# Patient Record
Sex: MALE | Race: WHITE | NOT HISPANIC OR LATINO | Employment: OTHER | URBAN - METROPOLITAN AREA
[De-identification: names, ages, dates, MRNs, and addresses within clinical notes are randomized per-mention and may not be internally consistent; named-entity substitution may affect disease eponyms.]

---

## 2017-05-16 ENCOUNTER — OFFICE VISIT (OUTPATIENT)
Dept: ORTHOPEDIC SURGERY | Facility: CLINIC | Age: 62
End: 2017-05-16

## 2017-05-16 VITALS — TEMPERATURE: 97.9 F | BODY MASS INDEX: 34.65 KG/M2 | WEIGHT: 270 LBS | HEIGHT: 74 IN

## 2017-05-16 DIAGNOSIS — R52 PAIN: Primary | ICD-10-CM

## 2017-05-16 DIAGNOSIS — M75.101 TEAR OF RIGHT ROTATOR CUFF, UNSPECIFIED TEAR EXTENT: ICD-10-CM

## 2017-05-16 PROCEDURE — 20610 DRAIN/INJ JOINT/BURSA W/O US: CPT | Performed by: ORTHOPAEDIC SURGERY

## 2017-05-16 RX ORDER — DICLOFENAC SODIUM 75 MG/1
TABLET, DELAYED RELEASE ORAL
COMMUNITY
Start: 2017-05-06 | End: 2023-01-06

## 2017-05-16 RX ORDER — METHYLPREDNISOLONE ACETATE 80 MG/ML
80 INJECTION, SUSPENSION INTRA-ARTICULAR; INTRALESIONAL; INTRAMUSCULAR; SOFT TISSUE
Status: COMPLETED | OUTPATIENT
Start: 2017-05-16 | End: 2017-05-16

## 2017-05-16 RX ORDER — BUPIVACAINE HYDROCHLORIDE 2.5 MG/ML
4 INJECTION, SOLUTION INFILTRATION; PERINEURAL
Status: COMPLETED | OUTPATIENT
Start: 2017-05-16 | End: 2017-05-16

## 2017-05-16 RX ADMIN — METHYLPREDNISOLONE ACETATE 80 MG: 80 INJECTION, SUSPENSION INTRA-ARTICULAR; INTRALESIONAL; INTRAMUSCULAR; SOFT TISSUE at 13:03

## 2017-05-16 RX ADMIN — BUPIVACAINE HYDROCHLORIDE 4 ML: 2.5 INJECTION, SOLUTION INFILTRATION; PERINEURAL at 13:03

## 2019-05-06 ENCOUNTER — HOSPITAL ENCOUNTER (OUTPATIENT)
Dept: GENERAL RADIOLOGY | Facility: HOSPITAL | Age: 64
Discharge: HOME | End: 2019-05-06

## 2019-07-31 ENCOUNTER — HOSPITAL ENCOUNTER (OUTPATIENT)
Dept: MRI IMAGING | Facility: HOSPITAL | Age: 64
Discharge: HOME | End: 2019-07-31

## 2020-05-27 ENCOUNTER — OFFICE VISIT CONVERTED (OUTPATIENT)
Dept: NEUROSURGERY | Facility: CLINIC | Age: 65
End: 2020-05-27
Attending: PHYSICIAN ASSISTANT

## 2020-06-18 ENCOUNTER — HOSPITAL ENCOUNTER (OUTPATIENT)
Dept: MRI IMAGING | Facility: HOSPITAL | Age: 65
Discharge: HOME | End: 2020-06-18
Attending: PHYSICIAN ASSISTANT

## 2020-06-23 ENCOUNTER — OFFICE VISIT CONVERTED (OUTPATIENT)
Dept: NEUROSURGERY | Facility: CLINIC | Age: 65
End: 2020-06-23
Attending: PHYSICIAN ASSISTANT
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
## 2021-05-14 NOTE — PROGRESS NOTES
Progress Note      Patient Name: Jimmy Gabehart   Patient ID: [id number]   Sex: Male   YOB: 1955    Referring Provider: Miriam Mckeon DO    Visit Date: June 23, 2020    Provider: Vaishnavi Jamil PA-C   Location: [facility]   Location Address: [address] [place] [address]  [id number]   Location Phone: [phone]          Chief Complaint  · Follow-up      History Of Present Illness  The patient is a 65 year old male who is in the office for followup appointment.      MRI cervical spine showed reversal of the normal cervical lordosis.  He has a disc protrusion at C3/4, C5/6 and C6/7 with moderate central canal stenosis at C3/4  and foraminal stenosis at C5/6 and C6/7.  These were the most notable findings.  This has only slightly progressed from MRI over a year ago. He is still having pain in the right arm down to the thumb and index finger.  His pain in the arm is intermittent along with paresthesias at this point.  He would like to avoid surgery.       Medication List  Apple Cider Vinegar Plus [phone] mg-mcg-mg-mg oral tablet; B12 5,000-100 mcg sublingual lozenge; carvedilol 6.25 mg oral tablet; diclofenac sodium 75 mg oral tablet,delayed release (DR/EC); escitalopram oxalate 5 mg oral tablet; gabapentin 300 mg oral capsule; losartan 100 mg oral tablet; magnesium 250 mg oral tablet; metformin 500 mg oral tablet; oxycodone 5 mg oral capsule; pantoprazole 20 mg oral tablet,delayed release (DR/EC); tamsulosin 0.4 mg oral capsule; Vitamin C 500 mg oral capsule, extended release         Allergy List  Demerol; PENICILLINS       Allergies Reconciled  Social History  Tobacco (Never)         Review of Systems  · Constitutional  o Denies  o : chills, excessive sweating, fatigue, fever, sycope/passing out, weight gain, weight loss  · Eyes  o Denies  o : changes in vision, blurry vision, double vision  · HENT  o Admits  o : sinus pain  o Denies  o : loss of hearing, ringing  "in the ears, ear aches, sore throat, nasal congestion, nose bleeds, seasonal allergies  · Cardiovascular  o Denies  o : blood clots, swollen legs, anemia, easy burising or bleeding, transfusions  · Respiratory  o Admits  o : dry cough  o Denies  o : shortness of breath, productive cough, pneumonia, COPD  · Gastrointestinal  o Admits  o : reflux  o Denies  o : difficulty swallowing  · Genitourinary  o Denies  o : incontinence  · Neurologic  o Denies  o : headache, seizure, stroke, tremor, loss of balance, falls, dizziness/vertigo, difficulty with sleep, numbness/tingling/paresthesia , difficulty with coordination, difficulty with dexterity, weakness  · Musculoskeletal  o Admits  o : neck stiffness/pain, joint pain, pain radiating in arm, pain radiating in leg, low back pain  o Denies  o : swollen lymph nodes, muscle aches, weakness, spasms, sciatica  · Endocrine  o Admits  o : diabetes  o Denies  o : thyroid disorder  · Psychiatric  o Denies  o : anxiety, depression  · All Others Negative      Vitals  Date Time BP Position Site L\R Cuff Size HR RR TEMP (F) WT  HT  BMI kg/m2 BSA m2 O2 Sat        06/23/2020 01:01 PM        97.1 257lbs 8oz 6'  2\" 33.06 2.47           Physical Examination  · Constitutional  o Appearance  o : well-nourished, well developed, alert, in no acute distress  · Respiratory  o Respiratory Effort  o : breathing unlabored  · Cardiovascular  o Peripheral Vascular System  o :   § Extremities  § : no cyanosis, clubbing or edema  · Neurologic  o Mental Status Examination  o :   § Orientation  § : grossly oriented to person, place and time  o Motor Examination  o :   § RUE Strength  § : strength normal  § RUE Motor Function  § : tone normal, muscle bulk normal, no abnormal movements noted  § LUE Strength  § : strength normal  § LUE Motor Function  § : tone normal, muscle bulk normal, no abnormal movements noted  § RLE Strength  § : strength normal  § RLE Motor Function  § : tone normal, no atrophy, no " abnormal movements noted  § LLE Strength  § : strength normal  § LLE Motor Function  § : tone normal, no atrophy, no abnormal movements noted  o Reflexes  o :   § RUE  § : biceps reflex 2, triceps reflex 2, brachioradialis reflex 2  § LUE  § : biceps reflex 2, triceps reflex 2, brachioradialis reflex 2  § RLE  § : 2/4 knee and ankle reflex  § LLE  § : 2/4 knee and ankle reflex  o Sensation  o :   § Light Touch  § : sensation intact to light touch in extremities  o Gait and Station  o :   § Gait Screening  § : gait within normal limits  · Psychiatric  o Mood and Affect  o : mood normal, affect appropriate          Assessment  · Cervicalgia     723.1/M54.2  · Cervical radiculopathy     723.4/M54.12  · Cervical disc disorder     722.91/M50.90  · Paresthesia     782.0/R20.2  · Cervical spondylosis     721.0/M47.812    Problems Reconciled  Plan  · Medications  o Medications have been Reconciled  o Transition of Care or Provider Policy  · Instructions  o Encouraged to follow-up with Primary Care Provider for preventative care.  o The ROS and the PFSH were reviewed at today's visit.  o Call or return to office if symptoms worsen or persist.   o I discussed with him the option of surgery due to disc protrusions at C5/6 and C6/7 but he would like to avoid surgery at this time. He will f/u with Dr. Murrieta if he would like to discuss surgery further.             Electronically Signed by: Vaishnavi Jamil PA-C -Author on June 23, 2020 01:38:38 PM

## 2021-05-15 VITALS
WEIGHT: 256 LBS | BODY MASS INDEX: 32.87 KG/M2 | HEART RATE: 66 BPM | TEMPERATURE: 97.2 F | SYSTOLIC BLOOD PRESSURE: 126 MMHG | DIASTOLIC BLOOD PRESSURE: 68 MMHG

## 2021-05-15 VITALS — TEMPERATURE: 97.1 F | BODY MASS INDEX: 33.05 KG/M2 | WEIGHT: 257.5 LBS | HEIGHT: 74 IN

## 2023-01-06 ENCOUNTER — OFFICE VISIT (OUTPATIENT)
Dept: ORTHOPEDIC SURGERY | Facility: CLINIC | Age: 68
End: 2023-01-06
Payer: MEDICARE

## 2023-01-06 VITALS
WEIGHT: 224 LBS | DIASTOLIC BLOOD PRESSURE: 62 MMHG | BODY MASS INDEX: 28.75 KG/M2 | SYSTOLIC BLOOD PRESSURE: 112 MMHG | HEIGHT: 74 IN

## 2023-01-06 DIAGNOSIS — E11.42 TYPE 2 DIABETES MELLITUS WITH DIABETIC POLYNEUROPATHY, WITHOUT LONG-TERM CURRENT USE OF INSULIN: ICD-10-CM

## 2023-01-06 DIAGNOSIS — M79.672 LEFT FOOT PAIN: Primary | ICD-10-CM

## 2023-01-06 DIAGNOSIS — M14.672 CHARCOT'S JOINT OF LEFT FOOT: ICD-10-CM

## 2023-01-06 PROCEDURE — 99204 OFFICE O/P NEW MOD 45 MIN: CPT | Performed by: ORTHOPAEDIC SURGERY

## 2023-01-06 RX ORDER — SITAGLIPTIN 50 MG/1
TABLET, FILM COATED ORAL
COMMUNITY
Start: 2022-11-01

## 2023-01-06 RX ORDER — ESCITALOPRAM OXALATE 5 MG/1
TABLET ORAL
COMMUNITY

## 2023-01-06 RX ORDER — ASPIRIN 81 MG/1
TABLET ORAL
COMMUNITY
Start: 2022-12-13 | End: 2023-04-18

## 2023-01-06 RX ORDER — GABAPENTIN 600 MG/1
TABLET ORAL
COMMUNITY
Start: 2022-12-07

## 2023-01-06 RX ORDER — ACETAMINOPHEN 500 MG
TABLET ORAL 3 TIMES DAILY
COMMUNITY
Start: 2022-12-13 | End: 2023-01-12

## 2023-01-06 RX ORDER — PANTOPRAZOLE SODIUM 40 MG/1
TABLET, DELAYED RELEASE ORAL
COMMUNITY
Start: 2022-10-27

## 2023-01-06 NOTE — PROGRESS NOTES
NEW PATIENT    Patient: Jimmy Gabehart  : 1955    Primary Care Provider: Miriam Mckeon DO    Requesting Provider: As above    Pain of the Left Ankle      History    Chief Complaint: Complicated left foot and ankle problem    History of Present Illness: This is an extremely pleasant 67-year-old gentleman who is here with his significant other.  He is seen at the request of Dr. Peña for complicated problem with respect to the left foot.  He has had diabetes for 8 to 10 years.  He reports his hemoglobin A1c is somewhere between 6 and 7.  He has seen podiatry in his hometown.  Last spring he had a severe infection of the left foot under the fifth metatarsal head.  He does have over-the-counter orthotics but no diabetic custom orthotics.  He does not do anything in the way of foot care himself.  He had a left ankle fusion secondary to trauma in .  He has had intermittent trouble on uneven ground in the hindfoot as expected.  He has been followed by podiatry for diabetic foot problems in the ankle.  He has x-rays on a disc of the left foot from 7/15/2018.  They show what looks like a solid ankle fusion with some narrowing of the subtalar joint to my interpretation.  The talus and talonavicular joints still have normal contour.  There is another x-ray of the left ankle from 2020, similarly there is no signs of Charcot joint as yet.  He had an MRI done on 2021 and the second 1 on 2021 that are on the disc and I reviewed both.  I also reviewed the report.  To my interpretation is a solid ankle fusion, and there is arthritic change in the subtalar joint as expected, no signs of Charcot joint at that time.  Over the past several months he has had increasing difficulty with pain and swelling in the left hindfoot.  He had to stop taking his anti-inflammatory medication  to have a right total knee done by Dr. Peña on .  He noticed even more pain when the  medication stopped.  Dr. Peña has sent him for evaluation of the left hindfoot pain and swelling.  The patient also notes its been very warm.  He has not had any fever nor chills.  He is still using a walker and recovery from the right knee replacement.  He is known to have diabetic neuropathy.  He takes Neurontin for pain.      The patient does not have a personal or family history of DVT, PE, hypercoagulable state or risk factors for clotting.         Current Outpatient Medications on File Prior to Visit   Medication Sig Dispense Refill   • acetaminophen (TYLENOL) 500 MG tablet 3 (Three) Times a Day.     • aspirin 81 MG EC tablet Aspirin 81 MG Oral Tablet Delayed Release QTY: 84 tablet Days: 42 Refills: 2  Written: 12/13/22 Patient Instructions: twice a day     • carvedilol (COREG) 6.25 MG tablet      • Diclofenac Sodium (VOLTAREN) 1 % gel gel apply a thin film (up to 4 grams or 4 pumps) to affected areas of feet up to four-five times daily (max 16 gm/day). Rub in for 2 minutes.     • escitalopram (LEXAPRO) 5 MG tablet escitalopram 5 mg tablet     • gabapentin (NEURONTIN) 600 MG tablet      • Januvia 50 MG tablet      • losartan (COZAAR) 100 MG tablet      • meloxicam (MOBIC) 15 MG tablet      • metFORMIN (GLUCOPHAGE) 500 MG tablet      • ONE TOUCH ULTRA TEST test strip      • ONETOUCH DELICA LANCETS 33G misc      • pantoprazole (PROTONIX) 40 MG EC tablet      • tamsulosin (FLOMAX) 0.4 MG capsule 24 hr capsule      • [DISCONTINUED] ammonium lactate (LAC-HYDRIN) 12 % lotion      • [DISCONTINUED] clotrimazole-betamethasone (LOTRISONE) 1-0.05 % cream      • [DISCONTINUED] diclofenac (VOLTAREN) 75 MG EC tablet      • [DISCONTINUED] fluticasone (FLONASE) 50 MCG/ACT nasal spray      • [DISCONTINUED] FLUZONE QUADRIVALENT 0.5 ML suspension prefilled syringe injection      • [DISCONTINUED] losartan (COZAAR) 50 MG tablet      • [DISCONTINUED] NAPROXEN  MG EC tablet      • [DISCONTINUED] pantoprazole (PROTONIX)  20 MG EC tablet      • [DISCONTINUED] prednisoLONE acetate (PRED FORTE) 1 % ophthalmic suspension      • [DISCONTINUED] trimethoprim-polymyxin b (POLYTRIM) 72863-0.1 UNIT/ML-% ophthalmic solution      • [DISCONTINUED] VIGAMOX 0.5 % ophthalmic solution        No current facility-administered medications on file prior to visit.      Allergies   Allergen Reactions   • Demerol [Meperidine]    • Penicillins       Past Medical History:   Diagnosis Date   • Diabetes (HCC)    • Hypertension      Past Surgical History:   Procedure Laterality Date   • BACK SURGERY     • CARPAL TUNNEL RELEASE     • COLONOSCOPY     • EYE SURGERY     • FOOT SURGERY Left 1986   • HERNIA REPAIR     • KNEE SURGERY Right 12/14/2022    TKA   • TONSILLECTOMY AND ADENOIDECTOMY     • TRIGGER FINGER RELEASE     • WISDOM TOOTH EXTRACTION       Family History   Problem Relation Age of Onset   • Hypertension Mother    • Heart attack Mother    • Kidney disease Mother    • High cholesterol Mother    • Arthritis Mother    • Heart attack Father       Social History     Socioeconomic History   • Marital status:    Tobacco Use   • Smoking status: Never   • Smokeless tobacco: Never   Substance and Sexual Activity   • Alcohol use: Yes     Comment: occasional   • Drug use: No   • Sexual activity: Defer        Review of Systems   Constitutional: Negative.    HENT: Negative.    Eyes: Negative.    Respiratory: Negative.    Cardiovascular: Negative.    Gastrointestinal: Negative.    Endocrine: Negative.    Genitourinary: Negative.    Musculoskeletal: Positive for arthralgias.   Skin: Negative.    Allergic/Immunologic: Negative.    Neurological: Negative.    Hematological: Negative.    Psychiatric/Behavioral: Negative.        The following portions of the patient's history were reviewed and updated as appropriate: allergies, current medications, past family history, past medical history, past social history, past surgical history and problem list.    Physical  Exam:   /62   Ht 188 cm (74.02\")   Wt 102 kg (224 lb)   BMI 28.75 kg/m²   GENERAL: Body habitus: overweight    Lower extremity edema: Right: trace; Left: trace    Varicose veins:  Right: mild; Left: mild    Gait: using walker     Mental Status:  awake and alert; oriented to person, place, and time    Voice:  clear  SKIN:  Lower extremity: warm and dry    Hair Growth(lower extremity):  Right:diminished; Left:  diminished  NAILS: Toenails: thick  HEENT: Head: Normocephalic, atraumatic,  without obvious abnormality.  eye: normal external eye, no icterus  ears:normal external ears  Pupils are asymmetric, evidently from prior injury, he reports vision is significantly decreased  PULM:  Repiratory effort normal  CV:  Dorsalis Pedis:  Right: 2+; Left:2+    Posterior Tibial: Right:2+; Left:2+    Capillary Refill:  Brisk  MSK:  Hand:intrinsic wasting      Tibia:  Right:  Total knee incision is healing well; Left:  non tender      Ankle:  Right: non tender; Left:  Left ankle is very stiff as expected healed lateral incision, the talonavicular and subtalar region are very swollen, they are warm, there is no erythema, minimal midfoot range of motion      Foot:  Right:  non tender; Left:  No other swelling in the foot, Tilleaux midfoot range of motion, toes have reasonable range of motion      NEURO: Heel Walking:  Right:  unable to test; Left:  unable to test    Toe Walking:  Right:  unable to test; Left:  unable to test     [place]-Radha 5.07 monofilament test: absent    Lower extremity sensation: diminished             Motor Function: All motors fire in the right lower extremity, the left toe motors fire, difficult to evaluate anything else secondary to ankle fusion         Medical Decision Making    Data Review:   ordered and reviewed x-rays today, reviewed prior lab results, reviewed radiology images, reviewed radiology results, reviewed outside records and phone conversation with physician    Assessment and  Plan/ Diagnosis/Treatment options:   1. Type 2 diabetes mellitus with diabetic polyneuropathy, without long-term current use of insulin (HCC)  I reviewed his outside records extensively.  I reviewed the podiatry notes.  I reviewed the studies both on the disc and the reports.  I also reviewed ABIs which were done3/31/2022 which were normal.  These were done at [facility].  I also discussed the patient with Dr. Peña.  He has stage I Charcot destruction of his talus and subtalar joint.  He still has reasonable alignment.  There is no active signs of infection.  He does not have any infection at present under the fifth metatarsal head where he had this previously.  I explained Charcot joints at length to the patient and his significant other.  I also went over diabetic foot care in great detail.  I explained that Charcot joints can take 6 to 12 months to reach stage III.  I explained each different stage.  I explained that the greatest risk in all of this is amputation.  The goal of treatment is to keep the foot in reasonable alignment so that it will be able to go into a shoe or brace.  I explained surgery is not indicated at this stage in time.  I definitely recommend postponing his knee replacement because of the risk of Charcot destruction of the knee.  In the acute inflammatory Charcot phase surgery is much higher risk even in the more proximal joint.  I explained I would usually treat this with a nonweightbearing cast, however because of the fresh knee replacement on the right he is not able to do that.  Therefore we will use a tall boot at present.  He needs to definitely limit his activity.  If he gets to the point he can be nonweightbearing on the left that would be ideal.  I will see him again in 6 weeks with 2 views of the ankle and AP of the foot, sooner if anything gets worse.    2. Charcot's joint of left foot  As above very lengthy discussion, very high risk problem    3. Left foot  pain  As above      I spent more than 50 minutes with the paperwork, reviewing studies, reviewing records seeing the patient taking history, lengthy discussion with the patient and girlfriend, discussion with Dr. Peña, etc.  - XR Ankle 2 View Left  - XR Foot 2 View Left            Part of this encounter note is an electronic transcription/translation of spoken language to printed text. The electronic translation of spoken language may permit erroneous, or at times, nonsensical words or phrases to be inadvertently transcribed; Although I have reviewed the note for such errors, some may still exist.          Kathryn Cui MD

## 2023-01-10 ENCOUNTER — TELEPHONE (OUTPATIENT)
Dept: ORTHOPEDIC SURGERY | Facility: CLINIC | Age: 68
End: 2023-01-10
Payer: MEDICARE

## 2023-01-16 ENCOUNTER — TELEPHONE (OUTPATIENT)
Dept: ORTHOPEDIC SURGERY | Facility: CLINIC | Age: 68
End: 2023-01-16
Payer: MEDICARE

## 2023-01-16 NOTE — TELEPHONE ENCOUNTER
Called patients wife back and lvm letting her know that there are a few in [place] near them. I gave her the names:    Dr Tarik Vuong  [facility]    Let her know to call back if she needed any other referrals    Elisha

## 2023-01-16 NOTE — TELEPHONE ENCOUNTER
This patient is asking if there is a podiatrist that is recommended. Wife said a voice message is fine.

## 2023-01-16 NOTE — TELEPHONE ENCOUNTER
Caller: SHERRY GABEHART    Relationship to patient: SELF    Best call back number: [phone]    Patient is needing: PATIENT IS HAVING TOE FUNGUS AND IS NEEDING HIS TOENAILS CLIPPED.  THE PATIENT'S WIFE WALE WANTS TO KNOW IF SHE CAN PERFORM THAT FOR HIS APPOINTMENT ON 02/17/2023

## 2023-01-16 NOTE — TELEPHONE ENCOUNTER
Called patients wife back- [facility] for her to return my call.     Elisha

## 2023-02-08 ENCOUNTER — OFFICE VISIT (OUTPATIENT)
Dept: ORTHOPEDIC SURGERY | Facility: CLINIC | Age: 68
End: 2023-02-08
Payer: MEDICARE

## 2023-02-08 VITALS
WEIGHT: 231.8 LBS | DIASTOLIC BLOOD PRESSURE: 58 MMHG | BODY MASS INDEX: 29.75 KG/M2 | HEIGHT: 74 IN | SYSTOLIC BLOOD PRESSURE: 130 MMHG

## 2023-02-08 DIAGNOSIS — E11.42 TYPE 2 DIABETES MELLITUS WITH DIABETIC POLYNEUROPATHY, WITHOUT LONG-TERM CURRENT USE OF INSULIN: ICD-10-CM

## 2023-02-08 DIAGNOSIS — M14.672 CHARCOT'S JOINT OF LEFT FOOT: Primary | ICD-10-CM

## 2023-02-08 PROCEDURE — 99213 OFFICE O/P EST LOW 20 MIN: CPT | Performed by: ORTHOPAEDIC SURGERY

## 2023-02-08 RX ORDER — EMULSION BASE NO.258
EMULSION (GRAM) TOPICAL
COMMUNITY

## 2023-02-08 RX ORDER — OXYCODONE HYDROCHLORIDE 5 MG/1
TABLET ORAL
COMMUNITY
Start: 2023-01-24

## 2023-02-08 RX ORDER — METFORMIN HYDROCHLORIDE 500 MG/1
TABLET, EXTENDED RELEASE ORAL
COMMUNITY
Start: 2023-02-04

## 2023-02-08 RX ORDER — OXYCODONE AND ACETAMINOPHEN 10; 325 MG/1; MG/1
TABLET ORAL
COMMUNITY
Start: 2023-01-25

## 2023-02-08 RX ORDER — CEPHALEXIN 500 MG/1
500 CAPSULE ORAL EVERY 6 HOURS
Qty: 60 CAPSULE | Refills: 0 | Status: SHIPPED | OUTPATIENT
Start: 2023-02-08

## 2023-02-08 NOTE — PROGRESS NOTES
ESTABLISHED PATIENT    Patient: Jimmy Gabehart  : 1955    Primary Care Provider: Miriam Mckeon DO    Requesting Provider: As above    Follow-up ( 4 weeek f/u-- Charcot's joint of left foot)      History    Chief Complaint: Returns for follow-up left Charcot foot    History of Present Illness: He returns for follow-up of his left Charcot hindfoot.  His wife called and brought him in earlier because he has developed a superficial ulcer on the lateral aspect of the fifth metatarsal head.  The brace shop tried to adjust the boot for him to help decrease the pressure on that area.    Current Outpatient Medications on File Prior to Visit   Medication Sig Dispense Refill   • aspirin 81 MG EC tablet Aspirin 81 MG Oral Tablet Delayed Release QTY: 84 tablet Days: 42 Refills: 2  Written: 22 Patient Instructions: twice a day     • carvedilol (COREG) 6.25 MG tablet      • diclofenac (VOLTAREN) 50 MG EC tablet      • Diclofenac Sodium (VOLTAREN) 1 % gel gel apply a thin film (up to 4 grams or 4 pumps) to affected areas of feet up to four-five times daily (max 16 gm/day). Rub in for 2 minutes.     • escitalopram (LEXAPRO) 5 MG tablet escitalopram 5 mg tablet     • gabapentin (NEURONTIN) 600 MG tablet      • Januvia 50 MG tablet      • losartan (COZAAR) 100 MG tablet      • metFORMIN ER (GLUCOPHAGE-XR) 500 MG 24 hr tablet      • Naproxen Sodium-Gabapentin (Gabapentin-Naproxen Cmpd Kit) 5-10 % cream Apply  topically.     • ONE TOUCH ULTRA TEST test strip      • ONETOUCH DELICA LANCETS 33G misc      • oxyCODONE (ROXICODONE) 5 MG immediate release tablet      • pantoprazole (PROTONIX) 40 MG EC tablet      • tamsulosin (FLOMAX) 0.4 MG capsule 24 hr capsule      • oxyCODONE-acetaminophen (PERCOCET)  MG per tablet      • [DISCONTINUED] meloxicam (MOBIC) 15 MG tablet      • [DISCONTINUED] metFORMIN (GLUCOPHAGE) 500 MG tablet        No current facility-administered medications on file prior to visit.     "  Allergies   Allergen Reactions   • Demerol [Meperidine]    • Penicillins       Past Medical History:   Diagnosis Date   • Diabetes (HCC)    • Hypertension      Past Surgical History:   Procedure Laterality Date   • BACK SURGERY     • CARPAL TUNNEL RELEASE     • COLONOSCOPY     • EYE SURGERY     • FOOT SURGERY Left 1986   • HERNIA REPAIR     • KNEE SURGERY Right 12/14/2022    TKA right knee manipulation 01-25-23   • TONSILLECTOMY AND ADENOIDECTOMY     • TRIGGER FINGER RELEASE     • WISDOM TOOTH EXTRACTION       Family History   Problem Relation Age of Onset   • Hypertension Mother    • Heart attack Mother    • Kidney disease Mother    • High cholesterol Mother    • Arthritis Mother    • Heart attack Father       Social History     Socioeconomic History   • Marital status:    Tobacco Use   • Smoking status: Never   • Smokeless tobacco: Never   Substance and Sexual Activity   • Alcohol use: Yes     Comment: occasional   • Drug use: No   • Sexual activity: Defer        Review of Systems   Constitutional: Negative.    HENT: Negative.    Eyes: Negative.    Respiratory: Negative.    Cardiovascular: Negative.    Gastrointestinal: Negative.    Endocrine: Negative.    Genitourinary: Negative.    Musculoskeletal: Positive for arthralgias.   Skin: Negative.    Allergic/Immunologic: Negative.    Neurological: Negative.    Hematological: Negative.    Psychiatric/Behavioral: Negative.        The following portions of the patient's history were reviewed and updated as appropriate: allergies, current medications, past family history, past medical history, past social history, past surgical history and problem list.    Physical Exam:   /58   Ht 188 cm (74.02\")   Wt 105 kg (231 lb 12.8 oz)   BMI 29.75 kg/m²   The left Charcot hindfoot is less swollen today and less warm, he has a new superficial ulcer of the fifth metatarsal head on the lateral aspect from rubbing in the boot it has a faint ring of erythema there is " no drainage there is no swelling    Medical Decision Making    Data Review:   ordered and reviewed x-rays today    Assessment/Plan/Diagnosis/Treatment Options:   1. Charcot's joint of left foot  I think he is in stage II Charcot arthropathy.  There is no new fragmentation.  He has less swelling and warmth.  We need to continue a boot or cast.  I am concerned that a cast would have high risk of ulceration.  The boot however is putting pressure over the fifth metatarsal.  We do not have any tools today that can cut away that part of the boot but he has some at home and he will try that to see if we can avoid the pressure.  I also recommend they take another orthotic and stack it up under the new orthotic to help lift the foot up above the hard edge of the boot.  I am going to put him on a week of Keflex.  I want him to use Bactroban on this once a day.  I will see him in a week.  No x-ray at that time  - XR Ankle 2 View Left  - XR Foot 2 View Left    2. Type 2 diabetes mellitus with diabetic polyneuropathy, without long-term current use of insulin (HCC)  As above        Kathryn Cui MD

## 2023-02-17 ENCOUNTER — OFFICE VISIT (OUTPATIENT)
Dept: ORTHOPEDIC SURGERY | Facility: CLINIC | Age: 68
End: 2023-02-17
Payer: MEDICARE

## 2023-02-17 VITALS
BODY MASS INDEX: 29.65 KG/M2 | WEIGHT: 231 LBS | SYSTOLIC BLOOD PRESSURE: 124 MMHG | DIASTOLIC BLOOD PRESSURE: 60 MMHG | HEIGHT: 74 IN

## 2023-02-17 DIAGNOSIS — E11.622 DIABETIC ULCER OF LOWER EXTREMITY: ICD-10-CM

## 2023-02-17 DIAGNOSIS — M14.672 CHARCOT'S JOINT OF LEFT FOOT: Primary | ICD-10-CM

## 2023-02-17 DIAGNOSIS — L97.909 DIABETIC ULCER OF LOWER EXTREMITY: ICD-10-CM

## 2023-02-17 PROCEDURE — 99212 OFFICE O/P EST SF 10 MIN: CPT | Performed by: ORTHOPAEDIC SURGERY

## 2023-02-17 NOTE — PROGRESS NOTES
ESTABLISHED PATIENT    Patient: Jimmy Gabehart  : 1955    Primary Care Provider: Miriam Mckeon DO    Requesting Provider: As above    Follow-up (1 week follow up -- Charcot's joint of left foot)      History    Chief Complaint: ***    History of Present Illness: ***    Current Outpatient Medications on File Prior to Visit   Medication Sig Dispense Refill   • aspirin 81 MG EC tablet Aspirin 81 MG Oral Tablet Delayed Release QTY: 84 tablet Days: 42 Refills: 2  Written: 22 Patient Instructions: twice a day     • carvedilol (COREG) 6.25 MG tablet      • cephalexin (KEFLEX) 500 MG capsule Take 1 capsule by mouth Every 6 (Six) Hours. 60 capsule 0   • diclofenac (VOLTAREN) 50 MG EC tablet      • Diclofenac Sodium (VOLTAREN) 1 % gel gel apply a thin film (up to 4 grams or 4 pumps) to affected areas of feet up to four-five times daily (max 16 gm/day). Rub in for 2 minutes.     • escitalopram (LEXAPRO) 5 MG tablet escitalopram 5 mg tablet     • gabapentin (NEURONTIN) 600 MG tablet      • Januvia 50 MG tablet      • losartan (COZAAR) 100 MG tablet      • metFORMIN ER (GLUCOPHAGE-XR) 500 MG 24 hr tablet      • mupirocin (BACTROBAN) 2 % ointment Apply 1 application topically to the appropriate area as directed Daily. 30 g 5   • Naproxen Sodium-Gabapentin (Gabapentin-Naproxen Cmpd Kit) 5-10 % cream Apply  topically.     • ONE TOUCH ULTRA TEST test strip      • ONETOUCH DELICA LANCETS 33G misc      • oxyCODONE (ROXICODONE) 5 MG immediate release tablet      • oxyCODONE-acetaminophen (PERCOCET)  MG per tablet      • pantoprazole (PROTONIX) 40 MG EC tablet      • tamsulosin (FLOMAX) 0.4 MG capsule 24 hr capsule        No current facility-administered medications on file prior to visit.      Allergies   Allergen Reactions   • Demerol [Meperidine]    • Penicillins       Past Medical History:   Diagnosis Date   • Diabetes (HCC)    • Hypertension      Past Surgical History:   Procedure Laterality Date   • BACK  "SURGERY     • CARPAL TUNNEL RELEASE     • COLONOSCOPY     • EYE SURGERY     • FOOT SURGERY Left 1986   • HERNIA REPAIR     • KNEE SURGERY Right 12/14/2022    TKA right knee manipulation 01-25-23   • TONSILLECTOMY AND ADENOIDECTOMY     • TRIGGER FINGER RELEASE     • WISDOM TOOTH EXTRACTION       Family History   Problem Relation Age of Onset   • Hypertension Mother    • Heart attack Mother    • Kidney disease Mother    • High cholesterol Mother    • Arthritis Mother    • Heart attack Father       Social History     Socioeconomic History   • Marital status:    Tobacco Use   • Smoking status: Never   • Smokeless tobacco: Never   Substance and Sexual Activity   • Alcohol use: Yes     Comment: occasional   • Drug use: No   • Sexual activity: Defer        Review of Systems   Constitutional: Negative.    HENT: Negative.    Eyes: Negative.    Respiratory: Negative.    Cardiovascular: Negative.    Gastrointestinal: Negative.    Endocrine: Negative.    Genitourinary: Negative.    Musculoskeletal: Positive for arthralgias.   Skin: Negative.    Allergic/Immunologic: Negative.    Neurological: Negative.    Hematological: Negative.    Psychiatric/Behavioral: Negative.        The following portions of the patient's history were reviewed and updated as appropriate: {history reviewed:20406::\"allergies\",\"current medications\",\"past family history\",\"past medical history\",\"past social history\",\"past surgical history\",\"problem list\"}.    Physical Exam:   Ht 188 cm (74.02\")   Wt 105 kg (231 lb)   BMI 29.65 kg/m²   GENERAL: Body habitus: {body habitus:29130}    Lower extremity edema: Left: {ltdedema:95670::\"none\"}; Right: {ltdedema:25377::\"none\"}    Gait: {ltdgait:49547::\"normal\"}     Mental Status:  {mental status:29836}  MSK:  Tibia:  Right:  {tibia exam:32247}; Left:  {tibia exam:91927}        Ankle:  Right: {foot/ankle exam:76647}; Left:  {foot/ankle exam:07760}        Foot:  Right:  {foot/ankle exam:33654}; Left:  " {foot/ankle exam:27430}    NEURO Sensation:  {sensation:57080}    Medical Decision Making    Data Review:   {Data Review:70654}    Assessment/Plan/Diagnosis/Treatment Options:   There are no diagnoses linked to this encounter.  ***    Kathryn Cui MD

## 2023-02-17 NOTE — PROGRESS NOTES
ESTABLISHED PATIENT    Patient: Jimmy Gabehart  : 1955    Primary Care Provider: Miriam Mckeon DO    Requesting Provider: As above    Follow-up (1 week follow up -- Charcot's joint of left foot)      History    Chief Complaint: charcot joint    History of Present Illness: he returns for f/u of left charcot hindfoot and new superficial  Ulcer on lateral left 5th metatarsal head- much improved    Current Outpatient Medications on File Prior to Visit   Medication Sig Dispense Refill   • aspirin 81 MG EC tablet Aspirin 81 MG Oral Tablet Delayed Release QTY: 84 tablet Days: 42 Refills: 2  Written: 22 Patient Instructions: twice a day     • carvedilol (COREG) 6.25 MG tablet      • cephalexin (KEFLEX) 500 MG capsule Take 1 capsule by mouth Every 6 (Six) Hours. 60 capsule 0   • diclofenac (VOLTAREN) 50 MG EC tablet      • Diclofenac Sodium (VOLTAREN) 1 % gel gel apply a thin film (up to 4 grams or 4 pumps) to affected areas of feet up to four-five times daily (max 16 gm/day). Rub in for 2 minutes.     • escitalopram (LEXAPRO) 5 MG tablet escitalopram 5 mg tablet     • gabapentin (NEURONTIN) 600 MG tablet      • Januvia 50 MG tablet      • losartan (COZAAR) 100 MG tablet      • metFORMIN ER (GLUCOPHAGE-XR) 500 MG 24 hr tablet      • mupirocin (BACTROBAN) 2 % ointment Apply 1 application topically to the appropriate area as directed Daily. 30 g 5   • Naproxen Sodium-Gabapentin (Gabapentin-Naproxen Cmpd Kit) 5-10 % cream Apply  topically.     • ONE TOUCH ULTRA TEST test strip      • ONETOUCH DELICA LANCETS 33G misc      • oxyCODONE (ROXICODONE) 5 MG immediate release tablet      • oxyCODONE-acetaminophen (PERCOCET)  MG per tablet      • pantoprazole (PROTONIX) 40 MG EC tablet      • tamsulosin (FLOMAX) 0.4 MG capsule 24 hr capsule        No current facility-administered medications on file prior to visit.      Allergies   Allergen Reactions   • Demerol [Meperidine]    • Penicillins       Past Medical  "History:   Diagnosis Date   • Diabetes (HCC)    • Hypertension      Past Surgical History:   Procedure Laterality Date   • BACK SURGERY     • CARPAL TUNNEL RELEASE     • COLONOSCOPY     • EYE SURGERY     • FOOT SURGERY Left 1986   • HERNIA REPAIR     • KNEE SURGERY Right 12/14/2022    TKA right knee manipulation 01-25-23   • TONSILLECTOMY AND ADENOIDECTOMY     • TRIGGER FINGER RELEASE     • WISDOM TOOTH EXTRACTION       Family History   Problem Relation Age of Onset   • Hypertension Mother    • Heart attack Mother    • Kidney disease Mother    • High cholesterol Mother    • Arthritis Mother    • Heart attack Father       Social History     Socioeconomic History   • Marital status:    Tobacco Use   • Smoking status: Never   • Smokeless tobacco: Never   Substance and Sexual Activity   • Alcohol use: Yes     Comment: occasional   • Drug use: No   • Sexual activity: Defer        Review of Systems   Constitutional: Negative.    HENT: Negative.    Eyes: Negative.    Respiratory: Negative.    Cardiovascular: Negative.    Gastrointestinal: Negative.    Endocrine: Negative.    Genitourinary: Negative.    Musculoskeletal: Positive for arthralgias.   Skin: Negative.    Allergic/Immunologic: Negative.    Neurological: Negative.    Hematological: Negative.    Psychiatric/Behavioral: Negative.        The following portions of the patient's history were reviewed and updated as appropriate: allergies, current medications, past family history, past medical history, past social history, past surgical history and problem list.    Physical Exam:   /60   Ht 188 cm (74.02\")   Wt 105 kg (231 lb)   BMI 29.65 kg/m²        Medical Decision Making    Data Review:   none    Assessment/Plan/Diagnosis/Treatment Options:   1. Charcot's joint of left foot  Stage 2, improving    2. Diabetic ulcer of lower extremity (HCC)  Much improved,  I will see him in 8 weeks for xray ofankle, he will see one of the partners sooner if any " problems

## 2023-04-14 ENCOUNTER — OFFICE VISIT (OUTPATIENT)
Dept: ORTHOPEDIC SURGERY | Facility: CLINIC | Age: 68
End: 2023-04-14
Payer: MEDICARE

## 2023-04-14 VITALS
HEIGHT: 74 IN | DIASTOLIC BLOOD PRESSURE: 84 MMHG | SYSTOLIC BLOOD PRESSURE: 128 MMHG | WEIGHT: 231.48 LBS | BODY MASS INDEX: 29.71 KG/M2

## 2023-04-14 DIAGNOSIS — M14.672 CHARCOT'S JOINT OF LEFT FOOT: Primary | ICD-10-CM

## 2023-04-14 PROCEDURE — 1159F MED LIST DOCD IN RCRD: CPT | Performed by: ORTHOPAEDIC SURGERY

## 2023-04-14 PROCEDURE — 99213 OFFICE O/P EST LOW 20 MIN: CPT | Performed by: ORTHOPAEDIC SURGERY

## 2023-04-14 PROCEDURE — 1160F RVW MEDS BY RX/DR IN RCRD: CPT | Performed by: ORTHOPAEDIC SURGERY

## 2023-04-14 RX ORDER — CEPHALEXIN 500 MG/1
500 CAPSULE ORAL EVERY 6 HOURS
Qty: 60 CAPSULE | Refills: 0 | Status: SHIPPED | OUTPATIENT
Start: 2023-04-14

## 2023-04-14 NOTE — LETTER
2023     Gunnar Addison MD  [id number] [facility] [address]    Patient: Jimmy Gabehart   YOB: 1955   Date of Visit: 2023       Dear Dr. Cyn MD:    Thank you for referring Jimmy Gabehart to me for evaluation. Below are the relevant portions of my assessment and plan of care.    If you have questions, please do not hesitate to call me. I look forward to following Con along with you.         Sincerely,        Kathryn Zuniga MD        CC: No Recipients    Kathryn Zuniga MD  [phone]  Signed  ESTABLISHED PATIENT    Patient: Jimmy Gabehart  : 1955    Primary Care Provider: Miriam Mckeon DO    Requesting Provider: As above    Follow-up (8 weeks- Charcot's joint of left foot)      History    Chief Complaint: Left Charcot joint    History of Present Illness: He returns for follow-up of his left Charcot hindfoot status post prior ankle fusion.  He has been on his feet more and had more irritation around the left fifth metatarsal head yesterday because his mother passed away and he had to be up and around.  He also has some new erythema on the great toe and eschar.  He has been putting medicine on it for fungus.  He has not had any fever or chills.    Current Outpatient Medications on File Prior to Visit   Medication Sig Dispense Refill   • aspirin 81 MG EC tablet Aspirin 81 MG Oral Tablet Delayed Release QTY: 84 tablet Days: 42 Refills: 2  Written: 22 Patient Instructions: twice a day     • carvedilol (COREG) 6.25 MG tablet      • cephalexin (KEFLEX) 500 MG capsule Take 1 capsule by mouth Every 6 (Six) Hours. 60 capsule 0   • diclofenac (VOLTAREN) 50 MG EC tablet      • Diclofenac Sodium (VOLTAREN) 1 % gel gel apply a thin film (up to 4 grams or 4 pumps) to affected areas of feet up to four-five times daily (max 16 gm/day). Rub in for 2 minutes.     • escitalopram (LEXAPRO) 5 MG tablet escitalopram 5 mg tablet     • gabapentin  (NEURONTIN) 600 MG tablet      • Januvia 50 MG tablet      • losartan (COZAAR) 100 MG tablet      • metFORMIN ER (GLUCOPHAGE-XR) 500 MG 24 hr tablet      • mupirocin (BACTROBAN) 2 % ointment Apply 1 application topically to the appropriate area as directed Daily. 30 g 5   • Naproxen Sodium-Gabapentin (Gabapentin-Naproxen Cmpd Kit) 5-10 % cream Apply  topically.     • ONE TOUCH ULTRA TEST test strip      • ONETOUCH DELICA LANCETS 33G misc      • oxyCODONE (ROXICODONE) 5 MG immediate release tablet      • oxyCODONE-acetaminophen (PERCOCET)  MG per tablet      • pantoprazole (PROTONIX) 40 MG EC tablet      • tamsulosin (FLOMAX) 0.4 MG capsule 24 hr capsule        No current facility-administered medications on file prior to visit.      Allergies   Allergen Reactions   • Demerol [Meperidine]    • Penicillins       Past Medical History:   Diagnosis Date   • Diabetes    • Hypertension      Past Surgical History:   Procedure Laterality Date   • BACK SURGERY     • CARPAL TUNNEL RELEASE     • COLONOSCOPY     • EYE SURGERY     • FOOT SURGERY Left 1986   • HERNIA REPAIR     • KNEE SURGERY Right 12/14/2022    TKA right knee manipulation 01-25-23   • TONSILLECTOMY AND ADENOIDECTOMY     • TRIGGER FINGER RELEASE     • WISDOM TOOTH EXTRACTION       Family History   Problem Relation Age of Onset   • Hypertension Mother    • Heart attack Mother    • Kidney disease Mother    • High cholesterol Mother    • Arthritis Mother    • Heart attack Father       Social History     Socioeconomic History   • Marital status:    Tobacco Use   • Smoking status: Never   • Smokeless tobacco: Never   Substance and Sexual Activity   • Alcohol use: Yes     Comment: occasional   • Drug use: No   • Sexual activity: Defer        Review of Systems   Constitutional: Negative.    HENT: Negative.    Eyes: Negative.    Respiratory: Negative.    Cardiovascular: Negative.    Gastrointestinal: Negative.    Endocrine: Negative.    Genitourinary:  "Negative.    Musculoskeletal: Positive for arthralgias.   Skin: Negative.    Allergic/Immunologic: Negative.    Neurological: Negative.    Hematological: Negative.    Psychiatric/Behavioral: Negative.        The following portions of the patient's history were reviewed and updated as appropriate: allergies, current medications, past family history, past medical history, past social history, past surgical history and problem list.    Physical Exam:   /84   Ht 188 cm (74.02\")   Wt 105 kg (231 lb 7.7 oz)   BMI 29.71 kg/m²   The left hindfoot is much less swollen than previously remains mildly warm, still in reasonable alignment.  The ulcer that was on the lateral fifth metatarsal head is healed.  The great toe has faint erythema around the toenail which is short with a large dorsal eschar    Medical Decision Making    Data Review:   ordered and reviewed x-rays today    Assessment/Plan/Diagnosis/Treatment Options:   1. Charcot's joint of left foot  I am going to start him on some Keflex for the erythema around the nail and recommend that he see podiatrist for advice regarding the toenail.  With respect of his Charcot hindfoot he is improving steadily.  He got a new boot today because the other one is getting very worn.  He needs to be very careful to avoid pressure on the fifth metatarsal head.  I will see him in 8 weeks with standing 2 views of the ankle with a full-length lateral on the x-ray  - XR Ankle 2 View Left        Kathryn Cui MD                        "

## 2023-06-09 ENCOUNTER — OFFICE VISIT (OUTPATIENT)
Dept: ORTHOPEDIC SURGERY | Facility: CLINIC | Age: 68
End: 2023-06-09
Payer: MEDICARE

## 2023-06-09 VITALS
SYSTOLIC BLOOD PRESSURE: 138 MMHG | WEIGHT: 240.8 LBS | BODY MASS INDEX: 30.9 KG/M2 | DIASTOLIC BLOOD PRESSURE: 62 MMHG | HEIGHT: 74 IN

## 2023-06-09 DIAGNOSIS — L97.909 DIABETIC ULCER OF LOWER EXTREMITY: ICD-10-CM

## 2023-06-09 DIAGNOSIS — E11.622 DIABETIC ULCER OF LOWER EXTREMITY: ICD-10-CM

## 2023-06-09 DIAGNOSIS — M14.672 CHARCOT'S JOINT OF LEFT FOOT: Primary | ICD-10-CM

## 2023-06-09 PROCEDURE — 99213 OFFICE O/P EST LOW 20 MIN: CPT | Performed by: ORTHOPAEDIC SURGERY

## 2023-06-09 RX ORDER — LEVOFLOXACIN 500 MG/1
500 TABLET, FILM COATED ORAL DAILY
Qty: 14 TABLET | Refills: 1 | Status: SHIPPED | OUTPATIENT
Start: 2023-06-09

## 2023-06-09 RX ORDER — OXYCODONE AND ACETAMINOPHEN 7.5; 325 MG/1; MG/1
TABLET ORAL
COMMUNITY
Start: 2023-05-12

## 2023-06-09 NOTE — PROGRESS NOTES
ESTABLISHED PATIENT    Patient: Jimmy Gabehart  : 1955    Primary Care Provider: Miriam Mckeon DO    Requesting Provider: As above    Follow-up (8 week follow up -- Charcot's joint of left foot )      History    Chief Complaint: Follow-up left Charcot    History of Present Illness: He returns for follow-up of his left Charcot hindfoot in the face of prior ankle fusion.  The Charcot joint is stabilizing.  I think it is reached stage III.  He still has an ulcer on the tip of the great toe and lateral fifth metatarsal head.  His podiatrist is doing some type of grafting once a week.  He has developed some more erythema on the left fifth metatarsal head.  There is no purulence.    Current Outpatient Medications on File Prior to Visit   Medication Sig Dispense Refill    carvedilol (COREG) 6.25 MG tablet       diclofenac (VOLTAREN) 50 MG EC tablet       Diclofenac Sodium (VOLTAREN) 1 % gel gel apply a thin film (up to 4 grams or 4 pumps) to affected areas of feet up to four-five times daily (max 16 gm/day). Rub in for 2 minutes.      escitalopram (LEXAPRO) 5 MG tablet escitalopram 5 mg tablet      gabapentin (NEURONTIN) 600 MG tablet       Januvia 50 MG tablet       losartan (COZAAR) 100 MG tablet       metFORMIN ER (GLUCOPHAGE-XR) 500 MG 24 hr tablet       Naproxen Sodium-Gabapentin (Gabapentin-Naproxen Cmpd Kit) 5-10 % cream Apply  topically.      ONE TOUCH ULTRA TEST test strip       ONETOUCH DELICA LANCETS 33G misc       oxyCODONE-acetaminophen (PERCOCET) 7.5-325 MG per tablet       pantoprazole (PROTONIX) 40 MG EC tablet       tamsulosin (FLOMAX) 0.4 MG capsule 24 hr capsule       mupirocin (BACTROBAN) 2 % ointment Apply 1 application topically to the appropriate area as directed Daily. (Patient not taking: Reported on 2023) 30 g 5    [DISCONTINUED] cephalexin (KEFLEX) 500 MG capsule Take 1 capsule by mouth Every 6 (Six) Hours. (Patient not taking: Reported on 2023) 60 capsule 0     [DISCONTINUED] cephalexin (KEFLEX) 500 MG capsule Take 1 capsule by mouth Every 6 (Six) Hours. (Patient not taking: Reported on 6/9/2023) 60 capsule 0    [DISCONTINUED] oxyCODONE (ROXICODONE) 5 MG immediate release tablet       [DISCONTINUED] oxyCODONE-acetaminophen (PERCOCET)  MG per tablet        No current facility-administered medications on file prior to visit.      Allergies   Allergen Reactions    Demerol [Meperidine]     Penicillins       Past Medical History:   Diagnosis Date    Diabetes     Hypertension      Past Surgical History:   Procedure Laterality Date    BACK SURGERY      CARPAL TUNNEL RELEASE      COLONOSCOPY      EYE SURGERY      FOOT SURGERY Left 1986    Ankle fusion    HERNIA REPAIR      KNEE SURGERY Right 12/14/2022    TKA right knee manipulation 01-25-23    TONSILLECTOMY AND ADENOIDECTOMY      TRIGGER FINGER RELEASE      WISDOM TOOTH EXTRACTION       Family History   Problem Relation Age of Onset    Hypertension Mother     Heart attack Mother     Kidney disease Mother     High cholesterol Mother     Arthritis Mother     Heart attack Father       Social History     Socioeconomic History    Marital status:    Tobacco Use    Smoking status: Never    Smokeless tobacco: Never   Substance and Sexual Activity    Alcohol use: Yes     Comment: occasional    Drug use: No    Sexual activity: Defer        Review of Systems   Constitutional: Negative.    HENT: Negative.     Eyes: Negative.    Respiratory: Negative.     Cardiovascular: Negative.    Gastrointestinal: Negative.    Endocrine: Negative.    Genitourinary: Negative.    Musculoskeletal:  Positive for arthralgias.   Skin: Negative.    Allergic/Immunologic: Negative.    Neurological: Negative.    Hematological: Negative.    Psychiatric/Behavioral: Negative.       The following portions of the patient's history were reviewed and updated as appropriate: allergies, current medications, past family history, past medical history, past  "social history, past surgical history, and problem list.    Physical Exam:   /62   Ht 188 cm (74.02\")   Wt 109 kg (240 lb 12.8 oz)   BMI 30.90 kg/m²   The Charcot hindfoot on the left is stable there is no longer any swelling erythema or warmth.  It is in neutral alignment.  The ulcer on the tip of the great toe is slowly healing there is no signs of infection.  There is about 1 cm ring of erythema around the ulcer on the left fifth metatarsal head laterally.  The ulcer itself is superficial.  There is no purulence there is no fluctuance    Medical Decision Making    Data Review:   ordered and reviewed x-rays today    Assessment/Plan/Diagnosis/Treatment Options:   1. Charcot's joint of left foot  The Charcot joint I think is reached stage III.  He may now transition to a wide open shoe.  He needs to wear an open shoe until the ulcer of the fifth metatarsal head is healed.  He is following up with his podiatrist for treatment of that.  I do want to start him on 2 weeks of antibiotics because of the erythema.  I sent a prescription for Levaquin.  I will see him again for evaluation of the Charcot joint in 8 weeks standing 2 views of the ankle.  If he remains stable he probably then can proceed with his knee replacement.  - XR Ankle 2 View Left    2. Diabetic ulcer of lower extremity  As above the podiatrist is following this, I am going to add Levaquin because of the erythema, I think the ulcer will heal better now that I allow him out of the boot        Kathryn Cui MD                      "

## 2023-08-04 ENCOUNTER — OFFICE VISIT (OUTPATIENT)
Dept: ORTHOPEDIC SURGERY | Facility: CLINIC | Age: 68
End: 2023-08-04
Payer: MEDICARE

## 2023-08-04 VITALS
BODY MASS INDEX: 30.8 KG/M2 | HEIGHT: 74 IN | DIASTOLIC BLOOD PRESSURE: 68 MMHG | SYSTOLIC BLOOD PRESSURE: 160 MMHG | WEIGHT: 240 LBS

## 2023-08-04 DIAGNOSIS — M14.672 CHARCOT'S JOINT OF LEFT FOOT: ICD-10-CM

## 2023-08-04 PROCEDURE — 99213 OFFICE O/P EST LOW 20 MIN: CPT | Performed by: ORTHOPAEDIC SURGERY

## 2024-12-02 ENCOUNTER — TELEPHONE (OUTPATIENT)
Dept: GASTROENTEROLOGY | Facility: CLINIC | Age: 69
End: 2024-12-02
Payer: MEDICARE

## 2024-12-02 NOTE — TELEPHONE ENCOUNTER
LEFT VOICE MESSAGE FOR PATIENT TO RETURN CALL IF HE WOULD LIKE A SOONER APPOINTMENT WITH TOMMIE OLVERA.SHE HAS OPENING TODAY AND TOMORROW. IF PATIENT CALLS THE OFFICE AND THE APPOINTMENTS ISN'T AVAILABLE. PATIENT CAN STAY ON THE CANCELLATION LIST.

## 2024-12-13 NOTE — TELEPHONE ENCOUNTER
Dr. Cui-please see message below and advise. Thank you!    Spoke with pt's wife who reports pt is in Home Health PT right now for his recent knee replacement on the right and PT is asking if there are any specific things they should be working on or avoiding in regards to the left foot/ankle (Charcot's) that is currently being treated in a tall boot. I told her I would send message to Dr. Cui and reach out to Rony at [facility] once I had a response. She understood.    Juan Antonio DARNELL CMA ([facility]), ROT    
LVM with pt requesting he give us a call back to discuss previous message.    Juan Antonio DARNELL CMA ([facility]), ROT    
Patient's wife is calling in regards to [facility]. She reports that he hasn't heard anything yet. You can call dion swanson (he works at [facility]) [phone]. Thanks  
Spoke with Rony at [facility] to let him know Dr. Cui's message. He verbalized understanding.    Juan Antonio DARNELL CMA ([facility]), ROT    
PMD

## 2025-01-08 ENCOUNTER — TELEPHONE (OUTPATIENT)
Dept: GASTROENTEROLOGY | Facility: CLINIC | Age: 70
End: 2025-01-08
Payer: MEDICARE

## 2025-01-08 NOTE — TELEPHONE ENCOUNTER
----- Message from Ganesh VERDIN sent at 1/8/2025  8:35 AM EST -----  Regarding: REGISTRATION PAPERWORK  Good morning,   Patient has an appointment on 2/19/25 with Liz Kaur. They would like to know if any paperwork for his appointment can be mailed to him prior to his appointment? He has a hard time seeing. Please call patient regarding this.     Ganesh   ([facility])

## 2025-01-13 ENCOUNTER — TELEPHONE (OUTPATIENT)
Dept: GASTROENTEROLOGY | Facility: CLINIC | Age: 70
End: 2025-01-13
Payer: MEDICARE

## 2025-01-13 NOTE — TELEPHONE ENCOUNTER
Contacted patient about being on the cancellation list, currently have an appointment on 02/19. Offered patient a sooner appointment on 01/14/2025 at 0830 am. Patient can not take the appointment has another appointment advised that I would leave it as scheduled.

## 2025-01-13 NOTE — TELEPHONE ENCOUNTER
Left voice message to see if patient would like a sooner appointment with Liz Kaur. She has openings on 1/14/25@8:30 or 1/15/25@10:00. If patient calls back and the appointments isn't available. Patient can stay on the cancellation list.

## 2025-01-13 NOTE — TELEPHONE ENCOUNTER
Patient spouse called back and we worked with her scheduled to make the appointment on a Tuesday or Thursday was able to move patient up to 02/13/2025 at 11:00 am. Removed from cancellation list due to moving the appointment up and spouse work schedule.

## 2025-02-13 ENCOUNTER — OFFICE VISIT (OUTPATIENT)
Dept: GASTROENTEROLOGY | Facility: CLINIC | Age: 70
End: 2025-02-13
Payer: MEDICARE

## 2025-02-13 ENCOUNTER — LAB (OUTPATIENT)
Facility: HOSPITAL | Age: 70
End: 2025-02-13
Payer: MEDICARE

## 2025-02-13 VITALS
HEIGHT: 74 IN | HEART RATE: 68 BPM | SYSTOLIC BLOOD PRESSURE: 137 MMHG | BODY MASS INDEX: 33.88 KG/M2 | DIASTOLIC BLOOD PRESSURE: 44 MMHG | WEIGHT: 264 LBS

## 2025-02-13 DIAGNOSIS — R74.01 ELEVATION OF LEVELS OF LIVER TRANSAMINASE LEVELS: ICD-10-CM

## 2025-02-13 DIAGNOSIS — K76.0 FATTY (CHANGE OF) LIVER, NOT ELSEWHERE CLASSIFIED: ICD-10-CM

## 2025-02-13 DIAGNOSIS — R19.00 ABDOMINAL SWELLING: Primary | ICD-10-CM

## 2025-02-13 DIAGNOSIS — R74.8 ELEVATED ALKALINE PHOSPHATASE LEVEL: ICD-10-CM

## 2025-02-13 DIAGNOSIS — D61.818 PANCYTOPENIA: ICD-10-CM

## 2025-02-13 DIAGNOSIS — F10.11 HISTORY OF ETOH ABUSE: ICD-10-CM

## 2025-02-13 PROBLEM — M47.816 LUMBAR SPONDYLOSIS: Status: ACTIVE | Noted: 2019-07-23

## 2025-02-13 PROBLEM — R09.89 PULMONARY CONGESTION: Status: ACTIVE | Noted: 2025-02-13

## 2025-02-13 PROBLEM — M47.812 CERVICAL SPONDYLOSIS: Status: ACTIVE | Noted: 2019-07-23

## 2025-02-13 PROBLEM — E11.40 TYPE 2 DIABETES MELLITUS WITH DIABETIC NEUROPATHY: Status: ACTIVE | Noted: 2025-02-13

## 2025-02-13 PROBLEM — R09.89 PULMONARY CONGESTION: Status: RESOLVED | Noted: 2025-02-13 | Resolved: 2025-02-13

## 2025-02-13 PROBLEM — E11.9 DIABETES MELLITUS: Status: ACTIVE | Noted: 2025-02-13

## 2025-02-13 PROBLEM — M54.12 CERVICAL RADICULOPATHY: Status: ACTIVE | Noted: 2022-10-18

## 2025-02-13 PROBLEM — I10 HYPERTENSIVE DISORDER: Status: ACTIVE | Noted: 2017-12-07

## 2025-02-13 PROBLEM — R39.9 LOWER URINARY TRACT SYMPTOMS (LUTS): Status: ACTIVE | Noted: 2025-02-13

## 2025-02-13 PROBLEM — E11.9 DIABETES MELLITUS: Status: RESOLVED | Noted: 2025-02-13 | Resolved: 2025-02-13

## 2025-02-13 PROBLEM — N40.0 BPH (BENIGN PROSTATIC HYPERPLASIA): Status: RESOLVED | Noted: 2025-02-13 | Resolved: 2025-02-13

## 2025-02-13 PROBLEM — M15.9 OSTEOARTHRITIS, GENERALIZED: Status: ACTIVE | Noted: 2025-02-13

## 2025-02-13 PROBLEM — Z86.31 HISTORY OF DIABETIC ULCER OF FOOT: Status: ACTIVE | Noted: 2025-02-13

## 2025-02-13 PROBLEM — Z79.891 LONG-TERM CURRENT USE OF OPIATE ANALGESIC: Status: ACTIVE | Noted: 2019-07-23

## 2025-02-13 PROBLEM — E78.00 HYPERCHOLESTEREMIA: Status: ACTIVE | Noted: 2025-02-13

## 2025-02-13 PROBLEM — N40.0 BPH (BENIGN PROSTATIC HYPERPLASIA): Status: ACTIVE | Noted: 2025-02-13

## 2025-02-13 PROBLEM — E78.00 HYPERCHOLESTEREMIA: Status: RESOLVED | Noted: 2025-02-13 | Resolved: 2025-02-13

## 2025-02-13 PROBLEM — R39.9 LOWER URINARY TRACT SYMPTOMS (LUTS): Status: RESOLVED | Noted: 2025-02-13 | Resolved: 2025-02-13

## 2025-02-13 LAB
ALBUMIN SERPL-MCNC: 3.7 G/DL (ref 3.5–5.2)
ALBUMIN/GLOB SERPL: 0.9 G/DL
ALP SERPL-CCNC: 165 U/L (ref 39–117)
ALPHA1 GLOB MFR UR ELPH: 163 MG/DL (ref 90–200)
ALT SERPL W P-5'-P-CCNC: 71 U/L (ref 1–41)
ANION GAP SERPL CALCULATED.3IONS-SCNC: 9.3 MMOL/L (ref 5–15)
ANISOCYTOSIS BLD QL: NORMAL
AST SERPL-CCNC: 51 U/L (ref 1–40)
BASOPHILS # BLD AUTO: 0 10*3/MM3 (ref 0–0.2)
BASOPHILS NFR BLD AUTO: 0 % (ref 0–1.5)
BILIRUB SERPL-MCNC: 0.5 MG/DL (ref 0–1.2)
BUN SERPL-MCNC: 30 MG/DL (ref 8–23)
BUN/CREAT SERPL: 16.7 (ref 7–25)
CALCIUM SPEC-SCNC: 9.2 MG/DL (ref 8.6–10.5)
CERULOPLASMIN SERPL-MCNC: 20 MG/DL (ref 16–31)
CHLORIDE SERPL-SCNC: 105 MMOL/L (ref 98–107)
CO2 SERPL-SCNC: 22.7 MMOL/L (ref 22–29)
CREAT SERPL-MCNC: 1.8 MG/DL (ref 0.76–1.27)
DACRYOCYTES BLD QL SMEAR: NORMAL
DEPRECATED RDW RBC AUTO: 58.4 FL (ref 37–54)
EGFRCR SERPLBLD CKD-EPI 2021: 40 ML/MIN/1.73
EOSINOPHIL # BLD AUTO: 0.04 10*3/MM3 (ref 0–0.4)
EOSINOPHIL NFR BLD AUTO: 1.4 % (ref 0.3–6.2)
ERYTHROCYTE [DISTWIDTH] IN BLOOD BY AUTOMATED COUNT: 14.6 % (ref 12.3–15.4)
GLOBULIN UR ELPH-MCNC: 3.9 GM/DL
GLUCOSE SERPL-MCNC: 240 MG/DL (ref 65–99)
HAV IGM SERPL QL IA: NORMAL
HBV CORE IGM SERPL QL IA: NORMAL
HBV SURFACE AG SERPL QL IA: NORMAL
HCT VFR BLD AUTO: 33.8 % (ref 37.5–51)
HCV AB SER QL: NORMAL
HGB BLD-MCNC: 10.8 G/DL (ref 13–17.7)
HYPOCHROMIA BLD QL: NORMAL
IMM GRANULOCYTES # BLD AUTO: 0.02 10*3/MM3 (ref 0–0.05)
IMM GRANULOCYTES NFR BLD AUTO: 0.7 % (ref 0–0.5)
INR PPP: 1.1 (ref 0.86–1.15)
IRON 24H UR-MRATE: 94 MCG/DL (ref 59–158)
IRON SATN MFR SERPL: 27 % (ref 20–50)
LARGE PLATELETS: NORMAL
LYMPHOCYTES # BLD AUTO: 1.21 10*3/MM3 (ref 0.7–3.1)
LYMPHOCYTES NFR BLD AUTO: 41.7 % (ref 19.6–45.3)
MACROCYTES BLD QL SMEAR: NORMAL
MCH RBC QN AUTO: 34.8 PG (ref 26.6–33)
MCHC RBC AUTO-ENTMCNC: 32 G/DL (ref 31.5–35.7)
MCV RBC AUTO: 109 FL (ref 79–97)
MONOCYTES # BLD AUTO: 0.43 10*3/MM3 (ref 0.1–0.9)
MONOCYTES NFR BLD AUTO: 14.8 % (ref 5–12)
NEUTROPHILS NFR BLD AUTO: 1.2 10*3/MM3 (ref 1.7–7)
NEUTROPHILS NFR BLD AUTO: 41.4 % (ref 42.7–76)
NRBC BLD AUTO-RTO: 0 /100 WBC (ref 0–0.2)
OVALOCYTES BLD QL SMEAR: NORMAL
PLATELET # BLD AUTO: 95 10*3/MM3 (ref 140–450)
PMV BLD AUTO: 11.9 FL (ref 6–12)
POIKILOCYTOSIS BLD QL SMEAR: NORMAL
POLYCHROMASIA BLD QL SMEAR: NORMAL
POTASSIUM SERPL-SCNC: 4.4 MMOL/L (ref 3.5–5.2)
PROT SERPL-MCNC: 7.6 G/DL (ref 6–8.5)
PROTHROMBIN TIME: 14.7 SECONDS (ref 11.8–14.9)
RBC # BLD AUTO: 3.1 10*6/MM3 (ref 4.14–5.8)
SMALL PLATELETS BLD QL SMEAR: NORMAL
SODIUM SERPL-SCNC: 137 MMOL/L (ref 136–145)
STOMATOCYTES BLD QL SMEAR: NORMAL
TIBC SERPL-MCNC: 355 MCG/DL (ref 298–536)
TRANSFERRIN SERPL-MCNC: 238 MG/DL (ref 200–360)
WBC MORPH BLD: NORMAL
WBC NRBC COR # BLD AUTO: 2.9 10*3/MM3 (ref 3.4–10.8)

## 2025-02-13 PROCEDURE — 82103 ALPHA-1-ANTITRYPSIN TOTAL: CPT | Performed by: NURSE PRACTITIONER

## 2025-02-13 PROCEDURE — 86334 IMMUNOFIX E-PHORESIS SERUM: CPT

## 2025-02-13 PROCEDURE — 85007 BL SMEAR W/DIFF WBC COUNT: CPT

## 2025-02-13 PROCEDURE — 86381 MITOCHONDRIAL ANTIBODY EACH: CPT | Performed by: NURSE PRACTITIONER

## 2025-02-13 PROCEDURE — 83540 ASSAY OF IRON: CPT

## 2025-02-13 PROCEDURE — 86225 DNA ANTIBODY NATIVE: CPT | Performed by: NURSE PRACTITIONER

## 2025-02-13 PROCEDURE — 36415 COLL VENOUS BLD VENIPUNCTURE: CPT

## 2025-02-13 PROCEDURE — 85025 COMPLETE CBC W/AUTO DIFF WBC: CPT

## 2025-02-13 PROCEDURE — 84466 ASSAY OF TRANSFERRIN: CPT

## 2025-02-13 PROCEDURE — 86038 ANTINUCLEAR ANTIBODIES: CPT | Performed by: NURSE PRACTITIONER

## 2025-02-13 PROCEDURE — 82784 ASSAY IGA/IGD/IGG/IGM EACH: CPT

## 2025-02-13 PROCEDURE — 85610 PROTHROMBIN TIME: CPT

## 2025-02-13 PROCEDURE — 82390 ASSAY OF CERULOPLASMIN: CPT | Performed by: NURSE PRACTITIONER

## 2025-02-13 PROCEDURE — 86015 ACTIN ANTIBODY EACH: CPT | Performed by: NURSE PRACTITIONER

## 2025-02-13 PROCEDURE — 80053 COMPREHEN METABOLIC PANEL: CPT

## 2025-02-13 PROCEDURE — 80074 ACUTE HEPATITIS PANEL: CPT | Performed by: NURSE PRACTITIONER

## 2025-02-13 RX ORDER — CHLORAL HYDRATE 500 MG
CAPSULE ORAL
COMMUNITY

## 2025-02-13 RX ORDER — VIT C/HESPERIDIN/BIOFLAVONOIDS 500-100 MG
TABLET ORAL
COMMUNITY

## 2025-02-13 RX ORDER — OXYCODONE HYDROCHLORIDE 10 MG/1
TABLET ORAL
COMMUNITY

## 2025-02-13 RX ORDER — BUDESONIDE 1 MG/2ML
INHALANT ORAL
COMMUNITY
End: 2025-02-13

## 2025-02-13 RX ORDER — ALBUTEROL SULFATE 0.83 MG/ML
SOLUTION RESPIRATORY (INHALATION)
COMMUNITY
End: 2025-02-13

## 2025-02-13 RX ORDER — BACLOFEN 10 MG/1
TABLET ORAL
COMMUNITY

## 2025-02-13 RX ORDER — FEXOFENADINE HCL 60 MG/1
60 TABLET, FILM COATED ORAL DAILY
COMMUNITY

## 2025-02-13 NOTE — PROGRESS NOTES
"Chief Complaint     Cirrhosis    History of Present Illness     Jimmy Gabehart is a 70 y.o. male who presents to [facility] on referral from Miriam Mckeon DO for a gastroenterology evaluation of cirrhosis.      He reports being told in 2010 that liver was enlarged.  He has a history of ETOH use, but hasn't drank ETOH for about ten years.  He is now being told that he has borderline cirrhosis based on labs.  Evaluated by hematology and was told that he does not have leukemia and they're concerned that he has cirrhosis.  At the time of office visit, labs results are not available, however these have been requested.      Denies family history of liver disease.      Reports intermittent abdominal swelling.  He adds lots of salt to his food.  His wife reports that he's diabetic, but he consumes a lot of sugary foods including several \"ski\" sodas per day.      Denies abdominal pain and rectal bleeding.       History      Past Medical History:   Diagnosis Date    Diabetes     Hypertension        Past Surgical History:   Procedure Laterality Date    BACK SURGERY      CARPAL TUNNEL RELEASE      COLONOSCOPY      EYE SURGERY      FOOT SURGERY Left 1986    Ankle fusion    HERNIA REPAIR      KNEE SURGERY Right 12/14/2022    TKA right knee manipulation 01-25-23    TONSILLECTOMY AND ADENOIDECTOMY      TRIGGER FINGER RELEASE      WISDOM TOOTH EXTRACTION         Family History   Problem Relation Age of Onset    Hypertension Mother     Heart attack Mother     Kidney disease Mother     High cholesterol Mother     Arthritis Mother     Heart attack Father         Current Medications        Current Outpatient Medications:     baclofen (LIORESAL) 10 MG tablet, Take 1 tablet every 8 hours by oral route as needed for 30 days., Disp: , Rfl:     carvedilol (COREG) 6.25 MG tablet, , Disp: , Rfl:     diclofenac (VOLTAREN) 50 MG EC tablet, , Disp: , Rfl:     Diclofenac Sodium (VOLTAREN) 1 % gel gel, apply " "a thin film (up to 4 grams or 4 pumps) to affected areas of feet up to four-five times daily (max 16 gm/day). Rub in for 2 minutes., Disp: , Rfl:     fexofenadine (ALLEGRA) 60 MG tablet, Take 1 tablet by mouth Daily., Disp: , Rfl:     gabapentin (NEURONTIN) 600 MG tablet, , Disp: , Rfl:     Januvia 50 MG tablet, , Disp: , Rfl:     losartan (COZAAR) 100 MG tablet, , Disp: , Rfl:     magnesium chloride ER 64 MG DR tablet, Take  by mouth Daily., Disp: , Rfl:     metFORMIN ER (GLUCOPHAGE-XR) 500 MG 24 hr tablet, , Disp: , Rfl:     Omega-3 Fatty Acids (fish oil) 1000 MG capsule capsule, , Disp: , Rfl:     oxyCODONE (ROXICODONE) 10 MG tablet, , Disp: , Rfl:     oxyCODONE-acetaminophen (PERCOCET) 7.5-325 MG per tablet, , Disp: , Rfl:     pantoprazole (PROTONIX) 40 MG EC tablet, , Disp: , Rfl:     tamsulosin (FLOMAX) 0.4 MG capsule 24 hr capsule, , Disp: , Rfl:     Zinc 30 MG tablet, Take  by mouth., Disp: , Rfl:      Allergies     Allergies   Allergen Reactions    Demerol [Meperidine]     Penicillins        Social History       Social History     Social History Narrative    Not on file       Immunizations     Immunization:  Immunization History   Administered Date(s) Administered    COVID-19 (PFIZER) BIVALENT 12+YRS 11/14/2022    COVID-19 (PFIZER) Purple Cap Monovalent 08/30/2021, 09/20/2021          Objective     Objective     Vital Signs:   /44 (BP Location: Left arm, Patient Position: Sitting, Cuff Size: Adult)   Pulse 68   Ht 188 cm (74\")   Wt 120 kg (264 lb)   BMI 33.90 kg/m²       Physical Exam    Results      Result Review :   The following data was reviewed by: RUSTAM Mccann on 02/13/2025:    11/26/2023 CBC: WBC 4.9, hemoglobin 11.2, hematocrit 34.7, platelets 127.  CMP: Creatinine 1.28, alk phos 165, ALT 36, AST 33, total bilirubin 0.6.               Assessment and Plan        Assessment and Plan    Diagnoses and all orders for this visit:    1. Abdominal swelling (Primary)    2. Elevated " alkaline phosphatase level  -     Protime-INR; Future  -     Hepatitis Panel, Acute  -     JEAN CARLOS  -     Alpha - 1 - Antitrypsin  -     Anti-Smooth Muscle Antibody Titer  -     Ceruloplasmin  -     Immunofixation, Serum; Future  -     Iron Profile; Future  -     Mitochondrial Antibodies, M2  -     US Abdomen Limited; Future  -     CBC Auto Differential; Future  -     Comprehensive Metabolic Panel; Future    3. Pancytopenia    4. History of ETOH abuse    5. Fatty (change of) liver, not elsewhere classified  -     Protime-INR; Future    6. Elevation of levels of liver transaminase levels  -     Hepatitis Panel, Acute      Encouraged reduction of high fat foods, sugary drinks and added salts.  Labs and imaging to further workup elevated alk phos and to determine if cirrhosis is present.  Will await records from outside facility for further recommendations.     Follow Up        Follow Up   Return in about 6 months (around 8/13/2025) for elevated liver enzymes.  Patient was given instructions and counseling regarding his condition or for health maintenance advice. Please see specific information pulled into the AVS if appropriate.     Attending Attestation (For Attendings USE Only)...

## 2025-02-14 ENCOUNTER — TELEPHONE (OUTPATIENT)
Dept: GASTROENTEROLOGY | Facility: CLINIC | Age: 70
End: 2025-02-14
Payer: MEDICARE

## 2025-02-14 LAB
ANA SER QL: POSITIVE
DSDNA AB SER-ACNC: 26 IU/ML (ref 0–9)
Lab: ABNORMAL
MITOCHONDRIA M2 IGG SER-ACNC: 23.1 UNITS (ref 0–20)
SMA IGG SER-ACNC: 12 UNITS (ref 0–19)

## 2025-02-14 NOTE — TELEPHONE ENCOUNTER
Called and spoke with Sylvie the first appointment would be 02/23/2025 at 0930 am arrive at 0900 am enter through the ER and NPO after midnight.

## 2025-02-14 NOTE — TELEPHONE ENCOUNTER
Attempted to call spouse about the appointment details, this has been scheduled at [facility] on 02/23/2025 at 0930 am need to arrive at 0900 am and enter through the ER entrance for registation only. Nothing to eat or drink after midnight. If this date and time does not work you can call [phone]

## 2025-02-17 LAB
IGA SERPL-MCNC: 1003 MG/DL (ref 61–437)
IGG SERPL-MCNC: 1230 MG/DL (ref 603–1613)
IGM SERPL-MCNC: 36 MG/DL (ref 20–172)
PROT PATTERN SERPL IFE-IMP: ABNORMAL

## 2025-02-19 ENCOUNTER — TELEPHONE (OUTPATIENT)
Dept: GASTROENTEROLOGY | Facility: CLINIC | Age: 70
End: 2025-02-19
Payer: MEDICARE

## 2025-02-19 ENCOUNTER — TELEPHONE (OUTPATIENT)
Dept: GASTROENTEROLOGY | Facility: CLINIC | Age: 70
End: 2025-02-19

## 2025-02-19 DIAGNOSIS — R76.8 POSITIVE ANA (ANTINUCLEAR ANTIBODY): Primary | ICD-10-CM

## 2025-02-19 NOTE — TELEPHONE ENCOUNTER
----- Message from Liz Kaur sent at 2/19/2025 10:17 AM EST -----  Please let pt know that labs show a positive JEAN CARLOS which is concerning for an autoimmune disease such as lupus.  I would recommend that he be evaluated by rheumatology for further w/u of this.  I will place the order.  No signs of autoimmune liver disease.  He is anemic.  When was his last colonoscopy/EGD?

## 2025-02-20 ENCOUNTER — TELEPHONE (OUTPATIENT)
Dept: GASTROENTEROLOGY | Facility: CLINIC | Age: 70
End: 2025-02-20
Payer: MEDICARE

## 2025-02-20 NOTE — TELEPHONE ENCOUNTER
S/w Diann listed on usha advised results and recommendations. She is aware there is no additional lab work ordered by Alona.

## 2025-02-20 NOTE — TELEPHONE ENCOUNTER
Patient called back, I advised that the office did not open until 10:00 am. Advised the note from her message has been sent to the clinical staff and we would be giving them a call back.

## 2025-02-20 NOTE — TELEPHONE ENCOUNTER
Caller: Gabehart,Sherry    Relationship to patient: Emergency Contact    Best call back [phone] (Mobile)     Patient is needing: PTS WIFE IS WANTING TO SPEAK WITH DELIA OR ANY MA THAT CAN RELAY INFORMATION THAT DELIA CALLED PT ABOUT YESTERDAY  PTS WIFE SAID PT FORGOT WHAT HE WAS TOLD   SHED LIKE A CALL BACK ASAP

## 2025-02-25 ENCOUNTER — TELEPHONE (OUTPATIENT)
Dept: GASTROENTEROLOGY | Facility: CLINIC | Age: 70
End: 2025-02-25
Payer: MEDICARE

## 2025-02-25 DIAGNOSIS — K76.0 FATTY (CHANGE OF) LIVER, NOT ELSEWHERE CLASSIFIED: Primary | ICD-10-CM

## 2025-02-25 NOTE — TELEPHONE ENCOUNTER
----- Message from iLz Kaur sent at 2/24/2025 10:13 PM EST -----  US is c/w enlarged liver and fatty liver vs cirrhosis.  Recommend fibroscan for further evaluation of the liver.

## 2025-02-26 NOTE — TELEPHONE ENCOUNTER
Patient returned call to the office was able to get him scheduled for the Fibroscan on 03/28/2025 at 11:15 am. Advised this was completed at the pavilion behind the hospital and nothing to eat 3 hours prior.

## 2025-03-05 ENCOUNTER — PATIENT ROUNDING (BHMG ONLY) (OUTPATIENT)
Dept: GASTROENTEROLOGY | Facility: CLINIC | Age: 70
End: 2025-03-05
Payer: MEDICARE

## 2025-03-05 NOTE — PROGRESS NOTES
"3/5/2025      Hello, may I speak with Jimmy Gabehart     My name is Reena. I am calling from [facility]. I show that you had a recent visit with RUSTAM Joel.    Before we get started may I verify your date of birth? 1955    I am calling to officially welcome you to our practice and ask about your recent visit. Is this a good time to talk?   Yes     Tell me about your visit with us. What things went well? \"It was a good visit, no concerns\"    We strive to ensure that we protect your safety and privacy. Is there anything we could have done to improve this during your visit?    No      We're always looking for ways to make our patients' experiences even better. Do you have recommendations on ways we may improve?  No     Overall were you satisfied with your first visit to our practice?  Yes     I appreciate you taking the time to speak with me today. Is there anything else I can do for you?  No     I am glad to hear that you had a very good visit and I appreciate you taking the time to provide feedback on this call. We would greatly appreciate you filling out a survey if you receive one in the mail, email or text. This is a great opportunity to provide any additional feedback that you may think of after this call as well.       Thank you, and have a great day.   "

## 2025-03-25 ENCOUNTER — TELEPHONE (OUTPATIENT)
Dept: GASTROENTEROLOGY | Facility: HOSPITAL | Age: 70
End: 2025-03-25
Payer: COMMERCIAL

## 2025-03-28 ENCOUNTER — PROCEDURE VISIT (OUTPATIENT)
Dept: OTHER | Facility: HOSPITAL | Age: 70
End: 2025-03-28
Payer: MEDICARE

## 2025-03-28 DIAGNOSIS — K76.0 FATTY (CHANGE OF) LIVER, NOT ELSEWHERE CLASSIFIED: ICD-10-CM

## 2025-03-28 PROCEDURE — 91200 LIVER ELASTOGRAPHY: CPT | Performed by: NURSE PRACTITIONER

## 2025-04-01 NOTE — PROGRESS NOTES
Liver Elastography    Performed by: Liz Kaur APRN  Authorized by: Liz Kaur APRN  Ordering Provider: Liz Kaur APRN    Probe:  XL+  Procedure Details:  Procedure: After providing an oral and written explanation of the Fibroscan vibration controlled transient elastography (VTCE) test procedure to the patient. The patient was placed in supine position with right arm in maximum abduction to allow optimal exposure of right lateral abdomen. Patient was briefly assessed, identifying terminus of the xyphoid process and locating an ideal transient elastography testing site, midline and lateral to this point. Patient was instructed to breathe normally and remain stationary during the test process. Pre-measurement data confirmed the transient elastography probe was centered over the liver parenchyma. A series of ten 50 Hz mechanical pulses were applied with controlled application pressure to induce a mechanical shear wave in the liver tissue. For each measurement, the shear wave propagation speed was detected, displayed and converted to its equivalent liver stiffness value in kilopascals. Skin to liver capsules distance and shear wave characteristics were monitored during the entire examination to assure quality data. Median liver stiffness measurement and interquartile range were calculated and displayed in real time. Acquired measurement data was stored and submitted to the provider for review and interpretation. Patient tolerated the procedure well and was discharged without incident.   Clinical Information:     NPO 3 Hours or More: Yes      Actively Drinking: No    Findings:     Median Liver Stiffness Score:  57.1    Interquartile Range (IQR) to Median Ratio:  22    Casie Stiffness Consistent with:  F4 Cirrhosis    Current Scan Considered Reliab: Yes      Median Controlled Attenuation Parameter (dB/m):  291    IQR:  8    CAP SCORE:  Moderate/severe liver fat

## 2025-04-02 ENCOUNTER — TELEPHONE (OUTPATIENT)
Dept: GASTROENTEROLOGY | Facility: CLINIC | Age: 70
End: 2025-04-02
Payer: COMMERCIAL

## 2025-04-02 NOTE — TELEPHONE ENCOUNTER
Hub staff attempted to follow warm transfer process and was unsuccessful     Caller: Gabehart,Sherry    Relationship to patient: Emergency Contact    Best call back number: [phone]     Patient is needing: QUESTIONS ABOUT RESULTS

## 2025-04-19 ENCOUNTER — APPOINTMENT (OUTPATIENT)
Dept: GENERAL RADIOLOGY | Facility: HOSPITAL | Age: 70
End: 2025-04-19
Payer: COMMERCIAL

## 2025-04-19 ENCOUNTER — HOSPITAL ENCOUNTER (INPATIENT)
Facility: HOSPITAL | Age: 70
LOS: 13 days | Discharge: NURSING, REHAB OR LONG-TERM CARE | End: 2025-05-02
Attending: STUDENT IN AN ORGANIZED HEALTH CARE EDUCATION/TRAINING PROGRAM | Admitting: INTERNAL MEDICINE
Payer: COMMERCIAL

## 2025-04-19 DIAGNOSIS — K76.82 HEPATIC ENCEPHALOPATHY: Primary | ICD-10-CM

## 2025-04-19 DIAGNOSIS — R52 PAIN: ICD-10-CM

## 2025-04-19 DIAGNOSIS — E11.40 TYPE 2 DIABETES MELLITUS WITH DIABETIC NEUROPATHY, UNSPECIFIED WHETHER LONG TERM INSULIN USE: ICD-10-CM

## 2025-04-19 LAB
ALBUMIN SERPL-MCNC: 3.5 G/DL (ref 3.5–5.2)
ALBUMIN/GLOB SERPL: 0.8 G/DL
ALP SERPL-CCNC: 253 U/L (ref 39–117)
ALT SERPL W P-5'-P-CCNC: 47 U/L (ref 1–41)
ANION GAP SERPL CALCULATED.3IONS-SCNC: 13 MMOL/L (ref 5–15)
AST SERPL-CCNC: 84 U/L (ref 1–40)
BASOPHILS # BLD AUTO: 0.01 10*3/MM3 (ref 0–0.2)
BASOPHILS NFR BLD AUTO: 0.2 % (ref 0–1.5)
BILIRUB SERPL-MCNC: 1.6 MG/DL (ref 0–1.2)
BUN SERPL-MCNC: 22 MG/DL (ref 8–23)
BUN/CREAT SERPL: 18.5 (ref 7–25)
CALCIUM SPEC-SCNC: 9 MG/DL (ref 8.6–10.5)
CHLORIDE SERPL-SCNC: 99 MMOL/L (ref 98–107)
CO2 SERPL-SCNC: 21 MMOL/L (ref 22–29)
CREAT SERPL-MCNC: 1.19 MG/DL (ref 0.76–1.27)
D-LACTATE SERPL-SCNC: 1.7 MMOL/L (ref 0.5–2)
DEPRECATED RDW RBC AUTO: 51.5 FL (ref 37–54)
EGFRCR SERPLBLD CKD-EPI 2021: 65.7 ML/MIN/1.73
EOSINOPHIL # BLD AUTO: 0.02 10*3/MM3 (ref 0–0.4)
EOSINOPHIL NFR BLD AUTO: 0.5 % (ref 0.3–6.2)
ERYTHROCYTE [DISTWIDTH] IN BLOOD BY AUTOMATED COUNT: 13.6 % (ref 12.3–15.4)
GLOBULIN UR ELPH-MCNC: 4.2 GM/DL
GLUCOSE BLDC GLUCOMTR-MCNC: 282 MG/DL (ref 70–130)
GLUCOSE BLDC GLUCOMTR-MCNC: 284 MG/DL (ref 70–130)
GLUCOSE SERPL-MCNC: 289 MG/DL (ref 65–99)
HCT VFR BLD AUTO: 28.7 % (ref 37.5–51)
HGB BLD-MCNC: 9.7 G/DL (ref 13–17.7)
IMM GRANULOCYTES # BLD AUTO: 0.03 10*3/MM3 (ref 0–0.05)
IMM GRANULOCYTES NFR BLD AUTO: 0.7 % (ref 0–0.5)
INR PPP: 1.26 (ref 0.89–1.12)
LYMPHOCYTES # BLD AUTO: 1.19 10*3/MM3 (ref 0.7–3.1)
LYMPHOCYTES NFR BLD AUTO: 28.3 % (ref 19.6–45.3)
MAGNESIUM SERPL-MCNC: 1.5 MG/DL (ref 1.6–2.4)
MCH RBC QN AUTO: 35.7 PG (ref 26.6–33)
MCHC RBC AUTO-ENTMCNC: 33.8 G/DL (ref 31.5–35.7)
MCV RBC AUTO: 105.5 FL (ref 79–97)
MONOCYTES # BLD AUTO: 0.53 10*3/MM3 (ref 0.1–0.9)
MONOCYTES NFR BLD AUTO: 12.6 % (ref 5–12)
NEUTROPHILS NFR BLD AUTO: 2.43 10*3/MM3 (ref 1.7–7)
NEUTROPHILS NFR BLD AUTO: 57.7 % (ref 42.7–76)
NRBC BLD AUTO-RTO: 0 /100 WBC (ref 0–0.2)
PHOSPHATE SERPL-MCNC: 2.3 MG/DL (ref 2.5–4.5)
PLATELET # BLD AUTO: 74 10*3/MM3 (ref 140–450)
PMV BLD AUTO: 12.1 FL (ref 6–12)
POTASSIUM SERPL-SCNC: 4.3 MMOL/L (ref 3.5–5.2)
PROT SERPL-MCNC: 7.7 G/DL (ref 6–8.5)
PROTHROMBIN TIME: 16.6 SECONDS (ref 12.2–15.3)
RBC # BLD AUTO: 2.72 10*6/MM3 (ref 4.14–5.8)
SODIUM SERPL-SCNC: 133 MMOL/L (ref 136–145)
TSH SERPL DL<=0.05 MIU/L-ACNC: 1.18 UIU/ML (ref 0.27–4.2)
WBC NRBC COR # BLD AUTO: 4.21 10*3/MM3 (ref 3.4–10.8)

## 2025-04-19 PROCEDURE — 87040 BLOOD CULTURE FOR BACTERIA: CPT | Performed by: STUDENT IN AN ORGANIZED HEALTH CARE EDUCATION/TRAINING PROGRAM

## 2025-04-19 PROCEDURE — 25010000002 MAGNESIUM SULFATE 2 GM/50ML SOLUTION: Performed by: STUDENT IN AN ORGANIZED HEALTH CARE EDUCATION/TRAINING PROGRAM

## 2025-04-19 PROCEDURE — 84443 ASSAY THYROID STIM HORMONE: CPT | Performed by: STUDENT IN AN ORGANIZED HEALTH CARE EDUCATION/TRAINING PROGRAM

## 2025-04-19 PROCEDURE — 99222 1ST HOSP IP/OBS MODERATE 55: CPT | Performed by: STUDENT IN AN ORGANIZED HEALTH CARE EDUCATION/TRAINING PROGRAM

## 2025-04-19 PROCEDURE — 83735 ASSAY OF MAGNESIUM: CPT | Performed by: STUDENT IN AN ORGANIZED HEALTH CARE EDUCATION/TRAINING PROGRAM

## 2025-04-19 PROCEDURE — 85610 PROTHROMBIN TIME: CPT | Performed by: STUDENT IN AN ORGANIZED HEALTH CARE EDUCATION/TRAINING PROGRAM

## 2025-04-19 PROCEDURE — 83605 ASSAY OF LACTIC ACID: CPT | Performed by: STUDENT IN AN ORGANIZED HEALTH CARE EDUCATION/TRAINING PROGRAM

## 2025-04-19 PROCEDURE — 85045 AUTOMATED RETICULOCYTE COUNT: CPT | Performed by: INTERNAL MEDICINE

## 2025-04-19 PROCEDURE — 63710000001 INSULIN LISPRO (HUMAN) PER 5 UNITS: Performed by: STUDENT IN AN ORGANIZED HEALTH CARE EDUCATION/TRAINING PROGRAM

## 2025-04-19 PROCEDURE — 82948 REAGENT STRIP/BLOOD GLUCOSE: CPT

## 2025-04-19 PROCEDURE — 85025 COMPLETE CBC W/AUTO DIFF WBC: CPT | Performed by: STUDENT IN AN ORGANIZED HEALTH CARE EDUCATION/TRAINING PROGRAM

## 2025-04-19 PROCEDURE — 80053 COMPREHEN METABOLIC PANEL: CPT | Performed by: STUDENT IN AN ORGANIZED HEALTH CARE EDUCATION/TRAINING PROGRAM

## 2025-04-19 PROCEDURE — 73030 X-RAY EXAM OF SHOULDER: CPT

## 2025-04-19 PROCEDURE — 84100 ASSAY OF PHOSPHORUS: CPT | Performed by: STUDENT IN AN ORGANIZED HEALTH CARE EDUCATION/TRAINING PROGRAM

## 2025-04-19 RX ORDER — ACETAMINOPHEN 650 MG/1
325 SUPPOSITORY RECTAL EVERY 6 HOURS PRN
Status: DISCONTINUED | OUTPATIENT
Start: 2025-04-19 | End: 2025-05-02 | Stop reason: HOSPADM

## 2025-04-19 RX ORDER — LACTULOSE 10 G/15ML
30 SOLUTION ORAL 2 TIMES DAILY
Status: DISCONTINUED | OUTPATIENT
Start: 2025-04-19 | End: 2025-04-20

## 2025-04-19 RX ORDER — PANTOPRAZOLE SODIUM 40 MG/1
40 TABLET, DELAYED RELEASE ORAL
Status: DISCONTINUED | OUTPATIENT
Start: 2025-04-20 | End: 2025-05-02 | Stop reason: HOSPADM

## 2025-04-19 RX ORDER — MAGNESIUM SULFATE HEPTAHYDRATE 40 MG/ML
2 INJECTION, SOLUTION INTRAVENOUS
Status: COMPLETED | OUTPATIENT
Start: 2025-04-19 | End: 2025-04-20

## 2025-04-19 RX ORDER — INSULIN LISPRO 100 [IU]/ML
2-9 INJECTION, SOLUTION INTRAVENOUS; SUBCUTANEOUS
Status: DISCONTINUED | OUTPATIENT
Start: 2025-04-19 | End: 2025-05-02 | Stop reason: HOSPADM

## 2025-04-19 RX ORDER — ACETAMINOPHEN 160 MG/5ML
500 SOLUTION ORAL EVERY 6 HOURS PRN
Status: DISCONTINUED | OUTPATIENT
Start: 2025-04-19 | End: 2025-05-02 | Stop reason: HOSPADM

## 2025-04-19 RX ORDER — SODIUM CHLORIDE 0.9 % (FLUSH) 0.9 %
10 SYRINGE (ML) INJECTION AS NEEDED
Status: DISCONTINUED | OUTPATIENT
Start: 2025-04-19 | End: 2025-05-02 | Stop reason: HOSPADM

## 2025-04-19 RX ORDER — SODIUM CHLORIDE 0.9 % (FLUSH) 0.9 %
10 SYRINGE (ML) INJECTION EVERY 12 HOURS SCHEDULED
Status: DISCONTINUED | OUTPATIENT
Start: 2025-04-19 | End: 2025-05-02 | Stop reason: HOSPADM

## 2025-04-19 RX ORDER — IBUPROFEN 600 MG/1
1 TABLET ORAL
Status: DISCONTINUED | OUTPATIENT
Start: 2025-04-19 | End: 2025-05-02 | Stop reason: HOSPADM

## 2025-04-19 RX ORDER — ACETAMINOPHEN 500 MG
500 TABLET ORAL EVERY 6 HOURS PRN
Status: DISCONTINUED | OUTPATIENT
Start: 2025-04-19 | End: 2025-04-20

## 2025-04-19 RX ORDER — NITROGLYCERIN 0.4 MG/1
0.4 TABLET SUBLINGUAL
Status: DISCONTINUED | OUTPATIENT
Start: 2025-04-19 | End: 2025-05-02 | Stop reason: HOSPADM

## 2025-04-19 RX ORDER — TAMSULOSIN HYDROCHLORIDE 0.4 MG/1
0.4 CAPSULE ORAL DAILY
Status: DISCONTINUED | OUTPATIENT
Start: 2025-04-20 | End: 2025-05-02 | Stop reason: HOSPADM

## 2025-04-19 RX ORDER — CARVEDILOL 6.25 MG/1
6.25 TABLET ORAL 2 TIMES DAILY WITH MEALS
Status: DISCONTINUED | OUTPATIENT
Start: 2025-04-20 | End: 2025-05-02 | Stop reason: HOSPADM

## 2025-04-19 RX ORDER — DEXTROSE MONOHYDRATE 25 G/50ML
25 INJECTION, SOLUTION INTRAVENOUS
Status: DISCONTINUED | OUTPATIENT
Start: 2025-04-19 | End: 2025-05-02 | Stop reason: HOSPADM

## 2025-04-19 RX ORDER — NICOTINE POLACRILEX 4 MG
15 LOZENGE BUCCAL
Status: DISCONTINUED | OUTPATIENT
Start: 2025-04-19 | End: 2025-05-02 | Stop reason: HOSPADM

## 2025-04-19 RX ORDER — LOSARTAN POTASSIUM 50 MG/1
100 TABLET ORAL
Status: DISCONTINUED | OUTPATIENT
Start: 2025-04-20 | End: 2025-05-02 | Stop reason: HOSPADM

## 2025-04-19 RX ORDER — SODIUM CHLORIDE 9 MG/ML
40 INJECTION, SOLUTION INTRAVENOUS AS NEEDED
Status: DISCONTINUED | OUTPATIENT
Start: 2025-04-19 | End: 2025-05-02 | Stop reason: HOSPADM

## 2025-04-19 RX ADMIN — MAGNESIUM SULFATE HEPTAHYDRATE 2 G: 40 INJECTION, SOLUTION INTRAVENOUS at 22:22

## 2025-04-19 RX ADMIN — Medication 10 ML: at 20:21

## 2025-04-19 RX ADMIN — INSULIN LISPRO 6 UNITS: 100 INJECTION, SOLUTION INTRAVENOUS; SUBCUTANEOUS at 20:15

## 2025-04-19 RX ADMIN — RIFAXIMIN 550 MG: 550 TABLET ORAL at 15:55

## 2025-04-19 RX ADMIN — LACTULOSE 30 G: 20 SOLUTION ORAL at 20:15

## 2025-04-19 RX ADMIN — RIFAXIMIN 550 MG: 550 TABLET ORAL at 20:27

## 2025-04-19 RX ADMIN — INSULIN LISPRO 6 UNITS: 100 INJECTION, SOLUTION INTRAVENOUS; SUBCUTANEOUS at 17:40

## 2025-04-19 RX ADMIN — MAGNESIUM SULFATE HEPTAHYDRATE 2 G: 40 INJECTION, SOLUTION INTRAVENOUS at 17:40

## 2025-04-19 RX ADMIN — Medication 10 ML: at 15:56

## 2025-04-19 RX ADMIN — ACETAMINOPHEN 500 MG: 500 TABLET ORAL at 20:15

## 2025-04-19 RX ADMIN — MAGNESIUM SULFATE HEPTAHYDRATE 2 G: 40 INJECTION, SOLUTION INTRAVENOUS at 20:15

## 2025-04-19 NOTE — LETTER
EMS Transport Request  For use at [facility], [place], [place], [place], and [place] only   Patient Name: Jimmy Gabehart : 1955   Weight:116 kg (255 lb 4.7 oz) Pick-up Location: [facility] BLS/ALS: BLS/ALS: BLS   Insurance: [employer] Auth End Date:    Pre-Cert #: D/C Summary complete:    Destination: Other [facility]   Contact Precautions: None   Equipment (O2, Fluids, etc.): None   Arrive By Date/Time: 25 Stretcher/WC: Stretcher   CM Requesting: Nahomi Goddard RN Ext: [phone]   Notes/Medical Necessity: hepatic encephalopathy, cervical spondylosis, lumbar spondylosis     ______________________________________________________________________    *Only 2 patient bags OR 1 carry-on size bag are permitted.  Wheelchairs and walkers CANNOT transported with the patient. Acknowledge: Yes

## 2025-04-19 NOTE — H&P
"    [facility] Medicine Services  HISTORY AND PHYSICAL    Patient Name: Jimmy Gabehart  : 1955  MRN: [id number]  Primary Care Physician: Miriam Mckeon DO  Date of admission: 2025      Subjective   Subjective     Chief Complaint:  Altered mental status, fall at home    HPI:  Jimmy Gabehart is a 70 y.o. male with past medical history of insulin-dependent diabetes, hypertension, recent diagnosis of cirrhosis, thrombocytopenia, chronic pain presenting with altered mental status and fall at home.    History obtained from wife over the phone.  Wife states since Chloe in  patient has become progressively weaker, staying in bed for 20 to 23 hours a day, with progressively worsening confusion.  Patient will talk about items or situations that are not present currently ongoing.  Wife states patient has had limited food intake and has been drinking pop excessively.  Wife states he has been \"out of character\" since .  Weakness and confusion have been worsening over the past 3 to 4 weeks, with acute worsening over the past 3 to 4 days.  Patient and wife note constipation recently.  Wife states that he was recently diagnosed with cirrhosis but does not have follow-up with his liver team until 2025.  Wife denies any current use of lactulose or rifaximin.  Of note patient was recently seen by rheumatology for abnormal autoimmune lab work.  Per wife, patient does not have lupus but rheumatologist is more concerned of possible MDS.  Patient had recent bone marrow biopsy with hematology/oncology this past Thursday, currently awaiting biopsy results.  Wife denies any past EGDs.    Patient was initially seen at an outside hospital secondary to fall at home.  Patient reports acute on chronic right shoulder pain.  Wife states that patient is in need of a right shoulder replacement.  He has been seen by orthopedic surgery is pursuing nonoperative management.  CT head at " outside hospital unremarkable.  Chest x-ray unremarkable.  Ammonia 28. UA without signs of infection.  Patient received lactulose prior to transfer.  Patient transferred to [facility] for concern for hepatic encephalopathy.    In the room, patient is oriented to name, date of birth, city, year, and president.  Patient denies any abdominal pain, shortness of breath, chest pain, GI or  symptoms.  Denies any tobacco use.  Quit alcohol 10 years ago.  Denies any illicit drug use besides prescribed chronic pain pills.  Mild asterixis on exam.  Wife confirms full CODE STATUS.      Personal History     Past Medical History:   Diagnosis Date    Diabetes     Hypertension            Past Surgical History:   Procedure Laterality Date    BACK SURGERY      CARPAL TUNNEL RELEASE      COLONOSCOPY      EYE SURGERY      FOOT SURGERY Left 1986    Ankle fusion    HERNIA REPAIR      KNEE SURGERY Right 12/14/2022    TKA right knee manipulation 01-25-23    TONSILLECTOMY AND ADENOIDECTOMY      TRIGGER FINGER RELEASE      WISDOM TOOTH EXTRACTION         Family History: family history includes Arthritis in his mother; Heart attack in his father and mother; High cholesterol in his mother; Hypertension in his mother; Kidney disease in his mother.     Social History:  reports that he has never smoked. He has never used smokeless tobacco. He reports current alcohol use. He reports that he does not use drugs.  Social History     Social History Narrative    Not on file       Medications:  Available home medication information reviewed.  Diclofenac Sodium, SITagliptin, Zinc, baclofen, carvedilol, diclofenac, fexofenadine, fish oil, gabapentin, losartan, magnesium chloride ER, metFORMIN ER, oxyCODONE, oxyCODONE-acetaminophen, pantoprazole, and tamsulosin    Allergies   Allergen Reactions    Demerol [Meperidine]     Penicillins        Objective   Objective     Vital Signs:   Temp:  [98.9 °F (37.2 °C)] 98.9 °F (37.2 °C)  Heart Rate:   [79] 79  Resp:  [18] 18  BP: (139)/(84) 139/84       Physical Exam  Constitutional:       General: He is not in acute distress.  Cardiovascular:      Rate and Rhythm: Normal rate.      Pulses: Normal pulses.   Pulmonary:      Effort: Pulmonary effort is normal. No respiratory distress.   Abdominal:      General: There is no distension.      Palpations: Abdomen is soft.      Tenderness: There is no abdominal tenderness. There is no guarding or rebound.   Musculoskeletal:      Right lower leg: No edema.      Left lower leg: No edema.   Skin:     General: Skin is warm.   Neurological:      Mental Status: He is alert and oriented to person, place, and time.      Comments: Mild asterixis on exam.  Limited range of motion of right arm secondary to right shoulder pain   Psychiatric:         Mood and Affect: Mood normal.         Behavior: Behavior normal.            Result Review:  I have personally reviewed the results from the time of this admission to 4/19/2025 13:50 EDT and agree with these findings:  [x]  Laboratory list / accordion  []  Microbiology  []  Radiology  []  EKG/Telemetry   []  Cardiology/Vascular   []  Pathology  [x]  Old records  []  Other:  Most notable findings include: CT head unremarkable, ammonia 28      LAB RESULTS:                                  Microbiology Results (last 10 days)       ** No results found for the last 240 hours. **            No radiology results from the last 24 hrs        Assessment & Plan   Assessment & Plan       Hepatic encephalopathy    Jimmy Gabehart is a 70 y.o. male with past medical history of insulin-dependent diabetes, hypertension, recent diagnosis of cirrhosis, thrombocytopenia, chronic pain presenting with altered mental status and fall at home.    Concern for hepatic encephalopathy  -Progressively worsening weakness and confusion since December 2024 with significant worsening over the past 3-4 days.   - New diagnosis of cirrhosis.  Initially seen by  gastroenterology in February 2025 for concern for cirrhosis.  FibroScan ordered and obtained in March 2025 revealing stiffness consistent with F4 cirrhosis. No recent EGD or AFP.  No follow-up with GI until September 2025 per family.  -Recently evaluated by rheumatology, low concern for autoimmune process including lupus.  -Currently not on lactulose or rifaximin, though patient suffers from constipation  -Per chart review of OSH records, CT head unremarkable, Ammonia 28, UA negative for infection, chest x-ray unremarkable.  -Mild asterixis on exam  PLAN  -Start lactulose and rifaximin. Titrate to 2-3 BM's/daily  -Will need outpatient GI follow up  -Consider GI/neurology consultation pending clinical response with aforementioned treatment  -Will obtain CBC, CMP, Mag, Phos, PT/INR, TSH, lactate, blood cultures  -PT/OT consulted     Acute on chronic right shoulder pain  - Fall at home landing on right shoulder  - Has been evaluated by orthopedic surgery outpatient.  Patient was found to have a tear in his right shoulder.  Patient was deemed a poor surgical candidate and nonoperative approach was indicated.  - Will obtain x-ray to evaluate for fracture    IDDM  -Hold home medications   -SSI for now    HTN  -Plan to resume home Coreg and losartan in the morning    GERD  -Continue PPI    BPH  -Continue tamsulosin    Hx of thrombocytopenia  -Follows with Heme/Onc outpatient. 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 Will need to follow up outpatient for biopsy results.   -Labs pending, SCD's for now     VTE Prophylaxis:  Mechanical VTE prophylaxis orders are present.          CODE STATUS:    Code Status and Medical Interventions: CPR (Attempt to Resuscitate); Full Support   Ordered at: 04/19/25 1349     Code Status (Patient has no pulse and is not breathing):    CPR (Attempt to Resuscitate)     Medical Interventions (Patient has pulse or is breathing):    Full Support     Level Of Support Discussed With:    Next of Kin (If No  Surrogate)       Expected Discharge   Expected discharge date/ time has not been documented.     Delbert Nettles,   04/19/25

## 2025-04-19 NOTE — PLAN OF CARE
Goal Outcome Evaluation:      Patient is Aox3, disoriented to situation, in Sinus Tach on the monitor, on Room Air, voiding frequently with the urinal ~100-200mL's per void, VSS - will continue POC.

## 2025-04-20 LAB
ALPHA-FETOPROTEIN: 3.03 NG/ML (ref 0–8.3)
ANION GAP SERPL CALCULATED.3IONS-SCNC: 12 MMOL/L (ref 5–15)
BASOPHILS # BLD AUTO: 0.01 10*3/MM3 (ref 0–0.2)
BASOPHILS NFR BLD AUTO: 0.2 % (ref 0–1.5)
BUN SERPL-MCNC: 18 MG/DL (ref 8–23)
BUN/CREAT SERPL: 16.8 (ref 7–25)
CALCIUM SPEC-SCNC: 8.7 MG/DL (ref 8.6–10.5)
CHLORIDE SERPL-SCNC: 98 MMOL/L (ref 98–107)
CO2 SERPL-SCNC: 20 MMOL/L (ref 22–29)
CREAT SERPL-MCNC: 1.07 MG/DL (ref 0.76–1.27)
DEPRECATED RDW RBC AUTO: 51.8 FL (ref 37–54)
EGFRCR SERPLBLD CKD-EPI 2021: 74.7 ML/MIN/1.73
EOSINOPHIL # BLD AUTO: 0.01 10*3/MM3 (ref 0–0.4)
EOSINOPHIL NFR BLD AUTO: 0.2 % (ref 0.3–6.2)
ERYTHROCYTE [DISTWIDTH] IN BLOOD BY AUTOMATED COUNT: 13.5 % (ref 12.3–15.4)
FERRITIN SERPL-MCNC: 594 NG/ML (ref 30–400)
FOLATE SERPL-MCNC: >20 NG/ML (ref 4.78–24.2)
GLUCOSE BLDC GLUCOMTR-MCNC: 312 MG/DL (ref 70–130)
GLUCOSE BLDC GLUCOMTR-MCNC: 327 MG/DL (ref 70–130)
GLUCOSE BLDC GLUCOMTR-MCNC: 393 MG/DL (ref 70–130)
GLUCOSE BLDC GLUCOMTR-MCNC: 393 MG/DL (ref 70–130)
GLUCOSE BLDC GLUCOMTR-MCNC: 394 MG/DL (ref 70–130)
GLUCOSE BLDC GLUCOMTR-MCNC: 406 MG/DL (ref 70–130)
GLUCOSE BLDC GLUCOMTR-MCNC: 441 MG/DL (ref 70–130)
GLUCOSE SERPL-MCNC: 308 MG/DL (ref 65–99)
HBA1C MFR BLD: 11.5 % (ref 4.8–5.6)
HCT VFR BLD AUTO: 30.9 % (ref 37.5–51)
HGB BLD-MCNC: 10.1 G/DL (ref 13–17.7)
IMM GRANULOCYTES # BLD AUTO: 0.05 10*3/MM3 (ref 0–0.05)
IMM GRANULOCYTES NFR BLD AUTO: 1 % (ref 0–0.5)
IRON 24H UR-MRATE: 30 MCG/DL (ref 59–158)
IRON SATN MFR SERPL: 10 % (ref 20–50)
LDH SERPL-CCNC: 226 U/L (ref 135–225)
LYMPHOCYTES # BLD AUTO: 1.46 10*3/MM3 (ref 0.7–3.1)
LYMPHOCYTES NFR BLD AUTO: 29.2 % (ref 19.6–45.3)
MAGNESIUM SERPL-MCNC: 2.3 MG/DL (ref 1.6–2.4)
MCH RBC QN AUTO: 34.7 PG (ref 26.6–33)
MCHC RBC AUTO-ENTMCNC: 32.7 G/DL (ref 31.5–35.7)
MCV RBC AUTO: 106.2 FL (ref 79–97)
MONOCYTES # BLD AUTO: 0.87 10*3/MM3 (ref 0.1–0.9)
MONOCYTES NFR BLD AUTO: 17.4 % (ref 5–12)
NEUTROPHILS NFR BLD AUTO: 2.6 10*3/MM3 (ref 1.7–7)
NEUTROPHILS NFR BLD AUTO: 52 % (ref 42.7–76)
NRBC BLD AUTO-RTO: 0 /100 WBC (ref 0–0.2)
PLATELET # BLD AUTO: 84 10*3/MM3 (ref 140–450)
PMV BLD AUTO: 12.4 FL (ref 6–12)
POTASSIUM SERPL-SCNC: 4.4 MMOL/L (ref 3.5–5.2)
RBC # BLD AUTO: 2.91 10*6/MM3 (ref 4.14–5.8)
RETICS # AUTO: 0.09 10*6/MM3 (ref 0.02–0.13)
RETICS/RBC NFR AUTO: 3.29 % (ref 0.7–1.9)
SODIUM SERPL-SCNC: 130 MMOL/L (ref 136–145)
TIBC SERPL-MCNC: 310 MCG/DL (ref 298–536)
TRANSFERRIN SERPL-MCNC: 208 MG/DL (ref 200–360)
VIT B12 BLD-MCNC: >2000 PG/ML (ref 211–946)
WBC NRBC COR # BLD AUTO: 5 10*3/MM3 (ref 3.4–10.8)

## 2025-04-20 PROCEDURE — 83036 HEMOGLOBIN GLYCOSYLATED A1C: CPT | Performed by: INTERNAL MEDICINE

## 2025-04-20 PROCEDURE — 84466 ASSAY OF TRANSFERRIN: CPT | Performed by: INTERNAL MEDICINE

## 2025-04-20 PROCEDURE — 83540 ASSAY OF IRON: CPT | Performed by: INTERNAL MEDICINE

## 2025-04-20 PROCEDURE — 80048 BASIC METABOLIC PNL TOTAL CA: CPT | Performed by: STUDENT IN AN ORGANIZED HEALTH CARE EDUCATION/TRAINING PROGRAM

## 2025-04-20 PROCEDURE — 85025 COMPLETE CBC W/AUTO DIFF WBC: CPT | Performed by: STUDENT IN AN ORGANIZED HEALTH CARE EDUCATION/TRAINING PROGRAM

## 2025-04-20 PROCEDURE — 82948 REAGENT STRIP/BLOOD GLUCOSE: CPT

## 2025-04-20 PROCEDURE — 63710000001 INSULIN LISPRO (HUMAN) PER 5 UNITS: Performed by: STUDENT IN AN ORGANIZED HEALTH CARE EDUCATION/TRAINING PROGRAM

## 2025-04-20 PROCEDURE — 82746 ASSAY OF FOLIC ACID SERUM: CPT | Performed by: INTERNAL MEDICINE

## 2025-04-20 PROCEDURE — 25810000003 SODIUM CHLORIDE 0.9 % SOLUTION: Performed by: INTERNAL MEDICINE

## 2025-04-20 PROCEDURE — 82105 ALPHA-FETOPROTEIN SERUM: CPT | Performed by: INTERNAL MEDICINE

## 2025-04-20 PROCEDURE — 82728 ASSAY OF FERRITIN: CPT | Performed by: INTERNAL MEDICINE

## 2025-04-20 PROCEDURE — 82607 VITAMIN B-12: CPT | Performed by: STUDENT IN AN ORGANIZED HEALTH CARE EDUCATION/TRAINING PROGRAM

## 2025-04-20 PROCEDURE — 83615 LACTATE (LD) (LDH) ENZYME: CPT | Performed by: INTERNAL MEDICINE

## 2025-04-20 PROCEDURE — 94660 CPAP INITIATION&MGMT: CPT

## 2025-04-20 PROCEDURE — 94799 UNLISTED PULMONARY SVC/PX: CPT

## 2025-04-20 PROCEDURE — 83735 ASSAY OF MAGNESIUM: CPT | Performed by: STUDENT IN AN ORGANIZED HEALTH CARE EDUCATION/TRAINING PROGRAM

## 2025-04-20 PROCEDURE — 63710000001 INSULIN GLARGINE PER 5 UNITS: Performed by: INTERNAL MEDICINE

## 2025-04-20 PROCEDURE — 99232 SBSQ HOSP IP/OBS MODERATE 35: CPT | Performed by: INTERNAL MEDICINE

## 2025-04-20 RX ORDER — GABAPENTIN 100 MG/1
200 CAPSULE ORAL EVERY 8 HOURS SCHEDULED
Status: DISCONTINUED | OUTPATIENT
Start: 2025-04-20 | End: 2025-04-25

## 2025-04-20 RX ORDER — DORZOLAMIDE HYDROCHLORIDE AND TIMOLOL MALEATE 20; 5 MG/ML; MG/ML
1 SOLUTION/ DROPS OPHTHALMIC DAILY
COMMUNITY

## 2025-04-20 RX ORDER — SODIUM CHLORIDE 9 MG/ML
75 INJECTION, SOLUTION INTRAVENOUS CONTINUOUS
Status: ACTIVE | OUTPATIENT
Start: 2025-04-20 | End: 2025-04-21

## 2025-04-20 RX ORDER — HYDROCODONE BITARTRATE AND ACETAMINOPHEN 5; 325 MG/1; MG/1
1 TABLET ORAL ONCE AS NEEDED
Refills: 0 | Status: COMPLETED | OUTPATIENT
Start: 2025-04-20 | End: 2025-04-20

## 2025-04-20 RX ORDER — MOXIFLOXACIN 5 MG/ML
1 SOLUTION/ DROPS OPHTHALMIC DAILY
COMMUNITY
End: 2025-05-02 | Stop reason: HOSPADM

## 2025-04-20 RX ORDER — LACTULOSE 10 G/15ML
30 SOLUTION ORAL 3 TIMES DAILY
Status: DISCONTINUED | OUTPATIENT
Start: 2025-04-20 | End: 2025-04-21

## 2025-04-20 RX ADMIN — LACTULOSE 30 G: 20 SOLUTION ORAL at 21:26

## 2025-04-20 RX ADMIN — INSULIN GLARGINE 30 UNITS: 100 INJECTION, SOLUTION SUBCUTANEOUS at 16:28

## 2025-04-20 RX ADMIN — RIFAXIMIN 550 MG: 550 TABLET ORAL at 08:23

## 2025-04-20 RX ADMIN — INSULIN LISPRO 7 UNITS: 100 INJECTION, SOLUTION INTRAVENOUS; SUBCUTANEOUS at 08:22

## 2025-04-20 RX ADMIN — ACETAMINOPHEN 500 MG: 500 TABLET ORAL at 05:10

## 2025-04-20 RX ADMIN — HYDROCODONE BITARTRATE AND ACETAMINOPHEN 1 TABLET: 5; 325 TABLET ORAL at 22:19

## 2025-04-20 RX ADMIN — SODIUM CHLORIDE 75 ML/HR: 9 INJECTION, SOLUTION INTRAVENOUS at 16:33

## 2025-04-20 RX ADMIN — Medication 10 ML: at 08:31

## 2025-04-20 RX ADMIN — INSULIN LISPRO 9 UNITS: 100 INJECTION, SOLUTION INTRAVENOUS; SUBCUTANEOUS at 16:30

## 2025-04-20 RX ADMIN — CARVEDILOL 6.25 MG: 6.25 TABLET, FILM COATED ORAL at 18:21

## 2025-04-20 RX ADMIN — TAMSULOSIN HYDROCHLORIDE 0.4 MG: 0.4 CAPSULE ORAL at 08:23

## 2025-04-20 RX ADMIN — GABAPENTIN 200 MG: 100 CAPSULE ORAL at 21:26

## 2025-04-20 RX ADMIN — GABAPENTIN 200 MG: 100 CAPSULE ORAL at 16:28

## 2025-04-20 RX ADMIN — LACTULOSE 30 G: 20 SOLUTION ORAL at 18:21

## 2025-04-20 RX ADMIN — LACTULOSE 30 G: 20 SOLUTION ORAL at 08:23

## 2025-04-20 RX ADMIN — PANTOPRAZOLE SODIUM 40 MG: 40 TABLET, DELAYED RELEASE ORAL at 05:10

## 2025-04-20 RX ADMIN — Medication 10 ML: at 21:26

## 2025-04-20 RX ADMIN — CARVEDILOL 6.25 MG: 6.25 TABLET, FILM COATED ORAL at 08:23

## 2025-04-20 RX ADMIN — LOSARTAN POTASSIUM 100 MG: 50 TABLET, FILM COATED ORAL at 08:23

## 2025-04-20 RX ADMIN — INSULIN LISPRO 8 UNITS: 100 INJECTION, SOLUTION INTRAVENOUS; SUBCUTANEOUS at 21:25

## 2025-04-20 RX ADMIN — INSULIN LISPRO 8 UNITS: 100 INJECTION, SOLUTION INTRAVENOUS; SUBCUTANEOUS at 11:57

## 2025-04-20 RX ADMIN — RIFAXIMIN 550 MG: 550 TABLET ORAL at 21:26

## 2025-04-20 NOTE — PROGRESS NOTES
[facility] Medicine Services  PROGRESS NOTE    Patient Name: Jimmy Gabehart  : 1955  MRN: [id number]    Date of Admission: 2025  Primary Care Physician: Miriam Mckeon DO    Subjective   Subjective     CC:  Follow-up for hepatic encephalopathy    HPI:  Patient seen and examined this morning.  Alert x 3.  Per family, he is significant improved compared to yesterday and is more alert today.  Discussed with him the working diagnosis and plan of care      Objective   Objective     Vital Signs:   Temp:  [98 °F (36.7 °C)-98.6 °F (37 °C)] 98 °F (36.7 °C)  Heart Rate:  [71-97] 71  Resp:  [18] 18  BP: (127-148)/(62-88) 137/62     Physical Exam:  General: Comfortable, not in distress, conversant and cooperative  Head: Atraumatic and normocephalic  Eyes: No Icterus. No pallor  Ears:  Ears appear intact with no abnormalities noted  Throat: No oral lesions, no thrush  Neck: Supple, trachea midline  Lungs: Clear to auscultation bilaterally, equal air entry, no wheezing or crackles  Heart:  Normal S1 and S2, no murmur, no gallop, No JVD, no lower extremity swelling  Abdomen:  Soft, no tenderness, no organomegaly, normal bowel sounds, no organomegaly  Extremities: pulses equal bilaterally  Skin: No bleeding, bruising or rash, normal skin turgor and elasticity  Neurologic: Cranial nerves appear intact with no evidence of facial asymmetry, normal motor and sensory functions in all 4 extremities  Psych: Alert and oriented x 3, normal mood    Results Reviewed:  LAB RESULTS:      Lab 25  0508 25  1604   WBC 5.00 4.21   HEMOGLOBIN 10.1* 9.7*   HEMATOCRIT 30.9* 28.7*   PLATELETS 84* 74*   NEUTROS ABS 2.60 2.43   IMMATURE GRANS (ABS) 0.05 0.03   LYMPHS ABS 1.46 1.19   MONOS ABS 0.87 0.53   EOS ABS 0.01 0.02   .2* 105.5*   LACTATE  --  1.7   PROTIME  --  16.6*         Lab 25  0508 25  1604   SODIUM 130* 133*   POTASSIUM 4.4 4.3   CHLORIDE 98 99   CO2 20.0* 21.0*    ANION GAP 12.0 13.0   BUN 18 22   CREATININE 1.07 1.19   EGFR 74.7 65.7   GLUCOSE 308* 289*   CALCIUM 8.7 9.0   MAGNESIUM 2.3 1.5*   PHOSPHORUS  --  2.3*   HEMOGLOBIN A1C 11.50*  --    TSH  --  1.180         Lab 04/19/25  1604   TOTAL PROTEIN 7.7   ALBUMIN 3.5   GLOBULIN 4.2   ALT (SGPT) 47*   AST (SGOT) 84*   BILIRUBIN 1.6*   ALK PHOS 253*         Lab 04/19/25  1604   PROTIME 16.6*   INR 1.26*             Lab 04/20/25  0508   VITAMIN B 12 >2,000*         Brief Urine Lab Results       None            Microbiology Results Abnormal       None            XR Shoulder 2+ View Right  Result Date: 4/19/2025  XR SHOULDER 2+ VW RIGHT Date of Exam: 4/19/2025 2:43 PM EDT Indication: Fall at home onto right shoulder, acute on chronic right shoulder pain Comparison: None available. Findings: There is a vague 9 mm triangular ossicle in the soft tissues adjacent to the posterior aspect of the greater tuberosity. There is no apparent fracture line or donor site at the greater tuberosity. There is moderate osteoarthritic change of the glenohumeral joint with subchondral cystic change of the glenoid and collar osteophyte of the humeral head. There are mild degenerative changes of the acromioclavicular joint. No high-grade narrowing of the subacromial space. No acute or traumatic findings in the partially imaged right chest.     Impression: Impression: Vague triangular ossicle adjacent to the greater tuberosity of uncertain clinical significance. There is no obvious fracture line or clear donor site at the greater tuberosity although the possibility of a small rotator cuff avulsion fracture is difficult to entirely exclude in the correct clinical context. Moderate osteoarthritic change of the glenohumeral joint. Electronically Signed: Dutch Mustafa MD  4/19/2025 3:23 PM EDT  Workstation ID: [id number]          Current medications:  Scheduled Meds:carvedilol, 6.25 mg, Oral, BID With Meals  gabapentin, 200 mg, Oral, Q8H  insulin  glargine, 30 Units, Subcutaneous, Daily  insulin lispro, 2-9 Units, Subcutaneous, 4x Daily AC & at Bedtime  lactulose, 30 g, Oral, TID  losartan, 100 mg, Oral, Q24H  pantoprazole, 40 mg, Oral, Q AM  rifAXIMin, 550 mg, Oral, BID  sodium chloride, 10 mL, Intravenous, Q12H  tamsulosin, 0.4 mg, Oral, Daily      Continuous Infusions:   PRN Meds:.  [DISCONTINUED] acetaminophen **OR** acetaminophen **OR** acetaminophen    Calcium Replacement - Follow Nurse / BPA Driven Protocol    dextrose    dextrose    glucagon (human recombinant)    Magnesium Standard Dose Replacement - Follow Nurse / BPA Driven Protocol    nitroglycerin    Phosphorus Replacement - Follow Nurse / BPA Driven Protocol    Potassium Replacement - Follow Nurse / BPA Driven Protocol    sodium chloride    sodium chloride    Assessment & Plan   Assessment & Plan     Active Hospital Problems    Diagnosis  POA    **Hepatic encephalopathy [K76.82]  Yes      Resolved Hospital Problems   No resolved problems to display.        Brief Hospital Course to date:  Jimmy Gabehart is a 70 y.o. male with past medical history of insulin-dependent diabetes, hypertension, recent diagnosis of liver cirrhosis, thrombocytopenia, chronic pain presenting with altered mental status and fall at home.     Acute hepatic encephalopathy  Recent diagnosis liver cirrhosis  Patient with remote history of alcohol use. New diagnosis of Stage F4 cirrhosis based on FibroScan in March 2023. Following with GI as out patient  Presented with acute encephalopathy secondary to hepatic encephalopathy  Somewhat improved with lactulose  Continue lactulose and rifaximin.  Will need prescription upon discharge  Patient family advised to keep his follow-up with GI as outpatient.  Will need EGD  Normal TSH and B12  Consider CT head if no clinical improvement within 24 hours  Reduce gabapentin to 200 mg 3 times daily  Discontinue baclofen upon discharge    Weakness and debility  PT/OT consulted      Acute on  chronic right shoulder pain  Fall at home landing on right shoulder  Has been evaluated by orthopedic surgery outpatient.  Patient was found to have a tear in his right shoulder.  Patient was deemed a poor surgical candidate and nonoperative approach was indicated.  X-ray to evaluate for fracture     Type 2 diabetes  A1c 11.5%  Start insulin glargine 30 units daily and continue SSI  Hold Januvia and metformin   The patient drinks plenty of ski (still drink rich in sugar) and eats a quart of gallon of ice cream daily.  Theand family counseled about the importance to comply with diabetic diet    Hypertension  Continue home Coreg and losartan in the morning     GERD  Continue PPI     BPH  Continue tamsulosin     Hx of thrombocytopenia  Follows with Heme/Onc outpatient. 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 Will need to follow up outpatient for biopsy results.   Continue SCD for DVT prophylaxis        Expected Discharge Location and Transportation: Home versus rehab  Expected Discharge   Expected Discharge Date: 4/22/2025; Expected Discharge Time:      VTE Prophylaxis:  Mechanical VTE prophylaxis orders are present.         AM-PAC 6 Clicks Score (PT): 19 (04/19/25 1605)    CODE STATUS:   Code Status and Medical Interventions: CPR (Attempt to Resuscitate); Full Support   Ordered at: 04/19/25 8229     Code Status (Patient has no pulse and is not breathing):    CPR (Attempt to Resuscitate)     Medical Interventions (Patient has pulse or is breathing):    Full Support     Level Of Support Discussed With:    Next of Kin (If No Surrogate)       Bailey Howell MD  04/20/25

## 2025-04-20 NOTE — PLAN OF CARE
Goal Outcome Evaluation:  Plan of Care Reviewed With: patient        Progress: improving  Outcome Evaluation: Pt on rm air during the day, cpap at night. NS going at 75/ml hr. Right shoulder mobility is moderately impaired. Purewick in place and up to bedside commode. Recent fall. He has an unsteady, weak gate. Glucose was in the 400s before dinner. MD notified. Lantus added.

## 2025-04-21 ENCOUNTER — APPOINTMENT (OUTPATIENT)
Dept: GENERAL RADIOLOGY | Facility: HOSPITAL | Age: 70
End: 2025-04-21
Payer: COMMERCIAL

## 2025-04-21 LAB
ALBUMIN SERPL-MCNC: 3.3 G/DL (ref 3.5–5.2)
ALBUMIN/GLOB SERPL: 0.9 G/DL
ALP SERPL-CCNC: 247 U/L (ref 39–117)
ALT SERPL W P-5'-P-CCNC: 35 U/L (ref 1–41)
ANION GAP SERPL CALCULATED.3IONS-SCNC: 10 MMOL/L (ref 5–15)
AST SERPL-CCNC: 51 U/L (ref 1–40)
B PARAPERT DNA SPEC QL NAA+PROBE: NOT DETECTED
B PERT DNA SPEC QL NAA+PROBE: NOT DETECTED
BASOPHILS # BLD AUTO: 0.01 10*3/MM3 (ref 0–0.2)
BASOPHILS NFR BLD AUTO: 0.2 % (ref 0–1.5)
BILIRUB SERPL-MCNC: 1.4 MG/DL (ref 0–1.2)
BILIRUB UR QL STRIP: NEGATIVE
BUN SERPL-MCNC: 22 MG/DL (ref 8–23)
BUN/CREAT SERPL: 19.6 (ref 7–25)
C PNEUM DNA NPH QL NAA+NON-PROBE: NOT DETECTED
CALCIUM SPEC-SCNC: 8.4 MG/DL (ref 8.6–10.5)
CHLORIDE SERPL-SCNC: 103 MMOL/L (ref 98–107)
CLARITY UR: CLEAR
CO2 SERPL-SCNC: 22 MMOL/L (ref 22–29)
COLOR UR: YELLOW
CREAT SERPL-MCNC: 1.12 MG/DL (ref 0.76–1.27)
CRP SERPL-MCNC: 13.52 MG/DL (ref 0–0.5)
D-LACTATE SERPL-SCNC: 1.9 MMOL/L (ref 0.5–2)
DEPRECATED RDW RBC AUTO: 55.4 FL (ref 37–54)
EGFRCR SERPLBLD CKD-EPI 2021: 70.7 ML/MIN/1.73
EOSINOPHIL # BLD AUTO: 0.03 10*3/MM3 (ref 0–0.4)
EOSINOPHIL NFR BLD AUTO: 0.6 % (ref 0.3–6.2)
ERYTHROCYTE [DISTWIDTH] IN BLOOD BY AUTOMATED COUNT: 13.7 % (ref 12.3–15.4)
FLUAV SUBTYP SPEC NAA+PROBE: NOT DETECTED
FLUBV RNA ISLT QL NAA+PROBE: NOT DETECTED
GLOBULIN UR ELPH-MCNC: 3.6 GM/DL
GLUCOSE BLDC GLUCOMTR-MCNC: 218 MG/DL (ref 70–130)
GLUCOSE BLDC GLUCOMTR-MCNC: 302 MG/DL (ref 70–130)
GLUCOSE BLDC GLUCOMTR-MCNC: 321 MG/DL (ref 70–130)
GLUCOSE BLDC GLUCOMTR-MCNC: 331 MG/DL (ref 70–130)
GLUCOSE BLDC GLUCOMTR-MCNC: 437 MG/DL (ref 70–130)
GLUCOSE BLDC GLUCOMTR-MCNC: 526 MG/DL (ref 70–130)
GLUCOSE SERPL-MCNC: 230 MG/DL (ref 65–99)
GLUCOSE UR STRIP-MCNC: ABNORMAL MG/DL
HADV DNA SPEC NAA+PROBE: NOT DETECTED
HCOV 229E RNA SPEC QL NAA+PROBE: NOT DETECTED
HCOV HKU1 RNA SPEC QL NAA+PROBE: NOT DETECTED
HCOV NL63 RNA SPEC QL NAA+PROBE: NOT DETECTED
HCOV OC43 RNA SPEC QL NAA+PROBE: NOT DETECTED
HCT VFR BLD AUTO: 29.9 % (ref 37.5–51)
HGB BLD-MCNC: 9.3 G/DL (ref 13–17.7)
HGB UR QL STRIP.AUTO: NEGATIVE
HMPV RNA NPH QL NAA+NON-PROBE: NOT DETECTED
HPIV1 RNA ISLT QL NAA+PROBE: NOT DETECTED
HPIV2 RNA SPEC QL NAA+PROBE: NOT DETECTED
HPIV3 RNA NPH QL NAA+PROBE: NOT DETECTED
HPIV4 P GENE NPH QL NAA+PROBE: NOT DETECTED
IMM GRANULOCYTES # BLD AUTO: 0.04 10*3/MM3 (ref 0–0.05)
IMM GRANULOCYTES NFR BLD AUTO: 0.8 % (ref 0–0.5)
KETONES UR QL STRIP: NEGATIVE
LEUKOCYTE ESTERASE UR QL STRIP.AUTO: NEGATIVE
LYMPHOCYTES # BLD AUTO: 1.7 10*3/MM3 (ref 0.7–3.1)
LYMPHOCYTES NFR BLD AUTO: 34.8 % (ref 19.6–45.3)
M PNEUMO IGG SER IA-ACNC: NOT DETECTED
MACROCYTES BLD QL SMEAR: NORMAL
MAGNESIUM SERPL-MCNC: 2.2 MG/DL (ref 1.6–2.4)
MCH RBC QN AUTO: 34.7 PG (ref 26.6–33)
MCHC RBC AUTO-ENTMCNC: 31.1 G/DL (ref 31.5–35.7)
MCV RBC AUTO: 111.6 FL (ref 79–97)
MONOCYTES # BLD AUTO: 0.64 10*3/MM3 (ref 0.1–0.9)
MONOCYTES NFR BLD AUTO: 13.1 % (ref 5–12)
NEUTROPHILS NFR BLD AUTO: 2.47 10*3/MM3 (ref 1.7–7)
NEUTROPHILS NFR BLD AUTO: 50.5 % (ref 42.7–76)
NITRITE UR QL STRIP: NEGATIVE
NRBC BLD AUTO-RTO: 0 /100 WBC (ref 0–0.2)
PH UR STRIP.AUTO: 6.5 [PH] (ref 5–8)
PHOSPHATE SERPL-MCNC: 2.8 MG/DL (ref 2.5–4.5)
PLAT MORPH BLD: NORMAL
PLATELET # BLD AUTO: 73 10*3/MM3 (ref 140–450)
PMV BLD AUTO: 12.5 FL (ref 6–12)
POTASSIUM SERPL-SCNC: 4.1 MMOL/L (ref 3.5–5.2)
PROCALCITONIN SERPL-MCNC: 0.46 NG/ML (ref 0–0.25)
PROT SERPL-MCNC: 6.9 G/DL (ref 6–8.5)
PROT UR QL STRIP: NEGATIVE
RBC # BLD AUTO: 2.68 10*6/MM3 (ref 4.14–5.8)
RHINOVIRUS RNA SPEC NAA+PROBE: NOT DETECTED
RSV RNA NPH QL NAA+NON-PROBE: NOT DETECTED
SARS-COV-2 RNA NPH QL NAA+NON-PROBE: NOT DETECTED
SODIUM SERPL-SCNC: 135 MMOL/L (ref 136–145)
SP GR UR STRIP: 1.01 (ref 1–1.03)
UROBILINOGEN UR QL STRIP: ABNORMAL
WBC MORPH BLD: NORMAL
WBC NRBC COR # BLD AUTO: 4.89 10*3/MM3 (ref 3.4–10.8)

## 2025-04-21 PROCEDURE — 99232 SBSQ HOSP IP/OBS MODERATE 35: CPT | Performed by: INTERNAL MEDICINE

## 2025-04-21 PROCEDURE — 84145 PROCALCITONIN (PCT): CPT | Performed by: INTERNAL MEDICINE

## 2025-04-21 PROCEDURE — 97530 THERAPEUTIC ACTIVITIES: CPT

## 2025-04-21 PROCEDURE — 63710000001 INSULIN LISPRO (HUMAN) PER 5 UNITS: Performed by: INTERNAL MEDICINE

## 2025-04-21 PROCEDURE — 97166 OT EVAL MOD COMPLEX 45 MIN: CPT

## 2025-04-21 PROCEDURE — 97535 SELF CARE MNGMENT TRAINING: CPT

## 2025-04-21 PROCEDURE — 0202U NFCT DS 22 TRGT SARS-COV-2: CPT | Performed by: INTERNAL MEDICINE

## 2025-04-21 PROCEDURE — 83735 ASSAY OF MAGNESIUM: CPT | Performed by: INTERNAL MEDICINE

## 2025-04-21 PROCEDURE — 97162 PT EVAL MOD COMPLEX 30 MIN: CPT

## 2025-04-21 PROCEDURE — 94799 UNLISTED PULMONARY SVC/PX: CPT

## 2025-04-21 PROCEDURE — 63710000001 INSULIN LISPRO (HUMAN) PER 5 UNITS: Performed by: STUDENT IN AN ORGANIZED HEALTH CARE EDUCATION/TRAINING PROGRAM

## 2025-04-21 PROCEDURE — 85025 COMPLETE CBC W/AUTO DIFF WBC: CPT | Performed by: INTERNAL MEDICINE

## 2025-04-21 PROCEDURE — 71045 X-RAY EXAM CHEST 1 VIEW: CPT

## 2025-04-21 PROCEDURE — 82948 REAGENT STRIP/BLOOD GLUCOSE: CPT

## 2025-04-21 PROCEDURE — 85007 BL SMEAR W/DIFF WBC COUNT: CPT | Performed by: INTERNAL MEDICINE

## 2025-04-21 PROCEDURE — 80053 COMPREHEN METABOLIC PANEL: CPT | Performed by: INTERNAL MEDICINE

## 2025-04-21 PROCEDURE — 25810000003 SODIUM CHLORIDE 0.9 % SOLUTION: Performed by: INTERNAL MEDICINE

## 2025-04-21 PROCEDURE — 25010000002 CEFTRIAXONE PER 250 MG: Performed by: INTERNAL MEDICINE

## 2025-04-21 PROCEDURE — 83605 ASSAY OF LACTIC ACID: CPT | Performed by: INTERNAL MEDICINE

## 2025-04-21 PROCEDURE — 81003 URINALYSIS AUTO W/O SCOPE: CPT | Performed by: INTERNAL MEDICINE

## 2025-04-21 PROCEDURE — 63710000001 INSULIN GLARGINE PER 5 UNITS: Performed by: INTERNAL MEDICINE

## 2025-04-21 PROCEDURE — 84100 ASSAY OF PHOSPHORUS: CPT | Performed by: INTERNAL MEDICINE

## 2025-04-21 PROCEDURE — 87040 BLOOD CULTURE FOR BACTERIA: CPT | Performed by: INTERNAL MEDICINE

## 2025-04-21 PROCEDURE — 86140 C-REACTIVE PROTEIN: CPT | Performed by: INTERNAL MEDICINE

## 2025-04-21 RX ORDER — INSULIN LISPRO 100 [IU]/ML
15 INJECTION, SOLUTION INTRAVENOUS; SUBCUTANEOUS ONCE
Status: COMPLETED | OUTPATIENT
Start: 2025-04-21 | End: 2025-04-21

## 2025-04-21 RX ORDER — OXYCODONE AND ACETAMINOPHEN 5; 325 MG/1; MG/1
1 TABLET ORAL EVERY 4 HOURS PRN
Refills: 0 | Status: DISCONTINUED | OUTPATIENT
Start: 2025-04-21 | End: 2025-04-23

## 2025-04-21 RX ORDER — LACTULOSE 10 G/15ML
20 SOLUTION ORAL EVERY 12 HOURS PRN
Status: DISCONTINUED | OUTPATIENT
Start: 2025-04-21 | End: 2025-04-24

## 2025-04-21 RX ADMIN — INSULIN LISPRO 9 UNITS: 100 INJECTION, SOLUTION INTRAVENOUS; SUBCUTANEOUS at 16:49

## 2025-04-21 RX ADMIN — GABAPENTIN 200 MG: 100 CAPSULE ORAL at 06:46

## 2025-04-21 RX ADMIN — CARVEDILOL 6.25 MG: 6.25 TABLET, FILM COATED ORAL at 08:50

## 2025-04-21 RX ADMIN — ACETAMINOPHEN 500 MG: 650 SOLUTION ORAL at 12:53

## 2025-04-21 RX ADMIN — LACTULOSE 30 G: 20 SOLUTION ORAL at 08:51

## 2025-04-21 RX ADMIN — LOSARTAN POTASSIUM 100 MG: 50 TABLET, FILM COATED ORAL at 08:47

## 2025-04-21 RX ADMIN — Medication 10 ML: at 08:51

## 2025-04-21 RX ADMIN — INSULIN LISPRO 15 UNITS: 100 INJECTION, SOLUTION INTRAVENOUS; SUBCUTANEOUS at 17:10

## 2025-04-21 RX ADMIN — INSULIN LISPRO 4 UNITS: 100 INJECTION, SOLUTION INTRAVENOUS; SUBCUTANEOUS at 08:46

## 2025-04-21 RX ADMIN — GABAPENTIN 200 MG: 100 CAPSULE ORAL at 13:00

## 2025-04-21 RX ADMIN — OXYCODONE HYDROCHLORIDE AND ACETAMINOPHEN 1 TABLET: 5; 325 TABLET ORAL at 16:50

## 2025-04-21 RX ADMIN — INSULIN LISPRO 7 UNITS: 100 INJECTION, SOLUTION INTRAVENOUS; SUBCUTANEOUS at 12:51

## 2025-04-21 RX ADMIN — SODIUM CHLORIDE 75 ML/HR: 9 INJECTION, SOLUTION INTRAVENOUS at 07:38

## 2025-04-21 RX ADMIN — PANTOPRAZOLE SODIUM 40 MG: 40 TABLET, DELAYED RELEASE ORAL at 06:46

## 2025-04-21 RX ADMIN — TAMSULOSIN HYDROCHLORIDE 0.4 MG: 0.4 CAPSULE ORAL at 08:47

## 2025-04-21 RX ADMIN — CEFTRIAXONE 2000 MG: 2 INJECTION, POWDER, FOR SOLUTION INTRAMUSCULAR; INTRAVENOUS at 16:49

## 2025-04-21 RX ADMIN — RIFAXIMIN 550 MG: 550 TABLET ORAL at 08:47

## 2025-04-21 RX ADMIN — Medication 10 ML: at 21:19

## 2025-04-21 RX ADMIN — INSULIN LISPRO 7 UNITS: 100 INJECTION, SOLUTION INTRAVENOUS; SUBCUTANEOUS at 21:18

## 2025-04-21 RX ADMIN — RIFAXIMIN 550 MG: 550 TABLET ORAL at 21:18

## 2025-04-21 RX ADMIN — INSULIN GLARGINE 30 UNITS: 100 INJECTION, SOLUTION SUBCUTANEOUS at 08:46

## 2025-04-21 RX ADMIN — CARVEDILOL 6.25 MG: 6.25 TABLET, FILM COATED ORAL at 17:10

## 2025-04-21 RX ADMIN — ACETAMINOPHEN 500 MG: 650 SOLUTION ORAL at 01:10

## 2025-04-21 RX ADMIN — GABAPENTIN 200 MG: 100 CAPSULE ORAL at 21:18

## 2025-04-21 NOTE — PLAN OF CARE
Goal Outcome Evaluation:  Plan of Care Reviewed With: patient, family        Progress: improving  Outcome Evaluation: Patient demonstrated improved transfer independence and safer walker use compared to morning PT session.  Pt. encouraged to use walker with movement up for improved safety.  Pt. attempting to complete ADL tasks with non-dominant UE.  Pt. with limited ability to reach to feet and was unable to ti his socks as priorly able.  Pt. appropriate for skilled OT services to address deficit areas of safety, balance and limited ROM and strength R shoulder with high level pain and impact on PLOF ADLs.  Recommend IRF at discharge if medically appropriate.    Anticipated Discharge Disposition (OT): inpatient rehabilitation facility

## 2025-04-21 NOTE — THERAPY EVALUATION
Patient Name: Jimmy Gabehart  : 1955    MRN: [id number]                              Today's Date: 2025       Admit Date: 2025    Visit Dx: No diagnosis found.  Patient Active Problem List   Diagnosis    Shoulder pain, bilateral    Bursitis/tendonitis, shoulder    Pain    Cervical radiculopathy    Cervical spondylosis    Lumbar spondylosis    Elevated liver enzymes    History of diabetic ulcer of foot    Hypertensive disorder    Long-term current use of opiate analgesic    Osteoarthritis, generalized    Type 2 diabetes mellitus with diabetic neuropathy    Hepatic encephalopathy     Past Medical History:   Diagnosis Date    Diabetes     Hypertension      Past Surgical History:   Procedure Laterality Date    BACK SURGERY      CARPAL TUNNEL RELEASE      COLONOSCOPY      EYE SURGERY      FOOT SURGERY Left     Ankle fusion    HERNIA REPAIR      KNEE SURGERY Right 2022    TKA right knee manipulation 23    TONSILLECTOMY AND ADENOIDECTOMY      TRIGGER FINGER RELEASE      WISDOM TOOTH EXTRACTION        General Information       Row Name 25 0924          Physical Therapy Time and Intention    Document Type evaluation  -ER     Mode of Treatment physical therapy  -ER       Row Name 25 0924          General Information    Patient Profile Reviewed yes  -ER     Prior Level of Function independent:;all household mobility;community mobility;gait;transfer;bed mobility  no AD use, frequent falls in last couple months, doing things for self at home  -ER     Existing Precautions/Restrictions fall  -ER     Barriers to Rehab medically complex;previous functional deficit  -ER       Row Name 25 0924          Living Environment    Current Living Arrangements home  -ER     People in Home spouse  -ER       Row Name 25 0924          Home Main Entrance    Number of Stairs, Main Entrance two  -ER     Stair Railings, Main Entrance railings safe and in good condition  -ER       Row Name  04/21/25 0924          Stairs Within Home, Primary    Number of Stairs, Within Home, Primary none  -ER       Row Name 04/21/25 0924          Cognition    Orientation Status (Cognition) oriented x 3  -ER       Row Name 04/21/25 0924          Safety Issues/Impairments Affecting Functional Mobility    Safety Issues Affecting Function (Mobility) ability to follow commands;at risk behavior observed;awareness of need for assistance;impulsivity;insight into deficits/self-awareness;judgment;sequencing abilities;safety precautions follow-through/compliance;safety precaution awareness;problem-solving;positioning of assistive device  -ER     Impairments Affecting Function (Mobility) balance;coordination;motor planning  -ER               User Key  (r) = Recorded By, (t) = Taken By, (c) = Cosigned By      Initials Name Provider Type    ER Sophie Bolanos, PT Physical Therapist                   Mobility       Row Name 04/21/25 0926          Bed Mobility    Bed Mobility rolling left;supine-sit  -ER     Rolling Left [place] (Bed Mobility) modified independence  -ER     Supine-Sit [place] (Bed Mobility) standby assist  -ER     Assistive Device (Bed Mobility) bed rails;head of bed elevated  -ER       Row Name 04/21/25 0926          Bed-Chair Transfer    Bed-Chair [place] (Transfers) minimum assist (75% patient effort)  -ER     Assistive Device (Bed-Chair Transfers) walker, front-wheeled  -ER     Comment, (Bed-Chair Transfer) LOB getting to chair  -ER       Row Name 04/21/25 0926          Sit-Stand Transfer    Sit-Stand [place] (Transfers) contact guard  -ER     Assistive Device (Sit-Stand Transfers) walker, front-wheeled  -ER       Row Name 04/21/25 0926          Gait/Stairs (Locomotion)    [place] Level (Gait) minimum assist (75% patient effort)  -ER     Assistive Device (Gait) walker, front-wheeled  -ER     Patient was able to Ambulate yes  -ER     Distance in Feet (Gait) 5  -ER     Deviations/Abnormal  Patterns (Gait) ataxic;gait speed decreased;base of support, wide  -ER     Bilateral Gait Deviations forward flexed posture  -ER     Comment, (Gait/Stairs) lob with bed to chair ambulation difficulty w turns and difficulty with use of AD  -ER               User Key  (r) = Recorded By, (t) = Taken By, (c) = Cosigned By      Initials Name Provider Type    ER Sophie Bolanos, PT Physical Therapist                   Obj/Interventions       Row Name 04/21/25 0932          Range of Motion Comprehensive    General Range of Motion no range of motion deficits identified  -ER       Row Name 04/21/25 0932          Strength Comprehensive (MMT)    General Manual Muscle Testing (MMT) Assessment no strength deficits identified  -ER     Comment, General Manual Muscle Testing (MMT) Assessment gross BLE 5/5  -ER       Row Name 04/21/25 0932          Balance    Balance Assessment sitting static balance;sitting dynamic balance;standing static balance;standing dynamic balance  -ER     Static Sitting Balance modified independence  -ER     Dynamic Sitting Balance modified independence  -ER     Position, Sitting Balance unsupported;sitting edge of bed  -ER     Static Standing Balance contact guard  -ER     Dynamic Standing Balance minimal assist  -ER     Position/Device Used, Standing Balance supported;walker, front-wheeled  -ER     Balance Interventions sitting;standing;sit to stand;supported;static;dynamic;minimal challenge;moderate challenge  -ER       Row Name 04/21/25 0932          Sensory Assessment (Somatosensory)    Sensory Assessment (Somatosensory) sensation intact  -ER               User Key  (r) = Recorded By, (t) = Taken By, (c) = Cosigned By      Initials Name Provider Type    ER Sophie Bolanos, PT Physical Therapist                   Goals/Plan       Row Name 04/21/25 0937          Bed Mobility Goal 1 (PT)    Activity/Assistive Device (Bed Mobility Goal 1, PT) bridging;rolling to left;rolling to right;scooting;sit to  supine;supine to sit  -ER     [place] Level/Cues Needed (Bed Mobility Goal 1, PT) independent  -ER     Time Frame (Bed Mobility Goal 1, PT) short term goal (STG);5 days  -ER       Row Name 04/21/25 0937          Transfer Goal 1 (PT)    Activity/Assistive Device (Transfer Goal 1, PT) sit-to-stand/stand-to-sit;bed-to-chair/chair-to-bed;tub;toilet  -ER     [place] Level/Cues Needed (Transfer Goal 1, PT) modified independence  -ER     Time Frame (Transfer Goal 1, PT) long term goal (LTG);10 days  -ER       Row Name 04/21/25 0937          Gait Training Goal 1 (PT)    Activity/Assistive Device (Gait Training Goal 1, PT) gait (walking locomotion);assistive device use;decrease asymmetrical patterns;decrease fall risk;diminish gait deviation  -ER     [place] Level (Gait Training Goal 1, PT) modified independence  -ER     Distance (Gait Training Goal 1, PT) 50  -ER     Time Frame (Gait Training Goal 1, PT) long term goal (LTG);10 days  -ER       Row Name 04/21/25 0908          Therapy Assessment/Plan (PT)    Planned Therapy Interventions (PT) balance training;bed mobility training;gait training;home exercise program;motor coordination training;postural re-education;patient/family education;neuromuscular re-education;strengthening;transfer training  -ER               User Key  (r) = Recorded By, (t) = Taken By, (c) = Cosigned By      Initials Name Provider Type    ER Sophie Bolanos, PT Physical Therapist                   Clinical Impression       Row Name 04/21/25 0934          Pain    Pretreatment Pain Rating 7/10  -ER     Posttreatment Pain Rating 7/10  -ER     Pain Location shoulder  -ER     Pain Side/Orientation right  -ER     Pain Management Interventions activity modification encouraged;exercise or physical activity utilized  -ER     Response to Pain Interventions activity participation with tolerable pain  -ER       Row Name 04/21/25 0985          Plan of Care Review    Plan of Care Reviewed With  patient;child  -ER     Outcome Evaluation PT adelfo complete. Able to complete transfer but he had minimal compliance to use w FWW and balance with turns. Recommend continued use of FWW and balance training. Recommend inpatient rehab upon d/c prior to returning home.  -ER       Row Name 04/21/25 0934          Therapy Assessment/Plan (PT)    Patient/Family Therapy Goals Statement (PT) to walk without falling  -ER     Rehab Potential (PT) good  -ER     Therapy Frequency (PT) daily  -ER     Predicted Duration of Therapy Intervention (PT) 10 days  -ER       Row Name 04/21/25 0934          Vital Signs    Pre Systolic BP Rehab 125  -ER     Pre Treatment Diastolic BP 66  -ER     Posttreatment Heart Rate (beats/min) 80  -ER     O2 Delivery Pre Treatment room air  -ER     O2 Delivery Intra Treatment room air  -ER     O2 Delivery Post Treatment room air  -ER     Pre Patient Position Supine  -ER     Intra Patient Position Standing  -ER     Post Patient Position Sitting  -ER       Row Name 04/21/25 0934          Positioning and Restraints    Pre-Treatment Position in bed  -ER     Post Treatment Position chair  -ER     In Chair notified nsg;reclined;call light within reach;encouraged to call for assist;exit alarm on;with family/caregiver;waffle cushion;with nsg  -ER               User Key  (r) = Recorded By, (t) = Taken By, (c) = Cosigned By      Initials Name Provider Type    ER Sophie Bolanos, PT Physical Therapist                   Outcome Measures       Row Name 04/21/25 0938          How much help from another person do you currently need...    Turning from your back to your side while in flat bed without using bedrails? 3  -ER     Moving from lying on back to sitting on the side of a flat bed without bedrails? 3  -ER     Moving to and from a bed to a chair (including a wheelchair)? 3  -ER     Standing up from a chair using your arms (e.g., wheelchair, bedside chair)? 3  -ER     Climbing 3-5 steps with a railing? 3  -ER      To walk in hospital room? 3  -ER     AM-PAC 6 Clicks Score (PT) 18  -ER     Highest Level of Mobility Goal 6 --> Walk 10 steps or more  -ER       Row Name 04/21/25 0938          Functional Assessment    Outcome Measure Options AM-PAC 6 Clicks Basic Mobility (PT)  -ER               User Key  (r) = Recorded By, (t) = Taken By, (c) = Cosigned By      Initials Name Provider Type    ER Sophie Bolanos, PT Physical Therapist                                 Physical Therapy Education       Title: PT OT SLP Therapies (Done)       Topic: Physical Therapy (Done)       Point: Mobility training (Done)       Learning Progress Summary            Patient Acceptance, E, VU by ER at 4/21/2025 0938    Comment: progress, d/c planning, shoulder pain edu                      Point: Home exercise program (Done)       Learning Progress Summary            Patient Acceptance, E, VU by ER at 4/21/2025 0938    Comment: progress, d/c planning, shoulder pain edu                      Point: Body mechanics (Done)       Learning Progress Summary            Patient Acceptance, E, VU by ER at 4/21/2025 0938    Comment: progress, d/c planning, shoulder pain edu                      Point: Precautions (Done)       Learning Progress Summary            Patient Acceptance, E, VU by ER at 4/21/2025 0938    Comment: progress, d/c planning, shoulder pain edu                                      User Key       Initials Effective Dates Name Provider Type Discipline    ER 12/13/24 -  Sophie Bolanos PT Physical Therapist PT                  PT Recommendation and Plan  Planned Therapy Interventions (PT): balance training, bed mobility training, gait training, home exercise program, motor coordination training, postural re-education, patient/family education, neuromuscular re-education, strengthening, transfer training  Outcome Evaluation: PT adelfo complete. Able to complete transfer but he had minimal compliance to use w FWW and balance with turns. Recommend  continued use of FWW and balance training. Recommend inpatient rehab upon d/c prior to returning home.     Time Calculation:   PT Evaluation Complexity  History, PT Evaluation Complexity: 1-2 personal factors and/or comorbidities  Examination of Body Systems (PT Eval Complexity): total of 3 or more elements  Clinical Presentation (PT Evaluation Complexity): evolving  Clinical Decision Making (PT Evaluation Complexity): moderate complexity  Overall Complexity (PT Evaluation Complexity): moderate complexity     PT Charges       Row Name 04/21/25 0939             Time Calculation    Start Time 0839  -ER      PT Received On 04/21/25  -ER      PT Goal Re-Cert Due Date 05/01/25  -ER         Timed Charges    71057 - PT Therapeutic Activity Minutes 15  -ER         Untimed Charges    PT Eval/Re-eval Minutes 46  -ER         Total Minutes    Timed Charges Total Minutes 15  -ER      Untimed Charges Total Minutes 46  -ER       Total Minutes 61  -ER                User Key  (r) = Recorded By, (t) = Taken By, (c) = Cosigned By      Initials Name Provider Type    ER Sophie Bolanos, PT Physical Therapist                  Therapy Charges for Today       Code Description Service Date Service Provider Modifiers Qty    11460259241 HC PT THERAPEUTIC ACT EA 15 MIN 4/21/2025 Sophie Bolanos, PT GP 1    91565449565 HC PT EVAL MOD COMPLEXITY 4 4/21/2025 Sophie Bolanos, PT GP 1            PT G-Codes  Outcome Measure Options: AM-PAC 6 Clicks Basic Mobility (PT)  AM-PAC 6 Clicks Score (PT): 18  PT Discharge Summary  Anticipated Discharge Disposition (PT): inpatient rehabilitation facility    Sophie Bolanos PT  4/21/2025

## 2025-04-21 NOTE — PLAN OF CARE
[facility] HOSPITALIST PROGRESS NOTE     Patient Identification:  Name:  Jessica Bravo  Age:  58 y.o.  Sex:  female  :  1963  MRN:  [id number]  Visit Number:  [id number]  ROOM: [address] [place] [address]     Primary Care Provider:  Buck Wallis APRN    Length of stay in inpatient status:  4    Subjective     Chief Compliant:    Chief Complaint   Patient presents with   • Shortness of Breath   • Cough       History of Presenting Illness:    Patient seen in follow-up for acute hypoxic respiratory failure. Still complains of persistent dyspnea, with a dry nonproductive cough. Renal function about the same.  Hemodynamically stable, 93% on 2L. spO2 drops to mid 70s on exertion. No adverse events noted by nursing.    Objective     Current Hospital Meds:amLODIPine, 5 mg, Oral, Q24H  apixaban, 5 mg, Oral, Q12H  aspirin, 81 mg, Oral, Daily  atorvastatin, 40 mg, Oral, Daily  cefepime, 2 g, Intravenous, Q24H  cholecalciferol, 50,000 Units, Oral, Weekly  fluticasone, 1 spray, Nasal, BID  hydrOXYzine, 25 mg, Oral, Nightly  insulin aspart, 0-14 Units, Subcutaneous, TID AC  insulin aspart, 5 Units, Subcutaneous, TID With Meals  insulin detemir, 20 Units, Subcutaneous, Nightly  linaclotide, 145 mcg, Oral, QAM AC  montelukast, 10 mg, Oral, Nightly  pantoprazole, 40 mg, Oral, Daily  sodium bicarbonate, 650 mg, Oral, BID  sodium chloride, 10 mL, Intravenous, Q12H  sodium chloride, 10 mL, Intravenous, Q12H  sodium chloride, 10 mL, Intravenous, Q12H  Vancomycin Pharmacy Intermittent Dosing, , Does not apply, Daily    IV Fluids 1000 mL + additives, 100 mL/hr, Last Rate: 100 mL/hr (21 0154)        Current Antimicrobial Therapy:  Anti-Infectives (From admission, onward)    Ordered     Dose/Rate Route Frequency Start Stop    21 1401  vancomycin 1 g/250 mL 0.9% NS (vial-mate)        Ordering Provider: Stephan Bolton MD    1,000 mg  over 60 Minutes Intravenous Once 21 1600 21 1743    10/30/21 1807  cefepime 2 gm  Goal Outcome Evaluation:  Plan of Care Reviewed With: patient        Progress: improving  Outcome Evaluation: Pt able to ambulate with 1 and walker. Able to move right arm more. Pain meds added prn. Additional insulin given before dinner for elevated glucose. IV antibiotics are started.  VSS                              IVPB in 100 ml NS (VTB)        Ordering Provider: Roland Alberto DO    2 g  over 4 Hours Intravenous Every 24 Hours 10/31/21 1700 11/07/21 1659    10/31/21 1417  vancomycin 1 g/250 mL 0.9% NS (vial-mate)        Ordering Provider: Stephan Bolton MD    1,000 mg  over 60 Minutes Intravenous Once 10/31/21 1600 10/31/21 1654    10/30/21 1813  vancomycin 1500 mg/500 mL 0.9% NS IVPB ([facility])        Ordering Provider: Stephan Bolton MD    20 mg/kg × 75.2 kg  over 90 Minutes Intravenous Once 10/30/21 1900 10/30/21 2107    10/30/21 1813  Vancomycin Pharmacy Intermittent Dosing        Ordering Provider: Roland Alberto DO     Does not apply Daily 10/30/21 1900 11/06/21 0859    10/30/21 1627  metroNIDAZOLE (FLAGYL) 500 mg/100mL IVPB        Ordering Provider: Michael Puente MD    500 mg  over 30 Minutes Intravenous Once 10/30/21 1629 10/30/21 1758        Current Diuretic Therapy:  Diuretics (From admission, onward)    None        ----------------------------------------------------------------------------------------------------------------------  Vital Signs:  Temp:  [98 °F (36.7 °C)-98.3 °F (36.8 °C)] 98.1 °F (36.7 °C)  Heart Rate:  [] 87  Resp:  [12-29] 16  BP: ()/(64-97) 186/89  SpO2:  [90 %-97 %] 95 %  on  Flow (L/min):  [3-5] 3;   Device (Oxygen Therapy): nasal cannula  Body mass index is 27.93 kg/m².    Wt Readings from Last 3 Encounters:   11/03/21 73.8 kg (162 lb 12.8 oz)   10/10/21 72.6 kg (160 lb)   02/05/20 78 kg (172 lb)     Intake & Output (last 3 days)       10/31 0701  11/01 0700 11/01 0701 11/02 0700 11/02 0701 11/03 0700 11/03 0701 11/04 0700    P.O. 580 540 0 120    I.V. (mL/kg) 250.2 (3.3) 1285.2 (17.5) 1982.1 (26.9)     IV Piggyback        Total Intake(mL/kg) 830.2 (11.1) 1825.2 (24.8) 1982.1 (26.9) 120 (1.6)    Urine (mL/kg/hr) 1350 (0.8) 1050 (0.6) 900 (0.5) 300 (0.7)    Total Output 1350 1050 900 300    Net -519.8 +775.2 +1082.1 -180                Diet Regular;  Consistent Carbohydrate  ----------------------------------------------------------------------------------------------------------------------  Physical exam:  Constitutional: older female., appears to feel unwell, no acute resp distress  HENT:  Head:  Normocephalic and atraumatic.  Mouth:  Moist mucous membranes.    Eyes:  Conjunctivae and EOM are normal. No scleral icterus.   Cardiovascular:  Normal rate, regular rhythm and normal heart sounds with no murmur. No JVD.   Pulmonary/Chest:  No respiratory distress, no wheezes, no crackles, with normal breath sounds. Diminished b/l. Unlabored. No accessory muscle use.  Abdominal:  Soft. No distension and no tenderness.  Bowel sounds present. No rebound or guarding.   Musculoskeletal:  No tenderness, and no deformity.  No red or swollen joints anywhere.    Neurological:  Alert and oriented to person, place, and time.  No cranial nerve deficit.   Nonfocal.   Skin:  Skin is warm and dry. No rash noted. No pallor.   Peripheral vascular:  No clubbing, no cyanosis, no edema. Pedal and tibial pulses 2/4 bilaterally.   ----------------------------------------------------------------------------------------------------------------------  Results from last 7 days   Lab Units 11/03/21  0648 11/02/21  0433 11/01/21  0124 10/31/21  0514 10/31/21  0514 10/30/21  1833 10/30/21  1513 10/30/21  1513   CRP mg/dL 3.44* 5.82* 5.96*   < > 4.02*  --    < > 3.39*   LACTATE mmol/L  --   --   --   --  0.8 2.4*  --  2.1*   WBC 10*3/mm3 7.01 7.26 8.02   < > 9.03  --    < > 8.48   HEMOGLOBIN g/dL 9.1* 9.3* 9.7*   < > 9.7*  --    < > 10.4*   HEMATOCRIT % 30.1* 30.6* 31.7*   < > 31.6*  --    < > 34.6   MCV fL 89.3 88.2 90.8   < > 89.5  --    < > 90.3   MCHC g/dL 30.2* 30.4* 30.6*   < > 30.7*  --    < > 30.1*   PLATELETS 10*3/mm3 278 274 248   < > 237  --    < > 245   INR   --   --   --   --   --   --   --  1.12*    < > = values in this interval not displayed.     Results from last 7 days   Lab  Units 10/30/21  1356   PH, ARTERIAL pH units 7.359   PO2 ART mm Hg 46.6*   PCO2, ARTERIAL mm Hg 37.5   HCO3 ART mmol/L 21.1     Results from last 7 days   Lab Units 11/03/21  0648 11/02/21  0942 11/02/21  0433 11/01/21  0124 11/01/21  0124 10/31/21  0514 10/30/21  1513   SODIUM mmol/L 134* 143 134*   < > 135*   < > 135*   POTASSIUM mmol/L 4.9 4.2 5.8*   < > 5.2   < > 5.0   MAGNESIUM mg/dL  --   --   --   --   --   --  2.1   CHLORIDE mmol/L 101 108* 106   < > 108*   < > 102   CO2 mmol/L 22.9 23.6 19.3*   < > 19.7*   < > 20.2*   BUN mg/dL 35* 36* 37*   < > 32*   < > 32*   CREATININE mg/dL 2.99* 2.94* 3.09*   < > 3.42*   < > 3.28*   EGFR IF NONAFRICN AM mL/min/1.73 16* 16* 15*   < > 14*   < > 14*   CALCIUM mg/dL 7.6* 9.4 8.3*   < > 7.9*   < > 8.1*   GLUCOSE mg/dL 386* 63* 474*   < > 228*   < > 468*   ALBUMIN g/dL 2.78*  --  2.71*  --  2.47*   < > 3.16*   BILIRUBIN mg/dL 0.3  --  0.2  --  0.3   < > 0.3   ALK PHOS U/L 94  --  88  --  91   < > 104   AST (SGOT) U/L 9  --  9  --  7   < > 7   ALT (SGPT) U/L 5  --  5  --  6   < > 9    < > = values in this interval not displayed.   Estimated Creatinine Clearance: 20.2 mL/min (A) (by C-G formula based on SCr of 2.99 mg/dL (H)).  No results found for: AMMONIA  Results from last 7 days   Lab Units 11/01/21  0124 10/30/21  1513   CK TOTAL U/L 31  --    TROPONIN T ng/mL  --  <0.010     Results from last 7 days   Lab Units 10/30/21  1513   PROBNP pg/mL 714.7         Glucose   Date/Time Value Ref Range Status   11/03/2021 1045 410 (C) 70 - 130 mg/dL Final     Comment:     Treated Patient Meter: [id number] : [id number] PARESH HENRY   11/03/2021 0557 375 (H) 70 - 130 mg/dL Final     Comment:     Meter: [id number] : [id number] Rachel Lopez   11/02/2021 2251 385 (H) 70 - 130 mg/dL Final     Comment:     Meter: [id number] : [id number] Rachel Lopez   11/02/2021 1628 415 (C) 70 - 130 mg/dL Final     Comment:     Treated Patient Meter: [id number] : [id number] BRYANT VÁZQUEZ    11/02/2021 1120 107 70 - 130 mg/dL Final     Comment:     Meter: [id number] : [id number] BRYANT VÁZQUEZ   11/02/2021 0530 422 (C) 70 - 130 mg/dL Final     Comment:     Confirmed by Repeat Meter: [id number] : [id number] Rachel Lopez   11/01/2021 2209 344 (H) 70 - 130 mg/dL Final     Comment:     Meter: [id number] : [id number] Rachel Lopez   11/01/2021 1812 411 (C) 70 - 130 mg/dL Final     Comment:     Treated Patient Meter: [id number] : [id number] VINH TAN     Lab Results   Component Value Date    TSH 0.397 10/09/2021     No results found for: PREGTESTUR, PREGSERUM, HCG, HCGQUANT  Pain Management Panel     Pain Management Panel Latest Ref Rng & Units 10/10/2021    CREATININE UR mg/dL 41.4        Brief Urine Lab Results  (Last result in the past 365 days)      Color   Clarity   Blood   Leuk Est   Nitrite   Protein   CREAT   Urine HCG        10/10/21 1527             41.4             Blood Culture   Date Value Ref Range Status   10/30/2021 No growth at 24 hours  Preliminary   10/30/2021 No growth at 24 hours  Preliminary     No results found for: URINECX  No results found for: WOUNDCX  No results found for: STOOLCX  No results found for: RESPCX  No results found for: AFBCX  Results from last 7 days   Lab Units 11/03/21  0648 11/02/21  0433 11/01/21  0124 10/31/21  0514 10/30/21  1833 10/30/21  1513   LACTATE mmol/L  --   --   --  0.8 2.4* 2.1*   SED RATE mm/hr  --  65*  --   --   --   --    CRP mg/dL 3.44* 5.82* 5.96* 4.02*  --  3.39*       I have personally looked at the labs and they are summarized above.  ----------------------------------------------------------------------------------------------------------------------  Detailed radiology reports for the last 24 hours:  Imaging Results (Last 24 Hours)     Procedure Component Value Units Date/Time    XR Chest 1 View [[id number]] Collected: 11/03/21 0847     Updated: 11/03/21 0849    Narrative:      EXAM:    XR Chest, 1 View     EXAM DATE:     11/3/2021 7:58 AM     CLINICAL HISTORY:    soa; J96.01-Acute respiratory failure with hypoxia; J18.9-Pneumonia,  unspecified organism; N17.9-Acute kidney failure, unspecified;  J18.9-Pneumonia, unspecified organism     TECHNIQUE:    Frontal view of the chest.     COMPARISON:    10/30/2021     FINDINGS:    LUNGS:  Patchy bilateral subpleural airspace disease again noted.    PLEURAL SPACE:  Unremarkable.  No pneumothorax.    HEART:  Heart size is stable.    MEDIASTINUM:  Unremarkable.    BONES/JOINTS:  Unremarkable.       Impression:        Patchy bilateral subpleural airspace disease again noted.     This report was finalized on 11/3/2021 8:47 AM by Dr. Landry Haynes MD.           Assessment & Plan      Patient is a 58-year-old female with history significant for CKD stage IV presented with worsening shortness of breath and CAP.    #Sepsis secondary to CAP vs   #Acute hypoxemic respiratory failure due to CAP vs   #Recent COVID-19 dx  --Patient remains hemodynamically stable.  93% on 3 L nasal cannula.  Does not wear home O2.  Recently hospitalized at this facility in [facility] with Covid. Significant hypoxia on exertion, spO2 drops in the 70s on ambulation to restroom. + SSA Ro, + RNP ab.  --Admission labs notable for CRP 3.4, WBC 8K, ABG 7.35/37/46 on room air, BUN/creatinine 32/3.2. ESR 65.   --CT chest without noted extensive bilateral groundglass opacities with progressive consolidation in multiple areas  --repeat CXR noted persistent bilateral infiltrates, no significant change  --Echocardiogram with preserved ejection fraction, no diastolic dysfunction  --Legionella, respiratory panel, strep pneumo, MRSA nasal swab negative  --follow up anca and complement levels, anti GBM, jewel profile, anti PLAR2  --follow up on BAL cx  --Given her persistent symptoms and characteristics of chest CT, concern for cryptogenic organizing pneumonia. She has been afebrile, no leukocytosis, afebrile and has not improved despite  abx. therefore infectious process I feel like is less likely  --discussed with pulmonology given significant hypoxia on exertion and inflammatory changes on CXR, will give 500mg solumedrol x1, appreciate Pulm's assistance  --Continue IV cefepime, vancomycin per ID recommendations  --Pulmonology following, appreciate recommendations     #ANA PAULA on CKD IV  #Metabolic acidosis  #Hyperkalemia  #Solitary kidney  --baseline renal function Cr 2.3. Had previously dialyzed 3x week. Previously referred to [place] for 2nd opinion regarding transplant evaluation after not listed by [facility] with concern for medical noncompliance & poor social support. reportedly neg AI workup when preformed yrs ago when renal disease initially presented.  --Right kidney severely atrophic due to diabetic nephropathy  --UA with 4 g proteinuria, hematuria. CK wnl.   --BUN/creatinine 35/3.0, potassium 4.9, bicarb 23, GFR 15  --Renal ultrasound of left kidney unremarkable  --c/w sodium bicarb, target serum bicarb 22  --concern for AI process, ANCA and work-up noted per above  --Renally dose medications to GFR less than 15, avoid nephrotoxins  --Nephrology following, appreciate recs     #Hyperglycemia with type 2 DM  --A1c 10.9%. am glucose 386, received IV insulin 5u. Basal had been decreased prior d/t wide swings of hyper/hypoglycemia. Will increase basal, add mealtime, SSI, will be difficult to control with steroids above, will continue to adjust accordingly     #Elevated d-dimer  --d-dimer 0.90. VQ scan low probability of PE.  --CT chest PE protocol not obtained given ANA PAULA on CKD IV.   --US venous doppler neg for DVT  --continue home Eliquis    #HLD, continue ASA 81, statin  #HTN, norvasc    #Medical Non-Adherence  --Given patient's reported choices and actions to not adhere to medical treatments and recommendations, this will drastically affect their short and long term morbidity and mortality.      CHECKLIST:  Abx: vancomycin, cefepime  Steroids:  solumedrol  VTE: eliquis  GI ppx: ppi  Diet: diabetic, renal  Code: CPR, full  Dispo: Patient remains hypoxic, especially on exertion. Anticipate >2 [place] stay. Disposition expected Home when medically stable.    Roland Alberto DO  [facility] Hospitalist  11/03/21  12:41 EDT

## 2025-04-21 NOTE — PLAN OF CARE
Goal Outcome Evaluation:              Outcome Evaluation: Patient alert with some occasional confusion noted at times. Room air when awake and at night is on cpap. Kept cpap on all night. Is up with one assist to bedside commode. Large bm's during night and continues lactulose. Has took a one time dose of norco for right shoulder pain. Temp during night. Fall precautions in place.

## 2025-04-21 NOTE — THERAPY EVALUATION
Patient Name: Jimmy Gabehart  : 1955    MRN: [id number]                              Today's Date: 2025       Admit Date: 2025    Visit Dx: No diagnosis found.  Patient Active Problem List   Diagnosis    Shoulder pain, bilateral    Bursitis/tendonitis, shoulder    Pain    Cervical radiculopathy    Cervical spondylosis    Lumbar spondylosis    Elevated liver enzymes    History of diabetic ulcer of foot    Hypertensive disorder    Long-term current use of opiate analgesic    Osteoarthritis, generalized    Type 2 diabetes mellitus with diabetic neuropathy    Hepatic encephalopathy     Past Medical History:   Diagnosis Date    Diabetes     Hypertension      Past Surgical History:   Procedure Laterality Date    BACK SURGERY      CARPAL TUNNEL RELEASE      COLONOSCOPY      EYE SURGERY      FOOT SURGERY Left     Ankle fusion    HERNIA REPAIR      KNEE SURGERY Right 2022    TKA right knee manipulation 23    TONSILLECTOMY AND ADENOIDECTOMY      TRIGGER FINGER RELEASE      WISDOM TOOTH EXTRACTION        General Information       Row Name 25 1137          OT Time and Intention    Subjective Information complains of;pain  -OCTAVIANO     Document Type evaluation  -OCTAVIANO     Mode of Treatment individual therapy;occupational therapy  -OCTAVIANO     Patient Effort good  -OCTAVIANO     Symptoms Noted During/After Treatment none  -OCTAVIANO       Row Name 25 113          General Information    Patient Profile Reviewed yes  -OCTAVIANO     Prior Level of Function independent:;all household mobility;community mobility;gait;transfer;feeding;grooming;bathing;min assist:;dressing;mod assist:;cooking;cleaning;dependent:;driving  per pt. he has not driven for 3 weeks  -OCTAVIANO     Existing Precautions/Restrictions fall;other (see comments)  blind L eye, painful R shoulder with increased pain since fall  -OCTAVIANO     Barriers to Rehab medically complex;previous functional deficit  -OCTAVIANO       Row Name 25 1135          Occupational Profile     Environmental Supports and Barriers (Occupational Profile) wx in shower with shower chair available, grab bar in wx in shower, standard toilet height, rw wx and straight cane available from prior knee surgery  -       Row Name 04/21/25 1137          Living Environment    Current Living Arrangements home  -     People in Home spouse;other (see comments)  spouse works 2-3 days a week during day and pt. is home alone  -OCTAVIANO       Row Name 04/21/25 1137          Home Main Entrance    Number of Stairs, Main Entrance three  2-3 steps  -     Stair Railings, Main Entrance railings safe and in good condition  -OCTAVIANO       Row Name 04/21/25 1137          Stairs Within Home, Primary    Number of Stairs, Within Home, Primary none  -OCTAVIANO       Row Name 04/21/25 1137          Cognition    Orientation Status (Cognition) oriented x 3  -       Row Name 04/21/25 1137          Safety Issues/Impairments Affecting Functional Mobility    Safety Issues Affecting Function (Mobility) safety precaution awareness;safety precautions follow-through/compliance;sequencing abilities  -     Impairments Affecting Function (Mobility) balance;endurance/activity tolerance;strength;pain;range of motion (ROM)  -     Comment, Safety Issues/Impairments (Mobility) R shoulder pain with decreased use to push up or reach back, but able to rest on walker and help steer  -               User Key  (r) = Recorded By, (t) = Taken By, (c) = Cosigned By      Initials Name Provider Type    Alona Calzada OT Occupational Therapist                     Mobility/ADL's       Row Name 04/21/25 1141          Bed Mobility    Comment, (Bed Mobility) [facility] on arrival  -[facility] Name 04/21/25 1141          Transfers    Transfers sit-stand transfer;stand-sit transfer  -     Comment, (Transfers) cues to push up and reach back and fully back to chair bringing walker  -       Row Name 04/21/25 1141          Sit-Stand Transfer    Sit-Stand [place]  (Transfers) contact guard;verbal cues  -OCTAVIANO     Assistive Device (Sit-Stand Transfers) walker, front-wheeled  -OCTAVIANO       Row Name 04/21/25 1141          Stand-Sit Transfer    Stand-Sit [place] (Transfers) contact guard;verbal cues  -OCTAVIANO     Assistive Device (Stand-Sit Transfers) walker, front-wheeled  -OCTAVIANO       Row Name 04/21/25 1141          Functional Mobility    Functional Mobility- Ind. Level contact guard assist;1 person  -OCTAVIANO     Functional Mobility- Device walker, front-wheeled  -OCTAVIANO     Functional Mobility-Distance (Feet) --  household distance +  -OCTVAIANO     Functional Mobility- Comment family assisted with IV pole  -OCTAVIANO       Row Name 04/21/25 1141          Activities of Daily Living    BADL Assessment/Intervention lower body dressing;toileting  -OCTAVIANO       Row Name 04/21/25 1141          Lower Body Dressing Assessment/Training    [place] Level (Lower Body Dressing) socks;doff;standby assist;don;dependent (less than 25% patient effort)  -OCTAVIANO     Position (Lower Body Dressing) long sitting  -OCTAVIANO     Comment, (Lower Body Dressing) pt. pushed off sock by crossing up foot reclined in recliner as does at home and pushed sock 75% off and then used toe from other foot to fully push off, pt. could not reach foot to start sock back on foot, educated on benefit of trial run with sockaid use  -OCTAVIANO       Row Name 04/21/25 1141          Toileting Assessment/Training    [place] Level (Toileting) toileting skills;change pad/brief;dependent (less than 25% patient effort);adjust/manage clothing;moderate assist (50% patient effort)  -OCTAVIANO     Position (Toileting) supported standing;unsupported standing  -OCTAVIANO     Comment, (Toileting) noted some light stool in brief on standing, brief changed and pt. cleaned, per pt. he has difficulty cleaning himself with non-dominant LUE, educated on bidet, but per son with older home there is no hook ups to place bidet  -OCTAVIANO               User Key  (r) = Recorded By, (t) = Taken By, (c) =  Cosigned By      Initials Name Provider Type    Alona Calzada OT Occupational Therapist                   Obj/Interventions       [facility] Name 04/21/25 1145          Sensory Assessment (Somatosensory)    Sensory Assessment (Somatosensory) UE sensation intact  -     Sensory Assessment per pt. and son he has diminished feeling from foot up to shin, but can still ID touch  -[facility] Name 04/21/25 1145          Vision Assessment/Intervention    Vision Assessment Comment pt. wears glasses to watch TV, pt. with blined L eye  -[facility] Name 04/21/25 1145          Range of Motion Comprehensive    General Range of Motion upper extremity range of motion deficits identified  -     Comment, General Range of Motion BUE WFL except R shoulder NT due to high level of pain post fall on it 3 weeks ago, pt. was able to flex at elbow by running hand up stomach, but could not ER from chest without LUE assisting, high pain with R shoulder movement, R supination limited grossly 20 percent  -[facility] Name 04/21/25 1145          Strength Comprehensive (MMT)    General Manual Muscle Testing (MMT) Assessment upper extremity strength deficits identified  -     Comment, General Manual Muscle Testing (MMT) Assessment LUE 5/5, Distal RUE at least 3- to 3+/5, unable to fully assess due to pain  -[facility] Name 04/21/25 1145          Balance    Static Sitting Balance independent  -     Dynamic Sitting Balance standby assist  -OCTAVIANO     Position, Sitting Balance unsupported;sitting in chair  -OCTAVIANO     Static Standing Balance standby assist  -OCTAVIANO     Dynamic Standing Balance contact guard  -     Position/Device Used, Standing Balance supported;walker, rolling  -OCTAVIANO     Balance Interventions sit to stand;occupation based/functional task  -OCTAVIANO     Comment, Balance LBD, toileting  -               User Key  (r) = Recorded By, (t) = Taken By, (c) = Cosigned By      Initials Name Provider Type    Alona Calzada, OT Occupational  Therapist                   Goals/Plan       Row Name 04/21/25 1155          Transfer Goal 1 (OT)    Activity/Assistive Device (Transfer Goal 1, OT) toilet  -OCTAVIANO     [place] Level/Cues Needed (Transfer Goal 1, OT) standby assist  -OCTAVIANO     Time Frame (Transfer Goal 1, OT) short term goal (STG);5 days  -OCTAVIANO     Strategies/Barriers (Transfers Goal 1, OT) grab bar use, good safety demonstrated  -OCTAVIANO     Progress/Outcome (Transfer Goal 1, OT) new goal  -OCTAVIANO       Row Name 04/21/25 115          Dressing Goal 1 (OT)    Activity/Device (Dressing Goal 1, OT) upper body dressing;lower body dressing;sock-aid  -OCTAVIANO     [place]/Cues Needed (Dressing Goal 1, OT) minimum assist (75% or more patient effort)  -OCTAVIANO     Time Frame (Dressing Goal 1, OT) long term goal (LTG);10 days  -OCTAVIANO     Strategies/Barriers (Dressing Goal 1, OT) [place] dressing, trial sock-aid use  -OCTAVIANO     Progress/Outcome (Dressing Goal 1, OT) new goal  -OCTAVIANO       Row Name 04/21/25 1158          Grooming Goal 1 (OT)    Activity/Device (Grooming Goal 1, OT) hair care;oral care;wash face, hands  -OCTAVIANO     [place] (Grooming Goal 1, OT) standby assist;set-up required  -OCTAVIANO     Time Frame (Grooming Goal 1, OT) short term goal (STG);5 days  -OCTAVIANO     Strategies/Barriers (Grooming Goal 1, OT) standing sinkside  -OCTAVIANO     Progress/Outcome (Grooming Goal 1, OT) new goal  -OCTAVIANO       Row Name 04/21/25 0667          Therapy Assessment/Plan (OT)    Planned Therapy Interventions (OT) activity tolerance training;adaptive equipment training;BADL retraining;functional balance retraining;occupation/activity based interventions;ROM/therapeutic exercise;transfer/mobility retraining;strengthening exercise;patient/caregiver education/training;passive ROM/stretching  -OCTAVIANO               User Key  (r) = Recorded By, (t) = Taken By, (c) = Cosigned By      Initials Name Provider Type    Alona Calzada, OT Occupational Therapist                   Clinical Impression       Row Name 04/21/25  1149          Pain Assessment    Pretreatment Pain Rating 8/10  -OCTAVIANO     Posttreatment Pain Rating 6/10  -OCTAVIANO     Pain Location shoulder  -OCTAVIANO     Pain Side/Orientation right  -OCTAVIANO     Pain Management Interventions positioning techniques utilized;nursing notified;exercise or physical activity utilized  -OCTAVIANO     Response to Pain Interventions activity participation with decreased pain  -OCTAVIANO       Row Name 04/21/25 1149          Plan of Care Review    Plan of Care Reviewed With patient;family  -OCTAVIANO     Progress improving  -OCTAVIANO     Outcome Evaluation Patient demonstrated improved transfer independence and safer walker use compared to morning PT session.  Pt. encouraged to use walker with movement up for improved safety.  Pt. attempting to complete ADL tasks with non-dominant UE.  Pt. with limited ability to reach to feet and was unable to ti his socks as priorly able.  Pt. appropriate for skilled OT services to address deficit areas of safety, balance and limited ROM and strength R shoulder with high level pain and impact on PLOF ADLs.  Recommend IRF at discharge if medically appropriate.  -       Row Name 04/21/25 1149          Therapy Assessment/Plan (OT)    Patient/Family Therapy Goal Statement (OT) return to prior higher level independence ADLs  -OCTAVIANO     Rehab Potential (OT) good  -OCTAVIANO     Criteria for Skilled Therapeutic Interventions Met (OT) yes;meets criteria;skilled treatment is necessary  -     Therapy Frequency (OT) daily  -OCTAVIANO     Predicted Duration of Therapy Intervention (OT) 10 days  -       Row Name 04/21/25 1149          Therapy Plan Review/Discharge Plan (OT)    Anticipated Discharge Disposition (OT) inpatient rehabilitation facility  -       Row Name 04/21/25 1149          Vital Signs    Pre Systolic BP Rehab 125  -OCTAVIANO     Pre Treatment Diastolic BP 66  -OCTAVIANO     Pretreatment Heart Rate (beats/min) 81  -OCTAVIANO     O2 Delivery Pre Treatment room air  -OCTAVIANO     O2 Delivery Intra Treatment room air  -OCTAVIANO     O2  Delivery Post Treatment room air  -OCTAVIANO     Pre Patient Position Sitting  -OCTAVIANO     Intra Patient Position Standing  -OCTAVIANO     Post Patient Position Sitting  -OCTAVIANO       Row Name 04/21/25 1149          Positioning and Restraints    Pre-Treatment Position sitting in chair/recliner  -OCTAVIANO     Post Treatment Position chair  -OCTAVIANO     In Chair notified nsg;reclined;call light within reach;encouraged to call for assist;exit alarm on;with family/caregiver;RUE elevated;legs elevated  nurse attempting to get pain meds ordered from [facility]               User Key  (r) = Recorded By, (t) = Taken By, (c) = Cosigned By      Initials Name Provider Type    Alona Calzada, OT Occupational Therapist                   Outcome Measures       Row Name 04/21/25 1157          How much help from another is currently needed...    Putting on and taking off regular lower body clothing? 2  -OCTAVIANO     Bathing (including washing, rinsing, and drying) 2  -OCTAVIANO     Toileting (which includes using toilet bed pan or urinal) 2  -OCTAVIANO     Putting on and taking off regular upper body clothing 2  -OCTAVIANO     Taking care of personal grooming (such as brushing teeth) 3  -OCTAVIANO     Eating meals 3  -OCTAVIANO     AM-PAC 6 Clicks Score (OT) 14  -OCTAVIANO       Row Name 04/21/25 0938          How much help from another person do you currently need...    Turning from your back to your side while in flat bed without using bedrails? 3  -ER     Moving from lying on back to sitting on the side of a flat bed without bedrails? 3  -ER     Moving to and from a bed to a chair (including a wheelchair)? 3  -ER     Standing up from a chair using your arms (e.g., wheelchair, bedside chair)? 3  -ER     Climbing 3-5 steps with a railing? 3  -ER     To walk in hospital room? 3  -ER     AM-PAC 6 Clicks Score (PT) 18  -ER     Highest Level of Mobility Goal 6 --> Walk 10 steps or more  -ER       Row Name 04/21/25 1157 04/21/25 0938       Functional Assessment    Outcome Measure Options AM-PAC 6 Clicks Daily  Activity (OT)  -OCTAVIANO AM-PAC 6 Clicks Basic Mobility (PT)  -ER              User Key  (r) = Recorded By, (t) = Taken By, (c) = Cosigned By      Initials Name Provider Type    Alona Calzada, OT Occupational Therapist    ER Sophie Bolanos, PT Physical Therapist                    Occupational Therapy Education       Title: PT OT SLP Therapies (In Progress)       Topic: Occupational Therapy (In Progress)       Point: ADL training (Done)       Learning Progress Summary            Patient Acceptance, E, VU,NR by  at 4/21/2025 1158    Comment: reason for consult, bidet benefit, benefit walker use, transfer safety, AE available for LBD   Family Acceptance, E, VU,NR by  at 4/21/2025 1158    Comment: reason for consult, bidet benefit, benefit walker use, transfer safety, AE available for LBD                      Point: Precautions (Done)       Learning Progress Summary            Patient Acceptance, E, VU,NR by  at 4/21/2025 1158    Comment: reason for consult, bidet benefit, benefit walker use, transfer safety, AE available for LBD   Family Acceptance, E, VU,NR by  at 4/21/2025 1158    Comment: reason for consult, bidet benefit, benefit walker use, transfer safety, AE available for LBD                                      User Key       Initials Effective Dates Name Provider Type Discipline    OCTAVIANO 07/11/23 -  Alona Turcios, OT Occupational Therapist OT                  OT Recommendation and Plan  Planned Therapy Interventions (OT): activity tolerance training, adaptive equipment training, BADL retraining, functional balance retraining, occupation/activity based interventions, ROM/therapeutic exercise, transfer/mobility retraining, strengthening exercise, patient/caregiver education/training, passive ROM/stretching  Therapy Frequency (OT): daily  Plan of Care Review  Plan of Care Reviewed With: patient, family  Progress: improving  Outcome Evaluation: Patient demonstrated improved transfer independence and safer  walker use compared to morning PT session.  Pt. encouraged to use walker with movement up for improved safety.  Pt. attempting to complete ADL tasks with non-dominant UE.  Pt. with limited ability to reach to feet and was unable to ti his socks as priorly able.  Pt. appropriate for skilled OT services to address deficit areas of safety, balance and limited ROM and strength R shoulder with high level pain and impact on PLOF ADLs.  Recommend IRF at discharge if medically appropriate.     Time Calculation:   Evaluation Complexity (OT)  Review Occupational Profile/Medical/Therapy History Complexity: expanded/moderate complexity  Assessment, Occupational Performance/Identification of Deficit Complexity: 3-5 performance deficits  Clinical Decision Making Complexity (OT): detailed assessment/moderate complexity  Overall Complexity of Evaluation (OT): moderate complexity     Time Calculation- OT       Row Name 04/21/25 1159             Time Calculation- OT    OT Start Time 1027  -OCTAVIANO      OT Received On 04/21/25  -OCTAVIANO      OT Goal Re-Cert Due Date 05/01/25  -OCTAVIANO         Timed Charges    46698 - OT Therapeutic Activity Minutes 8  -OCTAVIANO      89559 - OT Self Care/Mgmt Minutes 8  -OCTAVIANO         Untimed Charges    OT Eval/Re-eval Minutes 47  -OCTAVIANO         Total Minutes    Timed Charges Total Minutes 16  -OCTAVIANO      Untimed Charges Total Minutes 47  -OCTAVIANO       Total Minutes 63  -OCTAVIANO                User Key  (r) = Recorded By, (t) = Taken By, (c) = Cosigned By      Initials Name Provider Type    Alona Calzada OT Occupational Therapist                  Therapy Charges for Today       Code Description Service Date Service Provider Modifiers Qty    [id number]  OT EVAL MOD COMPLEXITY 4 4/21/2025 Alona Turcios OT GO 1    [id number]  OT SELF CARE/MGMT/TRAIN EA 15 MIN 4/21/2025 Alona Turcios OT GO 1                 Alona Turcios OT  4/21/2025

## 2025-04-21 NOTE — PROGRESS NOTES
[facility] Medicine Services  PROGRESS NOTE    Patient Name: Jimmy Gabehart  : 1955  MRN: [id number]    Date of Admission: 2025  Primary Care Physician: Miriam Mckeon DO    Subjective   Subjective     CC:  Follow-up for hepatic encephalopathy    HPI:  Patient seen and examined this morning.  Awake and alert today.  Still feeling very weak.  PT evaluated patient recommended acute rehab.  Patient is agreeable.  Son at bedside and updated    Objective   Objective     Vital Signs:   Temp:  [97.9 °F (36.6 °C)-101.1 °F (38.4 °C)] 99.8 °F (37.7 °C)  Heart Rate:  [68-82] 68  Resp:  [18-19] 18  BP: (113-137)/(60-88) 113/60     Physical Exam:  General: Comfortable, not in distress, conversant and cooperative  Head: Atraumatic and normocephalic  Eyes: No Icterus. No pallor  Ears:  Ears appear intact with no abnormalities noted  Throat: No oral lesions, no thrush  Neck: Supple, trachea midline  Lungs: Clear to auscultation bilaterally, equal air entry, no wheezing or crackles  Heart:  Normal S1 and S2, no murmur, no gallop, No JVD, no lower extremity swelling  Abdomen:  Soft, no tenderness, no organomegaly, normal bowel sounds, no organomegaly  Extremities: pulses equal bilaterally  Skin: No bleeding, bruising or rash, normal skin turgor and elasticity  Neurologic: Cranial nerves appear intact with no evidence of facial asymmetry, normal motor and sensory functions in all 4 extremities  Psych: Alert and oriented x 3, normal mood    Results Reviewed:  LAB RESULTS:      Lab 25  0508 25  1604   WBC 5.00 4.21   HEMOGLOBIN 10.1* 9.7*   HEMATOCRIT 30.9* 28.7*   PLATELETS 84* 74*   NEUTROS ABS 2.60 2.43   IMMATURE GRANS (ABS) 0.05 0.03   LYMPHS ABS 1.46 1.19   MONOS ABS 0.87 0.53   EOS ABS 0.01 0.02   .2* 105.5*   LACTATE  --  1.7   *  --    PROTIME  --  16.6*         Lab 25  0508 25  1604   SODIUM 130* 133*   POTASSIUM 4.4 4.3   CHLORIDE 98 99   CO2  20.0* 21.0*   ANION GAP 12.0 13.0   BUN 18 22   CREATININE 1.07 1.19   EGFR 74.7 65.7   GLUCOSE 308* 289*   CALCIUM 8.7 9.0   MAGNESIUM 2.3 1.5*   PHOSPHORUS  --  2.3*   HEMOGLOBIN A1C 11.50*  --    TSH  --  1.180         Lab 04/19/25  1604   TOTAL PROTEIN 7.7   ALBUMIN 3.5   GLOBULIN 4.2   ALT (SGPT) 47*   AST (SGOT) 84*   BILIRUBIN 1.6*   ALK PHOS 253*         Lab 04/19/25  1604   PROTIME 16.6*   INR 1.26*             Lab 04/20/25  0508   IRON 30*   IRON SATURATION (TSAT) 10*   TIBC 310   TRANSFERRIN 208   FERRITIN 594.00*   FOLATE >20.00   VITAMIN B 12 >2,000*         Brief Urine Lab Results       None            Microbiology Results Abnormal       None            XR Shoulder 2+ View Right  Result Date: 4/19/2025  XR SHOULDER 2+ VW RIGHT Date of Exam: 4/19/2025 2:43 PM EDT Indication: Fall at home onto right shoulder, acute on chronic right shoulder pain Comparison: None available. Findings: There is a vague 9 mm triangular ossicle in the soft tissues adjacent to the posterior aspect of the greater tuberosity. There is no apparent fracture line or donor site at the greater tuberosity. There is moderate osteoarthritic change of the glenohumeral joint with subchondral cystic change of the glenoid and collar osteophyte of the humeral head. There are mild degenerative changes of the acromioclavicular joint. No high-grade narrowing of the subacromial space. No acute or traumatic findings in the partially imaged right chest.     Impression: Impression: Vague triangular ossicle adjacent to the greater tuberosity of uncertain clinical significance. There is no obvious fracture line or clear donor site at the greater tuberosity although the possibility of a small rotator cuff avulsion fracture is difficult to entirely exclude in the correct clinical context. Moderate osteoarthritic change of the glenohumeral joint. Electronically Signed: Dutch Mustafa MD  4/19/2025 3:23 PM EDT  Workstation ID: [id number]          Current  medications:  Scheduled Meds:carvedilol, 6.25 mg, Oral, BID With Meals  gabapentin, 200 mg, Oral, Q8H  insulin glargine, 30 Units, Subcutaneous, Daily  insulin lispro, 2-9 Units, Subcutaneous, 4x Daily AC & at Bedtime  lactulose, 30 g, Oral, TID  losartan, 100 mg, Oral, Q24H  pantoprazole, 40 mg, Oral, Q AM  rifAXIMin, 550 mg, Oral, BID  sodium chloride, 10 mL, Intravenous, Q12H  tamsulosin, 0.4 mg, Oral, Daily      Continuous Infusions:sodium chloride, 75 mL/hr, Last Rate: 75 mL/hr (04/20/25 1633)      PRN Meds:.  [DISCONTINUED] acetaminophen **OR** acetaminophen **OR** acetaminophen    Calcium Replacement - Follow Nurse / BPA Driven Protocol    dextrose    dextrose    glucagon (human recombinant)    Magnesium Standard Dose Replacement - Follow Nurse / BPA Driven Protocol    nitroglycerin    Phosphorus Replacement - Follow Nurse / BPA Driven Protocol    Potassium Replacement - Follow Nurse / BPA Driven Protocol    sodium chloride    sodium chloride    Assessment & Plan   Assessment & Plan     Active Hospital Problems    Diagnosis  POA    **Hepatic encephalopathy [K76.82]  Yes      Resolved Hospital Problems   No resolved problems to display.        Brief Hospital Course to date:  Jimmy Gabehart is a 70 y.o. male with past medical history of insulin-dependent diabetes, hypertension, recent diagnosis of liver cirrhosis, thrombocytopenia, chronic pain presenting with altered mental status and fall at home.     Acute hepatic encephalopathy  Recent diagnosis liver cirrhosis  Patient with remote history of alcohol use. New diagnosis of Stage F4 cirrhosis based on FibroScan in March 2023. Following with GI as out patient  Presented with acute encephalopathy secondary to hepatic encephalopathy  Somewhat improved with lactulose  Continue lactulose as needed and rifaximin.  Will need prescription upon discharge  Patient family advised to keep his follow-up with GI as outpatient.  Will need EGD  Normal TSH and B12  Consider  CT head if no clinical improvement within 24 hours  Reduce gabapentin to 200 mg 3 times daily  Discontinue baclofen upon discharge because of his advanced liver disease    Febrile illness  Patient had a fever of 101.1 around midnight of 4/21/2025  Check procalcitonin, CRP, blood culture and lactic acid  Check chest x-ray and UA  Check respiratory panel  Empiric antibiotic therapy with Rocephin    Weakness and debility  PT/OT recommended short-term rehab     Acute on chronic right shoulder pain  Fall at home landing on right shoulder  Has been evaluated by orthopedic surgery outpatient.  Patient was found to have a tear in his right shoulder.  Patient was deemed a poor surgical candidate and nonoperative approach was indicated.  X-ray to evaluate for fracture  As needed Percocet     Type 2 diabetes  A1c 11.5%  Start insulin glargine 30 units daily and continue SSI  Hold Januvia and metformin   The patient drinks plenty of ski (still drink rich in sugar) and eats a quart of gallon of ice cream daily.  Theand family counseled about the importance to comply with diabetic diet    Hypertension  Continue home Coreg and losartan in the morning     GERD  Continue PPI     BPH  Continue tamsulosin     Hx of thrombocytopenia  Follows with Heme/Onc outpatient. 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 Will need to follow up outpatient for biopsy results.   Continue SCD for DVT prophylaxis        Expected Discharge Location and Transportation: Home versus rehab  Expected Discharge   Expected Discharge Date: 4/22/2025; Expected Discharge Time:      VTE Prophylaxis:  Mechanical VTE prophylaxis orders are present.         AM-PAC 6 Clicks Score (PT): 17 (04/20/25 3802)    CODE STATUS:   Code Status and Medical Interventions: CPR (Attempt to Resuscitate); Full Support   Ordered at: 04/19/25 2890     Code Status (Patient has no pulse and is not breathing):    CPR (Attempt to Resuscitate)     Medical Interventions (Patient has pulse or is  breathing):    Full Support     Level Of Support Discussed With:    Next of Kin (If No Surrogate)       Bailey Howell MD  04/21/25

## 2025-04-21 NOTE — CASE MANAGEMENT/SOCIAL WORK
Discharge Planning Assessment  [facility]     Patient Name: Jimmy Gabehart  MRN: [id number]  Today's Date: 4/21/2025    Admit Date: 4/19/2025    Plan: Home vs IPR   Discharge Needs Assessment       Row Name 04/21/25 0917       Living Environment    People in Home other (see comments)  Ex wife Sherry Gabeheart    Current Living Arrangements home    Primary Care Provided by self    Provides Primary Care For no one    Family Caregiver if Needed other (see comments);child(rc), adult  Ex wife Sherry Gabeheart    Family Caregiver Names son is at bedside    Quality of Family Relationships helpful;involved;supportive    Able to Return to Prior Arrangements yes       Transition Planning    Patient/Family Anticipates Transition to home with family;home with help/services;inpatient rehabilitation facility    Patient/Family Anticipated Services at Transition     Transportation Anticipated family or friend will provide       Discharge Needs Assessment    Equipment Currently Used at Home cpap;grab bar;cane, straight;walker, rolling;bp cuff    Concerns to be Addressed denies needs/concerns at this time    Discharge Coordination/Progress Lives in a house in [place] with his ex wife Diann, independent with ADLs.  Has 3 stairs to enter house, none once inside the house.  Has cpap, grab bars, cane, walker and blood pressure cuff at home.  No history of home health.  Has an advanced directive.                   Discharge Plan       Row Name 04/21/25 0919       Plan    Plan Home vs IPR    Patient/Family in Agreement with Plan yes    Plan Comments I spoke with Mr Gabeheart and his family at bedside.  I have confirmed that his listed address is correct and there are only 3 steps to enter the house.  There are no steps once inside the house.  He has a cpap, grab bars, cane, walker and blood pressure cuff at home.  He has not had home health in the past.  He does have an advanced directive.  His insurance is [facility]  with [employer] as secondary, and he does have prescription coverage.  His PCP is Miriam Mckeon, and he gets his prescriptions filled at [facility] in [place]. If Mr Gabeheart needs rehab prior to returning home, he would like to go to [facility].  I have advised that I am waiting on therapy notes prior to making the referral. Case management will continue to follow.    Final Discharge Disposition Code 01 - home or self-care                    Expected Discharge Date and Time       Expected Discharge Date Expected Discharge Time    Apr 22, 2025            Demographic Summary    No documentation.                  Functional Status       Row Name 04/21/25 0917       Functional Status    Usual Activity Tolerance good    Current Activity Tolerance good       Functional Status, IADL    Medications independent    Meal Preparation independent    Housekeeping assistive person    Laundry assistive person    Shopping assistive person       Mental Status    General Appearance WDL WDL                   Psychosocial    No documentation.                  Abuse/Neglect    No documentation.                  Legal    No documentation.                  Substance Abuse    No documentation.                  Patient Forms    No documentation.                     Nannette Galdamez RN

## 2025-04-21 NOTE — PLAN OF CARE
Goal Outcome Evaluation:  Plan of Care Reviewed With: patient, child           Outcome Evaluation: PT eval complete. Able to complete transfer but he had minimal compliance to use w FWW and balance with turns. Recommend continued use of FWW and balance training. Recommend inpatient rehab upon d/c prior to returning home.    Anticipated Discharge Disposition (PT): inpatient rehabilitation facility

## 2025-04-22 LAB
ALBUMIN SERPL-MCNC: 3.2 G/DL (ref 3.5–5.2)
ALBUMIN/GLOB SERPL: 1 G/DL
ALP SERPL-CCNC: 249 U/L (ref 39–117)
ALT SERPL W P-5'-P-CCNC: 37 U/L (ref 1–41)
ANION GAP SERPL CALCULATED.3IONS-SCNC: 13 MMOL/L (ref 5–15)
AST SERPL-CCNC: 62 U/L (ref 1–40)
BASOPHILS # BLD AUTO: 0.01 10*3/MM3 (ref 0–0.2)
BASOPHILS NFR BLD AUTO: 0.2 % (ref 0–1.5)
BILIRUB SERPL-MCNC: 1.2 MG/DL (ref 0–1.2)
BUN SERPL-MCNC: 21 MG/DL (ref 8–23)
BUN/CREAT SERPL: 16.7 (ref 7–25)
CALCIUM SPEC-SCNC: 8.2 MG/DL (ref 8.6–10.5)
CHLORIDE SERPL-SCNC: 101 MMOL/L (ref 98–107)
CO2 SERPL-SCNC: 20 MMOL/L (ref 22–29)
CREAT SERPL-MCNC: 1.26 MG/DL (ref 0.76–1.27)
CRP SERPL-MCNC: 9.27 MG/DL (ref 0–0.5)
DEPRECATED RDW RBC AUTO: 54.1 FL (ref 37–54)
EGFRCR SERPLBLD CKD-EPI 2021: 61.4 ML/MIN/1.73
EOSINOPHIL # BLD AUTO: 0.05 10*3/MM3 (ref 0–0.4)
EOSINOPHIL NFR BLD AUTO: 1.1 % (ref 0.3–6.2)
ERYTHROCYTE [DISTWIDTH] IN BLOOD BY AUTOMATED COUNT: 13.6 % (ref 12.3–15.4)
GLOBULIN UR ELPH-MCNC: 3.3 GM/DL
GLUCOSE BLDC GLUCOMTR-MCNC: 188 MG/DL (ref 70–130)
GLUCOSE BLDC GLUCOMTR-MCNC: 198 MG/DL (ref 70–130)
GLUCOSE BLDC GLUCOMTR-MCNC: 211 MG/DL (ref 70–130)
GLUCOSE BLDC GLUCOMTR-MCNC: 263 MG/DL (ref 70–130)
GLUCOSE BLDC GLUCOMTR-MCNC: 326 MG/DL (ref 70–130)
GLUCOSE BLDC GLUCOMTR-MCNC: 438 MG/DL (ref 70–130)
GLUCOSE SERPL-MCNC: 193 MG/DL (ref 65–99)
HCT VFR BLD AUTO: 28.6 % (ref 37.5–51)
HGB BLD-MCNC: 9.1 G/DL (ref 13–17.7)
IMM GRANULOCYTES # BLD AUTO: 0.04 10*3/MM3 (ref 0–0.05)
IMM GRANULOCYTES NFR BLD AUTO: 0.9 % (ref 0–0.5)
LYMPHOCYTES # BLD AUTO: 1.21 10*3/MM3 (ref 0.7–3.1)
LYMPHOCYTES NFR BLD AUTO: 25.9 % (ref 19.6–45.3)
MAGNESIUM SERPL-MCNC: 1.8 MG/DL (ref 1.6–2.4)
MCH RBC QN AUTO: 34.3 PG (ref 26.6–33)
MCHC RBC AUTO-ENTMCNC: 31.8 G/DL (ref 31.5–35.7)
MCV RBC AUTO: 107.9 FL (ref 79–97)
MONOCYTES # BLD AUTO: 0.55 10*3/MM3 (ref 0.1–0.9)
MONOCYTES NFR BLD AUTO: 11.8 % (ref 5–12)
NEUTROPHILS NFR BLD AUTO: 2.82 10*3/MM3 (ref 1.7–7)
NEUTROPHILS NFR BLD AUTO: 60.1 % (ref 42.7–76)
NRBC BLD AUTO-RTO: 0 /100 WBC (ref 0–0.2)
PHOSPHATE SERPL-MCNC: 2.8 MG/DL (ref 2.5–4.5)
PLATELET # BLD AUTO: 86 10*3/MM3 (ref 140–450)
PMV BLD AUTO: 13.1 FL (ref 6–12)
POTASSIUM SERPL-SCNC: 4.4 MMOL/L (ref 3.5–5.2)
PROCALCITONIN SERPL-MCNC: 0.36 NG/ML (ref 0–0.25)
PROT SERPL-MCNC: 6.5 G/DL (ref 6–8.5)
RBC # BLD AUTO: 2.65 10*6/MM3 (ref 4.14–5.8)
SODIUM SERPL-SCNC: 134 MMOL/L (ref 136–145)
WBC NRBC COR # BLD AUTO: 4.68 10*3/MM3 (ref 3.4–10.8)

## 2025-04-22 PROCEDURE — 80053 COMPREHEN METABOLIC PANEL: CPT | Performed by: INTERNAL MEDICINE

## 2025-04-22 PROCEDURE — 25010000002 CEFTRIAXONE PER 250 MG: Performed by: INTERNAL MEDICINE

## 2025-04-22 PROCEDURE — 84100 ASSAY OF PHOSPHORUS: CPT | Performed by: INTERNAL MEDICINE

## 2025-04-22 PROCEDURE — 25010000002 ENOXAPARIN PER 10 MG: Performed by: INTERNAL MEDICINE

## 2025-04-22 PROCEDURE — 83735 ASSAY OF MAGNESIUM: CPT | Performed by: INTERNAL MEDICINE

## 2025-04-22 PROCEDURE — 99232 SBSQ HOSP IP/OBS MODERATE 35: CPT | Performed by: INTERNAL MEDICINE

## 2025-04-22 PROCEDURE — 63710000001 INSULIN GLARGINE PER 5 UNITS: Performed by: INTERNAL MEDICINE

## 2025-04-22 PROCEDURE — 82948 REAGENT STRIP/BLOOD GLUCOSE: CPT

## 2025-04-22 PROCEDURE — 85025 COMPLETE CBC W/AUTO DIFF WBC: CPT | Performed by: INTERNAL MEDICINE

## 2025-04-22 PROCEDURE — 63710000001 INSULIN LISPRO (HUMAN) PER 5 UNITS: Performed by: STUDENT IN AN ORGANIZED HEALTH CARE EDUCATION/TRAINING PROGRAM

## 2025-04-22 PROCEDURE — 63710000001 INSULIN LISPRO (HUMAN) PER 5 UNITS: Performed by: INTERNAL MEDICINE

## 2025-04-22 PROCEDURE — 84145 PROCALCITONIN (PCT): CPT | Performed by: INTERNAL MEDICINE

## 2025-04-22 PROCEDURE — 86140 C-REACTIVE PROTEIN: CPT | Performed by: INTERNAL MEDICINE

## 2025-04-22 RX ORDER — INSULIN LISPRO 100 [IU]/ML
12 INJECTION, SOLUTION INTRAVENOUS; SUBCUTANEOUS
Status: DISCONTINUED | OUTPATIENT
Start: 2025-04-22 | End: 2025-04-25

## 2025-04-22 RX ORDER — ENOXAPARIN SODIUM 100 MG/ML
40 INJECTION SUBCUTANEOUS DAILY
Status: DISCONTINUED | OUTPATIENT
Start: 2025-04-22 | End: 2025-05-02 | Stop reason: HOSPADM

## 2025-04-22 RX ADMIN — OXYCODONE HYDROCHLORIDE AND ACETAMINOPHEN 1 TABLET: 5; 325 TABLET ORAL at 18:12

## 2025-04-22 RX ADMIN — Medication 10 ML: at 08:46

## 2025-04-22 RX ADMIN — RIFAXIMIN 550 MG: 550 TABLET ORAL at 08:44

## 2025-04-22 RX ADMIN — OXYCODONE HYDROCHLORIDE AND ACETAMINOPHEN 1 TABLET: 5; 325 TABLET ORAL at 03:56

## 2025-04-22 RX ADMIN — INSULIN LISPRO 12 UNITS: 100 INJECTION, SOLUTION INTRAVENOUS; SUBCUTANEOUS at 17:28

## 2025-04-22 RX ADMIN — INSULIN LISPRO 7 UNITS: 100 INJECTION, SOLUTION INTRAVENOUS; SUBCUTANEOUS at 08:45

## 2025-04-22 RX ADMIN — INSULIN LISPRO 4 UNITS: 100 INJECTION, SOLUTION INTRAVENOUS; SUBCUTANEOUS at 17:27

## 2025-04-22 RX ADMIN — RIFAXIMIN 550 MG: 550 TABLET ORAL at 20:35

## 2025-04-22 RX ADMIN — LOSARTAN POTASSIUM 100 MG: 50 TABLET, FILM COATED ORAL at 08:44

## 2025-04-22 RX ADMIN — PANTOPRAZOLE SODIUM 40 MG: 40 TABLET, DELAYED RELEASE ORAL at 05:48

## 2025-04-22 RX ADMIN — CARVEDILOL 6.25 MG: 6.25 TABLET, FILM COATED ORAL at 08:44

## 2025-04-22 RX ADMIN — GABAPENTIN 200 MG: 100 CAPSULE ORAL at 14:26

## 2025-04-22 RX ADMIN — GABAPENTIN 200 MG: 100 CAPSULE ORAL at 05:48

## 2025-04-22 RX ADMIN — INSULIN LISPRO 2 UNITS: 100 INJECTION, SOLUTION INTRAVENOUS; SUBCUTANEOUS at 20:34

## 2025-04-22 RX ADMIN — TAMSULOSIN HYDROCHLORIDE 0.4 MG: 0.4 CAPSULE ORAL at 08:44

## 2025-04-22 RX ADMIN — ENOXAPARIN SODIUM 40 MG: 100 INJECTION SUBCUTANEOUS at 08:44

## 2025-04-22 RX ADMIN — INSULIN LISPRO 12 UNITS: 100 INJECTION, SOLUTION INTRAVENOUS; SUBCUTANEOUS at 12:00

## 2025-04-22 RX ADMIN — CARVEDILOL 6.25 MG: 6.25 TABLET, FILM COATED ORAL at 17:27

## 2025-04-22 RX ADMIN — INSULIN LISPRO 12 UNITS: 100 INJECTION, SOLUTION INTRAVENOUS; SUBCUTANEOUS at 08:46

## 2025-04-22 RX ADMIN — INSULIN GLARGINE 50 UNITS: 100 INJECTION, SOLUTION SUBCUTANEOUS at 08:46

## 2025-04-22 RX ADMIN — INSULIN LISPRO 6 UNITS: 100 INJECTION, SOLUTION INTRAVENOUS; SUBCUTANEOUS at 12:00

## 2025-04-22 RX ADMIN — ACETAMINOPHEN 500 MG: 650 SOLUTION ORAL at 08:44

## 2025-04-22 RX ADMIN — GABAPENTIN 200 MG: 100 CAPSULE ORAL at 20:35

## 2025-04-22 RX ADMIN — CEFTRIAXONE 2000 MG: 2 INJECTION, POWDER, FOR SOLUTION INTRAMUSCULAR; INTRAVENOUS at 14:26

## 2025-04-22 NOTE — CASE MANAGEMENT/SOCIAL WORK
Continued Stay Note  [facility]     Patient Name: Jimmy Gabehart  MRN: [id number]  Today's Date: 4/22/2025    Admit Date: 4/19/2025    Plan: [facility]   Discharge Plan       Row Name 04/22/25 1153       Plan    Plan [facility]    Patient/Family in Agreement with Plan yes    Plan Comments PT and OT have worked with Mr Gabeheart and are recommending inpatient rehab.  I spoke with Mr Gabeheart and his wife Rosa Maria at bedside.  They are interested in [facility].  I have called and given the referral to Nina/LUIS MANUEL. Case management will continue to follow.    Final Discharge Disposition Code 62 - inpatient rehab facility                   Discharge Codes    No documentation.                 Expected Discharge Date and Time       Expected Discharge Date Expected Discharge Time    Apr 25, 2025               Nannette Galdamez RN

## 2025-04-22 NOTE — PROGRESS NOTES
[facility] Medicine Services  PROGRESS NOTE    Patient Name: Jimmy Gabehart  : 1955  MRN: [id number]    Date of Admission: 2025  Primary Care Physician: Miriam Mckeon DO    Subjective   Subjective     CC:  Follow-up for hepatic encephalopathy    HPI:  Patient seen and examined this morning.  Full awake and alert.  Having some cough and hoarseness of voice.  Had 2 bowel movements yesterday.  Family at bedside and updated about the plan of care.    Objective   Objective     Vital Signs:   Temp:  [97.8 °F (36.6 °C)-98.5 °F (36.9 °C)] 98.4 °F (36.9 °C)  Heart Rate:  [65-92] 71  Resp:  [16-18] 18  BP: (105-145)/(49-77) 105/49     Physical Exam:  General: Comfortable, not in distress, conversant and cooperative  Head: Atraumatic and normocephalic  Eyes: No Icterus. No pallor  Ears:  Ears appear intact with no abnormalities noted  Throat: No oral lesions, no thrush  Neck: Supple, trachea midline  Lungs: Clear to auscultation bilaterally, equal air entry, no wheezing or crackles  Heart:  Normal S1 and S2, no murmur, no gallop, No JVD, no lower extremity swelling  Abdomen:  Soft, no tenderness, no organomegaly, normal bowel sounds, no organomegaly  Extremities: pulses equal bilaterally  Skin: No bleeding, bruising or rash, normal skin turgor and elasticity  Neurologic: Cranial nerves appear intact with no evidence of facial asymmetry, normal motor and sensory functions in all 4 extremities  Psych: Alert and oriented x 3, normal mood    Results Reviewed:  LAB RESULTS:      Lab 25  0534 25  1816 25  0448 25  0508 25  1604   WBC 4.68  --  4.89 5.00 4.21   HEMOGLOBIN 9.1*  --  9.3* 10.1* 9.7*   HEMATOCRIT 28.6*  --  29.9* 30.9* 28.7*   PLATELETS 86*  --  73* 84* 74*   NEUTROS ABS 2.82  --  2.47 2.60 2.43   IMMATURE GRANS (ABS) 0.04  --  0.04 0.05 0.03   LYMPHS ABS 1.21  --  1.70 1.46 1.19   MONOS ABS 0.55  --  0.64 0.87 0.53   EOS ABS 0.05  --  0.03  0.01 0.02   .9*  --  111.6* 106.2* 105.5*   CRP 9.27*  --  13.52*  --   --    PROCALCITONIN 0.36*  --  0.46*  --   --    LACTATE  --  1.9  --   --  1.7   LDH  --   --   --  226*  --    PROTIME  --   --   --   --  16.6*         Lab 04/22/25  0534 04/21/25 0448 04/20/25  0508 04/19/25  1604   SODIUM 134* 135* 130* 133*   POTASSIUM 4.4 4.1 4.4 4.3   CHLORIDE 101 103 98 99   CO2 20.0* 22.0 20.0* 21.0*   ANION GAP 13.0 10.0 12.0 13.0   BUN 21 22 18 22   CREATININE 1.26 1.12 1.07 1.19   EGFR 61.4 70.7 74.7 65.7   GLUCOSE 193* 230* 308* 289*   CALCIUM 8.2* 8.4* 8.7 9.0   MAGNESIUM 1.8 2.2 2.3 1.5*   PHOSPHORUS 2.8 2.8  --  2.3*   HEMOGLOBIN A1C  --   --  11.50*  --    TSH  --   --   --  1.180         Lab 04/22/25  0534 04/21/25 0448 04/19/25  1604   TOTAL PROTEIN 6.5 6.9 7.7   ALBUMIN 3.2* 3.3* 3.5   GLOBULIN 3.3 3.6 4.2   ALT (SGPT) 37 35 47*   AST (SGOT) 62* 51* 84*   BILIRUBIN 1.2 1.4* 1.6*   ALK PHOS 249* 247* 253*         Lab 04/19/25  1604   PROTIME 16.6*   INR 1.26*             Lab 04/20/25  0508   IRON 30*   IRON SATURATION (TSAT) 10*   TIBC 310   TRANSFERRIN 208   FERRITIN 594.00*   FOLATE >20.00   VITAMIN B 12 >2,000*         Brief Urine Lab Results  (Last result in the past 365 days)        Color   Clarity   Blood   Leuk Est   Nitrite   Protein   CREAT   Urine HCG        04/21/25 1742 Yellow   Clear   Negative   Negative   Negative   Negative                   Microbiology Results Abnormal       None            XR Chest 1 View  Result Date: 4/21/2025  XR CHEST 1 VW Date of Exam: 4/21/2025 2:23 PM EDT Indication: Shortness of breath Comparison: None available. Findings: Heart size is normal for the projection. The pulmonary vascular pattern in the chest is normal and the lungs are clear.     Impression: Impression: No active disease. Electronically Signed: Nima Phillip MD  4/21/2025 3:26 PM EDT  Workstation ID: [id number]          Current medications:  Scheduled Meds:carvedilol, 6.25 mg, Oral, BID With  Meals  cefTRIAXone, 2,000 mg, Intravenous, Q24H  enoxaparin sodium, 40 mg, Subcutaneous, Daily  gabapentin, 200 mg, Oral, Q8H  insulin glargine, 50 Units, Subcutaneous, Daily  Insulin Lispro, 12 Units, Subcutaneous, TID With Meals  insulin lispro, 2-9 Units, Subcutaneous, 4x Daily AC & at Bedtime  losartan, 100 mg, Oral, Q24H  pantoprazole, 40 mg, Oral, Q AM  rifAXIMin, 550 mg, Oral, BID  sodium chloride, 10 mL, Intravenous, Q12H  tamsulosin, 0.4 mg, Oral, Daily      Continuous Infusions:     PRN Meds:.  [DISCONTINUED] acetaminophen **OR** acetaminophen **OR** acetaminophen    Calcium Replacement - Follow Nurse / BPA Driven Protocol    dextrose    dextrose    glucagon (human recombinant)    lactulose    Magnesium Standard Dose Replacement - Follow Nurse / BPA Driven Protocol    nitroglycerin    oxyCODONE-acetaminophen    Phosphorus Replacement - Follow Nurse / BPA Driven Protocol    Potassium Replacement - Follow Nurse / BPA Driven Protocol    sodium chloride    sodium chloride    Assessment & Plan   Assessment & Plan     Active Hospital Problems    Diagnosis  POA    **Hepatic encephalopathy [K76.82]  Yes      Resolved Hospital Problems   No resolved problems to display.        Brief Hospital Course to date:  Jimmy Gabehart is a 70 y.o. male with past medical history of insulin-dependent diabetes, hypertension, recent diagnosis of liver cirrhosis, thrombocytopenia, chronic pain presenting with altered mental status and fall at home.     Acute hepatic encephalopathy  Recent diagnosis liver cirrhosis  Patient with remote history of alcohol use. New diagnosis of Stage F4 cirrhosis based on FibroScan in March 2023. Following with GI as out patient  Presented with acute encephalopathy secondary to hepatic encephalopathy  Somewhat improved with lactulose  Continue lactulose as needed and rifaximin.  Will need prescription upon discharge  Patient family advised to keep his follow-up with GI as outpatient.  Will need  EGD  Normal TSH and B12  Consider CT head if no clinical improvement within 24 hours  Reduce gabapentin to 200 mg 3 times daily  Discontinue baclofen upon discharge because of his advanced liver disease    Febrile illness  Patient had a fever of 101.1 around midnight of 4/21/2025  Elevated procalcitonin, CRP  Negative blood culture.  Normal lactic acid  Normal chest x-ray and UA  Negative respiratory panel  Continue empiric antibiotic therapy with Rocephin    Weakness and debility  PT/OT recommended short-term rehab     Acute on chronic right shoulder pain  Fall at home landing on right shoulder  Has been evaluated by orthopedic surgery outpatient.  Patient was found to have a tear in his right shoulder.  Patient was deemed a poor surgical candidate and nonoperative approach was indicated.  X-ray to evaluate for fracture  As needed Percocet     Type 2 diabetes  A1c 11.5%  Decrease insulin glargine to 50 units daily.  Add Premeal insulin 10 units 3 times daily and continue SSI  Hold Januvia and metformin   The patient drinks plenty of ski (still drink rich in sugar) and eats a quart of gallon of ice cream daily.  Theand family counseled about the importance to comply with diabetic diet    Hypertension  Continue home Coreg and losartan in the morning     GERD  Continue PPI     BPH  Continue tamsulosin     Hx of thrombocytopenia  Follows with Heme/Onc outpatient. 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 Will need to follow up outpatient for biopsy results.   Continue SCD for DVT prophylaxis      Expected Discharge Location and Transportation: Inpatient rehab  Expected Discharge   Expected Discharge Date: 4/25/2025; Expected Discharge Time:      VTE Prophylaxis:  Pharmacologic & mechanical VTE prophylaxis orders are present.         AM-PAC 6 Clicks Score (PT): 18 (04/21/25 2055)    CODE STATUS:   Code Status and Medical Interventions: CPR (Attempt to Resuscitate); Full Support   Ordered at: 04/19/25 7473     Code Status  (Patient has no pulse and is not breathing):    CPR (Attempt to Resuscitate)     Medical Interventions (Patient has pulse or is breathing):    Full Support     Level Of Support Discussed With:    Next of Kin (If No Surrogate)       Bailey Howell MD  04/22/25

## 2025-04-22 NOTE — PLAN OF CARE
Goal Outcome Evaluation:              Outcome Evaluation: VSS on room air. NSR. oriented X4. No complains of pain/discomfort.

## 2025-04-23 LAB
ALBUMIN SERPL-MCNC: 3 G/DL (ref 3.5–5.2)
ALBUMIN/GLOB SERPL: 0.8 G/DL
ALP SERPL-CCNC: 237 U/L (ref 39–117)
ALT SERPL W P-5'-P-CCNC: 35 U/L (ref 1–41)
ANION GAP SERPL CALCULATED.3IONS-SCNC: 11 MMOL/L (ref 5–15)
AST SERPL-CCNC: 59 U/L (ref 1–40)
BASOPHILS # BLD AUTO: 0 10*3/MM3 (ref 0–0.2)
BASOPHILS NFR BLD AUTO: 0 % (ref 0–1.5)
BILIRUB SERPL-MCNC: 0.7 MG/DL (ref 0–1.2)
BUN SERPL-MCNC: 25 MG/DL (ref 8–23)
BUN/CREAT SERPL: 18.9 (ref 7–25)
CALCIUM SPEC-SCNC: 8.3 MG/DL (ref 8.6–10.5)
CHLORIDE SERPL-SCNC: 104 MMOL/L (ref 98–107)
CO2 SERPL-SCNC: 18 MMOL/L (ref 22–29)
CREAT SERPL-MCNC: 1.32 MG/DL (ref 0.76–1.27)
CRP SERPL-MCNC: 8.77 MG/DL (ref 0–0.5)
DEPRECATED RDW RBC AUTO: 55 FL (ref 37–54)
EGFRCR SERPLBLD CKD-EPI 2021: 58 ML/MIN/1.73
EOSINOPHIL # BLD AUTO: 0.06 10*3/MM3 (ref 0–0.4)
EOSINOPHIL NFR BLD AUTO: 1.7 % (ref 0.3–6.2)
ERYTHROCYTE [DISTWIDTH] IN BLOOD BY AUTOMATED COUNT: 13.7 % (ref 12.3–15.4)
GLOBULIN UR ELPH-MCNC: 3.7 GM/DL
GLUCOSE BLDC GLUCOMTR-MCNC: 141 MG/DL (ref 70–130)
GLUCOSE BLDC GLUCOMTR-MCNC: 146 MG/DL (ref 70–130)
GLUCOSE BLDC GLUCOMTR-MCNC: 171 MG/DL (ref 70–130)
GLUCOSE BLDC GLUCOMTR-MCNC: 219 MG/DL (ref 70–130)
GLUCOSE BLDC GLUCOMTR-MCNC: 220 MG/DL (ref 70–130)
GLUCOSE BLDC GLUCOMTR-MCNC: 77 MG/DL (ref 70–130)
GLUCOSE SERPL-MCNC: 193 MG/DL (ref 65–99)
HCT VFR BLD AUTO: 27.6 % (ref 37.5–51)
HGB BLD-MCNC: 8.6 G/DL (ref 13–17.7)
IMM GRANULOCYTES # BLD AUTO: 0.02 10*3/MM3 (ref 0–0.05)
IMM GRANULOCYTES NFR BLD AUTO: 0.6 % (ref 0–0.5)
LYMPHOCYTES # BLD AUTO: 1.54 10*3/MM3 (ref 0.7–3.1)
LYMPHOCYTES NFR BLD AUTO: 44.3 % (ref 19.6–45.3)
MAGNESIUM SERPL-MCNC: 1.7 MG/DL (ref 1.6–2.4)
MCH RBC QN AUTO: 34.3 PG (ref 26.6–33)
MCHC RBC AUTO-ENTMCNC: 31.2 G/DL (ref 31.5–35.7)
MCV RBC AUTO: 110 FL (ref 79–97)
MONOCYTES # BLD AUTO: 0.46 10*3/MM3 (ref 0.1–0.9)
MONOCYTES NFR BLD AUTO: 13.2 % (ref 5–12)
NEUTROPHILS NFR BLD AUTO: 1.4 10*3/MM3 (ref 1.7–7)
NEUTROPHILS NFR BLD AUTO: 40.2 % (ref 42.7–76)
NRBC BLD AUTO-RTO: 0 /100 WBC (ref 0–0.2)
PHOSPHATE SERPL-MCNC: 3.3 MG/DL (ref 2.5–4.5)
PLATELET # BLD AUTO: 82 10*3/MM3 (ref 140–450)
PMV BLD AUTO: 12.6 FL (ref 6–12)
POTASSIUM SERPL-SCNC: 4.2 MMOL/L (ref 3.5–5.2)
PROT SERPL-MCNC: 6.7 G/DL (ref 6–8.5)
RBC # BLD AUTO: 2.51 10*6/MM3 (ref 4.14–5.8)
SODIUM SERPL-SCNC: 133 MMOL/L (ref 136–145)
WBC NRBC COR # BLD AUTO: 3.48 10*3/MM3 (ref 3.4–10.8)

## 2025-04-23 PROCEDURE — 94660 CPAP INITIATION&MGMT: CPT

## 2025-04-23 PROCEDURE — 86140 C-REACTIVE PROTEIN: CPT | Performed by: INTERNAL MEDICINE

## 2025-04-23 PROCEDURE — 84100 ASSAY OF PHOSPHORUS: CPT | Performed by: INTERNAL MEDICINE

## 2025-04-23 PROCEDURE — 82948 REAGENT STRIP/BLOOD GLUCOSE: CPT

## 2025-04-23 PROCEDURE — 25010000002 ENOXAPARIN PER 10 MG: Performed by: INTERNAL MEDICINE

## 2025-04-23 PROCEDURE — 25810000003 SODIUM CHLORIDE 0.9 % SOLUTION 250 ML FLEX CONT: Performed by: INTERNAL MEDICINE

## 2025-04-23 PROCEDURE — 63710000001 INSULIN GLARGINE PER 5 UNITS: Performed by: INTERNAL MEDICINE

## 2025-04-23 PROCEDURE — 85025 COMPLETE CBC W/AUTO DIFF WBC: CPT | Performed by: INTERNAL MEDICINE

## 2025-04-23 PROCEDURE — 97110 THERAPEUTIC EXERCISES: CPT

## 2025-04-23 PROCEDURE — 97530 THERAPEUTIC ACTIVITIES: CPT

## 2025-04-23 PROCEDURE — 94799 UNLISTED PULMONARY SVC/PX: CPT

## 2025-04-23 PROCEDURE — 80053 COMPREHEN METABOLIC PANEL: CPT | Performed by: INTERNAL MEDICINE

## 2025-04-23 PROCEDURE — 25010000002 NA FERRIC GLUC CPLX PER 12.5 MG: Performed by: INTERNAL MEDICINE

## 2025-04-23 PROCEDURE — 97535 SELF CARE MNGMENT TRAINING: CPT

## 2025-04-23 PROCEDURE — 63710000001 INSULIN LISPRO (HUMAN) PER 5 UNITS: Performed by: INTERNAL MEDICINE

## 2025-04-23 PROCEDURE — 63710000001 INSULIN LISPRO (HUMAN) PER 5 UNITS: Performed by: STUDENT IN AN ORGANIZED HEALTH CARE EDUCATION/TRAINING PROGRAM

## 2025-04-23 PROCEDURE — 99232 SBSQ HOSP IP/OBS MODERATE 35: CPT | Performed by: INTERNAL MEDICINE

## 2025-04-23 PROCEDURE — 25010000002 CEFTRIAXONE PER 250 MG: Performed by: INTERNAL MEDICINE

## 2025-04-23 PROCEDURE — 83735 ASSAY OF MAGNESIUM: CPT | Performed by: INTERNAL MEDICINE

## 2025-04-23 RX ORDER — OXYCODONE HYDROCHLORIDE 10 MG/1
10 TABLET ORAL EVERY 4 HOURS PRN
Refills: 0 | Status: DISCONTINUED | OUTPATIENT
Start: 2025-04-23 | End: 2025-05-02 | Stop reason: HOSPADM

## 2025-04-23 RX ORDER — DORZOLAMIDE HYDROCHLORIDE AND TIMOLOL MALEATE 20; 5 MG/ML; MG/ML
SOLUTION/ DROPS OPHTHALMIC 2 TIMES DAILY
Status: DISCONTINUED | OUTPATIENT
Start: 2025-04-23 | End: 2025-05-02 | Stop reason: HOSPADM

## 2025-04-23 RX ORDER — LACTULOSE 10 G/15ML
30 SOLUTION ORAL ONCE
Status: COMPLETED | OUTPATIENT
Start: 2025-04-23 | End: 2025-04-23

## 2025-04-23 RX ADMIN — RIFAXIMIN 550 MG: 550 TABLET ORAL at 22:06

## 2025-04-23 RX ADMIN — GABAPENTIN 200 MG: 100 CAPSULE ORAL at 14:12

## 2025-04-23 RX ADMIN — GABAPENTIN 200 MG: 100 CAPSULE ORAL at 22:06

## 2025-04-23 RX ADMIN — RIFAXIMIN 550 MG: 550 TABLET ORAL at 09:26

## 2025-04-23 RX ADMIN — TAMSULOSIN HYDROCHLORIDE 0.4 MG: 0.4 CAPSULE ORAL at 09:26

## 2025-04-23 RX ADMIN — INSULIN LISPRO 12 UNITS: 100 INJECTION, SOLUTION INTRAVENOUS; SUBCUTANEOUS at 17:37

## 2025-04-23 RX ADMIN — SODIUM CHLORIDE 250 MG: 9 INJECTION, SOLUTION INTRAVENOUS at 11:06

## 2025-04-23 RX ADMIN — LACTULOSE 30 G: 20 SOLUTION ORAL at 09:26

## 2025-04-23 RX ADMIN — OXYCODONE HYDROCHLORIDE AND ACETAMINOPHEN 1 TABLET: 5; 325 TABLET ORAL at 02:25

## 2025-04-23 RX ADMIN — INSULIN LISPRO 4 UNITS: 100 INJECTION, SOLUTION INTRAVENOUS; SUBCUTANEOUS at 12:23

## 2025-04-23 RX ADMIN — Medication 10 ML: at 09:27

## 2025-04-23 RX ADMIN — CARVEDILOL 6.25 MG: 6.25 TABLET, FILM COATED ORAL at 09:26

## 2025-04-23 RX ADMIN — OXYCODONE HYDROCHLORIDE 10 MG: 10 TABLET ORAL at 14:12

## 2025-04-23 RX ADMIN — DORZOLAMIDE HYDROCHLORIDE: 20 SOLUTION/ DROPS OPHTHALMIC at 22:07

## 2025-04-23 RX ADMIN — Medication 10 ML: at 22:09

## 2025-04-23 RX ADMIN — INSULIN GLARGINE 50 UNITS: 100 INJECTION, SOLUTION SUBCUTANEOUS at 09:27

## 2025-04-23 RX ADMIN — CEFTRIAXONE 2000 MG: 2 INJECTION, POWDER, FOR SOLUTION INTRAMUSCULAR; INTRAVENOUS at 14:19

## 2025-04-23 RX ADMIN — ENOXAPARIN SODIUM 40 MG: 100 INJECTION SUBCUTANEOUS at 09:26

## 2025-04-23 RX ADMIN — INSULIN LISPRO 12 UNITS: 100 INJECTION, SOLUTION INTRAVENOUS; SUBCUTANEOUS at 09:26

## 2025-04-23 RX ADMIN — CARVEDILOL 6.25 MG: 6.25 TABLET, FILM COATED ORAL at 17:37

## 2025-04-23 RX ADMIN — OXYCODONE HYDROCHLORIDE 10 MG: 10 TABLET ORAL at 09:25

## 2025-04-23 RX ADMIN — OXYCODONE HYDROCHLORIDE 10 MG: 10 TABLET ORAL at 22:05

## 2025-04-23 RX ADMIN — GABAPENTIN 200 MG: 100 CAPSULE ORAL at 06:13

## 2025-04-23 RX ADMIN — PANTOPRAZOLE SODIUM 40 MG: 40 TABLET, DELAYED RELEASE ORAL at 06:14

## 2025-04-23 RX ADMIN — INSULIN LISPRO 12 UNITS: 100 INJECTION, SOLUTION INTRAVENOUS; SUBCUTANEOUS at 12:23

## 2025-04-23 RX ADMIN — LOSARTAN POTASSIUM 100 MG: 50 TABLET, FILM COATED ORAL at 09:26

## 2025-04-23 NOTE — CASE MANAGEMENT/SOCIAL WORK
Continued Stay Note  [facility]     Patient Name: Jimmy Gabehart  MRN: [id number]  Today's Date: 4/23/2025    Admit Date: 4/19/2025    Plan: [facility]   Discharge Plan       Row Name 04/23/25 0913       Plan    Plan [facility]    Plan Comments Nina/OWEN has started the insurance pre-cert for acute inpatient rehab at [facility].  I have called Ms Gabehart and updated her.  I have also answered her questions regarding the process with insurance and inpatient rehab.  Ms Gabeheart states that she has no help at home since their sons live here in [place], which is two hours away from where Mr and Ms Gabehart live.  Case management will continue to follow.    Final Discharge Disposition Code 62 - inpatient rehab facility                   Discharge Codes    No documentation.                 Expected Discharge Date and Time       Expected Discharge Date Expected Discharge Time    Apr 25, 2025               Nannette Galdamez RN

## 2025-04-23 NOTE — PLAN OF CARE
Goal Outcome Evaluation:  Plan of Care Reviewed With: patient, family        Progress: improving  Outcome Evaluation: Patient demonstrated improvement with LBD this date, but continues to need excess time and effort to complete. Educated on AE to increase with ease, but pt. decilned use at this time.  Educate on where could purchase if decided to use at a later time.  Pt. educated on edema reduction technique and pt. was able to dangle R UE for small AROM pendulum movements. Pt. with improved pain R shoulder at end of session. If pt. continues to improve he may be able to discharge home with assist and OP therapy, but continue to recommend IRF at this time if medically appropriate.    Anticipated Discharge Disposition (OT): inpatient rehabilitation facility

## 2025-04-23 NOTE — PLAN OF CARE
Goal Outcome Evaluation:  Plan of Care Reviewed With: patient        Progress: improving  Outcome Evaluation: VSS. Pain treated x2. Pt had one loose BM. No acute events. Continue POC.

## 2025-04-23 NOTE — PROGRESS NOTES
[facility] Medicine Services  PROGRESS NOTE    Patient Name: Jimmy Gabehart  : 1955  MRN: [id number]    Date of Admission: 2025  Primary Care Physician: Miriam Mckeon DO    Subjective   Subjective     CC:  Follow-up for hepatic encephalopathy    HPI:  Patient seen and examined this morning.  Did not sleep well overnight because of his right shoulder pain.  Did not have any bowel movement yesterday.  Fully awake and alert.  Daughter at bedside updated    Objective   Objective     Vital Signs:   Temp:  [97.7 °F (36.5 °C)-99 °F (37.2 °C)] 97.7 °F (36.5 °C)  Heart Rate:  [61-77] 66  Resp:  [16-18] 18  BP: (105-136)/(49-76) 136/76     Physical Exam:  General: Comfortable, not in distress, conversant and cooperative  Head: Atraumatic and normocephalic  Eyes: No Icterus. No pallor  Ears:  Ears appear intact with no abnormalities noted  Throat: No oral lesions, no thrush  Neck: Supple, trachea midline  Lungs: Clear to auscultation bilaterally, equal air entry, no wheezing or crackles  Heart:  Normal S1 and S2, no murmur, no gallop, No JVD, no lower extremity swelling  Abdomen:  Soft, no tenderness, no organomegaly, normal bowel sounds, no organomegaly  Extremities: pulses equal bilaterally  Skin: No bleeding, bruising or rash, normal skin turgor and elasticity  Neurologic: Cranial nerves appear intact with no evidence of facial asymmetry, normal motor and sensory functions in all 4 extremities  Psych: Alert and oriented x 3, normal mood    Results Reviewed:  LAB RESULTS:      Lab 25  0417 25  0534 25  1816 25  0448 25  0508 25  1604   WBC 3.48 4.68  --  4.89 5.00 4.21   HEMOGLOBIN 8.6* 9.1*  --  9.3* 10.1* 9.7*   HEMATOCRIT 27.6* 28.6*  --  29.9* 30.9* 28.7*   PLATELETS 82* 86*  --  73* 84* 74*   NEUTROS ABS 1.40* 2.82  --  2.47 2.60 2.43   IMMATURE GRANS (ABS) 0.02 0.04  --  0.04 0.05 0.03   LYMPHS ABS 1.54 1.21  --  1.70 1.46 1.19   MONOS  ABS 0.46 0.55  --  0.64 0.87 0.53   EOS ABS 0.06 0.05  --  0.03 0.01 0.02   .0* 107.9*  --  111.6* 106.2* 105.5*   CRP 8.77* 9.27*  --  13.52*  --   --    PROCALCITONIN  --  0.36*  --  0.46*  --   --    LACTATE  --   --  1.9  --   --  1.7   LDH  --   --   --   --  226*  --    PROTIME  --   --   --   --   --  16.6*         Lab 04/23/25 0417 04/22/25  0534 04/21/25  0448 04/20/25  0508 04/19/25  1604   SODIUM 133* 134* 135* 130* 133*   POTASSIUM 4.2 4.4 4.1 4.4 4.3   CHLORIDE 104 101 103 98 99   CO2 18.0* 20.0* 22.0 20.0* 21.0*   ANION GAP 11.0 13.0 10.0 12.0 13.0   BUN 25* 21 22 18 22   CREATININE 1.32* 1.26 1.12 1.07 1.19   EGFR 58.0* 61.4 70.7 74.7 65.7   GLUCOSE 193* 193* 230* 308* 289*   CALCIUM 8.3* 8.2* 8.4* 8.7 9.0   MAGNESIUM 1.7 1.8 2.2 2.3 1.5*   PHOSPHORUS 3.3 2.8 2.8  --  2.3*   HEMOGLOBIN A1C  --   --   --  11.50*  --    TSH  --   --   --   --  1.180         Lab 04/23/25 0417 04/22/25  0534 04/21/25  0448 04/19/25  1604   TOTAL PROTEIN 6.7 6.5 6.9 7.7   ALBUMIN 3.0* 3.2* 3.3* 3.5   GLOBULIN 3.7 3.3 3.6 4.2   ALT (SGPT) 35 37 35 47*   AST (SGOT) 59* 62* 51* 84*   BILIRUBIN 0.7 1.2 1.4* 1.6*   ALK PHOS 237* 249* 247* 253*         Lab 04/19/25  1604   PROTIME 16.6*   INR 1.26*             Lab 04/20/25  0508   IRON 30*   IRON SATURATION (TSAT) 10*   TIBC 310   TRANSFERRIN 208   FERRITIN 594.00*   FOLATE >20.00   VITAMIN B 12 >2,000*         Brief Urine Lab Results  (Last result in the past 365 days)        Color   Clarity   Blood   Leuk Est   Nitrite   Protein   CREAT   Urine HCG        04/21/25 1742 Yellow   Clear   Negative   Negative   Negative   Negative                   Microbiology Results Abnormal       None            XR Chest 1 View  Result Date: 4/21/2025  XR CHEST 1 VW Date of Exam: 4/21/2025 2:23 PM EDT Indication: Shortness of breath Comparison: None available. Findings: Heart size is normal for the projection. The pulmonary vascular pattern in the chest is normal and the lungs are  clear.     Impression: Impression: No active disease. Electronically Signed: Nima Phillip MD  4/21/2025 3:26 PM EDT  Workstation ID: [id number]          Current medications:  Scheduled Meds:carvedilol, 6.25 mg, Oral, BID With Meals  cefTRIAXone, 2,000 mg, Intravenous, Q24H  enoxaparin sodium, 40 mg, Subcutaneous, Daily  gabapentin, 200 mg, Oral, Q8H  insulin glargine, 50 Units, Subcutaneous, Daily  Insulin Lispro, 12 Units, Subcutaneous, TID With Meals  insulin lispro, 2-9 Units, Subcutaneous, 4x Daily AC & at Bedtime  losartan, 100 mg, Oral, Q24H  pantoprazole, 40 mg, Oral, Q AM  rifAXIMin, 550 mg, Oral, BID  sodium chloride, 10 mL, Intravenous, Q12H  tamsulosin, 0.4 mg, Oral, Daily      Continuous Infusions:     PRN Meds:.  [DISCONTINUED] acetaminophen **OR** acetaminophen **OR** acetaminophen    Calcium Replacement - Follow Nurse / BPA Driven Protocol    dextrose    dextrose    glucagon (human recombinant)    lactulose    Magnesium Standard Dose Replacement - Follow Nurse / BPA Driven Protocol    nitroglycerin    oxyCODONE-acetaminophen    Phosphorus Replacement - Follow Nurse / BPA Driven Protocol    Potassium Replacement - Follow Nurse / BPA Driven Protocol    sodium chloride    sodium chloride    Assessment & Plan   Assessment & Plan     Active Hospital Problems    Diagnosis  POA    **Hepatic encephalopathy [K76.82]  Yes      Resolved Hospital Problems   No resolved problems to display.        Brief Hospital Course to date:  Jimmy Gabehart is a 70 y.o. male with past medical history of insulin-dependent diabetes, hypertension, recent diagnosis of liver cirrhosis, thrombocytopenia, chronic pain presenting with altered mental status and fall at home.     Acute hepatic encephalopathy  Recent diagnosis liver cirrhosis  Patient with remote history of alcohol use. New diagnosis of Stage F4 cirrhosis based on FibroScan in March 2023. Following with GI as out patient  Presented with acute encephalopathy secondary to  hepatic encephalopathy  Somewhat improved with lactulose  Continue lactulose as needed and rifaximin.  Will need prescription upon discharge  Patient family advised to keep his follow-up with GI as outpatient.  Will need EGD  Normal TSH and B12  Consider CT head if no clinical improvement within 24 hours  Reduce gabapentin to 200 mg 3 times daily  Discontinue baclofen upon discharge because of his advanced liver disease    Febrile illness likely secondary to acute bronchitis  Patient had a fever of 101.1 around midnight of 4/21/2025  Elevated procalcitonin, CRP  Negative blood culture.  Normal lactic acid  Normal chest x-ray and UA  Negative respiratory panel  Continue empiric antibiotic therapy with Rocephin for possible acute bronchitis.  No fever over last 24 hours    Anemia of chronic disease  Hemoglobin is slowly trending down.  It was 10.8 on admission.  Today is 8.6  No evidence of GI bleed  Iron studies consistent with severe iron deficiency anemia  Start iron infusion   Monitor CBC    Weakness and debility  PT/OT recommended short-term rehab     Acute on chronic right shoulder pain  Fall at home landing on right shoulder  Has been evaluated by orthopedic surgery outpatient.  Patient was found to have a tear in his right shoulder.  Patient was deemed a poor surgical candidate and nonoperative approach was indicated.  X-ray to evaluate for fracture  As needed Percocet     Type 2 diabetes  A1c 11.5%  Continue insulin glargine 50 units daily.  Continue Premeal insulin 12 units 3 times daily and continue SSI  Hold Januvia and metformin   The patient drinks plenty of ski (still drink rich in sugar) and eats a quart of gallon of ice cream daily.  Theand family counseled about the importance to comply with diabetic diet    Hypertension  Continue home Coreg and losartan in the morning     GERD  Continue PPI     BPH  Continue tamsulosin     Hx of thrombocytopenia  Follows with Heme/Onc outpatient. Recent BM biopsy  for concern for MDS. Will need to follow up outpatient for biopsy results.   Continue SCD for DVT prophylaxis      Expected Discharge Location and Transportation: Inpatient rehab  Expected Discharge   Expected Discharge Date: 4/25/2025; Expected Discharge Time:      VTE Prophylaxis:  Pharmacologic & mechanical VTE prophylaxis orders are present.         AM-PAC 6 Clicks Score (PT): 18 (04/22/25 2035)    CODE STATUS:   Code Status and Medical Interventions: CPR (Attempt to Resuscitate); Full Support   Ordered at: 04/19/25 1349     Code Status (Patient has no pulse and is not breathing):    CPR (Attempt to Resuscitate)     Medical Interventions (Patient has pulse or is breathing):    Full Support     Level Of Support Discussed With:    Next of Kin (If No Surrogate)       Bailey Howell MD  04/23/25

## 2025-04-23 NOTE — THERAPY TREATMENT NOTE
Patient Name: Jimmy Gabehart  : 1955    MRN: [id number]                              Today's Date: 2025       Admit Date: 2025    Visit Dx: No diagnosis found.  Patient Active Problem List   Diagnosis    Shoulder pain, bilateral    Bursitis/tendonitis, shoulder    Pain    Cervical radiculopathy    Cervical spondylosis    Lumbar spondylosis    Elevated liver enzymes    History of diabetic ulcer of foot    Hypertensive disorder    Long-term current use of opiate analgesic    Osteoarthritis, generalized    Type 2 diabetes mellitus with diabetic neuropathy    Hepatic encephalopathy     Past Medical History:   Diagnosis Date    Diabetes     Hypertension      Past Surgical History:   Procedure Laterality Date    BACK SURGERY      CARPAL TUNNEL RELEASE      COLONOSCOPY      EYE SURGERY      FOOT SURGERY Left     Ankle fusion    HERNIA REPAIR      KNEE SURGERY Right 2022    TKA right knee manipulation 23    TONSILLECTOMY AND ADENOIDECTOMY      TRIGGER FINGER RELEASE      WISDOM TOOTH EXTRACTION        General Information       Row Name 25 1048          OT Time and Intention    Subjective Information complains of;pain  -OCTAVIANO     Document Type therapy note (daily note)  -OCTAVIANO     Mode of Treatment individual therapy;occupational therapy  -OCTAVIANO     Patient Effort good  -OCTAVIANO     Symptoms Noted During/After Treatment none  -OCTAVIANO       Row Name 25 1048          General Information    Patient Profile Reviewed yes  -OCTAVIANO     Existing Precautions/Restrictions fall;other (see comments)  blind L eye, painful R shoulder with increased pain since fall  -OCTAVIANO     Barriers to Rehab medically complex;previous functional deficit  -OCTAVIANO       Row Name 25 1048          Cognition    Orientation Status (Cognition) oriented x 3  -OCTAVIANO       Row Name 25 1048          Safety Issues/Impairments Affecting Functional Mobility    Safety Issues Affecting Function (Mobility) safety precaution awareness;safety  precautions follow-through/compliance  -     Impairments Affecting Function (Mobility) balance;endurance/activity tolerance;strength;pain;range of motion (ROM)  -               User Key  (r) = Recorded By, (t) = Taken By, (c) = Cosigned By      Initials Name Provider Type    OCTAVIANO Alona Turcios, OT Occupational Therapist                     Mobility/ADL's       Row Name 04/23/25 1049          Bed Mobility    Supine-Sit [place] (Bed Mobility) standby assist;verbal cues  -     Assistive Device (Bed Mobility) bed rails;head of bed elevated  -OCTAVIANO     Comment, (Bed Mobility) extra time and effort needed, cues to scoot out fully until both LE's touching  -       Row Name 04/23/25 1049          Transfers    Transfers sit-stand transfer;stand-sit transfer  -OCTAVIANO     Comment, (Transfers) cues for hand placement and to fully bring walker back when sitting due to wanting to leave to side once close to recliner  -       Row Name 04/23/25 1049          Sit-Stand Transfer    Sit-Stand [place] (Transfers) contact guard;verbal cues  -     Assistive Device (Sit-Stand Transfers) walker, front-wheeled  -OCTAVIANO       Row Name 04/23/25 1049          Stand-Sit Transfer    Stand-Sit [place] (Transfers) contact guard;verbal cues  -     Assistive Device (Stand-Sit Transfers) walker, front-wheeled  -       Row Name 04/23/25 1049          Functional Mobility    Functional Mobility- Ind. Level contact guard assist;1 person  -     Functional Mobility- Device walker, front-wheeled  -     Functional Mobility-Distance (Feet) --  household distance plus  -     Functional Mobility- Safety Issues step length decreased  -     Functional Mobility- Comment R ankle pain today with WB  -OCTAVIANO       Row Name 04/23/25 1049          Activities of Daily Living    BADL Assessment/Intervention upper body dressing;lower body dressing;toileting  -       Row Name 04/23/25 1049          Lower Body Dressing Assessment/Training     [place] Level (Lower Body Dressing) doff;don;socks;standby assist;pants/bottoms;minimum assist (75% patient effort)  -OCTAVIANO     Position (Lower Body Dressing) unsupported sitting;unsupported standing  -OCTAVIANO     Comment, (Lower Body Dressing) educated pt. on AE use, pt. at EOB was able with a combination of side sit, bending down and foot over knee methods was able to ti sock and doff socks with excess time and effort, educated on sockaid availability and use and where can purchase if decided later was interested in using, pt. needed assist to pull up pants when fell to ankles with toileting and to tie  -OCTAVIANO       Row Name 04/23/25 1049          Toileting Assessment/Training    [place] Level (Toileting) toileting skills;minimum assist (75% patient effort);adjust/manage clothing;perform perineal hygiene;verbal cues  -OCTAVIANO     Position (Toileting) unsupported standing;supported standing  -OCTAVIANO     Comment, (Toileting) bed wet with urine due to purewick partially off, purewick removed, pt. used urinal with assist to hold up gown, min A to pull up pants and assist to hold up gown  -OCTAVIANO       Row Name 04/23/25 1042          Upper Body Dressing Assessment/Training    [place] Level (Upper Body Dressing) don;pajama/robe;maximum assist (25% patient effort)  -OCTAVIANO     Position (Upper Body Dressing) edge of bed sitting  -OCTAVIANO     Comment, (Upper Body Dressing) educated pt. on sequencing of dressing for increased independence with button up and pull over, pt. was able to ti RUE with cues, but could not pull around back and robe tight in shoulder and assist needed for LUE  -OCTAVIANO               User Key  (r) = Recorded By, (t) = Taken By, (c) = Cosigned By      Initials Name Provider Type    Alona Calzada OT Occupational Therapist                   Obj/Interventions       Row Name 04/23/25 1053          Motor Skills    Therapeutic Exercise --  educated pt. on raising LUE in shoulder flexion and pumping hand to assist with  edema, pt. was also able to dangle RUE and complete active pendulums with clockwise and counter-clockwise circling  -OCTAVIANO       Row Name 04/23/25 1055          Balance    Static Sitting Balance independent  -OCTAVIANO     Dynamic Sitting Balance independent  -OCTAVIANO     Position, Sitting Balance unsupported;sitting edge of bed  -OCTAVIANO     Static Standing Balance standby assist  -OCTAVIANO     Dynamic Standing Balance contact guard  -OCTAVIANO     Position/Device Used, Standing Balance supported;walker, rolling  -OCTAVIANO     Balance Interventions sit to stand;occupation based/functional task  -OCTAVIANO     Comment, Balance UBD, LBD, toileting  -OCTAVIANO               User Key  (r) = Recorded By, (t) = Taken By, (c) = Cosigned By      Initials Name Provider Type    Alona Calzada OT Occupational Therapist                   Goals/Plan       Row Name 04/23/25 1102          Transfer Goal 1 (OT)    Progress/Outcome (Transfer Goal 1, OT) goal ongoing  -OCTAVIANO       Row Name 04/23/25 1102          Dressing Goal 1 (OT)    Progress/Outcome (Dressing Goal 1, OT) continuing progress toward goal;goal partially met;goal ongoing  -OCTAVIANO       Row Name 04/23/25 1102          Grooming Goal 1 (OT)    Progress/Outcome (Grooming Goal 1, OT) goal ongoing  -OCTAVIANO               User Key  (r) = Recorded By, (t) = Taken By, (c) = Cosigned By      Initials Name Provider Type    Alona Calzada, OT Occupational Therapist                   Clinical Impression       Row Name 04/23/25 1057          Pain Assessment    Pretreatment Pain Rating 8/10  -OCTAVIANO     Posttreatment Pain Rating 5/10  -OCTAVIANO     Pain Location shoulder  -OCTAVIANO     Pain Side/Orientation right  R ankle pain with mobility, recommended wearing shoe  -OCTAVIANO     Pain Management Interventions exercise or physical activity utilized;positioning techniques utilized;premedicated for activity  -OCTAVIANO     Response to Pain Interventions activity participation with decreased pain  -OCTAVIANO       Row Name 04/23/25 1057          Plan of Care Review    Plan of  Care Reviewed With patient;family  -OCTAVIANO     Progress improving  -OCTAVIANO     Outcome Evaluation Patient demonstrated improvement with LBD this date, but continues to need excess time and effort to complete. Educated on AE to increase with ease, but pt. decilned use at this time.  Educate on where could purchase if decided to use at a later time.  Pt. educated on edema reduction technique and pt. was able to dangle R UE for small AROM pendulum movements. Pt. with improved pain R shoulder at end of session. If pt. continues to improve he may be able to discharge home with assist and OP therapy, but continue to recommend IRF at this time if medically appropriate.  -OCTAVIANO       Row Name 04/23/25 1057          Therapy Assessment/Plan (OT)    Therapy Frequency (OT) daily  -OCTAVIANO       Row Name 04/23/25 1057          Therapy Plan Review/Discharge Plan (OT)    Anticipated Discharge Disposition (OT) inpatient rehabilitation facility  -OCTAVIANO       Row Name 04/23/25 1057          Vital Signs    Pre Systolic BP Rehab 134  -OCTAVIANO     Pre Treatment Diastolic BP 65  -OCTAVIANO     Pretreatment Heart Rate (beats/min) 78  -OCTAVIANO     Posttreatment Heart Rate (beats/min) 83  -OCTAVIANO     Pre SpO2 (%) 98  -OCTAVIANO     O2 Delivery Pre Treatment room air  -OCTAVIANO     O2 Delivery Intra Treatment room air  -OCTAVIANO     O2 Delivery Post Treatment room air  -OCTAVIANO     Pre Patient Position Supine  -OCTAVIANO     Intra Patient Position Standing  -OCTAVIANO       Row Name 04/23/25 1057          Positioning and Restraints    Pre-Treatment Position in bed  -OCTAVIANO     Post Treatment Position chair  -OCTAVIANO     In Chair notified nsg;reclined;call light within reach;encouraged to call for assist;exit alarm on;waffle cushion;legs elevated  -OCTAVIANO               User Key  (r) = Recorded By, (t) = Taken By, (c) = Cosigned By      Initials Name Provider Type    Alona Calzada, OT Occupational Therapist                   Outcome Measures       Row Name 04/23/25 1102          How much help from another is currently needed...     Putting on and taking off regular lower body clothing? 3  -OCTAVIANO     Bathing (including washing, rinsing, and drying) 2  -OCTAVIANO     Toileting (which includes using toilet bed pan or urinal) 3  urinal use  -OCTAVIANO     Putting on and taking off regular upper body clothing 2  -OCTAVIANO     Taking care of personal grooming (such as brushing teeth) 3  -OCTAVIANO     Eating meals 3  -OCTAVIANO     AM-PAC 6 Clicks Score (OT) 16  -OCTAVIANO       Row Name 04/23/25 1102          Functional Assessment    Outcome Measure Options AM-PAC 6 Clicks Daily Activity (OT)  -OCTAVIANO               User Key  (r) = Recorded By, (t) = Taken By, (c) = Cosigned By      Initials Name Provider Type    Alona Calzada OT Occupational Therapist                    Occupational Therapy Education       Title: PT OT SLP Therapies (Done)       Topic: Occupational Therapy (Done)       Point: ADL training (Done)       Learning Progress Summary            Patient Acceptance, E,D, NR,VU,DU by OCTAVIANO at 4/23/2025 1103    Comment: UE edema TE, active pendulum R shoulder, LBD AE available to improve ease, transfer safety    Acceptance, E, VU,NR by OCTAVIANO at 4/21/2025 1158    Comment: reason for consult, bidet benefit, benefit walker use, transfer safety, AE available for LBD   Family Acceptance, E, VU,NR by OCTAVIANO at 4/21/2025 1158    Comment: reason for consult, bidet benefit, benefit walker use, transfer safety, AE available for LBD                      Point: Home exercise program (Done)       Learning Progress Summary            Patient Acceptance, E,D, NR,VU,DU by OCTAVIANO at 4/23/2025 1103    Comment: UE edema TE, active pendulum R shoulder, LBD AE available to improve ease, transfer safety                      Point: Precautions (Done)       Learning Progress Summary            Patient Acceptance, E,D, NR,VU,DU by OCTAVIANO at 4/23/2025 1103    Comment: UE edema TE, active pendulum R shoulder, LBD AE available to improve ease, transfer safety    Acceptance, E, VU,NR by OCTAVIANO at 4/21/2025 1158    Comment: reason  for consult, bidet benefit, benefit walker use, transfer safety, AE available for LBD   Family Acceptance, E, VU,NR by OCTAVIANO at 4/21/2025 1158    Comment: reason for consult, bidet benefit, benefit walker use, transfer safety, AE available for LBD                      Point: Body mechanics (Done)       Learning Progress Summary            Patient Acceptance, E,D, NR,VU,DU by OCTAVIANO at 4/23/2025 1103    Comment: UE edema TE, active pendulum R shoulder, LBD AE available to improve ease, transfer safety                                      User Key       Initials Effective Dates Name Provider Type Discipline    OCTAVIANO 07/11/23 -  Alona Turcios, OT Occupational Therapist OT                  OT Recommendation and Plan  Planned Therapy Interventions (OT): activity tolerance training, adaptive equipment training, BADL retraining, functional balance retraining, occupation/activity based interventions, ROM/therapeutic exercise, transfer/mobility retraining, strengthening exercise, patient/caregiver education/training, passive ROM/stretching  Therapy Frequency (OT): daily  Plan of Care Review  Plan of Care Reviewed With: patient, family  Progress: improving  Outcome Evaluation: Patient demonstrated improvement with LBD this date, but continues to need excess time and effort to complete. Educated on AE to increase with ease, but pt. decilned use at this time.  Educate on where could purchase if decided to use at a later time.  Pt. educated on edema reduction technique and pt. was able to dangle R UE for small AROM pendulum movements. Pt. with improved pain R shoulder at end of session. If pt. continues to improve he may be able to discharge home with assist and OP therapy, but continue to recommend IRF at this time if medically appropriate.     Time Calculation:   Evaluation Complexity (OT)  Review Occupational Profile/Medical/Therapy History Complexity: expanded/moderate complexity  Assessment, Occupational Performance/Identification  of Deficit Complexity: 3-5 performance deficits  Clinical Decision Making Complexity (OT): detailed assessment/moderate complexity  Overall Complexity of Evaluation (OT): moderate complexity     Time Calculation- OT       Row Name 04/23/25 1104             Time Calculation- OT    OT Start Time 1002  -OCTAVIANO      OT Received On 04/23/25  -OCTAVIANO      OT Goal Re-Cert Due Date 05/01/25  -OCTAVIANO         Timed Charges    31388 - OT Therapeutic Exercise Minutes 8  -OCTAVIANO      28096 - OT Therapeutic Activity Minutes 12  -OCTAVIANO      30893 - OT Self Care/Mgmt Minutes 22  -OCTAVIANO         Total Minutes    Timed Charges Total Minutes 42  -OCTAVIANO       Total Minutes 42  -OCTAVIANO                User Key  (r) = Recorded By, (t) = Taken By, (c) = Cosigned By      Initials Name Provider Type    Alona Calzada OT Occupational Therapist                  Therapy Charges for Today       Code Description Service Date Service Provider Modifiers Qty    36207488265 HC OT THER PROC EA 15 MIN 4/23/2025 Alona Turcios OT GO 1    97120582411 HC OT THERAPEUTIC ACT EA 15 MIN 4/23/2025 Alona Turcios OT GO 1    99703649553 HC OT SELF CARE/MGMT/TRAIN EA 15 MIN 4/23/2025 Alona Turcios OT GO 1                 Alona Turcios OT  4/23/2025

## 2025-04-24 ENCOUNTER — APPOINTMENT (OUTPATIENT)
Dept: CT IMAGING | Facility: HOSPITAL | Age: 70
End: 2025-04-24
Payer: COMMERCIAL

## 2025-04-24 ENCOUNTER — APPOINTMENT (OUTPATIENT)
Dept: GENERAL RADIOLOGY | Facility: HOSPITAL | Age: 70
End: 2025-04-24
Payer: COMMERCIAL

## 2025-04-24 LAB
ALBUMIN SERPL-MCNC: 2.9 G/DL (ref 3.5–5.2)
ALBUMIN/GLOB SERPL: 0.8 G/DL
ALP SERPL-CCNC: 240 U/L (ref 39–117)
ALT SERPL W P-5'-P-CCNC: 49 U/L (ref 1–41)
ANION GAP SERPL CALCULATED.3IONS-SCNC: 12 MMOL/L (ref 5–15)
AST SERPL-CCNC: 105 U/L (ref 1–40)
BACTERIA SPEC AEROBE CULT: NORMAL
BACTERIA SPEC AEROBE CULT: NORMAL
BASOPHILS # BLD AUTO: 0.01 10*3/MM3 (ref 0–0.2)
BASOPHILS NFR BLD AUTO: 0.3 % (ref 0–1.5)
BILIRUB SERPL-MCNC: 1.1 MG/DL (ref 0–1.2)
BUN SERPL-MCNC: 23 MG/DL (ref 8–23)
BUN/CREAT SERPL: 17 (ref 7–25)
CALCIUM SPEC-SCNC: 8.2 MG/DL (ref 8.6–10.5)
CHLORIDE SERPL-SCNC: 101 MMOL/L (ref 98–107)
CO2 SERPL-SCNC: 19 MMOL/L (ref 22–29)
CREAT SERPL-MCNC: 1.35 MG/DL (ref 0.76–1.27)
CRP SERPL-MCNC: 6.25 MG/DL (ref 0–0.5)
DEPRECATED RDW RBC AUTO: 54.4 FL (ref 37–54)
EGFRCR SERPLBLD CKD-EPI 2021: 56.5 ML/MIN/1.73
EOSINOPHIL # BLD AUTO: 0.07 10*3/MM3 (ref 0–0.4)
EOSINOPHIL NFR BLD AUTO: 1.8 % (ref 0.3–6.2)
ERYTHROCYTE [DISTWIDTH] IN BLOOD BY AUTOMATED COUNT: 13.6 % (ref 12.3–15.4)
ERYTHROCYTE [SEDIMENTATION RATE] IN BLOOD: 73 MM/HR (ref 0–20)
GLOBULIN UR ELPH-MCNC: 3.7 GM/DL
GLUCOSE BLDC GLUCOMTR-MCNC: 149 MG/DL (ref 70–130)
GLUCOSE BLDC GLUCOMTR-MCNC: 159 MG/DL (ref 70–130)
GLUCOSE BLDC GLUCOMTR-MCNC: 194 MG/DL (ref 70–130)
GLUCOSE BLDC GLUCOMTR-MCNC: 99 MG/DL (ref 70–130)
GLUCOSE SERPL-MCNC: 173 MG/DL (ref 65–99)
HCT VFR BLD AUTO: 26.6 % (ref 37.5–51)
HGB BLD-MCNC: 8.6 G/DL (ref 13–17.7)
IMM GRANULOCYTES # BLD AUTO: 0.03 10*3/MM3 (ref 0–0.05)
IMM GRANULOCYTES NFR BLD AUTO: 0.8 % (ref 0–0.5)
LYMPHOCYTES # BLD AUTO: 1.56 10*3/MM3 (ref 0.7–3.1)
LYMPHOCYTES NFR BLD AUTO: 39.3 % (ref 19.6–45.3)
MAGNESIUM SERPL-MCNC: 1.6 MG/DL (ref 1.6–2.4)
MCH RBC QN AUTO: 35.1 PG (ref 26.6–33)
MCHC RBC AUTO-ENTMCNC: 32.3 G/DL (ref 31.5–35.7)
MCV RBC AUTO: 108.6 FL (ref 79–97)
MONOCYTES # BLD AUTO: 0.69 10*3/MM3 (ref 0.1–0.9)
MONOCYTES NFR BLD AUTO: 17.4 % (ref 5–12)
NEUTROPHILS NFR BLD AUTO: 1.61 10*3/MM3 (ref 1.7–7)
NEUTROPHILS NFR BLD AUTO: 40.4 % (ref 42.7–76)
NRBC BLD AUTO-RTO: 0 /100 WBC (ref 0–0.2)
PLATELET # BLD AUTO: 104 10*3/MM3 (ref 140–450)
PMV BLD AUTO: 11.9 FL (ref 6–12)
POTASSIUM SERPL-SCNC: 4.5 MMOL/L (ref 3.5–5.2)
PROCALCITONIN SERPL-MCNC: 0.33 NG/ML (ref 0–0.25)
PROT SERPL-MCNC: 6.6 G/DL (ref 6–8.5)
RBC # BLD AUTO: 2.45 10*6/MM3 (ref 4.14–5.8)
SODIUM SERPL-SCNC: 132 MMOL/L (ref 136–145)
URATE SERPL-MCNC: 5.1 MG/DL (ref 3.4–7)
WBC NRBC COR # BLD AUTO: 3.97 10*3/MM3 (ref 3.4–10.8)

## 2025-04-24 PROCEDURE — 92610 EVALUATE SWALLOWING FUNCTION: CPT

## 2025-04-24 PROCEDURE — 25010000002 NA FERRIC GLUC CPLX PER 12.5 MG: Performed by: INTERNAL MEDICINE

## 2025-04-24 PROCEDURE — 25010000002 CEFTRIAXONE PER 250 MG: Performed by: INTERNAL MEDICINE

## 2025-04-24 PROCEDURE — 70450 CT HEAD/BRAIN W/O DYE: CPT

## 2025-04-24 PROCEDURE — 85652 RBC SED RATE AUTOMATED: CPT | Performed by: INTERNAL MEDICINE

## 2025-04-24 PROCEDURE — 71045 X-RAY EXAM CHEST 1 VIEW: CPT

## 2025-04-24 PROCEDURE — 63710000001 INSULIN LISPRO (HUMAN) PER 5 UNITS: Performed by: INTERNAL MEDICINE

## 2025-04-24 PROCEDURE — 99232 SBSQ HOSP IP/OBS MODERATE 35: CPT | Performed by: INTERNAL MEDICINE

## 2025-04-24 PROCEDURE — 84145 PROCALCITONIN (PCT): CPT | Performed by: INTERNAL MEDICINE

## 2025-04-24 PROCEDURE — 63710000001 INSULIN LISPRO (HUMAN) PER 5 UNITS: Performed by: STUDENT IN AN ORGANIZED HEALTH CARE EDUCATION/TRAINING PROGRAM

## 2025-04-24 PROCEDURE — 97530 THERAPEUTIC ACTIVITIES: CPT

## 2025-04-24 PROCEDURE — 73030 X-RAY EXAM OF SHOULDER: CPT

## 2025-04-24 PROCEDURE — 97110 THERAPEUTIC EXERCISES: CPT

## 2025-04-24 PROCEDURE — 84550 ASSAY OF BLOOD/URIC ACID: CPT | Performed by: INTERNAL MEDICINE

## 2025-04-24 PROCEDURE — 63710000001 INSULIN GLARGINE PER 5 UNITS: Performed by: INTERNAL MEDICINE

## 2025-04-24 PROCEDURE — 25810000003 SODIUM CHLORIDE 0.9 % SOLUTION 250 ML FLEX CONT: Performed by: INTERNAL MEDICINE

## 2025-04-24 PROCEDURE — 25010000002 ENOXAPARIN PER 10 MG: Performed by: INTERNAL MEDICINE

## 2025-04-24 PROCEDURE — 83735 ASSAY OF MAGNESIUM: CPT | Performed by: INTERNAL MEDICINE

## 2025-04-24 PROCEDURE — 73080 X-RAY EXAM OF ELBOW: CPT

## 2025-04-24 PROCEDURE — 85025 COMPLETE CBC W/AUTO DIFF WBC: CPT | Performed by: INTERNAL MEDICINE

## 2025-04-24 PROCEDURE — 82948 REAGENT STRIP/BLOOD GLUCOSE: CPT

## 2025-04-24 PROCEDURE — 80053 COMPREHEN METABOLIC PANEL: CPT | Performed by: INTERNAL MEDICINE

## 2025-04-24 PROCEDURE — 86140 C-REACTIVE PROTEIN: CPT | Performed by: INTERNAL MEDICINE

## 2025-04-24 RX ORDER — LACTULOSE 10 G/15ML
30 SOLUTION ORAL DAILY
Status: DISCONTINUED | OUTPATIENT
Start: 2025-04-24 | End: 2025-04-24

## 2025-04-24 RX ORDER — LIDOCAINE 4 G/G
1 PATCH TOPICAL
Status: DISCONTINUED | OUTPATIENT
Start: 2025-04-24 | End: 2025-05-02 | Stop reason: HOSPADM

## 2025-04-24 RX ORDER — LACTULOSE 10 G/15ML
30 SOLUTION ORAL 3 TIMES DAILY
Status: DISCONTINUED | OUTPATIENT
Start: 2025-04-24 | End: 2025-04-27

## 2025-04-24 RX ORDER — COLCHICINE 0.6 MG/1
1.2 TABLET ORAL DAILY
Status: DISCONTINUED | OUTPATIENT
Start: 2025-04-24 | End: 2025-04-29

## 2025-04-24 RX ADMIN — CARVEDILOL 6.25 MG: 6.25 TABLET, FILM COATED ORAL at 17:10

## 2025-04-24 RX ADMIN — SODIUM CHLORIDE 250 MG: 9 INJECTION, SOLUTION INTRAVENOUS at 08:27

## 2025-04-24 RX ADMIN — INSULIN LISPRO 2 UNITS: 100 INJECTION, SOLUTION INTRAVENOUS; SUBCUTANEOUS at 12:03

## 2025-04-24 RX ADMIN — DORZOLAMIDE HYDROCHLORIDE: 20 SOLUTION/ DROPS OPHTHALMIC at 21:50

## 2025-04-24 RX ADMIN — GABAPENTIN 200 MG: 100 CAPSULE ORAL at 05:37

## 2025-04-24 RX ADMIN — LACTULOSE 30 G: 20 SOLUTION ORAL at 21:49

## 2025-04-24 RX ADMIN — INSULIN GLARGINE 50 UNITS: 100 INJECTION, SOLUTION SUBCUTANEOUS at 08:27

## 2025-04-24 RX ADMIN — INSULIN LISPRO 12 UNITS: 100 INJECTION, SOLUTION INTRAVENOUS; SUBCUTANEOUS at 12:03

## 2025-04-24 RX ADMIN — TAMSULOSIN HYDROCHLORIDE 0.4 MG: 0.4 CAPSULE ORAL at 08:27

## 2025-04-24 RX ADMIN — Medication 10 ML: at 08:26

## 2025-04-24 RX ADMIN — CARVEDILOL 6.25 MG: 6.25 TABLET, FILM COATED ORAL at 08:27

## 2025-04-24 RX ADMIN — COLCHICINE 1.2 MG: 0.6 TABLET ORAL at 15:54

## 2025-04-24 RX ADMIN — RIFAXIMIN 550 MG: 550 TABLET ORAL at 21:49

## 2025-04-24 RX ADMIN — PANTOPRAZOLE SODIUM 40 MG: 40 TABLET, DELAYED RELEASE ORAL at 05:37

## 2025-04-24 RX ADMIN — OXYCODONE HYDROCHLORIDE 10 MG: 10 TABLET ORAL at 02:14

## 2025-04-24 RX ADMIN — OXYCODONE HYDROCHLORIDE 10 MG: 10 TABLET ORAL at 21:48

## 2025-04-24 RX ADMIN — INSULIN LISPRO 2 UNITS: 100 INJECTION, SOLUTION INTRAVENOUS; SUBCUTANEOUS at 17:11

## 2025-04-24 RX ADMIN — LACTULOSE 30 G: 20 SOLUTION ORAL at 12:03

## 2025-04-24 RX ADMIN — DORZOLAMIDE HYDROCHLORIDE: 20 SOLUTION/ DROPS OPHTHALMIC at 08:26

## 2025-04-24 RX ADMIN — INSULIN LISPRO 12 UNITS: 100 INJECTION, SOLUTION INTRAVENOUS; SUBCUTANEOUS at 08:27

## 2025-04-24 RX ADMIN — CEFTRIAXONE 2000 MG: 2 INJECTION, POWDER, FOR SOLUTION INTRAMUSCULAR; INTRAVENOUS at 17:51

## 2025-04-24 RX ADMIN — GABAPENTIN 200 MG: 100 CAPSULE ORAL at 14:45

## 2025-04-24 RX ADMIN — ENOXAPARIN SODIUM 40 MG: 100 INJECTION SUBCUTANEOUS at 08:27

## 2025-04-24 RX ADMIN — OXYCODONE HYDROCHLORIDE 10 MG: 10 TABLET ORAL at 08:38

## 2025-04-24 RX ADMIN — Medication 10 ML: at 21:53

## 2025-04-24 RX ADMIN — LOSARTAN POTASSIUM 100 MG: 50 TABLET, FILM COATED ORAL at 08:27

## 2025-04-24 RX ADMIN — GABAPENTIN 200 MG: 100 CAPSULE ORAL at 21:49

## 2025-04-24 RX ADMIN — LACTULOSE 30 G: 20 SOLUTION ORAL at 15:54

## 2025-04-24 RX ADMIN — OXYCODONE HYDROCHLORIDE 10 MG: 10 TABLET ORAL at 12:15

## 2025-04-24 RX ADMIN — INSULIN LISPRO 12 UNITS: 100 INJECTION, SOLUTION INTRAVENOUS; SUBCUTANEOUS at 17:11

## 2025-04-24 RX ADMIN — RIFAXIMIN 550 MG: 550 TABLET ORAL at 08:27

## 2025-04-24 RX ADMIN — LIDOCAINE 1 PATCH: 4 PATCH TOPICAL at 10:32

## 2025-04-24 NOTE — PLAN OF CARE
Goal Outcome Evaluation:              Outcome Evaluation: Pt on RA, NSR on tele. PRN oxy given twice for complaints of pain with improvement. No acute changes this shift. Will continue with POC.

## 2025-04-24 NOTE — PLAN OF CARE
Goal Outcome Evaluation:  Plan of Care Reviewed With: patient        Progress: declining  Outcome Evaluation: Pt orientation not as good today. Lactulose given. Pt has yet to have a BM after administration. Increased pain during shift as well in elbow and shoulder. Continue POC.

## 2025-04-24 NOTE — PLAN OF CARE
Goal Outcome Evaluation:  Plan of Care Reviewed With: patient        Progress: no change       Anticipated Discharge Disposition (SLP): No further SLP services warranted          SLP Swallowing Diagnosis: functional oral phase, swallow WFL/no suspected pharyngeal impairment (04/24/25 7387)

## 2025-04-24 NOTE — THERAPY TREATMENT NOTE
Patient Name: Jimmy Gabehart  : 1955    MRN: [id number]                              Today's Date: 2025       Admit Date: 2025    Visit Dx: No diagnosis found.  Patient Active Problem List   Diagnosis    Shoulder pain, bilateral    Bursitis/tendonitis, shoulder    Pain    Cervical radiculopathy    Cervical spondylosis    Lumbar spondylosis    Elevated liver enzymes    History of diabetic ulcer of foot    Hypertensive disorder    Long-term current use of opiate analgesic    Osteoarthritis, generalized    Type 2 diabetes mellitus with diabetic neuropathy    Hepatic encephalopathy     Past Medical History:   Diagnosis Date    Diabetes     Hypertension      Past Surgical History:   Procedure Laterality Date    BACK SURGERY      CARPAL TUNNEL RELEASE      COLONOSCOPY      EYE SURGERY      FOOT SURGERY Left     Ankle fusion    HERNIA REPAIR      KNEE SURGERY Right 2022    TKA right knee manipulation 23    TONSILLECTOMY AND ADENOIDECTOMY      TRIGGER FINGER RELEASE      WISDOM TOOTH EXTRACTION        General Information       Row Name 25 1543          Physical Therapy Time and Intention    Document Type therapy note (daily note)  -ML     Mode of Treatment physical therapy  -       Row Name 25 1543          General Information    Patient Profile Reviewed yes  -ML     Existing Precautions/Restrictions fall;other (see comments)  blind L eye  -ML     Barriers to Rehab medically complex;previous functional deficit;cognitive status  -       Row Name 25 1543          Cognition    Orientation Status (Cognition) oriented to;person;place;time  conversational confusion  -       Row Name 25 1543          Safety Issues/Impairments Affecting Functional Mobility    Safety Issues Affecting Function (Mobility) insight into deficits/self-awareness;judgment;problem-solving;safety precaution awareness;safety precautions follow-through/compliance;awareness of need for  assistance;sequencing abilities  -ML     Impairments Affecting Function (Mobility) balance;endurance/activity tolerance;strength;pain;range of motion (ROM);cognition;motor planning  -ML     Cognitive Impairments, Mobility Safety/Performance attention;awareness, need for assistance;insight into deficits/self-awareness;judgment;problem-solving/reasoning;safety precaution awareness;safety precaution follow-through;sequencing abilities  -ML               User Key  (r) = Recorded By, (t) = Taken By, (c) = Cosigned By      Initials Name Provider Type    Kirsten Lopez Physical Therapist                   Mobility       Row Name 04/24/25 1546          Bed Mobility    Bed Mobility supine-sit  -ML     Supine-Sit [place] (Bed Mobility) verbal cues;nonverbal cues (demo/gesture);moderate assist (50% patient effort);1 person assist  -ML     Assistive Device (Bed Mobility) bed rails;head of bed elevated  -ML     Comment, (Bed Mobility) verbal cues for sequencing during supine to sit transfer  -ML       Row Name 04/24/25 1546          Bed-Chair Transfer    Bed-Chair [place] (Transfers) verbal cues;minimum assist (75% patient effort);1 person assist  -ML     Assistive Device (Bed-Chair Transfers) other (see comments)  UE support through chair  -ML       Row Name 04/24/25 1546          Sit-Stand Transfer    Sit-Stand [place] (Transfers) contact guard;verbal cues  -ML     Assistive Device (Sit-Stand Transfers) other (see comments)  UE support thru chair  -ML       Row Name 04/24/25 1546          Gait/Stairs (Locomotion)    [place] Level (Gait) unable to assess  -ML     Comment, (Gait/Stairs) ambulation deferred due to increased confusion and L elbow pain, currenlty unable to use RW  -ML               User Key  (r) = Recorded By, (t) = Taken By, (c) = Cosigned By      Initials Name Provider Type    Kirsten Lopez Physical Therapist                   Obj/Interventions       Row Name 04/24/25 1547          Motor  Skills    Therapeutic Exercise hip;knee;ankle  -ML       Row Name 04/24/25 1547          Hip (Therapeutic Exercise)    Hip (Therapeutic Exercise) strengthening exercise  -ML     Hip Strengthening (Therapeutic Exercise) bilateral;marching while seated;aBduction;aDduction;10 repetitions  -ML       Row Name 04/24/25 1547          Knee (Therapeutic Exercise)    Knee (Therapeutic Exercise) strengthening exercise  -ML     Knee Strengthening (Therapeutic Exercise) bilateral;LAQ (long arc quad);15 repititions  -ML       Row Name 04/24/25 1547          Ankle (Therapeutic Exercise)    Ankle (Therapeutic Exercise) AROM (active range of motion)  -ML     Ankle AROM (Therapeutic Exercise) bilateral;dorsiflexion;plantarflexion;10 repetitions  -ML       Row Name 04/24/25 1547          Balance    Balance Assessment sitting static balance;sitting dynamic balance;standing static balance;standing dynamic balance  -ML     Static Sitting Balance standby assist  -ML     Dynamic Sitting Balance contact guard  -ML     Position, Sitting Balance unsupported;sitting edge of bed  -ML     Static Standing Balance contact guard  -ML     Dynamic Standing Balance minimal assist;verbal cues;1-person assist  -ML     Position/Device Used, Standing Balance supported  -ML     Balance Interventions sitting;standing;sit to stand;supported;occupation based/functional task  -ML               User Key  (r) = Recorded By, (t) = Taken By, (c) = Cosigned By      Initials Name Provider Type    Kirsten Lopez Physical Therapist                   Goals/Plan    No documentation.                  Clinical Impression       Row Name 04/24/25 1549          Pain    Pretreatment Pain Rating 9/10  -ML     Posttreatment Pain Rating 9/10  -ML     Pain Location elbow  -ML     Pain Side/Orientation left  -ML     Pain Management Interventions exercise or physical activity utilized;positioning techniques utilized  -ML     Response to Pain Interventions activity participation  with tolerable pain  -ML       Row Name 04/24/25 1549          Plan of Care Review    Plan of Care Reviewed With patient;other (see comments)  ex-spouse  -ML     Progress declining  -ML     Outcome Evaluation Patient required increased assistance to complete transfers and unable to ambulate today. Patient with increased confusion, required verbal cues to increase safety awareness and orientate to task. Patient presents below baseline for mobility and would continue to benefit from skilled PT to address strength, balance and activity tolerance deficits. Continue current PT POC.  -ML       Row Name 04/24/25 1549          Positioning and Restraints    Pre-Treatment Position in bed  -ML     Post Treatment Position chair  -ML     In Chair notified nsg;reclined;call light within reach;encouraged to call for assist;exit alarm on;waffle cushion;with family/caregiver;legs elevated  -ML               User Key  (r) = Recorded By, (t) = Taken By, (c) = Cosigned By      Initials Name Provider Type    Kirsten Lopez Physical Therapist                   Outcome Measures       Row Name 04/24/25 3181          How much help from another person do you currently need...    Turning from your back to your side while in flat bed without using bedrails? 3  -ML     Moving from lying on back to sitting on the side of a flat bed without bedrails? 2  -ML     Moving to and from a bed to a chair (including a wheelchair)? 3  -ML     Standing up from a chair using your arms (e.g., wheelchair, bedside chair)? 3  -ML     Climbing 3-5 steps with a railing? 2  -ML     To walk in hospital room? 2  -ML     AM-PAC 6 Clicks Score (PT) 15  -ML     Highest Level of Mobility Goal 4 --> Transfer to chair/commode  -ML       Row Name 04/24/25 9511          Functional Assessment    Outcome Measure Options AM-PAC 6 Clicks Basic Mobility (PT)  -ML               User Key  (r) = Recorded By, (t) = Taken By, (c) = Cosigned By      Initials Name Provider Type    JAZMIN  Kirsten Vallejo Physical Therapist                                 Physical Therapy Education       Title: PT OT SLP Therapies (Done)       Topic: Physical Therapy (Done)       Point: Mobility training (Done)       Learning Progress Summary            Patient Acceptance, E, VU,NR by  at 4/24/2025 1552    Acceptance, E, VU by ER at 4/21/2025 0938    Comment: progress, d/c planning, shoulder pain edu   Family Acceptance, E, VU,NR by ML at 4/24/2025 1552                      Point: Home exercise program (Done)       Learning Progress Summary            Patient Acceptance, E, VU,NR by ML at 4/24/2025 1552    Acceptance, E, VU by ER at 4/21/2025 0938    Comment: progress, d/c planning, shoulder pain edu   Family Acceptance, E, VU,NR by ML at 4/24/2025 1552                      Point: Body mechanics (Done)       Learning Progress Summary            Patient Acceptance, E, VU,NR by ML at 4/24/2025 1552    Acceptance, E, VU by ER at 4/21/2025 0938    Comment: progress, d/c planning, shoulder pain edu   Family Acceptance, E, VU,NR by ML at 4/24/2025 1552                      Point: Precautions (Done)       Learning Progress Summary            Patient Acceptance, E, VU,NR by ML at 4/24/2025 1552    Acceptance, E, VU by ER at 4/21/2025 0938    Comment: progress, d/c planning, shoulder pain edu   Family Acceptance, E, VU,NR by ML at 4/24/2025 1552                                      User Key       Initials Effective Dates Name Provider Type Discipline     04/22/21 -  Kirsten Vallejo Physical Therapist PT    ER 12/13/24 -  Sophie Bolanos, PT Physical Therapist PT                  PT Recommendation and Plan     Progress: declining  Outcome Evaluation: Patient required increased assistance to complete transfers and unable to ambulate today. Patient with increased confusion, required verbal cues to increase safety awareness and orientate to task. Patient presents below baseline for mobility and would continue to benefit from skilled  PT to address strength, balance and activity tolerance deficits. Continue current PT POC.     Time Calculation:         PT Charges       Row Name 04/24/25 1553             Time Calculation    Start Time 1130  -ML      PT Received On 04/24/25  -ML         Timed Charges    49879 - PT Therapeutic Exercise Minutes 8  -ML      56537 - PT Therapeutic Activity Minutes 20  -ML         Total Minutes    Timed Charges Total Minutes 28  -ML       Total Minutes 28  -ML                User Key  (r) = Recorded By, (t) = Taken By, (c) = Cosigned By      Initials Name Provider Type    Kirsten Lopez Physical Therapist                  Therapy Charges for Today       Code Description Service Date Service Provider Modifiers Qty    11752740286 HC PT THER PROC EA 15 MIN 4/24/2025 Kirsten Vallejo GP 1    81388631452 HC PT THERAPEUTIC ACT EA 15 MIN 4/24/2025 Kirsten Vallejo GP 1            PT G-Codes  Outcome Measure Options: AM-PAC 6 Clicks Basic Mobility (PT)  AM-PAC 6 Clicks Score (PT): 15  AM-PAC 6 Clicks Score (OT): 16  PT Discharge Summary  Anticipated Discharge Disposition (PT): inpatient rehabilitation facility    Kirsten Vallejo  4/24/2025

## 2025-04-24 NOTE — PLAN OF CARE
Goal Outcome Evaluation:  Plan of Care Reviewed With: patient, other (see comments) (ex-spouse)        Progress: declining  Outcome Evaluation: Patient required increased assistance to complete transfers and unable to ambulate today. Patient with increased confusion, required verbal cues to increase safety awareness and orientate to task. Patient presents below baseline for mobility and would continue to benefit from skilled PT to address strength, balance and activity tolerance deficits. Continue current PT POC.    Anticipated Discharge Disposition (PT): inpatient rehabilitation facility

## 2025-04-24 NOTE — PROGRESS NOTES
[facility] Medicine Services  PROGRESS NOTE    Patient Name: Jimmy Gabehart  : 1955  MRN: [id number]    Date of Admission: 2025  Primary Care Physician: Miriam Mckeon DO    Subjective   Subjective     CC:  Follow-up for hepatic encephalopathy    HPI:  Patient seen and examined this morning.  He is more confused today.  Per his ex-wife at bedside, he is sleeping more than usual.  Patient is complaining of right shoulder pain as well as left elbow pain    Objective   Objective     Vital Signs:   Temp:  [97.4 °F (36.3 °C)-98.2 °F (36.8 °C)] 98.2 °F (36.8 °C)  Heart Rate:  [68-74] 69  Resp:  [18] 18  BP: (113-134)/(54-68) 129/61     Physical Exam:  General: Comfortable, not in distress, conversant and cooperative  Head: Atraumatic and normocephalic  Eyes: No Icterus. No pallor  Ears:  Ears appear intact with no abnormalities noted  Throat: No oral lesions, no thrush  Neck: Supple, trachea midline  Lungs: Clear to auscultation bilaterally, equal air entry, no wheezing or crackles  Heart:  Normal S1 and S2, no murmur, no gallop, No JVD, no lower extremity swelling  Abdomen:  Soft, no tenderness, no organomegaly, normal bowel sounds, no organomegaly  Extremities: Limited range of movement right shoulder because of pain.  Left elbow is warm with limited mobility because of pain  Skin: No bleeding, bruising or rash, normal skin turgor and elasticity  Neurologic: Cranial nerves appear intact with no evidence of facial asymmetry, normal motor and sensory functions in all 4 extremities  Psych: Alert and oriented x2    Results Reviewed:  LAB RESULTS:      Lab 25  0417 25  0534 25  1816 25  0448 25  0508 25  1604   WBC 3.48 4.68  --  4.89 5.00 4.21   HEMOGLOBIN 8.6* 9.1*  --  9.3* 10.1* 9.7*   HEMATOCRIT 27.6* 28.6*  --  29.9* 30.9* 28.7*   PLATELETS 82* 86*  --  73* 84* 74*   NEUTROS ABS 1.40* 2.82  --  2.47 2.60 2.43   IMMATURE GRANS (ABS) 0.02  0.04  --  0.04 0.05 0.03   LYMPHS ABS 1.54 1.21  --  1.70 1.46 1.19   MONOS ABS 0.46 0.55  --  0.64 0.87 0.53   EOS ABS 0.06 0.05  --  0.03 0.01 0.02   .0* 107.9*  --  111.6* 106.2* 105.5*   CRP 8.77* 9.27*  --  13.52*  --   --    PROCALCITONIN  --  0.36*  --  0.46*  --   --    LACTATE  --   --  1.9  --   --  1.7   LDH  --   --   --   --  226*  --    PROTIME  --   --   --   --   --  16.6*         Lab 04/23/25 0417 04/22/25 0534 04/21/25 0448 04/20/25  0508 04/19/25  1604   SODIUM 133* 134* 135* 130* 133*   POTASSIUM 4.2 4.4 4.1 4.4 4.3   CHLORIDE 104 101 103 98 99   CO2 18.0* 20.0* 22.0 20.0* 21.0*   ANION GAP 11.0 13.0 10.0 12.0 13.0   BUN 25* 21 22 18 22   CREATININE 1.32* 1.26 1.12 1.07 1.19   EGFR 58.0* 61.4 70.7 74.7 65.7   GLUCOSE 193* 193* 230* 308* 289*   CALCIUM 8.3* 8.2* 8.4* 8.7 9.0   MAGNESIUM 1.7 1.8 2.2 2.3 1.5*   PHOSPHORUS 3.3 2.8 2.8  --  2.3*   HEMOGLOBIN A1C  --   --   --  11.50*  --    TSH  --   --   --   --  1.180         Lab 04/23/25 0417 04/22/25 0534 04/21/25 0448 04/19/25  1604   TOTAL PROTEIN 6.7 6.5 6.9 7.7   ALBUMIN 3.0* 3.2* 3.3* 3.5   GLOBULIN 3.7 3.3 3.6 4.2   ALT (SGPT) 35 37 35 47*   AST (SGOT) 59* 62* 51* 84*   BILIRUBIN 0.7 1.2 1.4* 1.6*   ALK PHOS 237* 249* 247* 253*         Lab 04/19/25  1604   PROTIME 16.6*   INR 1.26*             Lab 04/20/25  0508   IRON 30*   IRON SATURATION (TSAT) 10*   TIBC 310   TRANSFERRIN 208   FERRITIN 594.00*   FOLATE >20.00   VITAMIN B 12 >2,000*         Brief Urine Lab Results  (Last result in the past 365 days)        Color   Clarity   Blood   Leuk Est   Nitrite   Protein   CREAT   Urine HCG        04/21/25 1742 Yellow   Clear   Negative   Negative   Negative   Negative                   Microbiology Results Abnormal       None            No radiology results from the last 24 hrs          Current medications:  Scheduled Meds:carvedilol, 6.25 mg, Oral, BID With Meals  cefTRIAXone, 2,000 mg, Intravenous, Q24H  dorzolamide (TRUSOPT) 2 % 1  drop, timolol (TIMOPTIC) 0.5 % 1 drop for Cosopt 22.3-6.8 mg/mL, , Left Eye, BID  enoxaparin sodium, 40 mg, Subcutaneous, Daily  ferric gluconate, 250 mg, Intravenous, Daily  gabapentin, 200 mg, Oral, Q8H  insulin glargine, 50 Units, Subcutaneous, Daily  Insulin Lispro, 12 Units, Subcutaneous, TID With Meals  insulin lispro, 2-9 Units, Subcutaneous, 4x Daily AC & at Bedtime  losartan, 100 mg, Oral, Q24H  pantoprazole, 40 mg, Oral, Q AM  rifAXIMin, 550 mg, Oral, BID  sodium chloride, 10 mL, Intravenous, Q12H  tamsulosin, 0.4 mg, Oral, Daily      Continuous Infusions:     PRN Meds:.  [DISCONTINUED] acetaminophen **OR** acetaminophen **OR** acetaminophen    Calcium Replacement - Follow Nurse / BPA Driven Protocol    dextrose    dextrose    glucagon (human recombinant)    lactulose    Magnesium Standard Dose Replacement - Follow Nurse / BPA Driven Protocol    nitroglycerin    oxyCODONE    Phosphorus Replacement - Follow Nurse / BPA Driven Protocol    Potassium Replacement - Follow Nurse / BPA Driven Protocol    sodium chloride    sodium chloride    Assessment & Plan   Assessment & Plan     Active Hospital Problems    Diagnosis  POA    **Hepatic encephalopathy [K76.82]  Yes      Resolved Hospital Problems   No resolved problems to display.        Brief Hospital Course to date:  Jimmy Gabehart is a 70 y.o. male with past medical history of insulin-dependent diabetes, hypertension, recent diagnosis of liver cirrhosis, thrombocytopenia, chronic pain presenting with altered mental status and fall at home.     Acute hepatic encephalopathy  Recent diagnosis liver cirrhosis  Patient with remote history of alcohol use. New diagnosis of Stage F4 cirrhosis based on FibroScan in March 2023. Following with GI as out patient  Presented with acute encephalopathy secondary to hepatic encephalopathy  Somewhat improved with lactulose  Lactulose was ordered as needed.  Will make a schedule given his worsening encephalopathy  today  Continue rifaximin  Patient family advised to keep his follow-up with GI as outpatient.  Will need EGD  Normal TSH and B12  Obtain CT head  Reduce gabapentin to 200 mg 3 times daily  Discontinue baclofen upon discharge because of his advanced liver disease    Febrile illness likely secondary to acute bronchitis  Patient had a fever of 101.1 around midnight of 4/21/2025  Elevated procalcitonin, CRP  Negative blood culture.  Normal lactic acid  Normal chest x-ray and UA  Negative respiratory panel  Continue empiric antibiotic therapy with Rocephin for possible acute bronchitis.  No fever over last 24 hours    Anemia of chronic disease  Severe iron deficiency, iron saturation 2%  Hemoglobin is slowly trending down.  It was 10.8 on admission.  Today is 8.6  No evidence of GI bleed  Iron studies consistent with severe iron deficiency anemia  Continue IV iron infusion   Monitor CBC    Weakness and debility  PT/OT recommended short-term rehab     Acute on chronic right shoulder pain  Left olecranon bursitis  Fall at home landing on right shoulder  Has been evaluated by orthopedic surgery outpatient.  Patient was found to have a tear in his right shoulder.  Patient was deemed a poor surgical candidate and nonoperative approach was indicated.  X-ray right shoulder with no evidence of acute fracture  X-ray left elbow with olecranon bursitis  Patient with history of gout.  Start colchicine  Monitor inflammatory markers     Type 2 diabetes  A1c 11.5%  Continue insulin glargine 50 units daily.  Continue Premeal insulin 12 units 3 times daily and continue SSI  Hold Januvia and metformin   The patient drinks plenty of ski (still drink rich in sugar) and eats a quart of gallon of ice cream daily.  Theand family counseled about the importance to comply with diabetic diet    Hypertension  Continue home Coreg and losartan in the morning     GERD  Continue PPI     BPH  Continue tamsulosin     Hx of thrombocytopenia  Follows with  Heme/Onc outpatient. 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 Will need to follow up outpatient for biopsy results.   Continue SCD for DVT prophylaxis      Expected Discharge Location and Transportation: Inpatient rehab  Expected Discharge   Expected Discharge Date: 4/25/2025; Expected Discharge Time:      VTE Prophylaxis:  Pharmacologic & mechanical VTE prophylaxis orders are present.         AM-PAC 6 Clicks Score (PT): 18 (04/22/25 2035)    CODE STATUS:   Code Status and Medical Interventions: CPR (Attempt to Resuscitate); Full Support   Ordered at: 04/19/25 1349     Code Status (Patient has no pulse and is not breathing):    CPR (Attempt to Resuscitate)     Medical Interventions (Patient has pulse or is breathing):    Full Support     Level Of Support Discussed With:    Next of Kin (If No Surrogate)       Bailey Howell MD  04/24/25

## 2025-04-24 NOTE — THERAPY EVALUATION
Acute Care - Speech Language Pathology   Swallow Initial Evaluation [facility]     Patient Name: Jimmy Gabehart  : 1955  MRN: [id number]  Today's Date: 2025               Admit Date: 2025    Visit Dx:   No diagnosis found.  Patient Active Problem List   Diagnosis    Shoulder pain, bilateral    Bursitis/tendonitis, shoulder    Pain    Cervical radiculopathy    Cervical spondylosis    Lumbar spondylosis    Elevated liver enzymes    History of diabetic ulcer of foot    Hypertensive disorder    Long-term current use of opiate analgesic    Osteoarthritis, generalized    Type 2 diabetes mellitus with diabetic neuropathy    Hepatic encephalopathy     Past Medical History:   Diagnosis Date    Diabetes     Hypertension      Past Surgical History:   Procedure Laterality Date    BACK SURGERY      CARPAL TUNNEL RELEASE      COLONOSCOPY      EYE SURGERY      FOOT SURGERY Left     Ankle fusion    HERNIA REPAIR      KNEE SURGERY Right 2022    TKA right knee manipulation 23    TONSILLECTOMY AND ADENOIDECTOMY      TRIGGER FINGER RELEASE      WISDOM TOOTH EXTRACTION         SLP Recommendation and Plan  SLP Swallowing Diagnosis: functional oral phase, swallow WFL/no suspected pharyngeal impairment (25 1625)  SLP Diet Recommendation: regular textures, thin liquids (25 1625)     SLP Rec. for Method of Medication Administration: meds whole, with thin liquids (25 1625)     Monitor for Signs of Aspiration: yes, notify SLP if any concerns (25 1625)  Recommended Diagnostics: No further SLP services recommended (25 1625)  Swallow Criteria for Skilled Therapeutic Interventions Met: no problems identified which require skilled intervention (25 1625)  Anticipated Discharge Disposition (SLP): No further SLP services warranted (25 1625)  Rehab Potential/Prognosis, Swallowing: good, to achieve stated therapy goals (25 1625)  Therapy Frequency (Swallow): evaluation  only (04/24/25 2855)     Oral Care Recommendations: Oral Care BID/PRN (04/24/25 0895)                                        Progress: no change      SWALLOW EVALUATION (Last 72 Hours)       SLP Adult Swallow Evaluation       Row Name 04/24/25 1625                   Rehab Evaluation    Document Type evaluation  -EC        Subjective Information no complaints  -EC        Patient Observations alert;cooperative;agree to therapy  -EC        Patient/Family/Caregiver Comments/Observations no family present  -EC        Patient Effort good  -EC        Symptoms Noted During/After Treatment none  -EC           General Information    Patient Profile Reviewed yes  -EC        Pertinent History Of Current Problem Pt adm w/ hepatic encephalopathy. Hx of liver cirrhosis, diabetes, hypertension, thrombocytopenia. Consulted d/t RN report of difficulty swallowing.  -EC        Current Method of Nutrition regular textures;thin liquids  -EC        Precautions/Limitations, Vision vision impairment, left  -EC        Precautions/Limitations, Hearing WFL;for purposes of eval  -EC        Prior Level of Function-Communication WFL  -EC        Prior Level of Function-Swallowing no diet consistency restrictions  -EC        Plans/Goals Discussed with patient  -EC        Barriers to Rehab none identified  -EC        Patient's Goals for Discharge return home  -EC           Pain    Pretreatment Pain Rating 0/10 - no pain  -EC        Posttreatment Pain Rating 0/10 - no pain  -EC           Oral Motor Structure and Function    Dentition Assessment natural, present and adequate  -EC        Secretion Management WNL/WFL  -EC        Mucosal Quality moist, healthy  -EC        Volitional Swallow WFL  -EC           Oral Musculature and Cranial Nerve Assessment    Oral Motor General Assessment WFL  -EC           General Eating/Swallowing Observations    Respiratory Support Currently in Use room air  -EC        Eating/Swallowing Skills fed by SLP  -EC         Positioning During Eating upright in bed  -EC        Utensils Used spoon;straw  -EC        Consistencies Trialed regular textures;pureed;thin liquids  -EC           Respiratory    Respiratory Status WFL  -EC           Clinical Swallow Eval    Oral Prep Phase WFL  -EC        Oral Transit WFL  -EC        Oral Residue WFL  -EC        Pharyngeal Phase no overt signs/symptoms of pharyngeal impairment  -EC        Esophageal Phase unremarkable  -EC           SLP Evaluation Clinical Impression    SLP Swallowing Diagnosis functional oral phase;swallow WFL/no suspected pharyngeal impairment  -EC        Functional Impact no impact on function  -EC        Rehab Potential/Prognosis, Swallowing good, to achieve stated therapy goals  -EC        Swallow Criteria for Skilled Therapeutic Interventions Met no problems identified which require skilled intervention  -EC           Recommendations    Therapy Frequency (Swallow) evaluation only  -EC        SLP Diet Recommendation regular textures;thin liquids  -EC        Recommended Diagnostics No further SLP services recommended  -EC        Oral Care Recommendations Oral Care BID/PRN  -EC        SLP Rec. for Method of Medication Administration meds whole;with thin liquids  -EC        Monitor for Signs of Aspiration yes;notify SLP if any concerns  -EC        Anticipated Discharge Disposition (SLP) No further SLP services warranted  -EC                  User Key  (r) = Recorded By, (t) = Taken By, (c) = Cosigned By      Initials Name Effective Dates    EC Sophie Ruano, MS CCC-SLP 10/24/24 -                     EDUCATION  The patient has been educated in the following areas:   Dysphagia (Swallowing Impairment).                Time Calculation:    Time Calculation- SLP       Row Name 04/24/25 4863             Time Calculation- SLP    SLP Start Time 1625  -EC      SLP Received On 04/24/25  -EC         Untimed Charges    27738-CT Eval Oral Pharyng Swallow Minutes 38  -EC         Total Minutes     Untimed Charges Total Minutes 38  -EC       Total Minutes 38  -EC                User Key  (r) = Recorded By, (t) = Taken By, (c) = Cosigned By      Initials Name Provider Type    EC Sophie Ruano, MS CCC-SLP Speech and Language Pathologist                    Therapy Charges for Today       Code Description Service Date Service Provider Modifiers Qty    [id number] HC ST EVAL ORAL PHARYNG SWALLOW 3 4/24/2025 Sophie Ruano MS CCC-SLP GN 1                 Sophie Ruano MS CCC-SLP  4/24/2025

## 2025-04-25 LAB
ALBUMIN SERPL-MCNC: 3.2 G/DL (ref 3.5–5.2)
ALBUMIN/GLOB SERPL: 0.8 G/DL
ALP SERPL-CCNC: 299 U/L (ref 39–117)
ALT SERPL W P-5'-P-CCNC: 54 U/L (ref 1–41)
ANION GAP SERPL CALCULATED.3IONS-SCNC: 12 MMOL/L (ref 5–15)
AST SERPL-CCNC: 105 U/L (ref 1–40)
BASOPHILS # BLD AUTO: 0 10*3/MM3 (ref 0–0.2)
BASOPHILS NFR BLD AUTO: 0 % (ref 0–1.5)
BILIRUB SERPL-MCNC: 1 MG/DL (ref 0–1.2)
BUN SERPL-MCNC: 26 MG/DL (ref 8–23)
BUN/CREAT SERPL: 20 (ref 7–25)
CALCIUM SPEC-SCNC: 9 MG/DL (ref 8.6–10.5)
CHLORIDE SERPL-SCNC: 105 MMOL/L (ref 98–107)
CK SERPL-CCNC: 49 U/L (ref 20–200)
CO2 SERPL-SCNC: 18 MMOL/L (ref 22–29)
CREAT SERPL-MCNC: 1.3 MG/DL (ref 0.76–1.27)
CRP SERPL-MCNC: 7.87 MG/DL (ref 0–0.5)
DEPRECATED RDW RBC AUTO: 53.5 FL (ref 37–54)
EGFRCR SERPLBLD CKD-EPI 2021: 59.1 ML/MIN/1.73
EOSINOPHIL # BLD AUTO: 0.06 10*3/MM3 (ref 0–0.4)
EOSINOPHIL NFR BLD AUTO: 1.2 % (ref 0.3–6.2)
ERYTHROCYTE [DISTWIDTH] IN BLOOD BY AUTOMATED COUNT: 13.6 % (ref 12.3–15.4)
ERYTHROCYTE [SEDIMENTATION RATE] IN BLOOD: 85 MM/HR (ref 0–20)
GLOBULIN UR ELPH-MCNC: 4.2 GM/DL
GLUCOSE BLDC GLUCOMTR-MCNC: 132 MG/DL (ref 70–130)
GLUCOSE BLDC GLUCOMTR-MCNC: 151 MG/DL (ref 70–130)
GLUCOSE BLDC GLUCOMTR-MCNC: 183 MG/DL (ref 70–130)
GLUCOSE BLDC GLUCOMTR-MCNC: 186 MG/DL (ref 70–130)
GLUCOSE BLDC GLUCOMTR-MCNC: 69 MG/DL (ref 70–130)
GLUCOSE BLDC GLUCOMTR-MCNC: 71 MG/DL (ref 70–130)
GLUCOSE SERPL-MCNC: 124 MG/DL (ref 65–99)
HCT VFR BLD AUTO: 28.6 % (ref 37.5–51)
HGB BLD-MCNC: 9.4 G/DL (ref 13–17.7)
IMM GRANULOCYTES # BLD AUTO: 0.03 10*3/MM3 (ref 0–0.05)
IMM GRANULOCYTES NFR BLD AUTO: 0.6 % (ref 0–0.5)
LYMPHOCYTES # BLD AUTO: 1.96 10*3/MM3 (ref 0.7–3.1)
LYMPHOCYTES NFR BLD AUTO: 40.7 % (ref 19.6–45.3)
MAGNESIUM SERPL-MCNC: 1.7 MG/DL (ref 1.6–2.4)
MCH RBC QN AUTO: 35.2 PG (ref 26.6–33)
MCHC RBC AUTO-ENTMCNC: 32.9 G/DL (ref 31.5–35.7)
MCV RBC AUTO: 107.1 FL (ref 79–97)
MONOCYTES # BLD AUTO: 0.82 10*3/MM3 (ref 0.1–0.9)
MONOCYTES NFR BLD AUTO: 17 % (ref 5–12)
MRSA DNA SPEC QL NAA+PROBE: NEGATIVE
NEUTROPHILS NFR BLD AUTO: 1.94 10*3/MM3 (ref 1.7–7)
NEUTROPHILS NFR BLD AUTO: 40.5 % (ref 42.7–76)
NRBC BLD AUTO-RTO: 0 /100 WBC (ref 0–0.2)
PHOSPHATE SERPL-MCNC: 3.3 MG/DL (ref 2.5–4.5)
PLATELET # BLD AUTO: 118 10*3/MM3 (ref 140–450)
PMV BLD AUTO: 12.1 FL (ref 6–12)
POTASSIUM SERPL-SCNC: 4.6 MMOL/L (ref 3.5–5.2)
PROT SERPL-MCNC: 7.4 G/DL (ref 6–8.5)
RBC # BLD AUTO: 2.67 10*6/MM3 (ref 4.14–5.8)
SODIUM SERPL-SCNC: 135 MMOL/L (ref 136–145)
WBC NRBC COR # BLD AUTO: 4.81 10*3/MM3 (ref 3.4–10.8)

## 2025-04-25 PROCEDURE — 87641 MR-STAPH DNA AMP PROBE: CPT | Performed by: INTERNAL MEDICINE

## 2025-04-25 PROCEDURE — 63710000001 INSULIN GLARGINE PER 5 UNITS: Performed by: INTERNAL MEDICINE

## 2025-04-25 PROCEDURE — 85025 COMPLETE CBC W/AUTO DIFF WBC: CPT | Performed by: INTERNAL MEDICINE

## 2025-04-25 PROCEDURE — 82948 REAGENT STRIP/BLOOD GLUCOSE: CPT

## 2025-04-25 PROCEDURE — 99232 SBSQ HOSP IP/OBS MODERATE 35: CPT | Performed by: INTERNAL MEDICINE

## 2025-04-25 PROCEDURE — 86140 C-REACTIVE PROTEIN: CPT | Performed by: INTERNAL MEDICINE

## 2025-04-25 PROCEDURE — 83735 ASSAY OF MAGNESIUM: CPT | Performed by: INTERNAL MEDICINE

## 2025-04-25 PROCEDURE — 25010000002 ENOXAPARIN PER 10 MG: Performed by: INTERNAL MEDICINE

## 2025-04-25 PROCEDURE — 84100 ASSAY OF PHOSPHORUS: CPT | Performed by: INTERNAL MEDICINE

## 2025-04-25 PROCEDURE — 25810000003 SODIUM CHLORIDE 0.9 % SOLUTION 250 ML FLEX CONT: Performed by: INTERNAL MEDICINE

## 2025-04-25 PROCEDURE — 25010000002 CEFTRIAXONE PER 250 MG: Performed by: INTERNAL MEDICINE

## 2025-04-25 PROCEDURE — 80053 COMPREHEN METABOLIC PANEL: CPT | Performed by: INTERNAL MEDICINE

## 2025-04-25 PROCEDURE — 94799 UNLISTED PULMONARY SVC/PX: CPT

## 2025-04-25 PROCEDURE — 87040 BLOOD CULTURE FOR BACTERIA: CPT | Performed by: INTERNAL MEDICINE

## 2025-04-25 PROCEDURE — 63710000001 INSULIN LISPRO (HUMAN) PER 5 UNITS: Performed by: STUDENT IN AN ORGANIZED HEALTH CARE EDUCATION/TRAINING PROGRAM

## 2025-04-25 PROCEDURE — 85652 RBC SED RATE AUTOMATED: CPT | Performed by: INTERNAL MEDICINE

## 2025-04-25 PROCEDURE — 25010000002 DAPTOMYCIN PER 1 MG

## 2025-04-25 PROCEDURE — 82550 ASSAY OF CK (CPK): CPT | Performed by: INTERNAL MEDICINE

## 2025-04-25 PROCEDURE — 25010000002 NA FERRIC GLUC CPLX PER 12.5 MG: Performed by: INTERNAL MEDICINE

## 2025-04-25 PROCEDURE — 99254 IP/OBS CNSLTJ NEW/EST MOD 60: CPT | Performed by: STUDENT IN AN ORGANIZED HEALTH CARE EDUCATION/TRAINING PROGRAM

## 2025-04-25 PROCEDURE — 63710000001 INSULIN LISPRO (HUMAN) PER 5 UNITS: Performed by: INTERNAL MEDICINE

## 2025-04-25 RX ORDER — GABAPENTIN 100 MG/1
100 CAPSULE ORAL NIGHTLY
Status: DISCONTINUED | OUTPATIENT
Start: 2025-04-25 | End: 2025-05-02 | Stop reason: HOSPADM

## 2025-04-25 RX ORDER — INSULIN LISPRO 100 [IU]/ML
10 INJECTION, SOLUTION INTRAVENOUS; SUBCUTANEOUS
Status: DISCONTINUED | OUTPATIENT
Start: 2025-04-25 | End: 2025-05-02 | Stop reason: HOSPADM

## 2025-04-25 RX ADMIN — CARVEDILOL 6.25 MG: 6.25 TABLET, FILM COATED ORAL at 08:58

## 2025-04-25 RX ADMIN — GABAPENTIN 200 MG: 100 CAPSULE ORAL at 05:18

## 2025-04-25 RX ADMIN — Medication 10 ML: at 21:28

## 2025-04-25 RX ADMIN — RIFAXIMIN 550 MG: 550 TABLET ORAL at 21:28

## 2025-04-25 RX ADMIN — DORZOLAMIDE HYDROCHLORIDE: 20 SOLUTION/ DROPS OPHTHALMIC at 09:02

## 2025-04-25 RX ADMIN — Medication 10 ML: at 09:03

## 2025-04-25 RX ADMIN — LACTULOSE 30 G: 20 SOLUTION ORAL at 21:27

## 2025-04-25 RX ADMIN — DAPTOMYCIN 550 MG: 500 INJECTION, POWDER, LYOPHILIZED, FOR SOLUTION INTRAVENOUS at 16:11

## 2025-04-25 RX ADMIN — INSULIN LISPRO 2 UNITS: 100 INJECTION, SOLUTION INTRAVENOUS; SUBCUTANEOUS at 13:06

## 2025-04-25 RX ADMIN — PANTOPRAZOLE SODIUM 40 MG: 40 TABLET, DELAYED RELEASE ORAL at 05:18

## 2025-04-25 RX ADMIN — SODIUM CHLORIDE 250 MG: 9 INJECTION, SOLUTION INTRAVENOUS at 09:03

## 2025-04-25 RX ADMIN — OXYCODONE HYDROCHLORIDE 10 MG: 10 TABLET ORAL at 08:59

## 2025-04-25 RX ADMIN — INSULIN LISPRO 12 UNITS: 100 INJECTION, SOLUTION INTRAVENOUS; SUBCUTANEOUS at 09:00

## 2025-04-25 RX ADMIN — CARVEDILOL 6.25 MG: 6.25 TABLET, FILM COATED ORAL at 17:19

## 2025-04-25 RX ADMIN — LACTULOSE 30 G: 20 SOLUTION ORAL at 09:00

## 2025-04-25 RX ADMIN — INSULIN LISPRO 12 UNITS: 100 INJECTION, SOLUTION INTRAVENOUS; SUBCUTANEOUS at 13:05

## 2025-04-25 RX ADMIN — OXYCODONE HYDROCHLORIDE 10 MG: 10 TABLET ORAL at 05:18

## 2025-04-25 RX ADMIN — GABAPENTIN 100 MG: 100 CAPSULE ORAL at 21:28

## 2025-04-25 RX ADMIN — LACTULOSE 30 G: 20 SOLUTION ORAL at 16:11

## 2025-04-25 RX ADMIN — LIDOCAINE 1 PATCH: 4 PATCH TOPICAL at 09:02

## 2025-04-25 RX ADMIN — ACETAMINOPHEN 500 MG: 650 SOLUTION ORAL at 23:47

## 2025-04-25 RX ADMIN — ENOXAPARIN SODIUM 40 MG: 100 INJECTION SUBCUTANEOUS at 09:00

## 2025-04-25 RX ADMIN — DORZOLAMIDE HYDROCHLORIDE: 20 SOLUTION/ DROPS OPHTHALMIC at 21:28

## 2025-04-25 RX ADMIN — DEXTROSE MONOHYDRATE 25 G: 25 INJECTION, SOLUTION INTRAVENOUS at 16:31

## 2025-04-25 RX ADMIN — CEFTRIAXONE 2000 MG: 2 INJECTION, POWDER, FOR SOLUTION INTRAMUSCULAR; INTRAVENOUS at 16:11

## 2025-04-25 RX ADMIN — COLCHICINE 1.2 MG: 0.6 TABLET ORAL at 08:58

## 2025-04-25 RX ADMIN — LOSARTAN POTASSIUM 100 MG: 50 TABLET, FILM COATED ORAL at 08:58

## 2025-04-25 RX ADMIN — RIFAXIMIN 550 MG: 550 TABLET ORAL at 08:58

## 2025-04-25 RX ADMIN — TAMSULOSIN HYDROCHLORIDE 0.4 MG: 0.4 CAPSULE ORAL at 08:58

## 2025-04-25 RX ADMIN — INSULIN GLARGINE 50 UNITS: 100 INJECTION, SOLUTION SUBCUTANEOUS at 09:01

## 2025-04-25 RX ADMIN — INSULIN LISPRO 2 UNITS: 100 INJECTION, SOLUTION INTRAVENOUS; SUBCUTANEOUS at 21:27

## 2025-04-25 RX ADMIN — OXYCODONE HYDROCHLORIDE 10 MG: 10 TABLET ORAL at 21:28

## 2025-04-25 RX ADMIN — ACETAMINOPHEN 500 MG: 650 SOLUTION ORAL at 11:13

## 2025-04-25 NOTE — CASE MANAGEMENT/SOCIAL WORK
Continued Stay Note  [facility]     Patient Name: Jimmy Gabehart  MRN: [id number]  Today's Date: 4/25/2025    Admit Date: 4/19/2025    Plan: To be determined   Discharge Plan       Row Name 04/25/25 1148       Plan    Plan To be determined    Patient/Family in Agreement with Plan yes    Plan Comments Discussed Mr Gabehart in MDR.  At this time a peer to peer has been performed and his insurance has decided to uphold the denial.  I have called and spoken with Rosa Maria, and advised her that an appeal can be performed with Mr Gabehart (he will have to give consent for the appeal with his insurance company) or I can make referrals to other facilities. Rosa Maria did start asking about the nursing home process and Medicaid.  I have given her information and left it at bedside with their son Jovanny.  I have also advised to consult an elder law . I have made phone called to both [facility] (left a voicemail) and [facility] (faxed a referral to [phone]).  At this time, Mr Gabehart remains not medically ready for discharge, and there is a new ortho consult.  Case management will continue to follow.    Final Discharge Disposition Code 03 - skilled nursing facility (SNF)                   Discharge Codes    No documentation.                 Expected Discharge Date and Time       Expected Discharge Date Expected Discharge Time    Apr 25, 2025               Nannette Galdamez RN

## 2025-04-25 NOTE — PROGRESS NOTES
[facility] Medicine Services  PROGRESS NOTE    Patient Name: Jimmy Gabehart  : 1955  MRN: [id number]    Date of Admission: 2025  Primary Care Physician: Miriam Mckeon DO    Subjective   Subjective     CC:  Follow-up for hepatic encephalopathy    HPI:  Patient seen and examined this morning.  Less confused than yesterday but still sleepy.  Complaining of worsening pain in his left elbow and now pain is even worse in his right shoulder.  Fever 100.7 yesterday.    Objective   Objective     Vital Signs:   Temp:  [98 °F (36.7 °C)-98.6 °F (37 °C)] 98.6 °F (37 °C)  Heart Rate:  [70-79] 79  Resp:  [18] 18  BP: (119-134)/(62-64) 134/62     Physical Exam:  General: Comfortable, not in distress, conversant and cooperative  Head: Atraumatic and normocephalic  Eyes: No Icterus. No pallor  Ears:  Ears appear intact with no abnormalities noted  Throat: No oral lesions, no thrush  Neck: Supple, trachea midline  Lungs: Clear to auscultation bilaterally, equal air entry, no wheezing or crackles  Heart:  Normal S1 and S2, no murmur, no gallop, No JVD, no lower extremity swelling  Abdomen:  Soft, no tenderness, no organomegaly, normal bowel sounds, no organomegaly  Extremities: Limited range of movement right shoulder because of pain.  Left elbow is warm with limited mobility because of pain  Skin: No bleeding, bruising or rash, normal skin turgor and elasticity  Neurologic: Cranial nerves appear intact with no evidence of facial asymmetry, normal motor and sensory functions in all 4 extremities  Psych: Alert and oriented x2    Results Reviewed:  LAB RESULTS:      Lab 25  0506 25  1114 25  0417 25  0534 25  1816 25  0448 25  0508 25  1604   WBC 4.81 3.97 3.48 4.68  --  4.89 5.00 4.21   HEMOGLOBIN 9.4* 8.6* 8.6* 9.1*  --  9.3* 10.1* 9.7*   HEMATOCRIT 28.6* 26.6* 27.6* 28.6*  --  29.9* 30.9* 28.7*   PLATELETS 118* 104* 82* 86*  --  73* 84* 74*    NEUTROS ABS 1.94 1.61* 1.40* 2.82  --  2.47 2.60 2.43   IMMATURE GRANS (ABS) 0.03 0.03 0.02 0.04  --  0.04 0.05 0.03   LYMPHS ABS 1.96 1.56 1.54 1.21  --  1.70 1.46 1.19   MONOS ABS 0.82 0.69 0.46 0.55  --  0.64 0.87 0.53   EOS ABS 0.06 0.07 0.06 0.05  --  0.03 0.01 0.02   .1* 108.6* 110.0* 107.9*  --  111.6* 106.2* 105.5*   SED RATE 85* 73*  --   --   --   --   --   --    CRP  --  6.25* 8.77* 9.27*  --  13.52*  --   --    PROCALCITONIN  --  0.33*  --  0.36*  --  0.46*  --   --    LACTATE  --   --   --   --  1.9  --   --  1.7   LDH  --   --   --   --   --   --  226*  --    PROTIME  --   --   --   --   --   --   --  16.6*         Lab 04/24/25  1114 04/23/25  0417 04/22/25  0534 04/21/25  0448 04/20/25  0508 04/19/25  1604   SODIUM 132* 133* 134* 135* 130* 133*   POTASSIUM 4.5 4.2 4.4 4.1 4.4 4.3   CHLORIDE 101 104 101 103 98 99   CO2 19.0* 18.0* 20.0* 22.0 20.0* 21.0*   ANION GAP 12.0 11.0 13.0 10.0 12.0 13.0   BUN 23 25* 21 22 18 22   CREATININE 1.35* 1.32* 1.26 1.12 1.07 1.19   EGFR 56.5* 58.0* 61.4 70.7 74.7 65.7   GLUCOSE 173* 193* 193* 230* 308* 289*   CALCIUM 8.2* 8.3* 8.2* 8.4* 8.7 9.0   MAGNESIUM 1.6 1.7 1.8 2.2 2.3 1.5*   PHOSPHORUS  --  3.3 2.8 2.8  --  2.3*   HEMOGLOBIN A1C  --   --   --   --  11.50*  --    TSH  --   --   --   --   --  1.180         Lab 04/24/25  1114 04/23/25  0417 04/22/25  0534 04/21/25  0448 04/19/25  1604   TOTAL PROTEIN 6.6 6.7 6.5 6.9 7.7   ALBUMIN 2.9* 3.0* 3.2* 3.3* 3.5   GLOBULIN 3.7 3.7 3.3 3.6 4.2   ALT (SGPT) 49* 35 37 35 47*   AST (SGOT) 105* 59* 62* 51* 84*   BILIRUBIN 1.1 0.7 1.2 1.4* 1.6*   ALK PHOS 240* 237* 249* 247* 253*         Lab 04/19/25  1604   PROTIME 16.6*   INR 1.26*             Lab 04/20/25  0508   IRON 30*   IRON SATURATION (TSAT) 10*   TIBC 310   TRANSFERRIN 208   FERRITIN 594.00*   FOLATE >20.00   VITAMIN B 12 >2,000*         Brief Urine Lab Results  (Last result in the past 365 days)        Color   Clarity   Blood   Leuk Est   Nitrite   Protein    CREAT   Urine HCG        04/21/25 1742 Yellow   Clear   Negative   Negative   Negative   Negative                   Microbiology Results Abnormal       None            CT Head Without Contrast  Result Date: 4/24/2025  CT HEAD WO CONTRAST Date of Exam: 4/24/2025 4:14 PM EDT Indication: AMS. Comparison: None available. Technique: Axial CT images were obtained of the head without contrast administration.  Automated exposure control and iterative construction methods were used. Findings: No acute intracranial hemorrhage.Intact appearing gray-white differentiation.No extra-axial fluid collection.No significant mass effect. No hydrocephalus. Mild generalized parenchymal volume loss. Scattered areas of periventricular and subcortical white matter hypoattenuation, nonspecific, perhaps from small vessel ischemic/hypertensive changes in a patient of this age.There are intracranial atherosclerotic calcifications. Mild scattered mucosal thickening in the paranasal sinuses.Mastoid air cells are essentially clear.. Bilateral lens replacements. Presumed intraocular silicone on the left. No acute or aggressive appearing bony or extracranial soft tissue process.     Impression: Impression: No acute intracranial findings. Electronically Signed: Gallito Hammonds MD  4/24/2025 4:49 PM EDT  Workstation ID: [id number]    XR Chest 1 View  Result Date: 4/24/2025  XR CHEST 1 VW, XR SHOULDER 2+ VW RIGHT, XR ELBOW 3+ VW LEFT Date of Exam: 4/24/2025 10:32 AM EDT Indication: sob right shoulder pain after fall, severe pain and redness left elbow Comparison: Chest 4/21/2025, right shoulder 4/19/2025 Findings: Chest: Heart size is normal for the projection. The pulmonary vascular pattern is normal and the lungs appear clear. Right shoulder: There are degenerative changes at the glenohumeral and AC joint. Findings are unchanged from the last study. There is osteopenia. No fractures are identified. Left elbow: There are degenerative changes of the  elbow joint. There is ossification of the triceps insertion. There is no joint effusion and there are no fractures or destructive lesions. There is soft tissue swelling overlying the olecranon.     Impression: Impression: 1.No radiographic evidence of acute cardiopulmonary disease. 2.Degenerative changes of the right shoulder and left elbow. 3.Soft tissue swelling overlying the left olecranon, probably representing olecranon bursitis. 4.No fractures or destructive lesions. Electronically Signed: Nima Phillip MD  4/24/2025 11:15 AM EDT  Workstation ID: [id number]    XR Elbow 3+ View Left  Result Date: 4/24/2025  XR CHEST 1 VW, XR SHOULDER 2+ VW RIGHT, XR ELBOW 3+ VW LEFT Date of Exam: 4/24/2025 10:32 AM EDT Indication: sob right shoulder pain after fall, severe pain and redness left elbow Comparison: Chest 4/21/2025, right shoulder 4/19/2025 Findings: Chest: Heart size is normal for the projection. The pulmonary vascular pattern is normal and the lungs appear clear. Right shoulder: There are degenerative changes at the glenohumeral and AC joint. Findings are unchanged from the last study. There is osteopenia. No fractures are identified. Left elbow: There are degenerative changes of the elbow joint. There is ossification of the triceps insertion. There is no joint effusion and there are no fractures or destructive lesions. There is soft tissue swelling overlying the olecranon.     Impression: Impression: 1.No radiographic evidence of acute cardiopulmonary disease. 2.Degenerative changes of the right shoulder and left elbow. 3.Soft tissue swelling overlying the left olecranon, probably representing olecranon bursitis. 4.No fractures or destructive lesions. Electronically Signed: Nima Phillip MD  4/24/2025 11:15 AM EDT  Workstation ID: [id number]    XR Shoulder 2+ View Right  Result Date: 4/24/2025  XR CHEST 1 VW, XR SHOULDER 2+ VW RIGHT, XR ELBOW 3+ VW LEFT Date of Exam: 4/24/2025 10:32 AM EDT Indication: sob right  shoulder pain after fall, severe pain and redness left elbow Comparison: Chest 4/21/2025, right shoulder 4/19/2025 Findings: Chest: Heart size is normal for the projection. The pulmonary vascular pattern is normal and the lungs appear clear. Right shoulder: There are degenerative changes at the glenohumeral and AC joint. Findings are unchanged from the last study. There is osteopenia. No fractures are identified. Left elbow: There are degenerative changes of the elbow joint. There is ossification of the triceps insertion. There is no joint effusion and there are no fractures or destructive lesions. There is soft tissue swelling overlying the olecranon.     Impression: Impression: 1.No radiographic evidence of acute cardiopulmonary disease. 2.Degenerative changes of the right shoulder and left elbow. 3.Soft tissue swelling overlying the left olecranon, probably representing olecranon bursitis. 4.No fractures or destructive lesions. Electronically Signed: Nima Phillip MD  4/24/2025 11:15 AM EDT  Workstation ID: [id number]            Current medications:  Scheduled Meds:carvedilol, 6.25 mg, Oral, BID With Meals  cefTRIAXone, 2,000 mg, Intravenous, Q24H  colchicine, 1.2 mg, Oral, Daily  dorzolamide (TRUSOPT) 2 % 1 drop, timolol (TIMOPTIC) 0.5 % 1 drop for Cosopt 22.3-6.8 mg/mL, , Left Eye, BID  enoxaparin sodium, 40 mg, Subcutaneous, Daily  ferric gluconate, 250 mg, Intravenous, Daily  gabapentin, 200 mg, Oral, Q8H  insulin glargine, 50 Units, Subcutaneous, Daily  Insulin Lispro, 12 Units, Subcutaneous, TID With Meals  insulin lispro, 2-9 Units, Subcutaneous, 4x Daily AC & at Bedtime  lactulose, 30 g, Oral, TID  Lidocaine, 1 patch, Transdermal, Q24H  losartan, 100 mg, Oral, Q24H  pantoprazole, 40 mg, Oral, Q AM  rifAXIMin, 550 mg, Oral, BID  sodium chloride, 10 mL, Intravenous, Q12H  tamsulosin, 0.4 mg, Oral, Daily      Continuous Infusions:     PRN Meds:.  [DISCONTINUED] acetaminophen **OR** acetaminophen **OR**  acetaminophen    Calcium Replacement - Follow Nurse / BPA Driven Protocol    dextrose    dextrose    glucagon (human recombinant)    Magnesium Standard Dose Replacement - Follow Nurse / BPA Driven Protocol    nitroglycerin    oxyCODONE    Phosphorus Replacement - Follow Nurse / BPA Driven Protocol    Potassium Replacement - Follow Nurse / BPA Driven Protocol    sodium chloride    sodium chloride    Assessment & Plan   Assessment & Plan     Active Hospital Problems    Diagnosis  POA    **Hepatic encephalopathy [K76.82]  Yes      Resolved Hospital Problems   No resolved problems to display.        Brief Hospital Course to date:  Jimmy Gabehart is a 70 y.o. male with past medical history of insulin-dependent diabetes, hypertension, recent diagnosis of liver cirrhosis, thrombocytopenia, chronic pain presenting with altered mental status and fall at home.     Acute hepatic encephalopathy  Recent diagnosis liver cirrhosis  Patient with remote history of alcohol use. New diagnosis of Stage F4 cirrhosis based on FibroScan in March 2023. Following with GI as out patient  Presented with acute encephalopathy secondary to hepatic encephalopathy  Somewhat improved with lactulose  Lactulose was ordered as needed.  Will make a schedule given his worsening encephalopathy today  Continue rifaximin  Patient family advised to keep his follow-up with GI as outpatient.  Will need EGD  Normal TSH and B12  CT head with no acute intracranial pathology  Reduce gabapentin to 100 mg nightly given his persistent confusion  Discontinue baclofen upon discharge because of his advanced liver disease    Left elbow bursitis  Patient had a fever of 101.1 around midnight of 4/21/2025.  Now with recurrent fever of 100.7 night of 4/24/2025  Elevated procalcitonin, CRP and sed rate  Negative blood culture.  Normal lactic acid, Normal chest x-ray and normal UA, Negative respiratory panel  X-ray left elbow with olecranon bursitis  Continue empiric  antibiotic therapy with Rocephin for possible acute bronchitis and left elbow bursitis  Add daptomycin  Obtain repeat blood culture  Monitor inflammatory markers  Discussed with Dr. Rainey/orthopedic.  Appreciate help    Acute on chronic right shoulder pain  Possible rotator cuff tear   Fall at home landing on right shoulder  Has been evaluated by orthopedic surgery outpatient.  Patient was found to have a tear in his right shoulder.  Patient was deemed a poor surgical candidate and nonoperative approach was indicated.  X-ray right shoulder with no evidence of acute fracture  Continue supportive care  Discussed with Dr. Rainey/orthopedic.  Appreciate help    Anemia of chronic disease  Severe iron deficiency, iron saturation 2%  Hemoglobin is slowly trending down.  It was 10.8 on admission.  Today is 8.6  No evidence of GI bleed  Iron studies consistent with severe iron deficiency anemia  Continue IV iron infusion   Monitor CBC    Type 2 diabetes  A1c 11.5%  Continue insulin glargine 50 units daily.  Continue Premeal insulin 12 units 3 times daily and continue SSI  Hold Januvia and metformin   The patient drinks plenty of ski (still drink rich in sugar) and eats a quart of gallon of ice cream daily.  Theand family counseled about the importance to comply with diabetic diet    Hypertension  Continue home Coreg and losartan in the morning     GERD  Continue PPI     BPH  Continue tamsulosin     Hx of thrombocytopenia  Follows with Heme/Onc outpatient. 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 Will need to follow up outpatient for biopsy results.   Continue SCD for DVT prophylaxis    Weakness and debility  PT/OT recommended short-term rehab     Expected Discharge Location and Transportation: Inpatient rehab  Expected Discharge   Expected Discharge Date: 4/25/2025; Expected Discharge Time:      VTE Prophylaxis:  Pharmacologic & mechanical VTE prophylaxis orders are present.         AM-PAC 6 Clicks Score (PT): 15  (04/24/25 0298)    CODE STATUS:   Code Status and Medical Interventions: CPR (Attempt to Resuscitate); Full Support   Ordered at: 04/19/25 2660     Code Status (Patient has no pulse and is not breathing):    CPR (Attempt to Resuscitate)     Medical Interventions (Patient has pulse or is breathing):    Full Support     Level Of Support Discussed With:    Next of Kin (If No Surrogate)       Bailey Howell MD  04/25/25

## 2025-04-25 NOTE — PLAN OF CARE
Goal Outcome Evaluation:  Plan of Care Reviewed With: patient        Progress: improving  Outcome Evaluation: Pt A&Ox4. Pain improved during shift. 1 loose BM during shift. No acute events. Fever treated x1 with PRN Tylenol. BS low during shift MD aware. Dextrose per protocol and sugar stable. Continue POC.

## 2025-04-25 NOTE — CONSULTS
Orthopaedic Consult Note    Patient Care Team:  Miriam Mckeon DO as PCP - General (Family Medicine)  Liz Kaur APRN as Nurse Practitioner (Nurse Practitioner)  Avery Scanlon MD as Consulting Physician (Cardiology)    Chief complaint  Right should pain, left elbow pain     Subjective .     History of present illness:    Jimmy Gabehart is a 70 year old male who was admitted to the hospital on 4/19/25 for AMS/fall at home. He has a past medical history of  insulin-dependent diabetes, hypertension, recent diagnosis of cirrhosis, thrombocytopenia, and chronic pain. He was admitted for hepatic encephalotomy. I was consulted for acute on chronic right shoulder pain and new elbow pain pain/redness.  Patient provides a poor history and most of the information is from prior notes and/or discussions with providers.  Regarding his right shoulder.  He has  chronic right shoulder pain and has been evaluated by an outside orthopedist for glenohumeral arthritis.  They have been pursuing nonoperative management.  They have previously has been told that he needs a shoulder replacement.  His recent fall at home created acute on chronic right shoulder pain.  Today, the patient states that his pain is somewhat improved.  Pain is described as being more lateral.  Regarding his left elbow, the patient denies any specific pain, increased redness and swelling was noted in the past 1 day per the primary team.  The patient has had elevated inflammatory markers recently.  No other complaints or concerns.    Review of Systems:    All systems reviewed are negative except for what is stated in the HPI     History  Family History   Problem Relation Age of Onset    Hypertension Mother     Heart attack Mother     Kidney disease Mother     High cholesterol Mother     Arthritis Mother     Heart attack Father      Social History     Socioeconomic History    Marital status:    Tobacco Use    Smoking status: Never     Smokeless tobacco: Never   Vaping Use    Vaping status: Never Used   Substance and Sexual Activity    Alcohol use: Yes     Comment: occasional    Drug use: No    Sexual activity: Defer     Past Surgical History:   Procedure Laterality Date    BACK SURGERY      CARPAL TUNNEL RELEASE      COLONOSCOPY      EYE SURGERY      FOOT SURGERY Left 1986    Ankle fusion    HERNIA REPAIR      KNEE SURGERY Right 12/14/2022    TKA right knee manipulation 01-25-23    TONSILLECTOMY AND ADENOIDECTOMY      TRIGGER FINGER RELEASE      WISDOM TOOTH EXTRACTION       Past Medical History:   Diagnosis Date    Diabetes     Hypertension      Allergies   Allergen Reactions    Demerol [Meperidine]     Penicillins        Current Facility-Administered Medications:     [DISCONTINUED] acetaminophen (TYLENOL) tablet 500 mg, 500 mg, Oral, Q6H PRN, 500 mg at 04/20/25 0510 **OR** acetaminophen (TYLENOL) 160 MG/5ML oral solution 500 mg, 500 mg, Oral, Q6H PRN, 500 mg at 04/22/25 0844 **OR** acetaminophen (TYLENOL) suppository 325 mg, 325 mg, Rectal, Q6H PRN, Delbert Nettles DO    Calcium Replacement - Follow Nurse / BPA Driven Protocol, , Not Applicable, PRN, Delbert Nettles DO    carvedilol (COREG) tablet 6.25 mg, 6.25 mg, Oral, BID With Meals, Delbert Nettles DO, 6.25 mg at 04/25/25 0858    cefTRIAXone (ROCEPHIN) 2,000 mg in sodium chloride 0.9 % 100 mL MBP, 2,000 mg, Intravenous, Q24H, Bailey Howell MD, Last Rate: 200 mL/hr at 04/24/25 1751, 2,000 mg at 04/24/25 1751    colchicine tablet 1.2 mg, 1.2 mg, Oral, Daily, Bailey Howell MD, 1.2 mg at 04/25/25 0858    dextrose (D50W) (25 g/50 mL) IV injection 25 g, 25 g, Intravenous, Q15 Min PRN, Delbert Nettles DO    dextrose (GLUTOSE) oral gel 15 g, 15 g, Oral, Q15 Min PRN, Delbert Nettles DO    dorzolamide (TRUSOPT) 2 % 1 drop, timolol (TIMOPTIC) 0.5 % 1 drop for Cosopt 22.3-6.8 mg/mL, , Left Eye, BID, Bailey Howell MD, Given at 04/25/25 0902    enoxaparin sodium (LOVENOX) syringe 40 mg, 40 mg,  Subcutaneous, Daily, Bailey Howell MD, 40 mg at 04/25/25 0900    ferric gluconate (FERRLECIT) 250 mg in sodium chloride 0.9 % 250 mL IVPB, 250 mg, Intravenous, Daily, Bailey Howell MD, Last Rate: 125 mL/hr at 04/25/25 0903, 250 mg at 04/25/25 0903    gabapentin (NEURONTIN) capsule 100 mg, 100 mg, Oral, Nightly, Bailey Howell MD    glucagon (GLUCAGEN) injection 1 mg, 1 mg, Intramuscular, Q15 Min PRN, Delbert Nettles DO    insulin glargine (LANTUS, SEMGLEE) injection 50 Units, 50 Units, Subcutaneous, Daily, Bailey Howell MD, 50 Units at 04/25/25 0901    Insulin Lispro (humaLOG) injection 12 Units, 12 Units, Subcutaneous, TID With Meals, Bailey Howell MD, 12 Units at 04/25/25 0900    Insulin Lispro (humaLOG) injection 2-9 Units, 2-9 Units, Subcutaneous, 4x Daily AC & at Bedtime, Delbert Nettles DO, 2 Units at 04/24/25 1711    lactulose (CHRONULAC) 10 GM/15ML solution 30 g, 30 g, Oral, TID, Bailey Howell MD, 30 g at 04/25/25 0900    Lidocaine 4 % 1 patch, 1 patch, Transdermal, Q24H, Bailey Howell MD, 1 patch at 04/25/25 0902    losartan (COZAAR) tablet 100 mg, 100 mg, Oral, Q24H, Delbert Nettles DO, 100 mg at 04/25/25 0858    Magnesium Standard Dose Replacement - Follow Nurse / BPA Driven Protocol, , Not Applicable, PRN, Delbert Nettles DO    nitroglycerin (NITROSTAT) SL tablet 0.4 mg, 0.4 mg, Sublingual, Q5 Min PRN, Delbert Nettles DO    oxyCODONE (ROXICODONE) immediate release tablet 10 mg, 10 mg, Oral, Q4H PRN, Bailey Howell MD, 10 mg at 04/25/25 0859    pantoprazole (PROTONIX) EC tablet 40 mg, 40 mg, Oral, Q AM, Delbert Nettles DO, 40 mg at 04/25/25 0518    Phosphorus Replacement - Follow Nurse / BPA Driven Protocol, , Not Applicable, PRN, Delbert Nettles DO    Potassium Replacement - Follow Nurse / BPA Driven Protocol, , Not Applicable, PRN, Delbert Nettles DO    riFAXIMin (XIFAXAN) tablet 550 mg, 550 mg, Oral, BID, Mike Purdy, PharmD, 550 mg at 04/25/25 0858     sodium chloride 0.9 % flush 10 mL, 10 mL, Intravenous, Q12H, Nettles, Delbert, DO, 10 mL at 04/25/25 0903    sodium chloride 0.9 % flush 10 mL, 10 mL, Intravenous, PRN, Nettles, Delbert, DO    sodium chloride 0.9 % infusion 40 mL, 40 mL, Intravenous, PRN, Nettles, Delbert, DO    tamsulosin (FLOMAX) 24 hr capsule 0.4 mg, 0.4 mg, Oral, Daily, Nettles, Delbert, DO, 0.4 mg at 04/25/25 0858    Objective     Vital Signs   Temp:  [98 °F (36.7 °C)-100.7 °F (38.2 °C)] 100.7 °F (38.2 °C)  Heart Rate:  [75-79] 79  Resp:  [18-20] 20  BP: (119-139)/(62-64) 139/64    Physical Exam:  General: NAD, alert and oriented to self  Head/Neck: Atraumatic  Respiratory: Breathing nonlabored  CV: palpable radial pulse  Extremity: Examination of the right upper extremity demonstrates no deformity.  No skin lesions or abrasions.  The patient is diffusely nontender to palpation throughout the clavicle, AC joint, anterior glenohumeral joint line, biceps tendon, lateral acromion, rotator cuff insertion laterally and deltoid.  Patient is only able to achieve 80 degrees of forward flexion and 60 degrees of abduction.  Significantly limited internal and external rotation secondary to pain.  Unable to assess rotator cuff strength secondary to pain.  Painless elbow wrist and digit range of motion.  Sensations intact light touch of the hand.  Warm and well-perfused distally.    Examination of the left upper extremity demonstrates mild swelling and erythema along the posterior aspect of the olecranon and more proximally along the distal aspect of the arm.  No palpable olecranon bursal fluid is noted.  Diffusely nontender throughout the elbow.  No open skin lesions or abrasions.  The patient has short arc elbow range of motion with flexion, extension, pronation supination.  He is able to make a composite fist.  Sensations intact light touch at the hand.  Warm well-perfused distally.    Labs:  Lab Results (last 24 hours)       Procedure Component Value Units Date/Time    POC  Glucose Once [[id number]]  (Abnormal) Collected: 04/25/25 0720    Specimen: Blood Updated: 04/25/25 0722     Glucose 132 mg/dL     Comprehensive Metabolic Panel [[id number]]  (Abnormal) Collected: 04/25/25 0506    Specimen: Blood Updated: 04/25/25 0651     Glucose 124 mg/dL      BUN 26 mg/dL      Creatinine 1.30 mg/dL      Sodium 135 mmol/L      Potassium 4.6 mmol/L      Comment: Slight hemolysis detected by analyzer. Result may be falsely elevated.        Chloride 105 mmol/L      CO2 18.0 mmol/L      Calcium 9.0 mg/dL      Total Protein 7.4 g/dL      Albumin 3.2 g/dL      ALT (SGPT) 54 U/L      AST (SGOT) 105 U/L      Alkaline Phosphatase 299 U/L      Total Bilirubin 1.0 mg/dL      Globulin 4.2 gm/dL      Comment: Calculated Result        A/G Ratio 0.8 g/dL      BUN/Creatinine Ratio 20.0     Anion Gap 12.0 mmol/L      eGFR 59.1 mL/min/1.73     Narrative:      GFR Categories in Chronic Kidney Disease (CKD)      GFR Category          GFR (mL/min/1.73)    Interpretation  G1                     90 or greater         Normal or high (1)  G2                      60-89                Mild decrease (1)  G3a                   45-59                Mild to moderate decrease  G3b                   30-44                Moderate to severe decrease  G4                    15-29                Severe decrease  G5                    14 or less           Kidney failure          (1)In the absence of evidence of kidney disease, neither GFR category G1 or G2 fulfill the criteria for CKD.    eGFR calculation 2021 CKD-EPI creatinine equation, which does not include race as a factor    Magnesium [[id number]]  (Normal) Collected: 04/25/25 0506    Specimen: Blood Updated: 04/25/25 0651     Magnesium 1.7 mg/dL     Phosphorus [[id number]]  (Normal) Collected: 04/25/25 0506    Specimen: Blood Updated: 04/25/25 0651     Phosphorus 3.3 mg/dL     C-reactive Protein [[id number]]  (Abnormal) Collected: 04/25/25 0506    Specimen: Blood Updated: 04/25/25  0651     C-Reactive Protein 7.87 mg/dL     Sedimentation Rate [[id number]]  (Abnormal) Collected: 04/25/25 0506    Specimen: Blood Updated: 04/25/25 0556     Sed Rate 85 mm/hr     CBC & Differential [[id number]]  (Abnormal) Collected: 04/25/25 0506    Specimen: Blood Updated: 04/25/25 0545    Narrative:      The following orders were created for panel order CBC & Differential.  Procedure                               Abnormality         Status                     ---------                               -----------         ------                     CBC Auto Differential[[id number]]        Abnormal            Final result               Scan Slide[[id number]]                                                                    Please view results for these tests on the individual orders.    CBC Auto Differential [[id number]]  (Abnormal) Collected: 04/25/25 0506    Specimen: Blood Updated: 04/25/25 0545     WBC 4.81 10*3/mm3      RBC 2.67 10*6/mm3      Hemoglobin 9.4 g/dL      Hematocrit 28.6 %      .1 fL      MCH 35.2 pg      MCHC 32.9 g/dL      RDW 13.6 %      RDW-SD 53.5 fl      MPV 12.1 fL      Platelets 118 10*3/mm3      Neutrophil % 40.5 %      Lymphocyte % 40.7 %      Monocyte % 17.0 %      Eosinophil % 1.2 %      Basophil % 0.0 %      Immature Grans % 0.6 %      Neutrophils, Absolute 1.94 10*3/mm3      Lymphocytes, Absolute 1.96 10*3/mm3      Monocytes, Absolute 0.82 10*3/mm3      Eosinophils, Absolute 0.06 10*3/mm3      Basophils, Absolute 0.00 10*3/mm3      Immature Grans, Absolute 0.03 10*3/mm3      nRBC 0.0 /100 WBC     Blood Culture - Blood, Arm, Left [[id number]]  (Normal) Collected: 04/21/25 1817    Specimen: Blood from Arm, Left Updated: 04/24/25 1946     Blood Culture No growth at 3 days    POC Glucose Once [[id number]]  (Abnormal) Collected: 04/24/25 1937    Specimen: Blood Updated: 04/24/25 1938     Glucose 149 mg/dL     POC Glucose Once [[id number]]  (Abnormal) Collected: 04/24/25 1654    Specimen:  "Blood Updated: 04/24/25 1656     Glucose 159 mg/dL     Blood Culture - Blood, Arm, Right [[id number]]  (Normal) Collected: 04/19/25 1611    Specimen: Blood from Arm, Right Updated: 04/24/25 1645     Blood Culture No growth at 5 days    Blood Culture - Blood, Arm, Left [[id number]]  (Normal) Collected: 04/19/25 1604    Specimen: Blood from Arm, Left Updated: 04/24/25 1645     Blood Culture No growth at 5 days    Procalcitonin [[id number]]  (Abnormal) Collected: 04/24/25 1114    Specimen: Blood Updated: 04/24/25 1205     Procalcitonin 0.33 ng/mL     Narrative:      As a Marker for Sepsis (Non-Neonates):    1. <0.5 ng/mL represents a low risk of severe sepsis and/or septic shock.  2. >2 ng/mL represents a high risk of severe sepsis and/or septic shock.    As a Marker for Lower Respiratory Tract Infections that require antibiotic therapy:    PCT on Admission    Antibiotic Therapy       6-12 Hrs later    >0.5                Strongly Recommended  >0.25 - <0.5        Recommended   0.1 - 0.25          Discouraged              Remeasure/reassess PCT  <0.1                Strongly Discouraged     Remeasure/reassess PCT    As 28 day mortality risk marker: \"Change in Procalcitonin Result\" (>80% or <=80%) if Day 0 (or Day 1) and Day 4 values are available. Refer to http://www.[email or web address]-pct-calculator.com    Change in PCT <=80%  A decrease of PCT levels below or equal to 80% defines a positive change in PCT test result representing a higher risk for 28-day all-cause mortality of patients diagnosed with severe sepsis for septic shock.    Change in PCT >80%  A decrease of PCT levels of more than 80% defines a negative change in PCT result representing a lower risk for 28-day all-cause mortality of patients diagnosed with severe sepsis or septic shock.       Magnesium [[id number]]  (Normal) Collected: 04/24/25 1114    Specimen: Blood Updated: 04/24/25 1202     Magnesium 1.6 mg/dL     Comprehensive Metabolic Panel [[id number]]  (Abnormal) " Collected: 04/24/25 1114    Specimen: Blood Updated: 04/24/25 1202     Glucose 173 mg/dL      BUN 23 mg/dL      Creatinine 1.35 mg/dL      Sodium 132 mmol/L      Potassium 4.5 mmol/L      Comment: Slight hemolysis detected by analyzer. Result may be falsely elevated.        Chloride 101 mmol/L      CO2 19.0 mmol/L      Calcium 8.2 mg/dL      Total Protein 6.6 g/dL      Albumin 2.9 g/dL      ALT (SGPT) 49 U/L      AST (SGOT) 105 U/L      Alkaline Phosphatase 240 U/L      Total Bilirubin 1.1 mg/dL      Globulin 3.7 gm/dL      Comment: Calculated Result        A/G Ratio 0.8 g/dL      BUN/Creatinine Ratio 17.0     Anion Gap 12.0 mmol/L      eGFR 56.5 mL/min/1.73     Narrative:      GFR Categories in Chronic Kidney Disease (CKD)      GFR Category          GFR (mL/min/1.73)    Interpretation  G1                     90 or greater         Normal or high (1)  G2                      60-89                Mild decrease (1)  G3a                   45-59                Mild to moderate decrease  G3b                   30-44                Moderate to severe decrease  G4                    15-29                Severe decrease  G5                    14 or less           Kidney failure          (1)In the absence of evidence of kidney disease, neither GFR category G1 or G2 fulfill the criteria for CKD.    eGFR calculation 2021 CKD-EPI creatinine equation, which does not include race as a factor    C-reactive Protein [[id number]]  (Abnormal) Collected: 04/24/25 1114    Specimen: Blood Updated: 04/24/25 1202     C-Reactive Protein 6.25 mg/dL     Uric Acid [[id number]]  (Normal) Collected: 04/24/25 1114    Specimen: Blood Updated: 04/24/25 1202     Uric Acid 5.1 mg/dL      Comment: Falsely depressed results may occur on samples drawn from patients receiving N-Acetylcysteine (NAC) or Metamizole.       Sedimentation Rate [[id number]]  (Abnormal) Collected: 04/24/25 1114    Specimen: Blood Updated: 04/24/25 1152     Sed Rate 73 mm/hr     POC  Glucose Once [[id number]]  (Abnormal) Collected: 04/24/25 1147    Specimen: Blood Updated: 04/24/25 1149     Glucose 194 mg/dL     CBC & Differential [[id number]]  (Abnormal) Collected: 04/24/25 1114    Specimen: Blood Updated: 04/24/25 1141    Narrative:      The following orders were created for panel order CBC & Differential.  Procedure                               Abnormality         Status                     ---------                               -----------         ------                     CBC Auto Differential[[id number]]        Abnormal            Final result               Scan Slide[[id number]]                                                                    Please view results for these tests on the individual orders.    CBC Auto Differential [[id number]]  (Abnormal) Collected: 04/24/25 1114    Specimen: Blood Updated: 04/24/25 1141     WBC 3.97 10*3/mm3      RBC 2.45 10*6/mm3      Hemoglobin 8.6 g/dL      Hematocrit 26.6 %      .6 fL      MCH 35.1 pg      MCHC 32.3 g/dL      RDW 13.6 %      RDW-SD 54.4 fl      MPV 11.9 fL      Platelets 104 10*3/mm3      Neutrophil % 40.4 %      Lymphocyte % 39.3 %      Monocyte % 17.4 %      Eosinophil % 1.8 %      Basophil % 0.3 %      Immature Grans % 0.8 %      Neutrophils, Absolute 1.61 10*3/mm3      Lymphocytes, Absolute 1.56 10*3/mm3      Monocytes, Absolute 0.69 10*3/mm3      Eosinophils, Absolute 0.07 10*3/mm3      Basophils, Absolute 0.01 10*3/mm3      Immature Grans, Absolute 0.03 10*3/mm3      nRBC 0.0 /100 WBC           Imaging:  Right shoulder x-rays obtained on 4/24/25 and Left elbow x-rays obtained on 4/24/2025:   1.No radiographic evidence of acute cardiopulmonary disease.  2.Degenerative changes of the right shoulder and left elbow.  3.Soft tissue swelling overlying the left olecranon, probably representing olecranon bursitis.  4.No fractures or destructive lesions.    Right shoulder x-rays obtained on 4/19/25:   Vague triangular ossicle  adjacent to the greater tuberosity of uncertain clinical significance. There is no obvious fracture line or clear donor site at the greater tuberosity although the possibility of a small rotator cuff avulsion fracture is  difficult to entirely exclude in the correct clinical context. Moderate osteoarthritic change of the glenohumeral joint.    Assessment & Plan   Jimmy Gabehart is a 70-year-old male with right glenohumeral joint rotator cuff arthropathy and left elbow cellulitis versus olecranon bursitis.    I discussed with the patient their clinical and radiographic findings demonstrate right shoulder rotator cuff arthropathy and left elbow cellulitis versus olecranon bursitis.  Reguarding patient's right shoulder, he does have significant degenerative changes to the glenohumeral joint with likely rotator cuff pathology.  I cannot exclude an acute on chronic rotator cuff tear.  However in the setting of his age and underlying glenohumeral joint arthritis, evaluation of his rotator cuff at this point can be done on an outpatient basis.  Additional imaging such as an MRI would only be warranted in the setting of preoperative planning.  Would recommend pain control and physical therapy directed exercises.  Regarding his left elbow, patient has either elbow cellulitis versus olecranon bursitis.  He does have a small olecranon osteophyte that can put him at a higher risk of olecranon bursitis.  There is no palpable fluid on today's examination, however there was mild erythema and swelling.  I would recommend ice, compression, and antibiotics.  Would recommend coverage of MRSA.  If the patient continues to worsen, we could consider advanced imaging to identify any fluid collections as well as an infectious disease consult.  There are no acute orthopedic interventions required at this time.  Please call with any additional questions or concerns.     - Weight bearing status: Weight-bear as tolerated bilateral upper  extremities.  Ace wrap compression and ice to the left elbow.  - Pain control: Per medicine  - DVT PPX: Mechanical  - ABX: Rocephin, daptomycin  - Diet: Regular  - Medical management and optimization   - PT/OT  - Continue to monitor for clinical improvements.    I discussed the patients findings and my recommendations with patient, primary care team, and consulting provider      Hepatic encephalopathy      Kirsten Rainey MD  04/25/25  10:59 EDT

## 2025-04-25 NOTE — DISCHARGE PLACEMENT REQUEST
"Case Management  Nannette Galdamez, [phone]    Gabehart, Jimmy (70 y.o. Male)       Date of Birth   1955    Social Security Number       Address   [address] [id number]    Home Phone   [phone]    MRN   [id number]       Amish   Unknown    Marital Status                               Admission Date   4/19/2025    Admission Type   Urgent    Admitting Provider   Bailey Howell MD    Attending Provider   Bailey Howell MD    Department, Room/Bed   [address], [address]/1       Discharge Date       Discharge Disposition       Discharge Destination                                 Attending Provider: Bailey Howell MD    Allergies: Demerol [Meperidine], Penicillins    Isolation: None   Infection: None   Code Status: CPR    Ht: 188 cm (74\")   Wt: 116 kg (255 lb 4.7 oz)    Admission Cmt: None   Principal Problem: Hepatic encephalopathy [K76.82]                   Active Insurance as of 4/19/2025       Primary Coverage       Payor Plan Insurance Group Employer/Plan Group    HUMANA HUMANA VASYL HMO [id number]       Payor Plan Address Payor Plan Phone Number Payor Plan Fax Number Effective Dates    [address] [address]   3/1/2025 - None Entered    [facility] [phone]         Subscriber Name Subscriber Birth Date Member ID       GABEHART,JIMMY 1955 [id number]               Secondary Coverage       Payor Plan Insurance Group Employer/Plan Group    HUMANA MEDICARE REPLACEMENT HUMANA MEDICARE ADVANTAGE HMO [id number]       Payor Plan Address Payor Plan Phone Number Payor Plan Fax Number Effective Dates    [address] [address] [phone]  3/1/2025 - None Entered    [facility] [id number]         Subscriber Name Subscriber Birth Date Member ID       GABEHART,JIMMY 1955 [id number]                     Emergency Contacts        (Rel.) Home Phone Work Phone Mobile Phone    GabehartDiann (Other) -- -- [phone]              Physician " Progress Notes (last 24 hours)  Notes from 25 1147 through 25 1147   No notes of this type exist for this encounter.          Physical Therapy Notes (most recent note)        Kirsten Vallejo at 25 1130  Version 1 of 1         Patient Name: Jimmy Gabehart  : 1955    MRN: [id number]                              Today's Date: 2025       Admit Date: 2025    Visit Dx: No diagnosis found.  Patient Active Problem List   Diagnosis    Shoulder pain, bilateral    Bursitis/tendonitis, shoulder    Pain    Cervical radiculopathy    Cervical spondylosis    Lumbar spondylosis    Elevated liver enzymes    History of diabetic ulcer of foot    Hypertensive disorder    Long-term current use of opiate analgesic    Osteoarthritis, generalized    Type 2 diabetes mellitus with diabetic neuropathy    Hepatic encephalopathy     Past Medical History:   Diagnosis Date    Diabetes     Hypertension      Past Surgical History:   Procedure Laterality Date    BACK SURGERY      CARPAL TUNNEL RELEASE      COLONOSCOPY      EYE SURGERY      FOOT SURGERY Left     Ankle fusion    HERNIA REPAIR      KNEE SURGERY Right 2022    TKA right knee manipulation 23    TONSILLECTOMY AND ADENOIDECTOMY      TRIGGER FINGER RELEASE      WISDOM TOOTH EXTRACTION        General Information       Row Name 25 1543          Physical Therapy Time and Intention    Document Type therapy note (daily note)  -ML     Mode of Treatment physical therapy  -       Row Name 25 1543          General Information    Patient Profile Reviewed yes  -ML     Existing Precautions/Restrictions fall;other (see comments)  blind L eye  -ML     Barriers to Rehab medically complex;previous functional deficit;cognitive status  -ML       Row Name 25 1543          Cognition    Orientation Status (Cognition) oriented to;person;place;time  conversational confusion  -ML       Row Name 25 1543          Safety Issues/Impairments  Affecting Functional Mobility    Safety Issues Affecting Function (Mobility) insight into deficits/self-awareness;judgment;problem-solving;safety precaution awareness;safety precautions follow-through/compliance;awareness of need for assistance;sequencing abilities  -ML     Impairments Affecting Function (Mobility) balance;endurance/activity tolerance;strength;pain;range of motion (ROM);cognition;motor planning  -ML     Cognitive Impairments, Mobility Safety/Performance attention;awareness, need for assistance;insight into deficits/self-awareness;judgment;problem-solving/reasoning;safety precaution awareness;safety precaution follow-through;sequencing abilities  -ML               User Key  (r) = Recorded By, (t) = Taken By, (c) = Cosigned By      Initials Name Provider Type     Kirsten Vallejo Physical Therapist                   Mobility       Row Name 04/24/25 1546          Bed Mobility    Bed Mobility supine-sit  -ML     Supine-Sit [place] (Bed Mobility) verbal cues;nonverbal cues (demo/gesture);moderate assist (50% patient effort);1 person assist  -ML     Assistive Device (Bed Mobility) bed rails;head of bed elevated  -ML     Comment, (Bed Mobility) verbal cues for sequencing during supine to sit transfer  -ML       Row Name 04/24/25 1546          Bed-Chair Transfer    Bed-Chair [place] (Transfers) verbal cues;minimum assist (75% patient effort);1 person assist  -ML     Assistive Device (Bed-Chair Transfers) other (see comments)  UE support through chair  -ML       Row Name 04/24/25 1546          Sit-Stand Transfer    Sit-Stand [place] (Transfers) contact guard;verbal cues  -ML     Assistive Device (Sit-Stand Transfers) other (see comments)  UE support thru chair  -ML       Row Name 04/24/25 1546          Gait/Stairs (Locomotion)    [place] Level (Gait) unable to assess  -ML     Comment, (Gait/Stairs) ambulation deferred due to increased confusion and L elbow pain, currenlty unable to use RW   -ML               User Key  (r) = Recorded By, (t) = Taken By, (c) = Cosigned By      Initials Name Provider Type    ML Kirsten Vallejo Physical Therapist                   Obj/Interventions       Row Name 04/24/25 1547          Motor Skills    Therapeutic Exercise hip;knee;ankle  -ML       Row Name 04/24/25 1547          Hip (Therapeutic Exercise)    Hip (Therapeutic Exercise) strengthening exercise  -ML     Hip Strengthening (Therapeutic Exercise) bilateral;marching while seated;aBduction;aDduction;10 repetitions  -ML       Row Name 04/24/25 1547          Knee (Therapeutic Exercise)    Knee (Therapeutic Exercise) strengthening exercise  -ML     Knee Strengthening (Therapeutic Exercise) bilateral;LAQ (long arc quad);15 repititions  -ML       Row Name 04/24/25 1547          Ankle (Therapeutic Exercise)    Ankle (Therapeutic Exercise) AROM (active range of motion)  -ML     Ankle AROM (Therapeutic Exercise) bilateral;dorsiflexion;plantarflexion;10 repetitions  -ML       Row Name 04/24/25 1547          Balance    Balance Assessment sitting static balance;sitting dynamic balance;standing static balance;standing dynamic balance  -ML     Static Sitting Balance standby assist  -ML     Dynamic Sitting Balance contact guard  -ML     Position, Sitting Balance unsupported;sitting edge of bed  -ML     Static Standing Balance contact guard  -ML     Dynamic Standing Balance minimal assist;verbal cues;1-person assist  -ML     Position/Device Used, Standing Balance supported  -ML     Balance Interventions sitting;standing;sit to stand;supported;occupation based/functional task  -ML               User Key  (r) = Recorded By, (t) = Taken By, (c) = Cosigned By      Initials Name Provider Type    ML Kirsten Vallejo Physical Therapist                   Goals/Plan    No documentation.                  Clinical Impression       Row Name 04/24/25 1549          Pain    Pretreatment Pain Rating 9/10  -ML     Posttreatment Pain Rating 9/10  -ML      Pain Location elbow  -ML     Pain Side/Orientation left  -ML     Pain Management Interventions exercise or physical activity utilized;positioning techniques utilized  -ML     Response to Pain Interventions activity participation with tolerable pain  -ML       Row Name 04/24/25 6126          Plan of Care Review    Plan of Care Reviewed With patient;other (see comments)  ex-spouse  -ML     Progress declining  -ML     Outcome Evaluation Patient required increased assistance to complete transfers and unable to ambulate today. Patient with increased confusion, required verbal cues to increase safety awareness and orientate to task. Patient presents below baseline for mobility and would continue to benefit from skilled PT to address strength, balance and activity tolerance deficits. Continue current PT POC.  -ML       Row Name 04/24/25 1543          Positioning and Restraints    Pre-Treatment Position in bed  -ML     Post Treatment Position chair  -ML     In Chair notified nsg;reclined;call light within reach;encouraged to call for assist;exit alarm on;waffle cushion;with family/caregiver;legs elevated  -ML               User Key  (r) = Recorded By, (t) = Taken By, (c) = Cosigned By      Initials Name Provider Type    ML Kirsten Vallejo Physical Therapist                   Outcome Measures       Row Name 04/24/25 7116          How much help from another person do you currently need...    Turning from your back to your side while in flat bed without using bedrails? 3  -ML     Moving from lying on back to sitting on the side of a flat bed without bedrails? 2  -ML     Moving to and from a bed to a chair (including a wheelchair)? 3  -ML     Standing up from a chair using your arms (e.g., wheelchair, bedside chair)? 3  -ML     Climbing 3-5 steps with a railing? 2  -ML     To walk in hospital room? 2  -ML     AM-PAC 6 Clicks Score (PT) 15  -ML     Highest Level of Mobility Goal 4 --> Transfer to chair/commode  -ML       Row Name  04/24/25 [facility]          Functional Assessment    Outcome Measure Options AM-PAC 6 Clicks Basic Mobility (PT)  -ML               User Key  (r) = Recorded By, (t) = Taken By, (c) = Cosigned By      Initials Name Provider Type    ML Kirsten Vallejo Physical Therapist                                 Physical Therapy Education       Title: PT OT SLP Therapies (Done)       Topic: Physical Therapy (Done)       Point: Mobility training (Done)       Learning Progress Summary            Patient Acceptance, E, VU,NR by  at 4/24/2025 1552    Acceptance, E, VU by ER at 4/21/2025 0938    Comment: progress, d/c planning, shoulder pain edu   Family Acceptance, E, VU,NR by ML at 4/24/2025 1552                      Point: Home exercise program (Done)       Learning Progress Summary            Patient Acceptance, E, VU,NR by ML at 4/24/2025 1552    Acceptance, E, VU by ER at 4/21/2025 0938    Comment: progress, d/c planning, shoulder pain edu   Family Acceptance, E, VU,NR by  at 4/24/2025 1552                      Point: Body mechanics (Done)       Learning Progress Summary            Patient Acceptance, E, VU,NR by ML at 4/24/2025 1552    Acceptance, E, VU by ER at 4/21/2025 0938    Comment: progress, d/c planning, shoulder pain edu   Family Acceptance, E, VU,NR by ML at 4/24/2025 1552                      Point: Precautions (Done)       Learning Progress Summary            Patient Acceptance, E, VU,NR by ML at 4/24/2025 1552    Acceptance, E, VU by ER at 4/21/2025 0938    Comment: progress, d/c planning, shoulder pain edu   Family Acceptance, E, VU,NR by  at 4/24/2025 1552                                      User Key       Initials Effective Dates Name Provider Type Discipline     04/22/21 -  Kirsten Vallejo Physical Therapist PT    ER 12/13/24 -  Sophie Bolanos, PT Physical Therapist PT                  PT Recommendation and Plan     Progress: declining  Outcome Evaluation: Patient required increased assistance to complete  transfers and unable to ambulate today. Patient with increased confusion, required verbal cues to increase safety awareness and orientate to task. Patient presents below baseline for mobility and would continue to benefit from skilled PT to address strength, balance and activity tolerance deficits. Continue current PT POC.     Time Calculation:         PT Charges       Row Name 25 1553             Time Calculation    Start Time 1130  -ML      PT Received On 25  -ML         Timed Charges    38365 - PT Therapeutic Exercise Minutes 8  -ML      20580 - PT Therapeutic Activity Minutes 20  -ML         Total Minutes    Timed Charges Total Minutes 28  -ML       Total Minutes 28  -ML                User Key  (r) = Recorded By, (t) = Taken By, (c) = Cosigned By      Initials Name Provider Type    Kirsten Lopez Physical Therapist                  Therapy Charges for Today       Code Description Service Date Service Provider Modifiers Qty    74512064518 HC PT THER PROC EA 15 MIN 2025 Kirsten Vallejo GP 1    68692351985 HC PT THERAPEUTIC ACT EA 15 MIN 2025 Kirsten Vallejo GP 1            PT G-Codes  Outcome Measure Options: AM-PAC 6 Clicks Basic Mobility (PT)  AM-PAC 6 Clicks Score (PT): 15  AM-PAC 6 Clicks Score (OT): 16  PT Discharge Summary  Anticipated Discharge Disposition (PT): inpatient rehabilitation facility    Kirsten Vallejo  2025      Electronically signed by Kirsten Vallejo at 25 1554          Occupational Therapy Notes (most recent note)        Alona Turcios, OT at 25 1002          Patient Name: Jimmy Gabehart  : 1955    MRN: [id number]                              Today's Date: 2025       Admit Date: 2025    Visit Dx: No diagnosis found.  Patient Active Problem List   Diagnosis    Shoulder pain, bilateral    Bursitis/tendonitis, shoulder    Pain    Cervical radiculopathy    Cervical spondylosis    Lumbar spondylosis    Elevated liver enzymes    History of diabetic  ulcer of foot    Hypertensive disorder    Long-term current use of opiate analgesic    Osteoarthritis, generalized    Type 2 diabetes mellitus with diabetic neuropathy    Hepatic encephalopathy     Past Medical History:   Diagnosis Date    Diabetes     Hypertension      Past Surgical History:   Procedure Laterality Date    BACK SURGERY      CARPAL TUNNEL RELEASE      COLONOSCOPY      EYE SURGERY      FOOT SURGERY Left 1986    Ankle fusion    HERNIA REPAIR      KNEE SURGERY Right 12/14/2022    TKA right knee manipulation 01-25-23    TONSILLECTOMY AND ADENOIDECTOMY      TRIGGER FINGER RELEASE      WISDOM TOOTH EXTRACTION        General Information       Row Name 04/23/25 1048          OT Time and Intention    Subjective Information complains of;pain  -OCTAVIANO     Document Type therapy note (daily note)  -OCTAVIANO     Mode of Treatment individual therapy;occupational therapy  -OCTAVIANO     Patient Effort good  -OCTAVIANO     Symptoms Noted During/After Treatment none  -OCTAVIANO       Row Name 04/23/25 1048          General Information    Patient Profile Reviewed yes  -OCTAVIANO     Existing Precautions/Restrictions fall;other (see comments)  blind L eye, painful R shoulder with increased pain since fall  -OCTAVIANO     Barriers to Rehab medically complex;previous functional deficit  -OCTAVIANO       Row Name 04/23/25 1048          Cognition    Orientation Status (Cognition) oriented x 3  -OCTAVIANO       Row Name 04/23/25 1048          Safety Issues/Impairments Affecting Functional Mobility    Safety Issues Affecting Function (Mobility) safety precaution awareness;safety precautions follow-through/compliance  -OCTAVIANO     Impairments Affecting Function (Mobility) balance;endurance/activity tolerance;strength;pain;range of motion (ROM)  -OCTAVIANO               User Key  (r) = Recorded By, (t) = Taken By, (c) = Cosigned By      Initials Name Provider Type    OCTAVIANO Alona Turcios, OT Occupational Therapist                     Mobility/ADL's       Row Name 04/23/25 1049          Bed Mobility     Supine-Sit [place] (Bed Mobility) standby assist;verbal cues  -     Assistive Device (Bed Mobility) bed rails;head of bed elevated  -OCTAVIANO     Comment, (Bed Mobility) extra time and effort needed, cues to scoot out fully until both LE's touching  -       Row Name 04/23/25 1049          Transfers    Transfers sit-stand transfer;stand-sit transfer  -OCTAVIANO     Comment, (Transfers) cues for hand placement and to fully bring walker back when sitting due to wanting to leave to side once close to recliner  -       Row Name 04/23/25 1049          Sit-Stand Transfer    Sit-Stand [place] (Transfers) contact guard;verbal cues  -     Assistive Device (Sit-Stand Transfers) walker, front-wheeled  -OCTAVIANO       Row Name 04/23/25 1049          Stand-Sit Transfer    Stand-Sit [place] (Transfers) contact guard;verbal cues  -     Assistive Device (Stand-Sit Transfers) walker, front-wheeled  -OCTAVIANO       Row Name 04/23/25 1049          Functional Mobility    Functional Mobility- Ind. Level contact guard assist;1 person  -     Functional Mobility- Device walker, front-wheeled  -     Functional Mobility-Distance (Feet) --  household distance plus  -     Functional Mobility- Safety Issues step length decreased  -     Functional Mobility- Comment R ankle pain today with WB  -       Row Name 04/23/25 1049          Activities of Daily Living    BADL Assessment/Intervention upper body dressing;lower body dressing;toileting  -       Row Name 04/23/25 1049          Lower Body Dressing Assessment/Training    [place] Level (Lower Body Dressing) doff;don;socks;standby assist;pants/bottoms;minimum assist (75% patient effort)  -     Position (Lower Body Dressing) unsupported sitting;unsupported standing  -OCTAVIANO     Comment, (Lower Body Dressing) educated pt. on AE use, pt. at EOB was able with a combination of side sit, bending down and foot over knee methods was able to ti sock and doff socks with excess time and  effort, educated on sockaid availability and use and where can purchase if decided later was interested in using, pt. needed assist to pull up pants when fell to ankles with toileting and to tie  -OCTAVIANO       Row Name 04/23/25 1049          Toileting Assessment/Training    [place] Level (Toileting) toileting skills;minimum assist (75% patient effort);adjust/manage clothing;perform perineal hygiene;verbal cues  -OCTAVIANO     Position (Toileting) unsupported standing;supported standing  -OCTAVIANO     Comment, (Toileting) bed wet with urine due to purewick partially off, purewick removed, pt. used urinal with assist to hold up gown, min A to pull up pants and assist to hold up gown  -OCTAVIANO       Row Name 04/23/25 1049          Upper Body Dressing Assessment/Training    [place] Level (Upper Body Dressing) don;pajama/robe;maximum assist (25% patient effort)  -OCTAVIANO     Position (Upper Body Dressing) edge of bed sitting  -OCTAVIANO     Comment, (Upper Body Dressing) educated pt. on sequencing of dressing for increased independence with button up and pull over, pt. was able to ti RUE with cues, but could not pull around back and robe tight in shoulder and assist needed for LUE  -OCTAVIANO               User Key  (r) = Recorded By, (t) = Taken By, (c) = Cosigned By      Initials Name Provider Type    Alona Calzada OT Occupational Therapist                   Obj/Interventions       Row Name 04/23/25 1055          Motor Skills    Therapeutic Exercise --  educated pt. on raising LUE in shoulder flexion and pumping hand to assist with edema, pt. was also able to dangle RUE and complete active pendulums with clockwise and counter-clockwise circling  -OCTAVIANO       Row Name 04/23/25 1055          Balance    Static Sitting Balance independent  -OCTAVIANO     Dynamic Sitting Balance independent  -OCTAVIANO     Position, Sitting Balance unsupported;sitting edge of bed  -OCTAVIANO     Static Standing Balance standby assist  -OCTAVIANO     Dynamic Standing Balance contact guard  -OCTAVIANO      Position/Device Used, Standing Balance supported;walker, rolling  -OCTAVIANO     Balance Interventions sit to stand;occupation based/functional task  -OCTAVIANO     Comment, Balance UBD, LBD, toileting  -OCTAVIANO               User Key  (r) = Recorded By, (t) = Taken By, (c) = Cosigned By      Initials Name Provider Type    Alona Calzada, OT Occupational Therapist                   Goals/Plan       Row Name 04/23/25 1102          Transfer Goal 1 (OT)    Progress/Outcome (Transfer Goal 1, OT) goal ongoing  -OCTAVIANO       Row Name 04/23/25 1102          Dressing Goal 1 (OT)    Progress/Outcome (Dressing Goal 1, OT) continuing progress toward goal;goal partially met;goal ongoing  -OCTAVIANO       Row Name 04/23/25 1102          Grooming Goal 1 (OT)    Progress/Outcome (Grooming Goal 1, OT) goal ongoing  -OCTAVIANO               User Key  (r) = Recorded By, (t) = Taken By, (c) = Cosigned By      Initials Name Provider Type    Alona Calzada OT Occupational Therapist                   Clinical Impression       Row Name 04/23/25 1057          Pain Assessment    Pretreatment Pain Rating 8/10  -OCTAVIANO     Posttreatment Pain Rating 5/10  -OCTAVIANO     Pain Location shoulder  -OCTAVIANO     Pain Side/Orientation right  R ankle pain with mobility, recommended wearing shoe  -OCTAVIANO     Pain Management Interventions exercise or physical activity utilized;positioning techniques utilized;premedicated for activity  -OCTAVIANO     Response to Pain Interventions activity participation with decreased pain  -OCTAVIANO       Row Name 04/23/25 1057          Plan of Care Review    Plan of Care Reviewed With patient;family  -OCTAVIANO     Progress improving  -OCTAVIANO     Outcome Evaluation Patient demonstrated improvement with LBD this date, but continues to need excess time and effort to complete. Educated on AE to increase with ease, but pt. decilned use at this time.  Educate on where could purchase if decided to use at a later time.  Pt. educated on edema reduction technique and pt. was able to dangle R UE  for small AROM pendulum movements. Pt. with improved pain R shoulder at end of session. If pt. continues to improve he may be able to discharge home with assist and OP therapy, but continue to recommend IRF at this time if medically appropriate.  -OCTAVIANO       Row Name 04/23/25 1057          Therapy Assessment/Plan (OT)    Therapy Frequency (OT) daily  -OCTAVIANO       Row Name 04/23/25 1057          Therapy Plan Review/Discharge Plan (OT)    Anticipated Discharge Disposition (OT) inpatient rehabilitation facility  -OCTAVIANO       Row Name 04/23/25 1057          Vital Signs    Pre Systolic BP Rehab 134  -OCTAVIANO     Pre Treatment Diastolic BP 65  -OCTAVIANO     Pretreatment Heart Rate (beats/min) 78  -OCTAVIANO     Posttreatment Heart Rate (beats/min) 83  -OCTAVIANO     Pre SpO2 (%) 98  -OCTAVIANO     O2 Delivery Pre Treatment room air  -OCTAVIANO     O2 Delivery Intra Treatment room air  -OCTAVIANO     O2 Delivery Post Treatment room air  -OCTAVIANO     Pre Patient Position Supine  -OCTAVIANO     Intra Patient Position Standing  -OCTAVIANO       Row Name 04/23/25 1057          Positioning and Restraints    Pre-Treatment Position in bed  -OCTAVIANO     Post Treatment Position chair  -OCTAVIANO     In Chair notified nsg;reclined;call light within reach;encouraged to call for assist;exit alarm on;waffle cushion;legs elevated  -OCTAVIANO               User Key  (r) = Recorded By, (t) = Taken By, (c) = Cosigned By      Initials Name Provider Type    Alona Calzada, OT Occupational Therapist                   Outcome Measures       Row Name 04/23/25 1102          How much help from another is currently needed...    Putting on and taking off regular lower body clothing? 3  -OCTAVIANO     Bathing (including washing, rinsing, and drying) 2  -OCTAVIANO     Toileting (which includes using toilet bed pan or urinal) 3  urinal use  -OCTAVIANO     Putting on and taking off regular upper body clothing 2  -OCTAVIANO     Taking care of personal grooming (such as brushing teeth) 3  -OCTAVIANO     Eating meals 3  -OCTAVIANO     AM-PAC 6 Clicks Score (OT) 16  -OCTAVIANO       Row  Name 04/23/25 1102          Functional Assessment    Outcome Measure Options AM-PAC 6 Clicks Daily Activity (OT)  -OCTAVIANO               User Key  (r) = Recorded By, (t) = Taken By, (c) = Cosigned By      Initials Name Provider Type    Alona Calzada, GENNY Occupational Therapist                    Occupational Therapy Education       Title: PT OT SLP Therapies (Done)       Topic: Occupational Therapy (Done)       Point: ADL training (Done)       Learning Progress Summary            Patient Acceptance, E,D, NR,VU,DU by OCTAVIANO at 4/23/2025 1103    Comment: UE edema TE, active pendulum R shoulder, LBD AE available to improve ease, transfer safety    Acceptance, E, VU,NR by OCTAVIANO at 4/21/2025 1158    Comment: reason for consult, bidet benefit, benefit walker use, transfer safety, AE available for LBD   Family Acceptance, E, VU,NR by OCTAVIANO at 4/21/2025 1158    Comment: reason for consult, bidet benefit, benefit walker use, transfer safety, AE available for LBD                      Point: Home exercise program (Done)       Learning Progress Summary            Patient Acceptance, E,D, NR,VU,DU by OCTAVIANO at 4/23/2025 1103    Comment: UE edema TE, active pendulum R shoulder, LBD AE available to improve ease, transfer safety                      Point: Precautions (Done)       Learning Progress Summary            Patient Acceptance, E,D, NR,VU,DU by OCTAVIANO at 4/23/2025 1103    Comment: UE edema TE, active pendulum R shoulder, LBD AE available to improve ease, transfer safety    Acceptance, E, VU,NR by OCTAVIANO at 4/21/2025 1158    Comment: reason for consult, bidet benefit, benefit walker use, transfer safety, AE available for LBD   Family Acceptance, E, VU,NR by OCTAVIANO at 4/21/2025 1158    Comment: reason for consult, bidet benefit, benefit walker use, transfer safety, AE available for LBD                      Point: Body mechanics (Done)       Learning Progress Summary            Patient Acceptance, E,D, NR,VU,DU by OCTAVIANO at 4/23/2025 1103    Comment: UE  edema TE, active pendulum R shoulder, LBD AE available to improve ease, transfer safety                                      User Key       Initials Effective Dates Name Provider Type Discipline    OCTAVIANO 07/11/23 -  Alona Turcios, OT Occupational Therapist OT                  OT Recommendation and Plan  Planned Therapy Interventions (OT): activity tolerance training, adaptive equipment training, BADL retraining, functional balance retraining, occupation/activity based interventions, ROM/therapeutic exercise, transfer/mobility retraining, strengthening exercise, patient/caregiver education/training, passive ROM/stretching  Therapy Frequency (OT): daily  Plan of Care Review  Plan of Care Reviewed With: patient, family  Progress: improving  Outcome Evaluation: Patient demonstrated improvement with LBD this date, but continues to need excess time and effort to complete. Educated on AE to increase with ease, but pt. decilned use at this time.  Educate on where could purchase if decided to use at a later time.  Pt. educated on edema reduction technique and pt. was able to dangle R UE for small AROM pendulum movements. Pt. with improved pain R shoulder at end of session. If pt. continues to improve he may be able to discharge home with assist and OP therapy, but continue to recommend IRF at this time if medically appropriate.     Time Calculation:   Evaluation Complexity (OT)  Review Occupational Profile/Medical/Therapy History Complexity: expanded/moderate complexity  Assessment, Occupational Performance/Identification of Deficit Complexity: 3-5 performance deficits  Clinical Decision Making Complexity (OT): detailed assessment/moderate complexity  Overall Complexity of Evaluation (OT): moderate complexity     Time Calculation- OT       Row Name 04/23/25 1104             Time Calculation- OT    OT Start Time 1002  -OCTAVIANO      OT Received On 04/23/25  -OCTAVIANO      OT Goal Re-Cert Due Date 05/01/25  -OCTAVIANO         Timed Charges     98346 - OT Therapeutic Exercise Minutes 8  -OCTAVIANO      44902 - OT Therapeutic Activity Minutes 12  -OCTAVIANO      79547 - OT Self Care/Mgmt Minutes 22  -OCTAVIANO         Total Minutes    Timed Charges Total Minutes 42  -OCTAVIANO       Total Minutes 42  -OCTAVIANO                User Key  (r) = Recorded By, (t) = Taken By, (c) = Cosigned By      Initials Name Provider Type    Alona Calzada OT Occupational Therapist                  Therapy Charges for Today       Code Description Service Date Service Provider Modifiers Qty    82876073300 HC OT THER PROC EA 15 MIN 4/23/2025 Alona Turcios OT GO 1    27549409195 HC OT THERAPEUTIC ACT EA 15 MIN 4/23/2025 Alona Turcios OT GO 1    34607411968 HC OT SELF CARE/MGMT/TRAIN EA 15 MIN 4/23/2025 Alona Turcios OT GO 1                 Alona Turcios OT  4/23/2025    Electronically signed by Alona Turcios OT at 04/23/25 1101

## 2025-04-26 ENCOUNTER — APPOINTMENT (OUTPATIENT)
Dept: CT IMAGING | Facility: HOSPITAL | Age: 70
End: 2025-04-26
Payer: COMMERCIAL

## 2025-04-26 LAB
ALBUMIN SERPL-MCNC: 2.9 G/DL (ref 3.5–5.2)
ALBUMIN/GLOB SERPL: 0.7 G/DL
ALP SERPL-CCNC: 279 U/L (ref 39–117)
ALT SERPL W P-5'-P-CCNC: 44 U/L (ref 1–41)
ANION GAP SERPL CALCULATED.3IONS-SCNC: 14 MMOL/L (ref 5–15)
AST SERPL-CCNC: 76 U/L (ref 1–40)
BACTERIA SPEC AEROBE CULT: NORMAL
BASOPHILS # BLD MANUAL: 0 10*3/MM3 (ref 0–0.2)
BASOPHILS NFR BLD MANUAL: 0 % (ref 0–1.5)
BILIRUB SERPL-MCNC: 1.1 MG/DL (ref 0–1.2)
BUN SERPL-MCNC: 29 MG/DL (ref 8–23)
BUN/CREAT SERPL: 19.7 (ref 7–25)
CALCIUM SPEC-SCNC: 8.6 MG/DL (ref 8.6–10.5)
CHLORIDE SERPL-SCNC: 103 MMOL/L (ref 98–107)
CO2 SERPL-SCNC: 17 MMOL/L (ref 22–29)
CREAT SERPL-MCNC: 1.47 MG/DL (ref 0.76–1.27)
CRP SERPL-MCNC: 8.2 MG/DL (ref 0–0.5)
D DIMER PPP FEU-MCNC: 2.84 MCGFEU/ML (ref 0–0.7)
DEPRECATED RDW RBC AUTO: 55 FL (ref 37–54)
EGFRCR SERPLBLD CKD-EPI 2021: 51 ML/MIN/1.73
EOSINOPHIL # BLD MANUAL: 0.17 10*3/MM3 (ref 0–0.4)
EOSINOPHIL NFR BLD MANUAL: 4 % (ref 0.3–6.2)
ERYTHROCYTE [DISTWIDTH] IN BLOOD BY AUTOMATED COUNT: 13.6 % (ref 12.3–15.4)
GLOBULIN UR ELPH-MCNC: 4 GM/DL
GLUCOSE BLDC GLUCOMTR-MCNC: 113 MG/DL (ref 70–130)
GLUCOSE BLDC GLUCOMTR-MCNC: 155 MG/DL (ref 70–130)
GLUCOSE BLDC GLUCOMTR-MCNC: 158 MG/DL (ref 70–130)
GLUCOSE BLDC GLUCOMTR-MCNC: 198 MG/DL (ref 70–130)
GLUCOSE SERPL-MCNC: 102 MG/DL (ref 65–99)
HCT VFR BLD AUTO: 27 % (ref 37.5–51)
HGB BLD-MCNC: 8.5 G/DL (ref 13–17.7)
LYMPHOCYTES # BLD MANUAL: 1.8 10*3/MM3 (ref 0.7–3.1)
LYMPHOCYTES NFR BLD MANUAL: 12 % (ref 5–12)
MAGNESIUM SERPL-MCNC: 1.8 MG/DL (ref 1.6–2.4)
MCH RBC QN AUTO: 35 PG (ref 26.6–33)
MCHC RBC AUTO-ENTMCNC: 31.5 G/DL (ref 31.5–35.7)
MCV RBC AUTO: 111.1 FL (ref 79–97)
MONOCYTES # BLD: 0.5 10*3/MM3 (ref 0.1–0.9)
NEUTROPHILS # BLD AUTO: 1.72 10*3/MM3 (ref 1.7–7)
NEUTROPHILS NFR BLD MANUAL: 35 % (ref 42.7–76)
NEUTS BAND NFR BLD MANUAL: 6 % (ref 0–5)
NRBC SPEC MANUAL: 0 /100 WBC (ref 0–0.2)
PHOSPHATE SERPL-MCNC: 3.5 MG/DL (ref 2.5–4.5)
PLAT MORPH BLD: NORMAL
PLATELET # BLD AUTO: 114 10*3/MM3 (ref 140–450)
PMV BLD AUTO: 11.5 FL (ref 6–12)
POTASSIUM SERPL-SCNC: 4.3 MMOL/L (ref 3.5–5.2)
PROCALCITONIN SERPL-MCNC: 0.3 NG/ML (ref 0–0.25)
PROT SERPL-MCNC: 6.9 G/DL (ref 6–8.5)
RBC # BLD AUTO: 2.43 10*6/MM3 (ref 4.14–5.8)
RBC MORPH BLD: NORMAL
SODIUM SERPL-SCNC: 134 MMOL/L (ref 136–145)
VARIANT LYMPHS NFR BLD MANUAL: 43 % (ref 19.6–45.3)
WBC MORPH BLD: NORMAL
WBC NRBC COR # BLD AUTO: 4.19 10*3/MM3 (ref 3.4–10.8)

## 2025-04-26 PROCEDURE — 94799 UNLISTED PULMONARY SVC/PX: CPT

## 2025-04-26 PROCEDURE — 63710000001 INSULIN LISPRO (HUMAN) PER 5 UNITS: Performed by: INTERNAL MEDICINE

## 2025-04-26 PROCEDURE — 80053 COMPREHEN METABOLIC PANEL: CPT | Performed by: INTERNAL MEDICINE

## 2025-04-26 PROCEDURE — 71275 CT ANGIOGRAPHY CHEST: CPT

## 2025-04-26 PROCEDURE — 25010000002 DAPTOMYCIN PER 1 MG

## 2025-04-26 PROCEDURE — 94660 CPAP INITIATION&MGMT: CPT

## 2025-04-26 PROCEDURE — 84100 ASSAY OF PHOSPHORUS: CPT | Performed by: INTERNAL MEDICINE

## 2025-04-26 PROCEDURE — 25010000002 ENOXAPARIN PER 10 MG: Performed by: INTERNAL MEDICINE

## 2025-04-26 PROCEDURE — 63710000001 INSULIN LISPRO (HUMAN) PER 5 UNITS: Performed by: STUDENT IN AN ORGANIZED HEALTH CARE EDUCATION/TRAINING PROGRAM

## 2025-04-26 PROCEDURE — 86140 C-REACTIVE PROTEIN: CPT | Performed by: INTERNAL MEDICINE

## 2025-04-26 PROCEDURE — 25810000003 SODIUM CHLORIDE 0.9 % SOLUTION: Performed by: INTERNAL MEDICINE

## 2025-04-26 PROCEDURE — 63710000001 INSULIN GLARGINE PER 5 UNITS: Performed by: INTERNAL MEDICINE

## 2025-04-26 PROCEDURE — 99232 SBSQ HOSP IP/OBS MODERATE 35: CPT | Performed by: INTERNAL MEDICINE

## 2025-04-26 PROCEDURE — 25510000001 IOPAMIDOL PER 1 ML: Performed by: INTERNAL MEDICINE

## 2025-04-26 PROCEDURE — 85007 BL SMEAR W/DIFF WBC COUNT: CPT | Performed by: INTERNAL MEDICINE

## 2025-04-26 PROCEDURE — 83735 ASSAY OF MAGNESIUM: CPT | Performed by: INTERNAL MEDICINE

## 2025-04-26 PROCEDURE — 25010000002 CEFTRIAXONE PER 250 MG: Performed by: INTERNAL MEDICINE

## 2025-04-26 PROCEDURE — 85379 FIBRIN DEGRADATION QUANT: CPT | Performed by: INTERNAL MEDICINE

## 2025-04-26 PROCEDURE — 85025 COMPLETE CBC W/AUTO DIFF WBC: CPT | Performed by: INTERNAL MEDICINE

## 2025-04-26 PROCEDURE — 82948 REAGENT STRIP/BLOOD GLUCOSE: CPT

## 2025-04-26 PROCEDURE — 84145 PROCALCITONIN (PCT): CPT | Performed by: INTERNAL MEDICINE

## 2025-04-26 RX ORDER — SODIUM CHLORIDE 9 MG/ML
75 INJECTION, SOLUTION INTRAVENOUS CONTINUOUS
Status: ACTIVE | OUTPATIENT
Start: 2025-04-26 | End: 2025-04-27

## 2025-04-26 RX ORDER — IOPAMIDOL 755 MG/ML
100 INJECTION, SOLUTION INTRAVASCULAR
Status: COMPLETED | OUTPATIENT
Start: 2025-04-26 | End: 2025-04-26

## 2025-04-26 RX ADMIN — INSULIN LISPRO 10 UNITS: 100 INJECTION, SOLUTION INTRAVENOUS; SUBCUTANEOUS at 08:27

## 2025-04-26 RX ADMIN — SODIUM CHLORIDE 75 ML/HR: 9 INJECTION, SOLUTION INTRAVENOUS at 09:10

## 2025-04-26 RX ADMIN — LOSARTAN POTASSIUM 100 MG: 50 TABLET, FILM COATED ORAL at 08:27

## 2025-04-26 RX ADMIN — LIDOCAINE 1 PATCH: 4 PATCH TOPICAL at 08:27

## 2025-04-26 RX ADMIN — INSULIN LISPRO 2 UNITS: 100 INJECTION, SOLUTION INTRAVENOUS; SUBCUTANEOUS at 12:36

## 2025-04-26 RX ADMIN — LACTULOSE 30 G: 20 SOLUTION ORAL at 20:13

## 2025-04-26 RX ADMIN — ENOXAPARIN SODIUM 40 MG: 100 INJECTION SUBCUTANEOUS at 08:27

## 2025-04-26 RX ADMIN — Medication 10 ML: at 08:28

## 2025-04-26 RX ADMIN — DAPTOMYCIN 550 MG: 500 INJECTION, POWDER, LYOPHILIZED, FOR SOLUTION INTRAVENOUS at 15:33

## 2025-04-26 RX ADMIN — LACTULOSE 30 G: 20 SOLUTION ORAL at 15:33

## 2025-04-26 RX ADMIN — INSULIN GLARGINE 30 UNITS: 100 INJECTION, SOLUTION SUBCUTANEOUS at 08:27

## 2025-04-26 RX ADMIN — TAMSULOSIN HYDROCHLORIDE 0.4 MG: 0.4 CAPSULE ORAL at 08:27

## 2025-04-26 RX ADMIN — LACTULOSE 30 G: 20 SOLUTION ORAL at 08:27

## 2025-04-26 RX ADMIN — CARVEDILOL 6.25 MG: 6.25 TABLET, FILM COATED ORAL at 08:27

## 2025-04-26 RX ADMIN — RIFAXIMIN 550 MG: 550 TABLET ORAL at 08:27

## 2025-04-26 RX ADMIN — GABAPENTIN 100 MG: 100 CAPSULE ORAL at 20:13

## 2025-04-26 RX ADMIN — INSULIN LISPRO 10 UNITS: 100 INJECTION, SOLUTION INTRAVENOUS; SUBCUTANEOUS at 17:21

## 2025-04-26 RX ADMIN — INSULIN LISPRO 10 UNITS: 100 INJECTION, SOLUTION INTRAVENOUS; SUBCUTANEOUS at 12:35

## 2025-04-26 RX ADMIN — Medication 10 ML: at 20:13

## 2025-04-26 RX ADMIN — DORZOLAMIDE HYDROCHLORIDE: 20 SOLUTION/ DROPS OPHTHALMIC at 08:27

## 2025-04-26 RX ADMIN — COLCHICINE 1.2 MG: 0.6 TABLET ORAL at 08:27

## 2025-04-26 RX ADMIN — IOPAMIDOL 160 ML: 755 INJECTION, SOLUTION INTRAVENOUS at 17:21

## 2025-04-26 RX ADMIN — INSULIN LISPRO 2 UNITS: 100 INJECTION, SOLUTION INTRAVENOUS; SUBCUTANEOUS at 17:22

## 2025-04-26 RX ADMIN — DORZOLAMIDE HYDROCHLORIDE: 20 SOLUTION/ DROPS OPHTHALMIC at 20:11

## 2025-04-26 RX ADMIN — RIFAXIMIN 550 MG: 550 TABLET ORAL at 20:13

## 2025-04-26 RX ADMIN — SODIUM CHLORIDE 75 ML/HR: 9 INJECTION, SOLUTION INTRAVENOUS at 21:49

## 2025-04-26 RX ADMIN — CEFTRIAXONE 2000 MG: 2 INJECTION, POWDER, FOR SOLUTION INTRAMUSCULAR; INTRAVENOUS at 15:33

## 2025-04-26 RX ADMIN — PANTOPRAZOLE SODIUM 40 MG: 40 TABLET, DELAYED RELEASE ORAL at 06:27

## 2025-04-26 RX ADMIN — INSULIN LISPRO 2 UNITS: 100 INJECTION, SOLUTION INTRAVENOUS; SUBCUTANEOUS at 21:49

## 2025-04-26 RX ADMIN — CARVEDILOL 6.25 MG: 6.25 TABLET, FILM COATED ORAL at 17:27

## 2025-04-26 NOTE — PLAN OF CARE
Problem: Adult Inpatient Plan of Care  Goal: Plan of Care Review  Outcome: Progressing  Flowsheets (Taken 4/26/2025 0510)  Progress: improving  Plan of Care Reviewed With: patient  Goal: Patient-Specific Goal (Individualized)  Outcome: Progressing  Goal: Absence of Hospital-Acquired Illness or Injury  Outcome: Progressing  Intervention: Identify and Manage Fall Risk  Recent Flowsheet Documentation  Taken 4/26/2025 0427 by Matilde Schultz RN  Safety Promotion/Fall Prevention:   activity supervised   assistive device/personal items within reach   safety round/check completed  Taken 4/26/2025 0209 by Matilde Schultz RN  Safety Promotion/Fall Prevention:   activity supervised   assistive device/personal items within reach   safety round/check completed  Taken 4/26/2025 0011 by Matilde Schultz RN  Safety Promotion/Fall Prevention:   activity supervised   assistive device/personal items within reach   safety round/check completed  Taken 4/25/2025 2235 by Matilde Schultz RN  Safety Promotion/Fall Prevention:   activity supervised   assistive device/personal items within reach   safety round/check completed  Taken 4/25/2025 2009 by Matilde Schultz RN  Safety Promotion/Fall Prevention:   activity supervised   assistive device/personal items within reach   clutter free environment maintained   fall prevention program maintained   lighting adjusted   muscle strengthening facilitated   nonskid shoes/slippers when out of bed   room organization consistent   safety round/check completed  Intervention: Prevent Skin Injury  Recent Flowsheet Documentation  Taken 4/26/2025 0427 by Matilde Schultz RN  Body Position: weight shifting  Taken 4/26/2025 0209 by Matilde Schultz RN  Body Position: supine  Taken 4/26/2025 0011 by Matilde Schultz RN  Body Position: sitting up in bed  Taken 4/25/2025 2235 by Matilde Schultz RN  Body Position: sitting up in bed  Taken 4/25/2025 2009 by Matilde Schultz RN  Body Position: supine  Skin  Protection:   drying agents applied   incontinence pads utilized  Intervention: Prevent and Manage VTE (Venous Thromboembolism) Risk  Recent Flowsheet Documentation  Taken 4/25/2025 2009 by Matilde Schultz RN  VTE Prevention/Management:   bilateral   SCDs (sequential compression devices) off  Intervention: Prevent Infection  Recent Flowsheet Documentation  Taken 4/26/2025 0427 by Matilde Schultz RN  Infection Prevention:   hand hygiene promoted   rest/sleep promoted  Taken 4/26/2025 0209 by Matilde Schultz RN  Infection Prevention:   hand hygiene promoted   rest/sleep promoted  Taken 4/26/2025 0011 by Matilde Schultz RN  Infection Prevention:   hand hygiene promoted   rest/sleep promoted  Taken 4/25/2025 2235 by Matilde Schultz RN  Infection Prevention:   hand hygiene promoted   rest/sleep promoted  Taken 4/25/2025 2009 by Matilde Schultz RN  Infection Prevention:   hand hygiene promoted   rest/sleep promoted  Goal: Optimal Comfort and Wellbeing  Outcome: Progressing  Intervention: Provide Person-Centered Care  Recent Flowsheet Documentation  Taken 4/26/2025 0427 by Matilde Schultz RN  Trust Relationship/Rapport: care explained  Taken 4/26/2025 0209 by Matilde Schultz RN  Trust Relationship/Rapport: care explained  Taken 4/26/2025 0011 by Matidle Schultz RN  Trust Relationship/Rapport: care explained  Taken 4/25/2025 2235 by Matilde Schultz RN  Trust Relationship/Rapport: care explained  Taken 4/25/2025 2009 by Matilde Schultz RN  Trust Relationship/Rapport:   care explained   choices provided   questions answered   questions encouraged   reassurance provided  Goal: Readiness for Transition of Care  Outcome: Progressing     Problem: Fall Injury Risk  Goal: Absence of Fall and Fall-Related Injury  Outcome: Progressing  Intervention: Identify and Manage Contributors  Recent Flowsheet Documentation  Taken 4/26/2025 0427 by Matilde Schultz RN  Self-Care Promotion: independence encouraged  Taken 4/26/2025  0209 by Matilde Schultz RN  Self-Care Promotion: independence encouraged  Taken 4/26/2025 0011 by Matilde Schultz RN  Self-Care Promotion: independence encouraged  Taken 4/25/2025 2235 by Matilde Schultz RN  Self-Care Promotion: independence encouraged  Taken 4/25/2025 2009 by Matilde Schultz RN  Medication Review/Management: medications reviewed  Self-Care Promotion: independence encouraged  Intervention: Promote Injury-Free Environment  Recent Flowsheet Documentation  Taken 4/26/2025 0427 by Matilde Schultz RN  Safety Promotion/Fall Prevention:   activity supervised   assistive device/personal items within reach   safety round/check completed  Taken 4/26/2025 0209 by Matilde Schultz RN  Safety Promotion/Fall Prevention:   activity supervised   assistive device/personal items within reach   safety round/check completed  Taken 4/26/2025 0011 by Matilde Schultz RN  Safety Promotion/Fall Prevention:   activity supervised   assistive device/personal items within reach   safety round/check completed  Taken 4/25/2025 2235 by Matilde Schultz RN  Safety Promotion/Fall Prevention:   activity supervised   assistive device/personal items within reach   safety round/check completed  Taken 4/25/2025 2009 by Matilde Schultz RN  Safety Promotion/Fall Prevention:   activity supervised   assistive device/personal items within reach   clutter free environment maintained   fall prevention program maintained   lighting adjusted   muscle strengthening facilitated   nonskid shoes/slippers when out of bed   room organization consistent   safety round/check completed     Problem: Comorbidity Management  Goal: Blood Glucose Level Within Target Range  Outcome: Progressing  Intervention: Monitor and Manage Glycemia  Recent Flowsheet Documentation  Taken 4/25/2025 2009 by Matilde Schultz RN  Medication Review/Management: medications reviewed  Goal: Blood Pressure in Desired Range  Outcome: Progressing  Intervention: Maintain Blood  Pressure Management  Recent Flowsheet Documentation  Taken 4/25/2025 2009 by Matilde Schultz RN  Medication Review/Management: medications reviewed  Goal: Maintenance of Osteoarthritis Symptom Control  Outcome: Progressing  Intervention: Maintain Osteoarthritis Symptom Control  Recent Flowsheet Documentation  Taken 4/26/2025 0427 by Matilde Schultz RN  Activity Management: activity encouraged  Taken 4/26/2025 0209 by Matilde Schultz RN  Activity Management: activity encouraged  Taken 4/26/2025 0011 by Matilde Schultz RN  Activity Management: ambulated to bathroom  Assistive Device Utilized: gait belt  Taken 4/25/2025 2235 by Matilde Schultz RN  Activity Management: activity encouraged  Taken 4/25/2025 2009 by Matilde Schultz RN  Medication Review/Management: medications reviewed     Problem: Violence Risk or Actual  Goal: Anger and Impulse Control  Outcome: Progressing  Intervention: Minimize Safety Risk  Recent Flowsheet Documentation  Taken 4/26/2025 0427 by Matilde Schultz RN  Enhanced Safety Measures: bed alarm set  Taken 4/26/2025 0209 by Matilde Schultz RN  Enhanced Safety Measures: bed alarm set  Taken 4/26/2025 0011 by Matilde Schultz RN  Enhanced Safety Measures: bed alarm set  Taken 4/25/2025 2235 by Matilde Schultz RN  Enhanced Safety Measures: bed alarm set  Taken 4/25/2025 2009 by Matilde Schultz RN  Enhanced Safety Measures: bed alarm set     Problem: Noninvasive Ventilation Acute  Goal: Effective Unassisted Ventilation and Oxygenation  Outcome: Progressing     Problem: Skin Injury Risk Increased  Goal: Skin Health and Integrity  Outcome: Progressing  Intervention: Optimize Skin Protection  Recent Flowsheet Documentation  Taken 4/26/2025 0427 by Matilde Schultz RN  Activity Management: activity encouraged  Head of Bed (HOB) Positioning: HOB elevated  Taken 4/26/2025 0209 by Matilde Schultz RN  Activity Management: activity encouraged  Head of Bed (HOB) Positioning: HOB elevated  Taken  4/26/2025 0011 by Matilde Schultz, RN  Activity Management: ambulated to bathroom  Head of Bed (HOB) Positioning: HOB elevated  Taken 4/25/2025 2235 by Matilde Schultz RN  Activity Management: activity encouraged  Head of Bed (HOB) Positioning: HOB elevated  Taken 4/25/2025 2009 by Matilde Schultz, RN  Pressure Reduction Techniques: frequent weight shift encouraged  Head of Bed (HOB) Positioning: HOB elevated  Pressure Reduction Devices: pressure-redistributing mattress utilized  Skin Protection:   drying agents applied   incontinence pads utilized   Goal Outcome Evaluation:  Plan of Care Reviewed With: patient      Pt currently in bed resting quietly. No complaints of pain or discomfort at this time. Pt disoriented throughout shift. Redirecting needed. VSS on CPAP. Incontinent of bowel and bladder this shift. Pt ambulatory with 1 assist. Plans for SNF when appropriate. No other observations at this time, call bell in reach.  Progress: improving

## 2025-04-26 NOTE — PROGRESS NOTES
[facility] Medicine Services  PROGRESS NOTE    Patient Name: Jimmy Gabehart  : 1955  MRN: [id number]    Date of Admission: 2025  Primary Care Physician: Miriam Mckeon DO    Subjective   Subjective     CC:  Follow-up for hepatic encephalopathy    HPI:  Patient seen and examined this morning.  More awake and alert today.  Had multiple bowel movements yesterday.  Reported that the pain in his left elbow is improving.  Still having significant pain in his right shoulder.  Complaining of dry cough.  Wife at bedside and updated.    Objective   Objective     Vital Signs:   Temp:  [97.9 °F (36.6 °C)-101.1 °F (38.4 °C)] 98.2 °F (36.8 °C)  Heart Rate:  [65-86] 65  Resp:  [16-20] 18  BP: (112-147)/(56-73) 112/61     Physical Exam:  General: Comfortable, not in distress, conversant and cooperative  Head: Atraumatic and normocephalic  Eyes: No Icterus. No pallor  Ears:  Ears appear intact with no abnormalities noted  Throat: No oral lesions, no thrush  Neck: Supple, trachea midline  Lungs: Clear to auscultation bilaterally, equal air entry, no wheezing or crackles  Heart:  Normal S1 and S2, no murmur, no gallop, No JVD, no lower extremity swelling  Abdomen:  Soft, no tenderness, no organomegaly, normal bowel sounds, no organomegaly  Extremities: Limited range of movement right shoulder because of pain.  Left elbow is warm with limited mobility because of pain  Skin: No bleeding, bruising or rash, normal skin turgor and elasticity  Neurologic: Cranial nerves appear intact with no evidence of facial asymmetry, normal motor and sensory functions in all 4 extremities  Psych: Alert and oriented x2    Results Reviewed:  LAB RESULTS:      Lab 25  0428 25  0506 25  1114 25  0417 25  0534 25  1816 25  0448 25  0508 25  0508 25  1604   WBC 4.19 4.81 3.97 3.48 4.68  --  4.89  --  5.00 4.21   HEMOGLOBIN 8.5* 9.4* 8.6* 8.6* 9.1*  --   9.3*  --  10.1* 9.7*   HEMATOCRIT 27.0* 28.6* 26.6* 27.6* 28.6*  --  29.9*  --  30.9* 28.7*   PLATELETS 114* 118* 104* 82* 86*  --  73*  --  84* 74*   NEUTROS ABS  --  1.94 1.61* 1.40* 2.82  --  2.47  --  2.60 2.43   IMMATURE GRANS (ABS)  --  0.03 0.03 0.02 0.04  --  0.04  --  0.05 0.03   LYMPHS ABS  --  1.96 1.56 1.54 1.21  --  1.70  --  1.46 1.19   MONOS ABS  --  0.82 0.69 0.46 0.55  --  0.64  --  0.87 0.53   EOS ABS  --  0.06 0.07 0.06 0.05  --  0.03  --  0.01 0.02   .1* 107.1* 108.6* 110.0* 107.9*  --  111.6*  --  106.2* 105.5*   SED RATE  --  85* 73*  --   --   --   --   --   --   --    CRP 8.20* 7.87* 6.25* 8.77* 9.27*  --  13.52*   < >  --   --    PROCALCITONIN  --   --  0.33*  --  0.36*  --  0.46*  --   --   --    LACTATE  --   --   --   --   --  1.9  --   --   --  1.7   LDH  --   --   --   --   --   --   --   --  226*  --    PROTIME  --   --   --   --   --   --   --   --   --  16.6*    < > = values in this interval not displayed.         Lab 04/26/25  0428 04/25/25  0506 04/24/25  1114 04/23/25  0417 04/22/25  0534 04/21/25  0448 04/20/25  0508 04/19/25  1604   SODIUM 134* 135* 132* 133* 134* 135* 130* 133*   POTASSIUM 4.3 4.6 4.5 4.2 4.4 4.1 4.4 4.3   CHLORIDE 103 105 101 104 101 103 98 99   CO2 17.0* 18.0* 19.0* 18.0* 20.0* 22.0 20.0* 21.0*   ANION GAP 14.0 12.0 12.0 11.0 13.0 10.0 12.0 13.0   BUN 29* 26* 23 25* 21 22 18 22   CREATININE 1.47* 1.30* 1.35* 1.32* 1.26 1.12 1.07 1.19   EGFR 51.0* 59.1* 56.5* 58.0* 61.4 70.7 74.7 65.7   GLUCOSE 102* 124* 173* 193* 193* 230* 308* 289*   CALCIUM 8.6 9.0 8.2* 8.3* 8.2* 8.4* 8.7 9.0   MAGNESIUM 1.8 1.7 1.6 1.7 1.8 2.2 2.3 1.5*   PHOSPHORUS 3.5 3.3  --  3.3 2.8 2.8  --  2.3*   HEMOGLOBIN A1C  --   --   --   --   --   --  11.50*  --    TSH  --   --   --   --   --   --   --  1.180         Lab 04/26/25  0428 04/25/25  0506 04/24/25  1114 04/23/25  0417 04/22/25  0534   TOTAL PROTEIN 6.9 7.4 6.6 6.7 6.5   ALBUMIN 2.9* 3.2* 2.9* 3.0* 3.2*   GLOBULIN 4.0 4.2 3.7  3.7 3.3   ALT (SGPT) 44* 54* 49* 35 37   AST (SGOT) 76* 105* 105* 59* 62*   BILIRUBIN 1.1 1.0 1.1 0.7 1.2   ALK PHOS 279* 299* 240* 237* 249*         Lab 04/19/25  1604   PROTIME 16.6*   INR 1.26*             Lab 04/20/25  0508   IRON 30*   IRON SATURATION (TSAT) 10*   TIBC 310   TRANSFERRIN 208   FERRITIN 594.00*   FOLATE >20.00   VITAMIN B 12 >2,000*         Brief Urine Lab Results  (Last result in the past 365 days)        Color   Clarity   Blood   Leuk Est   Nitrite   Protein   CREAT   Urine HCG        04/21/25 1742 Yellow   Clear   Negative   Negative   Negative   Negative                   Microbiology Results Abnormal       None            CT Head Without Contrast  Result Date: 4/24/2025  CT HEAD WO CONTRAST Date of Exam: 4/24/2025 4:14 PM EDT Indication: AMS. Comparison: None available. Technique: Axial CT images were obtained of the head without contrast administration.  Automated exposure control and iterative construction methods were used. Findings: No acute intracranial hemorrhage.Intact appearing gray-white differentiation.No extra-axial fluid collection.No significant mass effect. No hydrocephalus. Mild generalized parenchymal volume loss. Scattered areas of periventricular and subcortical white matter hypoattenuation, nonspecific, perhaps from small vessel ischemic/hypertensive changes in a patient of this age.There are intracranial atherosclerotic calcifications. Mild scattered mucosal thickening in the paranasal sinuses.Mastoid air cells are essentially clear.. Bilateral lens replacements. Presumed intraocular silicone on the left. No acute or aggressive appearing bony or extracranial soft tissue process.     Impression: Impression: No acute intracranial findings. Electronically Signed: Gallito Hammonds MD  4/24/2025 4:49 PM EDT  Workstation ID: [id number]    XR Chest 1 View  Result Date: 4/24/2025  XR CHEST 1 VW, XR SHOULDER 2+ VW RIGHT, XR ELBOW 3+ VW LEFT Date of Exam: 4/24/2025 10:32 AM EDT  Indication: sob right shoulder pain after fall, severe pain and redness left elbow Comparison: Chest 4/21/2025, right shoulder 4/19/2025 Findings: Chest: Heart size is normal for the projection. The pulmonary vascular pattern is normal and the lungs appear clear. Right shoulder: There are degenerative changes at the glenohumeral and AC joint. Findings are unchanged from the last study. There is osteopenia. No fractures are identified. Left elbow: There are degenerative changes of the elbow joint. There is ossification of the triceps insertion. There is no joint effusion and there are no fractures or destructive lesions. There is soft tissue swelling overlying the olecranon.     Impression: Impression: 1.No radiographic evidence of acute cardiopulmonary disease. 2.Degenerative changes of the right shoulder and left elbow. 3.Soft tissue swelling overlying the left olecranon, probably representing olecranon bursitis. 4.No fractures or destructive lesions. Electronically Signed: Nima Phillip MD  4/24/2025 11:15 AM EDT  Workstation ID: [id number]    XR Elbow 3+ View Left  Result Date: 4/24/2025  XR CHEST 1 VW, XR SHOULDER 2+ VW RIGHT, XR ELBOW 3+ VW LEFT Date of Exam: 4/24/2025 10:32 AM EDT Indication: sob right shoulder pain after fall, severe pain and redness left elbow Comparison: Chest 4/21/2025, right shoulder 4/19/2025 Findings: Chest: Heart size is normal for the projection. The pulmonary vascular pattern is normal and the lungs appear clear. Right shoulder: There are degenerative changes at the glenohumeral and AC joint. Findings are unchanged from the last study. There is osteopenia. No fractures are identified. Left elbow: There are degenerative changes of the elbow joint. There is ossification of the triceps insertion. There is no joint effusion and there are no fractures or destructive lesions. There is soft tissue swelling overlying the olecranon.     Impression: Impression: 1.No radiographic evidence of  acute cardiopulmonary disease. 2.Degenerative changes of the right shoulder and left elbow. 3.Soft tissue swelling overlying the left olecranon, probably representing olecranon bursitis. 4.No fractures or destructive lesions. Electronically Signed: Nima Phillip MD  4/24/2025 11:15 AM EDT  Workstation ID: [id number]    XR Shoulder 2+ View Right  Result Date: 4/24/2025  XR CHEST 1 VW, XR SHOULDER 2+ VW RIGHT, XR ELBOW 3+ VW LEFT Date of Exam: 4/24/2025 10:32 AM EDT Indication: sob right shoulder pain after fall, severe pain and redness left elbow Comparison: Chest 4/21/2025, right shoulder 4/19/2025 Findings: Chest: Heart size is normal for the projection. The pulmonary vascular pattern is normal and the lungs appear clear. Right shoulder: There are degenerative changes at the glenohumeral and AC joint. Findings are unchanged from the last study. There is osteopenia. No fractures are identified. Left elbow: There are degenerative changes of the elbow joint. There is ossification of the triceps insertion. There is no joint effusion and there are no fractures or destructive lesions. There is soft tissue swelling overlying the olecranon.     Impression: Impression: 1.No radiographic evidence of acute cardiopulmonary disease. 2.Degenerative changes of the right shoulder and left elbow. 3.Soft tissue swelling overlying the left olecranon, probably representing olecranon bursitis. 4.No fractures or destructive lesions. Electronically Signed: Nima Phillip MD  4/24/2025 11:15 AM EDT  Workstation ID: [id number]            Current medications:  Scheduled Meds:carvedilol, 6.25 mg, Oral, BID With Meals  cefTRIAXone, 2,000 mg, Intravenous, Q24H  colchicine, 1.2 mg, Oral, Daily  DAPTOmycin, 6 mg/kg (Adjusted), Intravenous, Q24H  dorzolamide (TRUSOPT) 2 % 1 drop, timolol (TIMOPTIC) 0.5 % 1 drop for Cosopt 22.3-6.8 mg/mL, , Left Eye, BID  enoxaparin sodium, 40 mg, Subcutaneous, Daily  gabapentin, 100 mg, Oral, Nightly  insulin  glargine, 40 Units, Subcutaneous, Daily  Insulin Lispro, 10 Units, Subcutaneous, TID With Meals  insulin lispro, 2-9 Units, Subcutaneous, 4x Daily AC & at Bedtime  lactulose, 30 g, Oral, TID  Lidocaine, 1 patch, Transdermal, Q24H  losartan, 100 mg, Oral, Q24H  pantoprazole, 40 mg, Oral, Q AM  rifAXIMin, 550 mg, Oral, BID  sodium chloride, 10 mL, Intravenous, Q12H  tamsulosin, 0.4 mg, Oral, Daily      Continuous Infusions:Pharmacy Consult,         PRN Meds:.  [DISCONTINUED] acetaminophen **OR** acetaminophen **OR** acetaminophen    Calcium Replacement - Follow Nurse / BPA Driven Protocol    dextrose    dextrose    glucagon (human recombinant)    Magnesium Standard Dose Replacement - Follow Nurse / BPA Driven Protocol    nitroglycerin    oxyCODONE    Pharmacy Consult    Phosphorus Replacement - Follow Nurse / BPA Driven Protocol    Potassium Replacement - Follow Nurse / BPA Driven Protocol    sodium chloride    sodium chloride    Assessment & Plan   Assessment & Plan     Active Hospital Problems    Diagnosis  POA    **Hepatic encephalopathy [K76.82]  Yes      Resolved Hospital Problems   No resolved problems to display.        Brief Hospital Course to date:  Jimmy Gabehart is a 70 y.o. male with past medical history of insulin-dependent diabetes, hypertension, recent diagnosis of liver cirrhosis, thrombocytopenia, chronic pain presenting with altered mental status and fall at home.     Acute hepatic encephalopathy, improving  Decompensated liver cirrhosis  Patient with remote history of alcohol use. New diagnosis of Stage F4 cirrhosis based on FibroScan in March 2023. Following with GI as out patient  Presented with acute encephalopathy secondary to hepatic encephalopathy  Somewhat improved with lactulose  Lactulose was ordered as needed.  Will make a schedule given his worsening encephalopathy today  Continue rifaximin  Patient family advised to keep his follow-up with GI as outpatient.  Will need EGD  Normal TSH and  B12  CT head with no acute intracranial pathology  Reduce gabapentin to 100 mg nightly given his persistent confusion  Discontinue baclofen upon discharge because of his advanced liver disease    Left elbow bursitis  Patient had a fever of 101.1 around midnight of 4/21/2025.  Now with recurrent fever of 100.7 night of 4/24/2025  Elevated procalcitonin, CRP and sed rate  Negative blood culture.  Normal lactic acid, Normal chest x-ray and normal UA, Negative respiratory panel  X-ray left elbow with olecranon bursitis  Continue empiric antibiotic therapy with Rocephin and daptomycin  Follow cultures  Continue to monitor inflammatory markers  Discussed with Dr. Rainey/orthopedic.  Appreciate help    Elevated D-dimer  CTA chest pending    Acute on chronic right shoulder pain  Possible rotator cuff tear   Fall at home landing on right shoulder  Has been evaluated by orthopedic surgery outpatient.  Patient was found to have a tear in his right shoulder.  Patient was deemed a poor surgical candidate and nonoperative approach was indicated.  X-ray right shoulder with no evidence of acute fracture  Continue supportive care  Discussed with Dr. Rainey/orthopedic.  Patient will need right shoulder replacement versus rotator cuff repair in the near future.  No plan for surgical intervention during this hospitalization     Anemia of chronic disease  Severe iron deficiency, iron saturation 2%  Hemoglobin is slowly trending down.  It was 10.8 on admission.  Currently stable around 8-8.5  No evidence of GI bleed  Iron studies consistent with severe iron deficiency anemia  Status post IV iron infusion   Monitor CBC    Type 2 diabetes  A1c 11.5%  Continue insulin glargine 50 units daily.  Continue Premeal insulin 10 units 3 times daily and continue SSI  Hold Januvia and metformin   The patient drinks plenty of ski (still drink rich in sugar) and eats a quart of gallon of ice cream daily.  Theand family counseled about the  importance to comply with diabetic diet    Hypertension  Continue home Coreg and losartan in the morning     GERD  Continue PPI     BPH  Continue tamsulosin     Hx of thrombocytopenia  Follows with Heme/Onc outpatient. 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 Will need to follow up outpatient for biopsy results.   Continue SCD for DVT prophylaxis    Weakness and debility  PT/OT recommended short-term rehab     Expected Discharge Location and Transportation: Inpatient rehab  Expected Discharge   Expected Discharge Date: 4/25/2025; Expected Discharge Time:      VTE Prophylaxis:  Pharmacologic & mechanical VTE prophylaxis orders are present.         AM-PAC 6 Clicks Score (PT): 20 (04/25/25 2009)    CODE STATUS:   Code Status and Medical Interventions: CPR (Attempt to Resuscitate); Full Support   Ordered at: 04/19/25 1349     Code Status (Patient has no pulse and is not breathing):    CPR (Attempt to Resuscitate)     Medical Interventions (Patient has pulse or is breathing):    Full Support     Level Of Support Discussed With:    Next of Kin (If No Surrogate)       Bailey Howell MD  04/26/25

## 2025-04-27 LAB
ALBUMIN SERPL-MCNC: 3 G/DL (ref 3.5–5.2)
ALBUMIN/GLOB SERPL: 0.9 G/DL
ALP SERPL-CCNC: 286 U/L (ref 39–117)
ALT SERPL W P-5'-P-CCNC: 36 U/L (ref 1–41)
ANION GAP SERPL CALCULATED.3IONS-SCNC: 12 MMOL/L (ref 5–15)
AST SERPL-CCNC: 55 U/L (ref 1–40)
BASOPHILS # BLD AUTO: 0.01 10*3/MM3 (ref 0–0.2)
BASOPHILS NFR BLD AUTO: 0.4 % (ref 0–1.5)
BILIRUB SERPL-MCNC: 0.8 MG/DL (ref 0–1.2)
BUN SERPL-MCNC: 22 MG/DL (ref 8–23)
BUN/CREAT SERPL: 18.8 (ref 7–25)
CALCIUM SPEC-SCNC: 8.3 MG/DL (ref 8.6–10.5)
CHLORIDE SERPL-SCNC: 109 MMOL/L (ref 98–107)
CO2 SERPL-SCNC: 18 MMOL/L (ref 22–29)
CREAT SERPL-MCNC: 1.17 MG/DL (ref 0.76–1.27)
CRP SERPL-MCNC: 5.67 MG/DL (ref 0–0.5)
DEPRECATED RDW RBC AUTO: 54.3 FL (ref 37–54)
EGFRCR SERPLBLD CKD-EPI 2021: 67.1 ML/MIN/1.73
EOSINOPHIL # BLD AUTO: 0.04 10*3/MM3 (ref 0–0.4)
EOSINOPHIL NFR BLD AUTO: 1.5 % (ref 0.3–6.2)
ERYTHROCYTE [DISTWIDTH] IN BLOOD BY AUTOMATED COUNT: 13.6 % (ref 12.3–15.4)
ERYTHROCYTE [SEDIMENTATION RATE] IN BLOOD: 47 MM/HR (ref 0–20)
GLOBULIN UR ELPH-MCNC: 3.2 GM/DL
GLUCOSE BLDC GLUCOMTR-MCNC: 135 MG/DL (ref 70–130)
GLUCOSE BLDC GLUCOMTR-MCNC: 142 MG/DL (ref 70–130)
GLUCOSE BLDC GLUCOMTR-MCNC: 187 MG/DL (ref 70–130)
GLUCOSE BLDC GLUCOMTR-MCNC: 266 MG/DL (ref 70–130)
GLUCOSE SERPL-MCNC: 120 MG/DL (ref 65–99)
HCT VFR BLD AUTO: 25.7 % (ref 37.5–51)
HGB BLD-MCNC: 8 G/DL (ref 13–17.7)
IMM GRANULOCYTES # BLD AUTO: 0.01 10*3/MM3 (ref 0–0.05)
IMM GRANULOCYTES NFR BLD AUTO: 0.4 % (ref 0–0.5)
LYMPHOCYTES # BLD AUTO: 1.32 10*3/MM3 (ref 0.7–3.1)
LYMPHOCYTES NFR BLD AUTO: 49.4 % (ref 19.6–45.3)
MAGNESIUM SERPL-MCNC: 1.7 MG/DL (ref 1.6–2.4)
MCH RBC QN AUTO: 33.9 PG (ref 26.6–33)
MCHC RBC AUTO-ENTMCNC: 31.1 G/DL (ref 31.5–35.7)
MCV RBC AUTO: 108.9 FL (ref 79–97)
MONOCYTES # BLD AUTO: 0.51 10*3/MM3 (ref 0.1–0.9)
MONOCYTES NFR BLD AUTO: 19.1 % (ref 5–12)
NEUTROPHILS NFR BLD AUTO: 0.78 10*3/MM3 (ref 1.7–7)
NEUTROPHILS NFR BLD AUTO: 29.2 % (ref 42.7–76)
NRBC BLD AUTO-RTO: 0 /100 WBC (ref 0–0.2)
PHOSPHATE SERPL-MCNC: 3.1 MG/DL (ref 2.5–4.5)
PLATELET # BLD AUTO: 113 10*3/MM3 (ref 140–450)
PMV BLD AUTO: 11.7 FL (ref 6–12)
POTASSIUM SERPL-SCNC: 4.1 MMOL/L (ref 3.5–5.2)
PROT SERPL-MCNC: 6.2 G/DL (ref 6–8.5)
RBC # BLD AUTO: 2.36 10*6/MM3 (ref 4.14–5.8)
SODIUM SERPL-SCNC: 139 MMOL/L (ref 136–145)
WBC NRBC COR # BLD AUTO: 2.67 10*3/MM3 (ref 3.4–10.8)

## 2025-04-27 PROCEDURE — 63710000001 INSULIN LISPRO (HUMAN) PER 5 UNITS: Performed by: INTERNAL MEDICINE

## 2025-04-27 PROCEDURE — 25010000002 ENOXAPARIN PER 10 MG: Performed by: INTERNAL MEDICINE

## 2025-04-27 PROCEDURE — 99231 SBSQ HOSP IP/OBS SF/LOW 25: CPT | Performed by: STUDENT IN AN ORGANIZED HEALTH CARE EDUCATION/TRAINING PROGRAM

## 2025-04-27 PROCEDURE — 84100 ASSAY OF PHOSPHORUS: CPT | Performed by: INTERNAL MEDICINE

## 2025-04-27 PROCEDURE — 83735 ASSAY OF MAGNESIUM: CPT | Performed by: INTERNAL MEDICINE

## 2025-04-27 PROCEDURE — 63710000001 INSULIN GLARGINE PER 5 UNITS: Performed by: INTERNAL MEDICINE

## 2025-04-27 PROCEDURE — 25010000002 DAPTOMYCIN PER 1 MG

## 2025-04-27 PROCEDURE — 94799 UNLISTED PULMONARY SVC/PX: CPT

## 2025-04-27 PROCEDURE — 25010000002 CEFTRIAXONE PER 250 MG: Performed by: INTERNAL MEDICINE

## 2025-04-27 PROCEDURE — 85025 COMPLETE CBC W/AUTO DIFF WBC: CPT | Performed by: INTERNAL MEDICINE

## 2025-04-27 PROCEDURE — 99232 SBSQ HOSP IP/OBS MODERATE 35: CPT | Performed by: INTERNAL MEDICINE

## 2025-04-27 PROCEDURE — 63710000001 INSULIN LISPRO (HUMAN) PER 5 UNITS: Performed by: STUDENT IN AN ORGANIZED HEALTH CARE EDUCATION/TRAINING PROGRAM

## 2025-04-27 PROCEDURE — 86140 C-REACTIVE PROTEIN: CPT | Performed by: INTERNAL MEDICINE

## 2025-04-27 PROCEDURE — 82948 REAGENT STRIP/BLOOD GLUCOSE: CPT

## 2025-04-27 PROCEDURE — 85652 RBC SED RATE AUTOMATED: CPT | Performed by: INTERNAL MEDICINE

## 2025-04-27 PROCEDURE — 94660 CPAP INITIATION&MGMT: CPT

## 2025-04-27 PROCEDURE — 80053 COMPREHEN METABOLIC PANEL: CPT | Performed by: INTERNAL MEDICINE

## 2025-04-27 RX ORDER — LACTULOSE 10 G/15ML
30 SOLUTION ORAL DAILY
Status: DISCONTINUED | OUTPATIENT
Start: 2025-04-28 | End: 2025-04-29

## 2025-04-27 RX ADMIN — Medication 10 ML: at 08:48

## 2025-04-27 RX ADMIN — INSULIN LISPRO 6 UNITS: 100 INJECTION, SOLUTION INTRAVENOUS; SUBCUTANEOUS at 12:24

## 2025-04-27 RX ADMIN — PANTOPRAZOLE SODIUM 40 MG: 40 TABLET, DELAYED RELEASE ORAL at 06:25

## 2025-04-27 RX ADMIN — Medication 10 ML: at 21:46

## 2025-04-27 RX ADMIN — TAMSULOSIN HYDROCHLORIDE 0.4 MG: 0.4 CAPSULE ORAL at 08:48

## 2025-04-27 RX ADMIN — DORZOLAMIDE HYDROCHLORIDE: 20 SOLUTION/ DROPS OPHTHALMIC at 21:44

## 2025-04-27 RX ADMIN — DORZOLAMIDE HYDROCHLORIDE: 20 SOLUTION/ DROPS OPHTHALMIC at 08:47

## 2025-04-27 RX ADMIN — RIFAXIMIN 550 MG: 550 TABLET ORAL at 21:43

## 2025-04-27 RX ADMIN — CARVEDILOL 6.25 MG: 6.25 TABLET, FILM COATED ORAL at 08:48

## 2025-04-27 RX ADMIN — INSULIN GLARGINE 30 UNITS: 100 INJECTION, SOLUTION SUBCUTANEOUS at 08:49

## 2025-04-27 RX ADMIN — INSULIN LISPRO 10 UNITS: 100 INJECTION, SOLUTION INTRAVENOUS; SUBCUTANEOUS at 12:24

## 2025-04-27 RX ADMIN — RIFAXIMIN 550 MG: 550 TABLET ORAL at 08:48

## 2025-04-27 RX ADMIN — OXYCODONE HYDROCHLORIDE 10 MG: 10 TABLET ORAL at 18:18

## 2025-04-27 RX ADMIN — LACTULOSE 30 G: 20 SOLUTION ORAL at 08:47

## 2025-04-27 RX ADMIN — CARVEDILOL 6.25 MG: 6.25 TABLET, FILM COATED ORAL at 17:22

## 2025-04-27 RX ADMIN — COLCHICINE 1.2 MG: 0.6 TABLET ORAL at 08:48

## 2025-04-27 RX ADMIN — DAPTOMYCIN 550 MG: 500 INJECTION, POWDER, LYOPHILIZED, FOR SOLUTION INTRAVENOUS at 15:13

## 2025-04-27 RX ADMIN — INSULIN LISPRO 10 UNITS: 100 INJECTION, SOLUTION INTRAVENOUS; SUBCUTANEOUS at 17:22

## 2025-04-27 RX ADMIN — INSULIN LISPRO 2 UNITS: 100 INJECTION, SOLUTION INTRAVENOUS; SUBCUTANEOUS at 22:28

## 2025-04-27 RX ADMIN — LOSARTAN POTASSIUM 100 MG: 50 TABLET, FILM COATED ORAL at 08:48

## 2025-04-27 RX ADMIN — INSULIN LISPRO 10 UNITS: 100 INJECTION, SOLUTION INTRAVENOUS; SUBCUTANEOUS at 08:49

## 2025-04-27 RX ADMIN — LIDOCAINE 1 PATCH: 4 PATCH TOPICAL at 08:47

## 2025-04-27 RX ADMIN — CEFTRIAXONE 2000 MG: 2 INJECTION, POWDER, FOR SOLUTION INTRAMUSCULAR; INTRAVENOUS at 15:12

## 2025-04-27 RX ADMIN — ENOXAPARIN SODIUM 40 MG: 100 INJECTION SUBCUTANEOUS at 08:48

## 2025-04-27 RX ADMIN — GABAPENTIN 100 MG: 100 CAPSULE ORAL at 21:43

## 2025-04-27 NOTE — PROGRESS NOTES
"          Orthopaedic Surgery Progress Note      LOS: 8 days   Patient Care Team:  Miriam Mckeon DO as PCP - General (Family Medicine)  Liz Kaur APRN as Nurse Practitioner (Nurse Practitioner)  Avery Scanlon MD as Consulting Physician (Cardiology)    Subjective     CC:  Right shoulder pain    Interval History:   Patient reports that his left elbow pain has significantly improved.  He continues to have right shoulder pain is largely unchanged.  He overall does feel that he is improving with time.  No other complaints.    Objective     Vital Signs:  Temp (24hrs), Av.2 °F (36.8 °C), Min:98 °F (36.7 °C), Max:98.4 °F (36.9 °C)    /77 (BP Location: Right arm, Patient Position: Sitting)   Pulse 70   Temp 98 °F (36.7 °C) (Oral)   Resp 18   Ht 188 cm (74\")   Wt 116 kg (255 lb 4.7 oz)   SpO2 91%   BMI 32.78 kg/m²     Labs:  Lab Results (last 24 hours)       Procedure Component Value Units Date/Time    POC Glucose Once [[id number]]  (Abnormal) Collected: 25 0746    Specimen: Blood Updated: 25 0747     Glucose 142 mg/dL     Comprehensive Metabolic Panel [[id number]]  (Abnormal) Collected: 25 0523    Specimen: Blood Updated: 25 0639     Glucose 120 mg/dL      BUN 22 mg/dL      Creatinine 1.17 mg/dL      Sodium 139 mmol/L      Potassium 4.1 mmol/L      Chloride 109 mmol/L      CO2 18.0 mmol/L      Calcium 8.3 mg/dL      Total Protein 6.2 g/dL      Albumin 3.0 g/dL      ALT (SGPT) 36 U/L      AST (SGOT) 55 U/L      Alkaline Phosphatase 286 U/L      Total Bilirubin 0.8 mg/dL      Globulin 3.2 gm/dL      Comment: Calculated Result        A/G Ratio 0.9 g/dL      BUN/Creatinine Ratio 18.8     Anion Gap 12.0 mmol/L      eGFR 67.1 mL/min/1.73     Narrative:      GFR Categories in Chronic Kidney Disease (CKD)      GFR Category          GFR (mL/min/1.73)    Interpretation  G1                     90 or greater         Normal or high (1)  G2                      60-89             "    Mild decrease (1)  G3a                   45-59                Mild to moderate decrease  G3b                   30-44                Moderate to severe decrease  G4                    15-29                Severe decrease  G5                    14 or less           Kidney failure          (1)In the absence of evidence of kidney disease, neither GFR category G1 or G2 fulfill the criteria for CKD.    eGFR calculation 2021 CKD-EPI creatinine equation, which does not include race as a factor    Magnesium [[id number]]  (Normal) Collected: 04/27/25 0523    Specimen: Blood Updated: 04/27/25 0639     Magnesium 1.7 mg/dL     Phosphorus [[id number]]  (Normal) Collected: 04/27/25 0523    Specimen: Blood Updated: 04/27/25 0639     Phosphorus 3.1 mg/dL     C-reactive Protein [[id number]]  (Abnormal) Collected: 04/27/25 0523    Specimen: Blood Updated: 04/27/25 0639     C-Reactive Protein 5.67 mg/dL     Sedimentation Rate [[id number]]  (Abnormal) Collected: 04/27/25 0523    Specimen: Blood Updated: 04/27/25 0633     Sed Rate 47 mm/hr     CBC & Differential [[id number]]  (Abnormal) Collected: 04/27/25 0523    Specimen: Blood Updated: 04/27/25 0602    Narrative:      The following orders were created for panel order CBC & Differential.  Procedure                               Abnormality         Status                     ---------                               -----------         ------                     CBC Auto Differential[[id number]]        Abnormal            Final result               Scan Slide[[id number]]                                                                    Please view results for these tests on the individual orders.    CBC Auto Differential [[id number]]  (Abnormal) Collected: 04/27/25 0523    Specimen: Blood Updated: 04/27/25 0601     WBC 2.67 10*3/mm3      RBC 2.36 10*6/mm3      Hemoglobin 8.0 g/dL      Hematocrit 25.7 %      .9 fL      MCH 33.9 pg      MCHC 31.1 g/dL      RDW 13.6 %      RDW-SD 54.3 fl       MPV 11.7 fL      Platelets 113 10*3/mm3      Neutrophil % 29.2 %      Lymphocyte % 49.4 %      Monocyte % 19.1 %      Eosinophil % 1.5 %      Basophil % 0.4 %      Immature Grans % 0.4 %      Neutrophils, Absolute 0.78 10*3/mm3      Lymphocytes, Absolute 1.32 10*3/mm3      Monocytes, Absolute 0.51 10*3/mm3      Eosinophils, Absolute 0.04 10*3/mm3      Basophils, Absolute 0.01 10*3/mm3      Immature Grans, Absolute 0.01 10*3/mm3      nRBC 0.0 /100 WBC     POC Glucose Once [[id number]]  (Abnormal) Collected: 04/26/25 2030    Specimen: Blood Updated: 04/26/25 2031     Glucose 198 mg/dL     Blood Culture - Blood, Arm, Left [[id number]]  (Normal) Collected: 04/21/25 1817    Specimen: Blood from Arm, Left Updated: 04/26/25 1946     Blood Culture No growth at 5 days    Blood Culture - Blood, Arm, Right [[id number]]  (Normal) Collected: 04/25/25 1536    Specimen: Blood from Arm, Right Updated: 04/26/25 1745     Blood Culture No growth at 24 hours    POC Glucose Once [[id number]]  (Abnormal) Collected: 04/26/25 1646    Specimen: Blood Updated: 04/26/25 1648     Glucose 155 mg/dL     POC Glucose Once [[id number]]  (Abnormal) Collected: 04/26/25 1129    Specimen: Blood Updated: 04/26/25 1131     Glucose 158 mg/dL     D-dimer, Quantitative [[id number]]  (Abnormal) Collected: 04/26/25 1059    Specimen: Blood Updated: 04/26/25 1122     D-Dimer, Quantitative 2.84 MCGFEU/mL     Narrative:      According to the assay 's published package insert, a normal (<0.50 MCGFEU/mL) D-dimer result in conjunction with a non-high clinical probability assessment, excludes deep vein thrombosis (DVT) and pulmonary embolism (PE) with high sensitivity.    D-dimer values increase with age and this can make VTE exclusion of an older population difficult. To address this, the [employer], based on best available evidence and recent guidelines, recommends that clinicians use age-adjusted D-dimer thresholds in patients greater  "than 50 years of age with: a) a low probability of PE who do not meet all Pulmonary Embolism Rule Out Criteria, or b) in those with intermediate probability of PE.   The formula for an age-adjusted D-dimer cut-off is \"age/100\".  For example, a 60 year old patient would have an age-adjusted cut-off of 0.60 MCGFEU/mL and an 80 year old 0.80 MCGFEU/mL.            Physical Exam:  Examination of the left upper extremity demonstrates improvements in the erythema and swelling along the posterior aspect of the elbow.  No palpable bursal fluid.  Painless active and passive short arc range of motion of the elbow.  Full elbow flexion, extension, pronation and supination.  Sensations intact light touch of the hand.  Warm well-perfused distally.    Examination of the right upper extremity demonstrates no deformity.  There is a small area of swelling and bruising in the antecubital fossa at site of prior IV insertion.  This is minimally tender to palpation.  He is diffusely nontender to palpation throughout the shoulder girdle.  Unchanged shoulder range of motion, 80 degrees of forward flexion and 60 degrees of abduction.  Limited internal and external rotation secondary to pain.  Rotator cuff not assessed due to pain.  Sensations intact light touch of the hand.  Warm well-perfused distally.    Assessment & Plan     Jimmy Gabehart is a 70-year-old male with right glenohumeral joint rotator cuff arthropathy and improved left elbow cellulitis versus olecranon bursitis.    - Weight bearing status: Weight-bear as tolerated bilateral upper extremities.  Ace wrap compression and ice to the left elbow.  - Pain control: Per medicine  - DVT PPX: Mechanical, lovenox  - ABX: Rocephin, daptomycin  - Diet: Regular  - Medical management and optimization   - PT/OT  - The patient has had clinical improvement to his left elbow.  His right shoulder pain has stabilized.  No indications for operative interventions.  The patient is an established " patient at [facility] orthopedics and he may follow-up with them regarding his right shoulder arthritis or with [facility] orthopedics.  Outpatient orthopedic follow-up recommended for his right shoulder and outpatient primary care follow-up for his left elbow.  Please call with any additional questions or concerns.    Kirsten Rainey MD  04/27/25  11:00 EDT

## 2025-04-27 NOTE — PROGRESS NOTES
[facility] Medicine Services  PROGRESS NOTE    Patient Name: Jimmy Gabehart  : 1955  MRN: [id number]    Date of Admission: 2025  Primary Care Physician: Miriam Mckeon DO    Subjective   Subjective     CC:  Follow-up for hepatic encephalopathy    HPI:  Patient seen and examined this morning.  She is looking and feeling much better today.  Left arm/elbow pain and swelling resolved.  Right shoulder pain is also improving.  Fully awake and alert today.  Son at bedside reported that his father looks much better    Objective   Objective     Vital Signs:   Temp:  [98 °F (36.7 °C)-98.4 °F (36.9 °C)] 98 °F (36.7 °C)  Heart Rate:  [] 70  Resp:  [18-20] 20  BP: (115-143)/(49-77) 143/60     Physical Exam:  General: Comfortable, not in distress, conversant and cooperative  Head: Atraumatic and normocephalic  Eyes: No Icterus. No pallor  Ears:  Ears appear intact with no abnormalities noted  Throat: No oral lesions, no thrush  Neck: Supple, trachea midline  Lungs: Clear to auscultation bilaterally, equal air entry, no wheezing or crackles  Heart:  Normal S1 and S2, no murmur, no gallop, No JVD, no lower extremity swelling  Abdomen:  Soft, no tenderness, no organomegaly, normal bowel sounds, no organomegaly  Extremities: Improved left bursitis  Skin: No bleeding, bruising or rash, normal skin turgor and elasticity  Neurologic: Cranial nerves appear intact with no evidence of facial asymmetry, normal motor and sensory functions in all 4 extremities  Psych: Alert and oriented x3    Results Reviewed:  LAB RESULTS:      Lab 25  0523 25  1059 25  0428 25  0506 25  1114 25  0417 25  0534 25  1816 25  0448   WBC 2.67*  --  4.19 4.81 3.97 3.48 4.68  --  4.89   HEMOGLOBIN 8.0*  --  8.5* 9.4* 8.6* 8.6* 9.1*  --  9.3*   HEMATOCRIT 25.7*  --  27.0* 28.6* 26.6* 27.6* 28.6*  --  29.9*   PLATELETS 113*  --  114* 118* 104* 82* 86*  --  73*    NEUTROS ABS 0.78*  --  1.72 1.94 1.61* 1.40* 2.82  --  2.47   IMMATURE GRANS (ABS) 0.01  --   --  0.03 0.03 0.02 0.04  --  0.04   LYMPHS ABS 1.32  --   --  1.96 1.56 1.54 1.21  --  1.70   MONOS ABS 0.51  --   --  0.82 0.69 0.46 0.55  --  0.64   EOS ABS 0.04  --  0.17 0.06 0.07 0.06 0.05  --  0.03   .9*  --  111.1* 107.1* 108.6* 110.0* 107.9*  --  111.6*   SED RATE 47*  --   --  85* 73*  --   --   --   --    CRP 5.67*  --  8.20* 7.87* 6.25* 8.77* 9.27*  --  13.52*   PROCALCITONIN  --   --  0.30*  --  0.33*  --  0.36*  --  0.46*   LACTATE  --   --   --   --   --   --   --  1.9  --    D DIMER QUANT  --  2.84*  --   --   --   --   --   --   --          Lab 04/27/25  0523 04/26/25  0428 04/25/25  0506 04/24/25  1114 04/23/25  0417 04/22/25  0534   SODIUM 139 134* 135* 132* 133* 134*   POTASSIUM 4.1 4.3 4.6 4.5 4.2 4.4   CHLORIDE 109* 103 105 101 104 101   CO2 18.0* 17.0* 18.0* 19.0* 18.0* 20.0*   ANION GAP 12.0 14.0 12.0 12.0 11.0 13.0   BUN 22 29* 26* 23 25* 21   CREATININE 1.17 1.47* 1.30* 1.35* 1.32* 1.26   EGFR 67.1 51.0* 59.1* 56.5* 58.0* 61.4   GLUCOSE 120* 102* 124* 173* 193* 193*   CALCIUM 8.3* 8.6 9.0 8.2* 8.3* 8.2*   MAGNESIUM 1.7 1.8 1.7 1.6 1.7 1.8   PHOSPHORUS 3.1 3.5 3.3  --  3.3 2.8         Lab 04/27/25  0523 04/26/25  0428 04/25/25  0506 04/24/25  1114 04/23/25  0417   TOTAL PROTEIN 6.2 6.9 7.4 6.6 6.7   ALBUMIN 3.0* 2.9* 3.2* 2.9* 3.0*   GLOBULIN 3.2 4.0 4.2 3.7 3.7   ALT (SGPT) 36 44* 54* 49* 35   AST (SGOT) 55* 76* 105* 105* 59*   BILIRUBIN 0.8 1.1 1.0 1.1 0.7   ALK PHOS 286* 279* 299* 240* 237*                         Brief Urine Lab Results  (Last result in the past 365 days)        Color   Clarity   Blood   Leuk Est   Nitrite   Protein   CREAT   Urine HCG        04/21/25 1742 Yellow   Clear   Negative   Negative   Negative   Negative                   Microbiology Results Abnormal       None            CT Angiogram Chest Pulmonary Embolism  Result Date: 4/26/2025  CT ANGIOGRAM CHEST  PULMONARY EMBOLISM Date of Exam: 4/26/2025 5:02 PM EDT Indication: Pulmonary embolism (PE) suspected, high prob, shortness of breath. Comparison: None available. Technique: Axial CT images were obtained of the chest after the uneventful intravenous administration of 160 mL Isovue-370 utilizing pulmonary embolism protocol.  In addition, a 3-D volume rendered image was created for interpretation.  Reconstructed coronal and sagittal images were also obtained. Automated exposure control and iterative construction methods were used. Findings: Exam was repeated due to suboptimal contrast opacification of pulmonary arteries on initial imaging. Evaluation of segmental and subsegmental pulmonary arteries is limited by respiratory motion artifact, but no central pulmonary emboli are seen in the main or lobar pulmonary arteries. Main pulmonary artery is not dilated. Heart size is mildly enlarged. No pericardial or pleural effusion.  Mild patchy groundglass opacities in the right lower lobe. Lungs otherwise clear. Nodular liver contour and splenomegaly in the partially included upper abdomen. Small right renal cysts. No acute osseous abnormality is identified. Severe arthritic changes of the right shoulder.     Impression: Impression: 1.No central pulmonary emboli seen. Evaluation of segmental/subsegmental pulmonary arteries limited by respiratory motion artifact. 2.Mild patchy groundglass opacities in the right lower lobe, likely due to an infectious/inflammatory process. 3.Cirrhotic liver morphology with splenomegaly. Electronically Signed: Joan Radford MD  4/26/2025 5:40 PM EDT  Workstation ID: [id number]            Current medications:  Scheduled Meds:carvedilol, 6.25 mg, Oral, BID With Meals  cefTRIAXone, 2,000 mg, Intravenous, Q24H  colchicine, 1.2 mg, Oral, Daily  DAPTOmycin, 6 mg/kg (Adjusted), Intravenous, Q24H  dorzolamide (TRUSOPT) 2 % 1 drop, timolol (TIMOPTIC) 0.5 % 1 drop for Cosopt 22.3-6.8 mg/mL, , Left Eye,  BID  enoxaparin sodium, 40 mg, Subcutaneous, Daily  gabapentin, 100 mg, Oral, Nightly  insulin glargine, 30 Units, Subcutaneous, Daily  Insulin Lispro, 10 Units, Subcutaneous, TID With Meals  insulin lispro, 2-9 Units, Subcutaneous, 4x Daily AC & at Bedtime  [START ON 4/28/2025] lactulose, 30 g, Oral, Daily  Lidocaine, 1 patch, Transdermal, Q24H  losartan, 100 mg, Oral, Q24H  pantoprazole, 40 mg, Oral, Q AM  rifAXIMin, 550 mg, Oral, BID  sodium chloride, 10 mL, Intravenous, Q12H  tamsulosin, 0.4 mg, Oral, Daily      Continuous Infusions:Pharmacy Consult,         PRN Meds:.  [DISCONTINUED] acetaminophen **OR** acetaminophen **OR** acetaminophen    Calcium Replacement - Follow Nurse / BPA Driven Protocol    dextrose    dextrose    glucagon (human recombinant)    Magnesium Standard Dose Replacement - Follow Nurse / BPA Driven Protocol    nitroglycerin    oxyCODONE    Pharmacy Consult    Phosphorus Replacement - Follow Nurse / BPA Driven Protocol    Potassium Replacement - Follow Nurse / BPA Driven Protocol    sodium chloride    sodium chloride    Assessment & Plan   Assessment & Plan     Active Hospital Problems    Diagnosis  POA    **Hepatic encephalopathy [K76.82]  Yes      Resolved Hospital Problems   No resolved problems to display.        Brief Hospital Course to date:  Jimmy Gabehart is a 70 y.o. male with past medical history of insulin-dependent diabetes, hypertension, recent diagnosis of liver cirrhosis, thrombocytopenia, chronic pain presenting with altered mental status and fall at home.     Acute hepatic encephalopathy, improved  Decompensated liver cirrhosis  Patient with remote history of alcohol use. New diagnosis of Stage F4 cirrhosis based on FibroScan in March 2023. Following with GI as out patient  Presented with acute encephalopathy secondary to hepatic encephalopathy  Somewhat improved with lactulose  Continue scheduled lactulose   Continue rifaximin  Patient family advised to keep his follow-up  with GI as outpatient.  Will need EGD  Normal TSH and B12  CT head with no acute intracranial pathology  Gabapentin dose reduced to 100 mg at bedtime   Discontinue baclofen upon discharge because of his advanced liver disease    Left elbow bursitis, improving  Patient had a fever of 101.1 around midnight of 4/21/2025.  Now with recurrent fever of 100.7 night of 4/24/2025  Elevated procalcitonin, CRP and sed rate  Negative blood culture.  Normal lactic acid, Normal chest x-ray and normal UA, Negative respiratory panel  X-ray left elbow with olecranon bursitis  Continue empiric antibiotic therapy with Rocephin and daptomycin  Negative cultures  Continue to monitor inflammatory markers  Discussed with Dr. Rainey/orthopedic.  Appreciate help    Right forearm superficial thrombophlebitis  Elevated D-dimer  CTA chest negative for PE  Elevated D-dimer is likely admitted secondary to clinical right forearm thrombophlebitis.  Continue conservative treatment    Acute on chronic right shoulder pain  Possible rotator cuff tear   Fall at home landing on right shoulder  Has been evaluated by orthopedic surgery outpatient.  Patient was found to have a tear in his right shoulder.  Patient was deemed a poor surgical candidate and nonoperative approach was indicated.  X-ray right shoulder with no evidence of acute fracture  Continue supportive care  Discussed with Dr. Rainey/orthopedic.  Patient will need right shoulder replacement versus rotator cuff repair in the near future.  No plan for surgical intervention during this hospitalization     Anemia of chronic disease  Severe iron deficiency, iron saturation 2%  Hemoglobin is slowly trending down.  It was 10.8 on admission.  Currently stable around 8-8.5  No evidence of GI bleed  Iron studies consistent with severe iron deficiency anemia  Status post IV iron infusion   Monitor CBC    Type 2 diabetes  A1c 11.5%  Continue insulin glargine 50 units daily.  Continue Premeal  insulin 10 units 3 times daily and continue SSI  Hold Januvia and metformin   The patient drinks plenty of ski (still drink rich in sugar) and eats a quart of gallon of ice cream daily.  Theand family counseled about the importance to comply with diabetic diet    Hypertension  Continue home Coreg and losartan in the morning     GERD  Continue PPI     BPH  Continue tamsulosin     Hx of thrombocytopenia  Follows with Heme/Onc outpatient. 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 Will need to follow up outpatient for biopsy results.   Continue SCD for DVT prophylaxis    Weakness and debility  PT/OT recommended short-term rehab     Expected Discharge Location and Transportation: Inpatient rehab  Expected Discharge   Expected Discharge Date: 4/30/2025; Expected Discharge Time:      VTE Prophylaxis:  Pharmacologic & mechanical VTE prophylaxis orders are present.         AM-PAC 6 Clicks Score (PT): 21 (04/26/25 2010)    CODE STATUS:   Code Status and Medical Interventions: CPR (Attempt to Resuscitate); Full Support   Ordered at: 04/19/25 1349     Code Status (Patient has no pulse and is not breathing):    CPR (Attempt to Resuscitate)     Medical Interventions (Patient has pulse or is breathing):    Full Support     Level Of Support Discussed With:    Next of Kin (If No Surrogate)       Bailey Howell MD  04/27/25

## 2025-04-27 NOTE — PLAN OF CARE
Problem: Adult Inpatient Plan of Care  Goal: Plan of Care Review  Outcome: Progressing  Goal: Patient-Specific Goal (Individualized)  Outcome: Progressing  Goal: Absence of Hospital-Acquired Illness or Injury  Outcome: Progressing  Intervention: Identify and Manage Fall Risk  Recent Flowsheet Documentation  Taken 4/27/2025 0410 by Charles Chaves RN  Safety Promotion/Fall Prevention:   safety round/check completed   activity supervised   assistive device/personal items within reach   clutter free environment maintained   fall prevention program maintained  Taken 4/27/2025 0210 by Charles Chaves RN  Safety Promotion/Fall Prevention:   activity supervised   assistive device/personal items within reach   safety round/check completed   clutter free environment maintained   nonskid shoes/slippers when out of bed  Taken 4/27/2025 0010 by Charles Chaves RN  Safety Promotion/Fall Prevention:   safety round/check completed   activity supervised   assistive device/personal items within reach   clutter free environment maintained   fall prevention program maintained  Taken 4/26/2025 2210 by Charles Chaves RN  Safety Promotion/Fall Prevention:   activity supervised   assistive device/personal items within reach   safety round/check completed   clutter free environment maintained   nonskid shoes/slippers when out of bed  Taken 4/26/2025 2010 by Charles Chaves RN  Safety Promotion/Fall Prevention:   safety round/check completed   activity supervised   assistive device/personal items within reach   clutter free environment maintained   fall prevention program maintained  Intervention: Prevent Skin Injury  Recent Flowsheet Documentation  Taken 4/27/2025 0410 by Charles Chaves RN  Body Position: position changed independently  Skin Protection:   incontinence pads utilized   silicone foam dressing in place   transparent dressing maintained  Taken 4/27/2025 0210 by Charles Chaves RN  Skin  Protection:   incontinence pads utilized   transparent dressing maintained  Taken 4/27/2025 0010 by Charles Chaves RN  Skin Protection:   incontinence pads utilized   silicone foam dressing in place   transparent dressing maintained  Taken 4/26/2025 2210 by Charles Chaves RN  Skin Protection:   incontinence pads utilized   transparent dressing maintained  Taken 4/26/2025 2010 by Charles Chaves RN  Body Position: position changed independently  Skin Protection: incontinence pads utilized  Intervention: Prevent and Manage VTE (Venous Thromboembolism) Risk  Recent Flowsheet Documentation  Taken 4/26/2025 2010 by Charles Chaves RN  VTE Prevention/Management:   bilateral   SCDs (sequential compression devices) off  Intervention: Prevent Infection  Recent Flowsheet Documentation  Taken 4/27/2025 0410 by Charles Chaves RN  Infection Prevention:   hand hygiene promoted   rest/sleep promoted  Taken 4/27/2025 0210 by Charles Chaves RN  Infection Prevention:   environmental surveillance performed   rest/sleep promoted  Taken 4/27/2025 0010 by Charles Chaves RN  Infection Prevention:   hand hygiene promoted   rest/sleep promoted  Taken 4/26/2025 2210 by Charles Chaves RN  Infection Prevention:   environmental surveillance performed   rest/sleep promoted  Taken 4/26/2025 2010 by Charles Chaves RN  Infection Prevention:   environmental surveillance performed   hand hygiene promoted   rest/sleep promoted   single patient room provided   cohorting utilized  Goal: Optimal Comfort and Wellbeing  Outcome: Progressing  Intervention: Provide Person-Centered Care  Recent Flowsheet Documentation  Taken 4/26/2025 2010 by Charles Chaves RN  Trust Relationship/Rapport:   care explained   emotional support provided   questions answered   questions encouraged   thoughts/feelings acknowledged  Goal: Readiness for Transition of Care  Outcome: Progressing     Problem: Fall Injury Risk  Goal:  Absence of Fall and Fall-Related Injury  Outcome: Progressing  Intervention: Identify and Manage Contributors  Recent Flowsheet Documentation  Taken 4/27/2025 0210 by Charles Chaves, RN  Self-Care Promotion: independence encouraged  Taken 4/26/2025 2210 by Charles Chaves RN  Self-Care Promotion: independence encouraged  Taken 4/26/2025 2010 by Charles Chaves, RN  Medication Review/Management: medications reviewed  Intervention: Promote Injury-Free Environment  Recent Flowsheet Documentation  Taken 4/27/2025 0410 by Charles Chaves, RN  Safety Promotion/Fall Prevention:   safety round/check completed   activity supervised   assistive device/personal items within reach   clutter free environment maintained   fall prevention program maintained  Taken 4/27/2025 0210 by Charles Chaves, RN  Safety Promotion/Fall Prevention:   activity supervised   assistive device/personal items within reach   safety round/check completed   clutter free environment maintained   nonskid shoes/slippers when out of bed  Taken 4/27/2025 0010 by Charles Chaves, RN  Safety Promotion/Fall Prevention:   safety round/check completed   activity supervised   assistive device/personal items within reach   clutter free environment maintained   fall prevention program maintained  Taken 4/26/2025 2210 by Charles Chaves, RN  Safety Promotion/Fall Prevention:   activity supervised   assistive device/personal items within reach   safety round/check completed   clutter free environment maintained   nonskid shoes/slippers when out of bed  Taken 4/26/2025 2010 by Charles Chaves, RN  Safety Promotion/Fall Prevention:   safety round/check completed   activity supervised   assistive device/personal items within reach   clutter free environment maintained   fall prevention program maintained     Problem: Comorbidity Management  Goal: Blood Glucose Level Within Target Range  Outcome: Progressing  Intervention: Monitor and Manage  Glycemia  Recent Flowsheet Documentation  Taken 4/26/2025 2010 by Charles Chaves RN  Medication Review/Management: medications reviewed  Goal: Blood Pressure in Desired Range  Outcome: Progressing  Intervention: Maintain Blood Pressure Management  Recent Flowsheet Documentation  Taken 4/26/2025 2010 by Charles Chaves RN  Medication Review/Management: medications reviewed  Goal: Maintenance of Osteoarthritis Symptom Control  Outcome: Progressing  Intervention: Maintain Osteoarthritis Symptom Control  Recent Flowsheet Documentation  Taken 4/27/2025 0410 by Charles Chaves RN  Activity Management: activity minimized  Taken 4/27/2025 0210 by Charles Chaves RN  Activity Management: activity minimized  Taken 4/27/2025 0010 by Charles Chaves RN  Activity Management: activity minimized  Taken 4/26/2025 2210 by Charles Chaves RN  Activity Management: activity minimized  Taken 4/26/2025 2010 by Charles Chaves RN  Activity Management: activity encouraged  Medication Review/Management: medications reviewed     Problem: Violence Risk or Actual  Goal: Anger and Impulse Control  Outcome: Progressing  Intervention: Minimize Safety Risk  Recent Flowsheet Documentation  Taken 4/27/2025 0410 by Charles Chaves RN  Enhanced Safety Measures: bed alarm set  Taken 4/27/2025 0210 by Charles Chaves RN  Enhanced Safety Measures: bed alarm set  Taken 4/27/2025 0010 by Charles Chaves RN  Enhanced Safety Measures: bed alarm set  Taken 4/26/2025 2210 by Charles Chaves RN  Enhanced Safety Measures: bed alarm set  Taken 4/26/2025 2010 by Charles Chaves RN  Enhanced Safety Measures:   bed alarm set   room near unit station     Problem: Noninvasive Ventilation Acute  Goal: Effective Unassisted Ventilation and Oxygenation  Outcome: Progressing     Problem: Skin Injury Risk Increased  Goal: Skin Health and Integrity  Outcome: Progressing  Intervention: Optimize Skin Protection  Recent Flowsheet  Documentation  Taken 4/27/2025 0410 by Charles Chaves RN  Activity Management: activity minimized  Pressure Reduction Techniques:   frequent weight shift encouraged   heels elevated off bed   weight shift assistance provided   positioned off wounds  Head of Bed (HOB) Positioning: [facility] elevated  Pressure Reduction Devices:   positioning supports utilized   pressure-redistributing mattress utilized  Skin Protection:   incontinence pads utilized   silicone foam dressing in place   transparent dressing maintained  Taken 4/27/2025 0210 by Charles Chaves RN  Activity Management: activity minimized  Pressure Reduction Techniques:   frequent weight shift encouraged   pressure points protected  Head of Bed (HOB) Positioning: HOB elevated  Pressure Reduction Devices:   positioning supports utilized   pressure-redistributing mattress utilized  Skin Protection:   incontinence pads utilized   transparent dressing maintained  Taken 4/27/2025 0010 by Charles Chaves RN  Activity Management: activity minimized  Pressure Reduction Techniques:   frequent weight shift encouraged   heels elevated off bed   weight shift assistance provided   positioned off wounds  Head of Bed (HOB) Positioning: [facility] elevated  Pressure Reduction Devices:   positioning supports utilized   pressure-redistributing mattress utilized  Skin Protection:   incontinence pads utilized   silicone foam dressing in place   transparent dressing maintained  Taken 4/26/2025 2210 by Charles Chaves RN  Activity Management: activity minimized  Pressure Reduction Techniques:   frequent weight shift encouraged   pressure points protected  Head of Bed (HOB) Positioning: [facility] elevated  Pressure Reduction Devices:   positioning supports utilized   pressure-redistributing mattress utilized  Skin Protection:   incontinence pads utilized   transparent dressing maintained  Taken 4/26/2025 2010 by Charles Chaves RN  Activity Management: activity  encouraged  Pressure Reduction Techniques:   frequent weight shift encouraged   heels elevated off bed   weight shift assistance provided   pressure points protected  Head of Bed (HOB) Positioning: HOB elevated  Pressure Reduction Devices:   pressure-redistributing mattress utilized   positioning supports utilized  Skin Protection: incontinence pads utilized   Goal Outcome Evaluation:            Pt has no new nursing issues at this time. Pt is a&ox3-4 (confusion @ times) but easy to redirect, NSR/Sinus Tach, RA, VSS. Multiple loose bowel movements. Pt has no complaints at this time. Will continue plan of care.

## 2025-04-27 NOTE — PLAN OF CARE
Goal Outcome Evaluation:              Outcome Evaluation: VSS on room air. NSR. complains of pain. given prn meds. alert and oriented X 4.

## 2025-04-28 LAB
ALBUMIN SERPL-MCNC: 3 G/DL (ref 3.5–5.2)
ALBUMIN/GLOB SERPL: 0.8 G/DL
ALP SERPL-CCNC: 265 U/L (ref 39–117)
ALT SERPL W P-5'-P-CCNC: 32 U/L (ref 1–41)
ANION GAP SERPL CALCULATED.3IONS-SCNC: 12 MMOL/L (ref 5–15)
AST SERPL-CCNC: 49 U/L (ref 1–40)
BASOPHILS # BLD MANUAL: 0 10*3/MM3 (ref 0–0.2)
BASOPHILS NFR BLD MANUAL: 0 % (ref 0–1.5)
BILIRUB SERPL-MCNC: 0.7 MG/DL (ref 0–1.2)
BUN SERPL-MCNC: 20 MG/DL (ref 8–23)
BUN/CREAT SERPL: 18.2 (ref 7–25)
CALCIUM SPEC-SCNC: 8.4 MG/DL (ref 8.6–10.5)
CHLORIDE SERPL-SCNC: 108 MMOL/L (ref 98–107)
CO2 SERPL-SCNC: 19 MMOL/L (ref 22–29)
CREAT SERPL-MCNC: 1.1 MG/DL (ref 0.76–1.27)
DEPRECATED RDW RBC AUTO: 55 FL (ref 37–54)
EGFRCR SERPLBLD CKD-EPI 2021: 72.2 ML/MIN/1.73
EOSINOPHIL # BLD MANUAL: 0.06 10*3/MM3 (ref 0–0.4)
EOSINOPHIL NFR BLD MANUAL: 2 % (ref 0.3–6.2)
ERYTHROCYTE [DISTWIDTH] IN BLOOD BY AUTOMATED COUNT: 13.7 % (ref 12.3–15.4)
GLOBULIN UR ELPH-MCNC: 3.8 GM/DL
GLUCOSE BLDC GLUCOMTR-MCNC: 136 MG/DL (ref 70–130)
GLUCOSE BLDC GLUCOMTR-MCNC: 145 MG/DL (ref 70–130)
GLUCOSE BLDC GLUCOMTR-MCNC: 197 MG/DL (ref 70–130)
GLUCOSE BLDC GLUCOMTR-MCNC: 199 MG/DL (ref 70–130)
GLUCOSE SERPL-MCNC: 120 MG/DL (ref 65–99)
HCT VFR BLD AUTO: 27.1 % (ref 37.5–51)
HGB BLD-MCNC: 8.7 G/DL (ref 13–17.7)
LYMPHOCYTES # BLD MANUAL: 1.9 10*3/MM3 (ref 0.7–3.1)
LYMPHOCYTES NFR BLD MANUAL: 12 % (ref 5–12)
MAGNESIUM SERPL-MCNC: 1.7 MG/DL (ref 1.6–2.4)
MCH RBC QN AUTO: 35.2 PG (ref 26.6–33)
MCHC RBC AUTO-ENTMCNC: 32.1 G/DL (ref 31.5–35.7)
MCV RBC AUTO: 109.7 FL (ref 79–97)
MONOCYTES # BLD: 0.36 10*3/MM3 (ref 0.1–0.9)
NEUTROPHILS # BLD AUTO: 0.65 10*3/MM3 (ref 1.7–7)
NEUTROPHILS NFR BLD MANUAL: 20 % (ref 42.7–76)
NEUTS BAND NFR BLD MANUAL: 2 % (ref 0–5)
PHOSPHATE SERPL-MCNC: 3.1 MG/DL (ref 2.5–4.5)
PLAT MORPH BLD: NORMAL
PLATELET # BLD AUTO: 126 10*3/MM3 (ref 140–450)
PMV BLD AUTO: 11.4 FL (ref 6–12)
POTASSIUM SERPL-SCNC: 4.1 MMOL/L (ref 3.5–5.2)
PROT SERPL-MCNC: 6.8 G/DL (ref 6–8.5)
RBC # BLD AUTO: 2.47 10*6/MM3 (ref 4.14–5.8)
RBC MORPH BLD: NORMAL
SODIUM SERPL-SCNC: 139 MMOL/L (ref 136–145)
VARIANT LYMPHS NFR BLD MANUAL: 5 % (ref 0–5)
VARIANT LYMPHS NFR BLD MANUAL: 59 % (ref 19.6–45.3)
WBC MORPH BLD: NORMAL
WBC NRBC COR # BLD AUTO: 2.97 10*3/MM3 (ref 3.4–10.8)

## 2025-04-28 PROCEDURE — 80053 COMPREHEN METABOLIC PANEL: CPT | Performed by: INTERNAL MEDICINE

## 2025-04-28 PROCEDURE — 63710000001 INSULIN LISPRO (HUMAN) PER 5 UNITS: Performed by: INTERNAL MEDICINE

## 2025-04-28 PROCEDURE — 83735 ASSAY OF MAGNESIUM: CPT | Performed by: INTERNAL MEDICINE

## 2025-04-28 PROCEDURE — 25010000002 DAPTOMYCIN PER 1 MG

## 2025-04-28 PROCEDURE — 99232 SBSQ HOSP IP/OBS MODERATE 35: CPT | Performed by: INTERNAL MEDICINE

## 2025-04-28 PROCEDURE — 84100 ASSAY OF PHOSPHORUS: CPT | Performed by: INTERNAL MEDICINE

## 2025-04-28 PROCEDURE — 82948 REAGENT STRIP/BLOOD GLUCOSE: CPT

## 2025-04-28 PROCEDURE — 85025 COMPLETE CBC W/AUTO DIFF WBC: CPT | Performed by: INTERNAL MEDICINE

## 2025-04-28 PROCEDURE — 85007 BL SMEAR W/DIFF WBC COUNT: CPT | Performed by: INTERNAL MEDICINE

## 2025-04-28 PROCEDURE — 63710000001 INSULIN GLARGINE PER 5 UNITS: Performed by: INTERNAL MEDICINE

## 2025-04-28 PROCEDURE — 25010000002 ENOXAPARIN PER 10 MG: Performed by: INTERNAL MEDICINE

## 2025-04-28 PROCEDURE — 63710000001 INSULIN LISPRO (HUMAN) PER 5 UNITS: Performed by: STUDENT IN AN ORGANIZED HEALTH CARE EDUCATION/TRAINING PROGRAM

## 2025-04-28 RX ADMIN — DORZOLAMIDE HYDROCHLORIDE: 20 SOLUTION/ DROPS OPHTHALMIC at 21:50

## 2025-04-28 RX ADMIN — Medication 10 ML: at 10:18

## 2025-04-28 RX ADMIN — INSULIN LISPRO 3 UNITS: 100 INJECTION, SOLUTION INTRAVENOUS; SUBCUTANEOUS at 12:22

## 2025-04-28 RX ADMIN — INSULIN LISPRO 10 UNITS: 100 INJECTION, SOLUTION INTRAVENOUS; SUBCUTANEOUS at 17:09

## 2025-04-28 RX ADMIN — ENOXAPARIN SODIUM 40 MG: 100 INJECTION SUBCUTANEOUS at 10:11

## 2025-04-28 RX ADMIN — RIFAXIMIN 550 MG: 550 TABLET ORAL at 21:50

## 2025-04-28 RX ADMIN — INSULIN LISPRO 10 UNITS: 100 INJECTION, SOLUTION INTRAVENOUS; SUBCUTANEOUS at 10:12

## 2025-04-28 RX ADMIN — CARVEDILOL 6.25 MG: 6.25 TABLET, FILM COATED ORAL at 10:11

## 2025-04-28 RX ADMIN — OXYCODONE HYDROCHLORIDE 10 MG: 10 TABLET ORAL at 10:11

## 2025-04-28 RX ADMIN — GABAPENTIN 100 MG: 100 CAPSULE ORAL at 21:50

## 2025-04-28 RX ADMIN — LACTULOSE 30 G: 20 SOLUTION ORAL at 10:12

## 2025-04-28 RX ADMIN — COLCHICINE 1.2 MG: 0.6 TABLET ORAL at 10:10

## 2025-04-28 RX ADMIN — LOSARTAN POTASSIUM 100 MG: 50 TABLET, FILM COATED ORAL at 10:11

## 2025-04-28 RX ADMIN — RIFAXIMIN 550 MG: 550 TABLET ORAL at 10:11

## 2025-04-28 RX ADMIN — CARVEDILOL 6.25 MG: 6.25 TABLET, FILM COATED ORAL at 17:08

## 2025-04-28 RX ADMIN — PANTOPRAZOLE SODIUM 40 MG: 40 TABLET, DELAYED RELEASE ORAL at 05:02

## 2025-04-28 RX ADMIN — TAMSULOSIN HYDROCHLORIDE 0.4 MG: 0.4 CAPSULE ORAL at 10:11

## 2025-04-28 RX ADMIN — Medication 10 ML: at 21:50

## 2025-04-28 RX ADMIN — OXYCODONE HYDROCHLORIDE 10 MG: 10 TABLET ORAL at 22:04

## 2025-04-28 RX ADMIN — INSULIN LISPRO 10 UNITS: 100 INJECTION, SOLUTION INTRAVENOUS; SUBCUTANEOUS at 12:23

## 2025-04-28 RX ADMIN — DAPTOMYCIN 550 MG: 500 INJECTION, POWDER, LYOPHILIZED, FOR SOLUTION INTRAVENOUS at 14:40

## 2025-04-28 RX ADMIN — DORZOLAMIDE HYDROCHLORIDE: 20 SOLUTION/ DROPS OPHTHALMIC at 10:18

## 2025-04-28 RX ADMIN — LIDOCAINE 1 PATCH: 4 PATCH TOPICAL at 10:17

## 2025-04-28 RX ADMIN — INSULIN GLARGINE 30 UNITS: 100 INJECTION, SOLUTION SUBCUTANEOUS at 10:18

## 2025-04-28 NOTE — PROGRESS NOTES
[facility] Medicine Services  PROGRESS NOTE    Patient Name: Jimmy Gabehart  : 1955  MRN: [id number]    Date of Admission: 2025  Primary Care Physician: Miriam Mckeon DO    Subjective   Subjective     CC:  Follow-up for hepatic encephalopathy    HPI:  Patient seen and examined this morning.  Continues to feel well today.  Fully awake and alert.  Had large bowel movement today.  Pain in his left elbow completely resolved.  Still having trouble with his right shoulder.  Awaiting rehab    Objective   Objective     Vital Signs:   Temp:  [98 °F (36.7 °C)] 98 °F (36.7 °C)  Heart Rate:  [63-78] 67  Resp:  [18-20] 18  BP: (120-143)/(59-77) 138/73  Flow (L/min) (Oxygen Therapy):  [2] 2     Physical Exam:  General: Comfortable, not in distress, conversant and cooperative  Head: Atraumatic and normocephalic  Eyes: No Icterus. No pallor  Ears:  Ears appear intact with no abnormalities noted  Throat: No oral lesions, no thrush  Neck: Supple, trachea midline  Lungs: Clear to auscultation bilaterally, equal air entry, no wheezing or crackles  Heart:  Normal S1 and S2, no murmur, no gallop, No JVD, no lower extremity swelling  Abdomen:  Soft, no tenderness, no organomegaly, normal bowel sounds, no organomegaly  Extremities: Improved left bursitis  Skin: No bleeding, bruising or rash, normal skin turgor and elasticity  Neurologic: Cranial nerves appear intact with no evidence of facial asymmetry, normal motor and sensory functions in all 4 extremities  Psych: Alert and oriented x3    Results Reviewed:  LAB RESULTS:      Lab 25  0216 25  0523 25  1059 25  0428 25  0506 25  1114 25  0417 25  0534 25  0534 25  1816   WBC 2.97* 2.67*  --  4.19 4.81 3.97 3.48   < > 4.68  --    HEMOGLOBIN 8.7* 8.0*  --  8.5* 9.4* 8.6* 8.6*   < > 9.1*  --    HEMATOCRIT 27.1* 25.7*  --  27.0* 28.6* 26.6* 27.6*   < > 28.6*  --    PLATELETS 126* 113*  --   114* 118* 104* 82*   < > 86*  --    NEUTROS ABS 0.65* 0.78*  --  1.72 1.94 1.61* 1.40*   < > 2.82  --    IMMATURE GRANS (ABS)  --  0.01  --   --  0.03 0.03 0.02  --  0.04  --    LYMPHS ABS  --  1.32  --   --  1.96 1.56 1.54  --  1.21  --    MONOS ABS  --  0.51  --   --  0.82 0.69 0.46  --  0.55  --    EOS ABS 0.06 0.04  --  0.17 0.06 0.07 0.06   < > 0.05  --    .7* 108.9*  --  111.1* 107.1* 108.6* 110.0*   < > 107.9*  --    SED RATE  --  47*  --   --  85* 73*  --   --   --   --    CRP  --  5.67*  --  8.20* 7.87* 6.25* 8.77*   < > 9.27*  --    PROCALCITONIN  --   --   --  0.30*  --  0.33*  --   --  0.36*  --    LACTATE  --   --   --   --   --   --   --   --   --  1.9   D DIMER QUANT  --   --  2.84*  --   --   --   --   --   --   --     < > = values in this interval not displayed.         Lab 04/28/25  0217 04/27/25  0523 04/26/25  0428 04/25/25  0506 04/24/25  1114 04/23/25  0417   SODIUM 139 139 134* 135* 132* 133*   POTASSIUM 4.1 4.1 4.3 4.6 4.5 4.2   CHLORIDE 108* 109* 103 105 101 104   CO2 19.0* 18.0* 17.0* 18.0* 19.0* 18.0*   ANION GAP 12.0 12.0 14.0 12.0 12.0 11.0   BUN 20 22 29* 26* 23 25*   CREATININE 1.10 1.17 1.47* 1.30* 1.35* 1.32*   EGFR 72.2 67.1 51.0* 59.1* 56.5* 58.0*   GLUCOSE 120* 120* 102* 124* 173* 193*   CALCIUM 8.4* 8.3* 8.6 9.0 8.2* 8.3*   MAGNESIUM 1.7 1.7 1.8 1.7 1.6 1.7   PHOSPHORUS 3.1 3.1 3.5 3.3  --  3.3         Lab 04/28/25  0217 04/27/25  0523 04/26/25  0428 04/25/25  0506 04/24/25  1114   TOTAL PROTEIN 6.8 6.2 6.9 7.4 6.6   ALBUMIN 3.0* 3.0* 2.9* 3.2* 2.9*   GLOBULIN 3.8 3.2 4.0 4.2 3.7   ALT (SGPT) 32 36 44* 54* 49*   AST (SGOT) 49* 55* 76* 105* 105*   BILIRUBIN 0.7 0.8 1.1 1.0 1.1   ALK PHOS 265* 286* 279* 299* 240*                         Brief Urine Lab Results  (Last result in the past 365 days)        Color   Clarity   Blood   Leuk Est   Nitrite   Protein   CREAT   Urine HCG        04/21/25 1742 Yellow   Clear   Negative   Negative   Negative   Negative                    Microbiology Results Abnormal       None            CT Angiogram Chest Pulmonary Embolism  Result Date: 4/26/2025  CT ANGIOGRAM CHEST PULMONARY EMBOLISM Date of Exam: 4/26/2025 5:02 PM EDT Indication: Pulmonary embolism (PE) suspected, high prob, shortness of breath. Comparison: None available. Technique: Axial CT images were obtained of the chest after the uneventful intravenous administration of 160 mL Isovue-370 utilizing pulmonary embolism protocol.  In addition, a 3-D volume rendered image was created for interpretation.  Reconstructed coronal and sagittal images were also obtained. Automated exposure control and iterative construction methods were used. Findings: Exam was repeated due to suboptimal contrast opacification of pulmonary arteries on initial imaging. Evaluation of segmental and subsegmental pulmonary arteries is limited by respiratory motion artifact, but no central pulmonary emboli are seen in the main or lobar pulmonary arteries. Main pulmonary artery is not dilated. Heart size is mildly enlarged. No pericardial or pleural effusion.  Mild patchy groundglass opacities in the right lower lobe. Lungs otherwise clear. Nodular liver contour and splenomegaly in the partially included upper abdomen. Small right renal cysts. No acute osseous abnormality is identified. Severe arthritic changes of the right shoulder.     Impression: Impression: 1.No central pulmonary emboli seen. Evaluation of segmental/subsegmental pulmonary arteries limited by respiratory motion artifact. 2.Mild patchy groundglass opacities in the right lower lobe, likely due to an infectious/inflammatory process. 3.Cirrhotic liver morphology with splenomegaly. Electronically Signed: Joan Radford MD  4/26/2025 5:40 PM EDT  Workstation ID: [id number]            Current medications:  Scheduled Meds:carvedilol, 6.25 mg, Oral, BID With Meals  colchicine, 1.2 mg, Oral, Daily  DAPTOmycin, 6 mg/kg (Adjusted), Intravenous,  Q24H  dorzolamide (TRUSOPT) 2 % 1 drop, timolol (TIMOPTIC) 0.5 % 1 drop for Cosopt 22.3-6.8 mg/mL, , Left Eye, BID  enoxaparin sodium, 40 mg, Subcutaneous, Daily  gabapentin, 100 mg, Oral, Nightly  insulin glargine, 30 Units, Subcutaneous, Daily  Insulin Lispro, 10 Units, Subcutaneous, TID With Meals  insulin lispro, 2-9 Units, Subcutaneous, 4x Daily AC & at Bedtime  lactulose, 30 g, Oral, Daily  Lidocaine, 1 patch, Transdermal, Q24H  losartan, 100 mg, Oral, Q24H  pantoprazole, 40 mg, Oral, Q AM  rifAXIMin, 550 mg, Oral, BID  sodium chloride, 10 mL, Intravenous, Q12H  tamsulosin, 0.4 mg, Oral, Daily      Continuous Infusions:Pharmacy Consult,         PRN Meds:.  [DISCONTINUED] acetaminophen **OR** acetaminophen **OR** acetaminophen    Calcium Replacement - Follow Nurse / BPA Driven Protocol    dextrose    dextrose    glucagon (human recombinant)    Magnesium Standard Dose Replacement - Follow Nurse / BPA Driven Protocol    nitroglycerin    oxyCODONE    Pharmacy Consult    Phosphorus Replacement - Follow Nurse / BPA Driven Protocol    Potassium Replacement - Follow Nurse / BPA Driven Protocol    sodium chloride    sodium chloride    Assessment & Plan   Assessment & Plan     Active Hospital Problems    Diagnosis  POA    **Hepatic encephalopathy [K76.82]  Yes      Resolved Hospital Problems   No resolved problems to display.        Brief Hospital Course to date:  Jimmy Gabehart is a 70 y.o. male with past medical history of insulin-dependent diabetes, hypertension, recent diagnosis of liver cirrhosis, thrombocytopenia, chronic pain presenting with altered mental status and fall at home.     Acute hepatic encephalopathy, improved  Decompensated liver cirrhosis  Patient with remote history of alcohol use. New diagnosis of Stage F4 cirrhosis based on FibroScan in March 2023. Following with GI as out patient  Presented with acute encephalopathy secondary to hepatic encephalopathy  Somewhat improved with lactulose  Continue  scheduled lactulose   Continue rifaximin  Patient family advised to keep his follow-up with GI as outpatient.  Will need EGD  Normal TSH and B12  CT head with no acute intracranial pathology  Gabapentin dose reduced to 100 mg at bedtime   Discontinue baclofen upon discharge because of his advanced liver disease    Left elbow bursitis, improving  Patient had a fever of 101.1 around midnight of 4/21/2025.  Now with recurrent fever of 100.7 night of 4/24/2025  Elevated procalcitonin, CRP and sed rate  Negative blood culture.  Normal lactic acid, Normal chest x-ray and normal UA, Negative respiratory panel  X-ray left elbow with olecranon bursitis  Significantly improved with empiric antibiotic therapy with Rocephin and daptomycin  Negative cultures  Inflammatory markers trending down   Dr. Rainey/orthopedic followed, appreciate the help    Right forearm superficial thrombophlebitis  Elevated D-dimer  CTA chest negative for PE  Elevated D-dimer is likely secondary to clinical right forearm thrombophlebitis.  Continue conservative treatment    Acute on chronic right shoulder pain  Possible rotator cuff tear   Fall at home landing on right shoulder  Has been evaluated by orthopedic surgery outpatient.  Patient was found to have a tear in his right shoulder.  Patient was deemed a poor surgical candidate and nonoperative approach was indicated.  X-ray right shoulder with no evidence of acute fracture  Continue supportive care  Discussed with Dr. Rainey/orthopedic.  Patient will need right shoulder replacement versus rotator cuff repair in the near future.  No plan for surgical intervention during this hospitalization     Anemia of chronic disease  Severe iron deficiency, iron saturation 2%  Hemoglobin is slowly trending down.  It was 10.8 on admission.  Currently stable around 8-8.5  No evidence of GI bleed  Iron studies consistent with severe iron deficiency anemia  Status post IV iron infusion   Monitor  CBC    Type 2 diabetes  A1c 11.5%  Continue insulin glargine 50 units daily.  Continue Premeal insulin 10 units 3 times daily and continue SSI  Hold Januvia and metformin   The patient drinks plenty of ski (still drink rich in sugar) and eats a quart of gallon of ice cream daily.  Theand family counseled about the importance to comply with diabetic diet    Hypertension  Continue home Coreg and losartan in the morning     GERD  Continue PPI     BPH  Continue tamsulosin     History of thrombocytopenia  Follows with Heme/Onc outpatient. 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 Will need to follow up outpatient for biopsy results.   Continue SCD for DVT prophylaxis    Weakness and debility  PT/OT recommended short-term rehab     Expected Discharge Location and Transportation: Inpatient rehab  Expected Discharge   Expected Discharge Date: 4/30/2025; Expected Discharge Time:      VTE Prophylaxis:  Pharmacologic & mechanical VTE prophylaxis orders are present.         AM-PAC 6 Clicks Score (PT): 20 (04/27/25 2010)    CODE STATUS:   Code Status and Medical Interventions: CPR (Attempt to Resuscitate); Full Support   Ordered at: 04/19/25 1349     Code Status (Patient has no pulse and is not breathing):    CPR (Attempt to Resuscitate)     Medical Interventions (Patient has pulse or is breathing):    Full Support     Level Of Support Discussed With:    Next of Kin (If No Surrogate)       Bailey Howell MD  04/28/25

## 2025-04-28 NOTE — DISCHARGE PLACEMENT REQUEST
"Case Management  Nannette Galdamez, [phone]      Gabehart, Jimmy (70 y.o. Male)       Date of Birth   1955    Social Security Number       Address   [address] [id number]    Home Phone   [phone]    MRN   [id number]       [place]   Unknown    Marital Status                               Admission Date   4/19/2025    Admission Type   Urgent    Admitting Provider   Bailey Howell MD    Attending Provider   Bailey Howell MD    Department, Room/Bed   [address], [address]       Discharge Date       Discharge Disposition       Discharge Destination                                 Attending Provider: Bailey Howell MD    Allergies: Demerol [Meperidine], Penicillins    Isolation: None   Infection: None   Code Status: CPR    Ht: 188 cm (74\")   Wt: 116 kg (255 lb 4.7 oz)    Admission Cmt: None   Principal Problem: Hepatic encephalopathy [K76.82]                   Active Insurance as of 4/19/2025       Primary Coverage       Payor Plan Insurance Group Employer/Plan Group    HUMANA HUMANA VASYL HMO [id number]       Payor Plan Address Payor Plan Phone Number Payor Plan Fax Number Effective Dates    [address] [address]   3/1/2025 - None Entered    [facility] [id number]         Subscriber Name Subscriber Birth Date Member ID       GABEHART,JIMMY 1955 [id number]               Secondary Coverage       Payor Plan Insurance Group Employer/Plan Group    HUMANA MEDICARE REPLACEMENT HUMANA MEDICARE ADVANTAGE HMO [id number]       Payor Plan Address Payor Plan Phone Number Payor Plan Fax Number Effective Dates    [address] [address] [phone]  3/1/2025 - None Entered    [facility] [id number]         Subscriber Name Subscriber Birth Date Member ID       GABEHART,JIMMY 1955 [id number]                     Emergency Contacts        (Rel.) Home Phone Work Phone Mobile Phone    Gabehart,Sherry (Other) -- -- [phone]               " "  History & Physical        YoonDelbert DO at 25 1350              [facility] Medicine Services  HISTORY AND PHYSICAL    Patient Name: Jimmy Gabehart  : 1955  MRN: [id number]  Primary Care Physician: Miriam Mckeon DO  Date of admission: 2025      Subjective  Subjective     Chief Complaint:  Altered mental status, fall at home    HPI:  Jimmy Gabehart is a 70 y.o. male with past medical history of insulin-dependent diabetes, hypertension, recent diagnosis of cirrhosis, thrombocytopenia, chronic pain presenting with altered mental status and fall at home.    History obtained from wife over the phone.  Wife states since [place] in  patient has become progressively weaker, staying in bed for 20 to 23 hours a day, with progressively worsening confusion.  Patient will talk about items or situations that are not present currently ongoing.  Wife states patient has had limited food intake and has been drinking pop excessively.  Wife states he has been \"out of character\" since .  Weakness and confusion have been worsening over the past 3 to 4 weeks, with acute worsening over the past 3 to 4 days.  Patient and wife note constipation recently.  Wife states that he was recently diagnosed with cirrhosis but does not have follow-up with his liver team until 2025.  Wife denies any current use of lactulose or rifaximin.  Of note patient was recently seen by rheumatology for abnormal autoimmune lab work.  Per wife, patient does not have lupus but rheumatologist is more concerned of possible MDS.  Patient had recent bone marrow biopsy with hematology/oncology this past Thursday, currently awaiting biopsy results.  Wife denies any past EGDs.    Patient was initially seen at an outside hospital secondary to fall at home.  Patient reports acute on chronic right shoulder pain.  Wife states that patient is in need of a right shoulder replacement.  He has been seen by " orthopedic surgery is pursuing nonoperative management.  CT head at outside hospital unremarkable.  Chest x-ray unremarkable.  Ammonia 28. UA without signs of infection.  Patient received lactulose prior to transfer.  Patient transferred to [facility] for concern for hepatic encephalopathy.    In the room, patient is oriented to name, date of birth, city, year, and president.  Patient denies any abdominal pain, shortness of breath, chest pain, GI or  symptoms.  Denies any tobacco use.  Quit alcohol 10 years ago.  Denies any illicit drug use besides prescribed chronic pain pills.  Mild asterixis on exam.  Wife confirms full CODE STATUS.      Personal History     Past Medical History:   Diagnosis Date    Diabetes     Hypertension            Past Surgical History:   Procedure Laterality Date    BACK SURGERY      CARPAL TUNNEL RELEASE      COLONOSCOPY      EYE SURGERY      FOOT SURGERY Left 1986    Ankle fusion    HERNIA REPAIR      KNEE SURGERY Right 12/14/2022    TKA right knee manipulation 01-25-23    TONSILLECTOMY AND ADENOIDECTOMY      TRIGGER FINGER RELEASE      WISDOM TOOTH EXTRACTION         Family History: family history includes Arthritis in his mother; Heart attack in his father and mother; High cholesterol in his mother; Hypertension in his mother; Kidney disease in his mother.     Social History:  reports that he has never smoked. He has never used smokeless tobacco. He reports current alcohol use. He reports that he does not use drugs.  Social History     Social History Narrative    Not on file       Medications:  Available home medication information reviewed.  Diclofenac Sodium, SITagliptin, Zinc, baclofen, carvedilol, diclofenac, fexofenadine, fish oil, gabapentin, losartan, magnesium chloride ER, metFORMIN ER, oxyCODONE, oxyCODONE-acetaminophen, pantoprazole, and tamsulosin    Allergies   Allergen Reactions    Demerol [Meperidine]     Penicillins        Objective  Objective     Vital  Signs:   Temp:  [98.9 °F (37.2 °C)] 98.9 °F (37.2 °C)  Heart Rate:  [79] 79  Resp:  [18] 18  BP: (139)/(84) 139/84       Physical Exam  Constitutional:       General: He is not in acute distress.  Cardiovascular:      Rate and Rhythm: Normal rate.      Pulses: Normal pulses.   Pulmonary:      Effort: Pulmonary effort is normal. No respiratory distress.   Abdominal:      General: There is no distension.      Palpations: Abdomen is soft.      Tenderness: There is no abdominal tenderness. There is no guarding or rebound.   Musculoskeletal:      Right lower leg: No edema.      Left lower leg: No edema.   Skin:     General: Skin is warm.   Neurological:      Mental Status: He is alert and oriented to person, place, and time.      Comments: Mild asterixis on exam.  Limited range of motion of right arm secondary to right shoulder pain   Psychiatric:         Mood and Affect: Mood normal.         Behavior: Behavior normal.            Result Review:  I have personally reviewed the results from the time of this admission to 4/19/2025 13:50 EDT and agree with these findings:  [x]  Laboratory list / accordion  []  Microbiology  []  Radiology  []  EKG/Telemetry   []  Cardiology/Vascular   []  Pathology  [x]  Old records  []  Other:  Most notable findings include: CT head unremarkable, ammonia 28      LAB RESULTS:                                  Microbiology Results (last 10 days)       ** No results found for the last 240 hours. **            No radiology results from the last 24 hrs        Assessment & Plan  Assessment & Plan       Hepatic encephalopathy    Jimmy Gabehart is a 70 y.o. male with past medical history of insulin-dependent diabetes, hypertension, recent diagnosis of cirrhosis, thrombocytopenia, chronic pain presenting with altered mental status and fall at home.    Concern for hepatic encephalopathy  -Progressively worsening weakness and confusion since December 2024 with significant worsening over the past 3-4  days.   - New diagnosis of cirrhosis.  Initially seen by gastroenterology in February 2025 for concern for cirrhosis.  FibroScan ordered and obtained in March 2025 revealing stiffness consistent with F4 cirrhosis. No recent EGD or AFP.  No follow-up with GI until September 2025 per family.  -Recently evaluated by rheumatology, low concern for autoimmune process including lupus.  -Currently not on lactulose or rifaximin, though patient suffers from constipation  -Per chart review of OSH records, CT head unremarkable, Ammonia 28, UA negative for infection, chest x-ray unremarkable.  -Mild asterixis on exam  PLAN  -Start lactulose and rifaximin. Titrate to 2-3 BM's/daily  -Will need outpatient GI follow up  -Consider GI/neurology consultation pending clinical response with aforementioned treatment  -Will obtain CBC, CMP, Mag, Phos, PT/INR, TSH, lactate, blood cultures  -PT/OT consulted     Acute on chronic right shoulder pain  - Fall at home landing on right shoulder  - Has been evaluated by orthopedic surgery outpatient.  Patient was found to have a tear in his right shoulder.  Patient was deemed a poor surgical candidate and nonoperative approach was indicated.  - Will obtain x-ray to evaluate for fracture    IDDM  -Hold home medications   -SSI for now    HTN  -Plan to resume home Coreg and losartan in the morning    GERD  -Continue PPI    BPH  -Continue tamsulosin    Hx of thrombocytopenia  -Follows with Heme/Onc outpatient. 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 Will need to follow up outpatient for biopsy results.   -Labs pending, SCD's for now     VTE Prophylaxis:  Mechanical VTE prophylaxis orders are present.          CODE STATUS:    Code Status and Medical Interventions: CPR (Attempt to Resuscitate); Full Support   Ordered at: 04/19/25 1349     Code Status (Patient has no pulse and is not breathing):    CPR (Attempt to Resuscitate)     Medical Interventions (Patient has pulse or is breathing):    Full Support      Level Of Support Discussed With:    Next of Kin (If No Surrogate)       Expected Discharge   Expected discharge date/ time has not been documented.     Delbert Nettles DO  25      Electronically signed by Delbert Nettles DO at 25 1412           Bailey Howell MD   Physician  Medicine     Progress Notes      Signed     Date of Service: 25  Creation Time: 25     Signed       Expand All Collapse All[]Expand All by Default    Show:Clear all  [x]Written[x]Templated[x]Copied    Added by:  [x]Bailey Howell MD    []Leroy for details       [facility] Medicine Services  PROGRESS NOTE     Patient Name: Jimmy Gabehart  : 1955  MRN: [id number]     Date of Admission: 2025  Primary Care Physician: Miriam Mckeon DO     Subjective  Subjective      CC:  Follow-up for hepatic encephalopathy     HPI:  Patient seen and examined this morning.  More awake and alert today.  Had multiple bowel movements yesterday.  Reported that the pain in his left elbow is improving.  Still having significant pain in his right shoulder.  Complaining of dry cough.  Wife at bedside and updated.     Objective  Objective      Vital Signs:   Temp:  [97.9 °F (36.6 °C)-101.1 °F (38.4 °C)] 98.2 °F (36.8 °C)  Heart Rate:  [65-86] 65  Resp:  [16-20] 18  BP: (112-147)/(56-73) 112/61     Physical Exam:  General: Comfortable, not in distress, conversant and cooperative  Head: Atraumatic and normocephalic  Eyes: No Icterus. No pallor  Ears:  Ears appear intact with no abnormalities noted  Throat: No oral lesions, no thrush  Neck: Supple, trachea midline  Lungs: Clear to auscultation bilaterally, equal air entry, no wheezing or crackles  Heart:  Normal S1 and S2, no murmur, no gallop, No JVD, no lower extremity swelling  Abdomen:  Soft, no tenderness, no organomegaly, normal bowel sounds, no organomegaly  Extremities: Limited range of movement right shoulder because of pain.  Left  elbow is warm with limited mobility because of pain  Skin: No bleeding, bruising or rash, normal skin turgor and elasticity  Neurologic: Cranial nerves appear intact with no evidence of facial asymmetry, normal motor and sensory functions in all 4 extremities  Psych: Alert and oriented x2     Results Reviewed:  LAB RESULTS:                   Lab 04/26/25  0428 04/25/25  0506 04/24/25  1114 04/23/25  0417 04/22/25  0534 04/21/25  1816 04/21/25  0448 04/20/25  0508 04/20/25  0508 04/19/25  1604   WBC 4.19 4.81 3.97 3.48 4.68  --  4.89  --  5.00 4.21   HEMOGLOBIN 8.5* 9.4* 8.6* 8.6* 9.1*  --  9.3*  --  10.1* 9.7*   HEMATOCRIT 27.0* 28.6* 26.6* 27.6* 28.6*  --  29.9*  --  30.9* 28.7*   PLATELETS 114* 118* 104* 82* 86*  --  73*  --  84* 74*   NEUTROS ABS  --  1.94 1.61* 1.40* 2.82  --  2.47  --  2.60 2.43   IMMATURE GRANS (ABS)  --  0.03 0.03 0.02 0.04  --  0.04  --  0.05 0.03   LYMPHS ABS  --  1.96 1.56 1.54 1.21  --  1.70  --  1.46 1.19   MONOS ABS  --  0.82 0.69 0.46 0.55  --  0.64  --  0.87 0.53   EOS ABS  --  0.06 0.07 0.06 0.05  --  0.03  --  0.01 0.02   .1* 107.1* 108.6* 110.0* 107.9*  --  111.6*  --  106.2* 105.5*   SED RATE  --  85* 73*  --   --   --   --   --   --   --    CRP 8.20* 7.87* 6.25* 8.77* 9.27*  --  13.52*   < >  --   --    PROCALCITONIN  --   --  0.33*  --  0.36*  --  0.46*  --   --   --    LACTATE  --   --   --   --   --  1.9  --   --   --  1.7   LDH  --   --   --   --   --   --   --   --  226*  --    PROTIME  --   --   --   --   --   --   --   --   --  16.6*    < > = values in this interval not displayed.                     Lab 04/26/25  0428 04/25/25  0506 04/24/25  1114 04/23/25  0417 04/22/25  0534 04/21/25  0448 04/20/25  0508 04/19/25  1604   SODIUM 134* 135* 132* 133* 134* 135* 130* 133*   POTASSIUM 4.3 4.6 4.5 4.2 4.4 4.1 4.4 4.3   CHLORIDE 103 105 101 104 101 103 98 99   CO2 17.0* 18.0* 19.0* 18.0* 20.0* 22.0 20.0* 21.0*   ANION GAP 14.0 12.0 12.0 11.0 13.0 10.0 12.0 13.0   BUN 29*  26* 23 25* 21 22 18 22   CREATININE 1.47* 1.30* 1.35* 1.32* 1.26 1.12 1.07 1.19   EGFR 51.0* 59.1* 56.5* 58.0* 61.4 70.7 74.7 65.7   GLUCOSE 102* 124* 173* 193* 193* 230* 308* 289*   CALCIUM 8.6 9.0 8.2* 8.3* 8.2* 8.4* 8.7 9.0   MAGNESIUM 1.8 1.7 1.6 1.7 1.8 2.2 2.3 1.5*   PHOSPHORUS 3.5 3.3  --  3.3 2.8 2.8  --  2.3*   HEMOGLOBIN A1C  --   --   --   --   --   --  11.50*  --    TSH  --   --   --   --   --   --   --  1.180                  Lab 04/26/25  0428 04/25/25  0506 04/24/25  1114 04/23/25  0417 04/22/25  0534   TOTAL PROTEIN 6.9 7.4 6.6 6.7 6.5   ALBUMIN 2.9* 3.2* 2.9* 3.0* 3.2*   GLOBULIN 4.0 4.2 3.7 3.7 3.3   ALT (SGPT) 44* 54* 49* 35 37   AST (SGOT) 76* 105* 105* 59* 62*   BILIRUBIN 1.1 1.0 1.1 0.7 1.2   ALK PHOS 279* 299* 240* 237* 249*              Lab 04/19/25  1604   PROTIME 16.6*   INR 1.26*                  Lab 04/20/25  0508   IRON 30*   IRON SATURATION (TSAT) 10*   TIBC 310   TRANSFERRIN 208   FERRITIN 594.00*   FOLATE >20.00   VITAMIN B 12 >2,000*          Brief Urine Lab Results  (Last result in the past 365 days)          Color   Clarity   Blood   Leuk Est   Nitrite   Protein   CREAT   Urine HCG         04/21/25 1742 Yellow    Clear    Negative    Negative    Negative    Negative                             Microbiology Results Abnormal         None                  Radiology results from the last 24 hours:   CT Head Without Contrast  Result Date: 4/24/2025  CT HEAD WO CONTRAST Date of Exam: 4/24/2025 4:14 PM EDT Indication: AMS. Comparison: None available. Technique: Axial CT images were obtained of the head without contrast administration.  Automated exposure control and iterative construction methods were used. Findings: No acute intracranial hemorrhage.Intact appearing gray-white differentiation.No extra-axial fluid collection.No significant mass effect. No hydrocephalus. Mild generalized parenchymal volume loss. Scattered areas of periventricular and subcortical white matter  hypoattenuation, nonspecific, perhaps from small vessel ischemic/hypertensive changes in a patient of this age.There are intracranial atherosclerotic calcifications. Mild scattered mucosal thickening in the paranasal sinuses.Mastoid air cells are essentially clear.. Bilateral lens replacements. Presumed intraocular silicone on the left. No acute or aggressive appearing bony or extracranial soft tissue process.      Impression: Impression: No acute intracranial findings. Electronically Signed: Gallito Hammonds MD  4/24/2025 4:49 PM EDT  Workstation ID: [id number]     XR Chest 1 View  Result Date: 4/24/2025  XR CHEST 1 VW, XR SHOULDER 2+ VW RIGHT, XR ELBOW 3+ VW LEFT Date of Exam: 4/24/2025 10:32 AM EDT Indication: sob right shoulder pain after fall, severe pain and redness left elbow Comparison: Chest 4/21/2025, right shoulder 4/19/2025 Findings: Chest: Heart size is normal for the projection. The pulmonary vascular pattern is normal and the lungs appear clear. Right shoulder: There are degenerative changes at the glenohumeral and AC joint. Findings are unchanged from the last study. There is osteopenia. No fractures are identified. Left elbow: There are degenerative changes of the elbow joint. There is ossification of the triceps insertion. There is no joint effusion and there are no fractures or destructive lesions. There is soft tissue swelling overlying the olecranon.      Impression: Impression: 1.No radiographic evidence of acute cardiopulmonary disease. 2.Degenerative changes of the right shoulder and left elbow. 3.Soft tissue swelling overlying the left olecranon, probably representing olecranon bursitis. 4.No fractures or destructive lesions. Electronically Signed: Nima Phillip MD  4/24/2025 11:15 AM EDT  Workstation ID: [id number]     XR Elbow 3+ View Left  Result Date: 4/24/2025  XR CHEST 1 VW, XR SHOULDER 2+ VW RIGHT, XR ELBOW 3+ VW LEFT Date of Exam: 4/24/2025 10:32 AM EDT Indication: sob right shoulder  pain after fall, severe pain and redness left elbow Comparison: Chest 4/21/2025, right shoulder 4/19/2025 Findings: Chest: Heart size is normal for the projection. The pulmonary vascular pattern is normal and the lungs appear clear. Right shoulder: There are degenerative changes at the glenohumeral and AC joint. Findings are unchanged from the last study. There is osteopenia. No fractures are identified. Left elbow: There are degenerative changes of the elbow joint. There is ossification of the triceps insertion. There is no joint effusion and there are no fractures or destructive lesions. There is soft tissue swelling overlying the olecranon.      Impression: Impression: 1.No radiographic evidence of acute cardiopulmonary disease. 2.Degenerative changes of the right shoulder and left elbow. 3.Soft tissue swelling overlying the left olecranon, probably representing olecranon bursitis. 4.No fractures or destructive lesions. Electronically Signed: Nima Phillip MD  4/24/2025 11:15 AM EDT  Workstation ID: [id number]     XR Shoulder 2+ View Right  Result Date: 4/24/2025  XR CHEST 1 VW, XR SHOULDER 2+ VW RIGHT, XR ELBOW 3+ VW LEFT Date of Exam: 4/24/2025 10:32 AM EDT Indication: sob right shoulder pain after fall, severe pain and redness left elbow Comparison: Chest 4/21/2025, right shoulder 4/19/2025 Findings: Chest: Heart size is normal for the projection. The pulmonary vascular pattern is normal and the lungs appear clear. Right shoulder: There are degenerative changes at the glenohumeral and AC joint. Findings are unchanged from the last study. There is osteopenia. No fractures are identified. Left elbow: There are degenerative changes of the elbow joint. There is ossification of the triceps insertion. There is no joint effusion and there are no fractures or destructive lesions. There is soft tissue swelling overlying the olecranon.      Impression: Impression: 1.No radiographic evidence of acute cardiopulmonary  disease. 2.Degenerative changes of the right shoulder and left elbow. 3.Soft tissue swelling overlying the left olecranon, probably representing olecranon bursitis. 4.No fractures or destructive lesions. Electronically Signed: Nima Phillip MD  4/24/2025 11:15 AM EDT  Workstation ID: [id number]                  Current medications:  Scheduled Meds:  Scheduled Medication   carvedilol, 6.25 mg, Oral, BID With Meals  cefTRIAXone, 2,000 mg, Intravenous, Q24H  colchicine, 1.2 mg, Oral, Daily  DAPTOmycin, 6 mg/kg (Adjusted), Intravenous, Q24H  dorzolamide (TRUSOPT) 2 % 1 drop, timolol (TIMOPTIC) 0.5 % 1 drop for Cosopt 22.3-6.8 mg/mL, , Left Eye, BID  enoxaparin sodium, 40 mg, Subcutaneous, Daily  gabapentin, 100 mg, Oral, Nightly  insulin glargine, 40 Units, Subcutaneous, Daily  Insulin Lispro, 10 Units, Subcutaneous, TID With Meals  insulin lispro, 2-9 Units, Subcutaneous, 4x Daily AC & at Bedtime  lactulose, 30 g, Oral, TID  Lidocaine, 1 patch, Transdermal, Q24H  losartan, 100 mg, Oral, Q24H  pantoprazole, 40 mg, Oral, Q AM  rifAXIMin, 550 mg, Oral, BID  sodium chloride, 10 mL, Intravenous, Q12H  tamsulosin, 0.4 mg, Oral, Daily         Continuous Infusions:  Infusion Medications   Pharmacy Consult,             PRN Meds:.  PRN Medication     [DISCONTINUED] acetaminophen **OR** acetaminophen **OR** acetaminophen    Calcium Replacement - Follow Nurse / BPA Driven Protocol    dextrose    dextrose    glucagon (human recombinant)    Magnesium Standard Dose Replacement - Follow Nurse / BPA Driven Protocol    nitroglycerin    oxyCODONE    Pharmacy Consult    Phosphorus Replacement - Follow Nurse / BPA Driven Protocol    Potassium Replacement - Follow Nurse / BPA Driven Protocol    sodium chloride    sodium chloride        Assessment & Plan  Assessment & Plan            Active Hospital Problems     Diagnosis   POA    **Hepatic encephalopathy [K76.82]   Yes       Resolved Hospital Problems   No resolved problems to display.          Brief Hospital Course to date:  Jimmy Gabehart is a 70 y.o. male with past medical history of insulin-dependent diabetes, hypertension, recent diagnosis of liver cirrhosis, thrombocytopenia, chronic pain presenting with altered mental status and fall at home.     Acute hepatic encephalopathy, improving  Decompensated liver cirrhosis  Patient with remote history of alcohol use. New diagnosis of Stage F4 cirrhosis based on FibroScan in March 2023. Following with GI as out patient  Presented with acute encephalopathy secondary to hepatic encephalopathy  Somewhat improved with lactulose  Lactulose was ordered as needed.  Will make a schedule given his worsening encephalopathy today  Continue rifaximin  Patient family advised to keep his follow-up with GI as outpatient.  Will need EGD  Normal TSH and B12  CT head with no acute intracranial pathology  Reduce gabapentin to 100 mg nightly given his persistent confusion  Discontinue baclofen upon discharge because of his advanced liver disease     Left elbow bursitis  Patient had a fever of 101.1 around midnight of 4/21/2025.  Now with recurrent fever of 100.7 night of 4/24/2025  Elevated procalcitonin, CRP and sed rate  Negative blood culture.  Normal lactic acid, Normal chest x-ray and normal UA, Negative respiratory panel  X-ray left elbow with olecranon bursitis  Continue empiric antibiotic therapy with Rocephin and daptomycin  Follow cultures  Continue to monitor inflammatory markers  Discussed with Dr. Rainey/orthopedic.  Appreciate help     Elevated D-dimer  CTA chest pending     Acute on chronic right shoulder pain  Possible rotator cuff tear   Fall at home landing on right shoulder  Has been evaluated by orthopedic surgery outpatient.  Patient was found to have a tear in his right shoulder.  Patient was deemed a poor surgical candidate and nonoperative approach was indicated.  X-ray right shoulder with no evidence of acute fracture  Continue supportive  care  Discussed with Dr. Rainey/orthopedic.  Patient will need right shoulder replacement versus rotator cuff repair in the near future.  No plan for surgical intervention during this hospitalization      Anemia of chronic disease  Severe iron deficiency, iron saturation 2%  Hemoglobin is slowly trending down.  It was 10.8 on admission.  Currently stable around 8-8.5  No evidence of GI bleed  Iron studies consistent with severe iron deficiency anemia  Status post IV iron infusion   Monitor CBC     Type 2 diabetes  A1c 11.5%  Continue insulin glargine 50 units daily.  Continue Premeal insulin 15 units 3 times daily and continue SSI  Hold Januvia and metformin   The patient drinks plenty of ski (still drink rich in sugar) and eats a quart of gallon of ice cream daily.  Theand family counseled about the importance to comply with diabetic diet     Hypertension  Continue home Coreg and losartan in the morning     GERD  Continue PPI     BPH  Continue tamsulosin     Hx of thrombocytopenia  Follows with Heme/Onc outpatient. 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 Will need to follow up outpatient for biopsy results.   Continue SCD for DVT prophylaxis     Weakness and debility  PT/OT recommended short-term rehab     Expected Discharge Location and Transportation: Inpatient rehab  Expected Discharge   Expected Discharge Date: 4/25/2025; Expected Discharge Time:       VTE Prophylaxis:  Pharmacologic & mechanical VTE prophylaxis orders are present.           AM-PAC 6 Clicks Score (PT): 20 (04/25/25 2009)     CODE STATUS:       Code Status and Medical Interventions: CPR (Attempt to Resuscitate); Full Support   Ordered at: 04/19/25 5594     Code Status (Patient has no pulse and is not breathing):     CPR (Attempt to Resuscitate)     Medical Interventions (Patient has pulse or is breathing):     Full Support     Level Of Support Discussed With:     Next of Kin (If No Surrogate)         Bailey Howell MD  04/26/25                            Bailey Howell MD   Physician  Medicine     Progress Notes      Signed     Date of Service: 25  Creation Time: 25     Signed       Expand All Collapse All[]Expand All by Default    Show:Clear all  [x]Written[x]Templated[x]Copied    Added by:  [x]Bailey Howell MD    []Leroy for details       [facility]  PROGRESS NOTE     Patient Name: Jimmy Gabehart  : 1955  MRN: [id number]     Date of Admission: 2025  Primary Care Physician: Miriam Mckeon DO     Subjective  Subjective      CC:  Follow-up for hepatic encephalopathy     HPI:  Patient seen and examined this morning.  She is looking and feeling much better today.  Left arm/elbow pain and swelling resolved.  Right shoulder pain is also improving.  Fully awake and alert today.  Son at bedside reported that his father looks much better     Objective  Objective      Vital Signs:   Temp:  [98 °F (36.7 °C)-98.4 °F (36.9 °C)] 98 °F (36.7 °C)  Heart Rate:  [] 70  Resp:  [18-20] 20  BP: (115-143)/(49-77) 143/60     Physical Exam:  General: Comfortable, not in distress, conversant and cooperative  Head: Atraumatic and normocephalic  Eyes: No Icterus. No pallor  Ears:  Ears appear intact with no abnormalities noted  Throat: No oral lesions, no thrush  Neck: Supple, trachea midline  Lungs: Clear to auscultation bilaterally, equal air entry, no wheezing or crackles  Heart:  Normal S1 and S2, no murmur, no gallop, No JVD, no lower extremity swelling  Abdomen:  Soft, no tenderness, no organomegaly, normal bowel sounds, no organomegaly  Extremities: Improved left bursitis  Skin: No bleeding, bruising or rash, normal skin turgor and elasticity  Neurologic: Cranial nerves appear intact with no evidence of facial asymmetry, normal motor and sensory functions in all 4 extremities  Psych: Alert and oriented x3     Results Reviewed:  LAB RESULTS:                  Lab  04/27/25  0523 04/26/25  1059 04/26/25  0428 04/25/25  0506 04/24/25  1114 04/23/25  0417 04/22/25  0534 04/21/25  1816 04/21/25  0448   WBC 2.67*  --  4.19 4.81 3.97 3.48 4.68  --  4.89   HEMOGLOBIN 8.0*  --  8.5* 9.4* 8.6* 8.6* 9.1*  --  9.3*   HEMATOCRIT 25.7*  --  27.0* 28.6* 26.6* 27.6* 28.6*  --  29.9*   PLATELETS 113*  --  114* 118* 104* 82* 86*  --  73*   NEUTROS ABS 0.78*  --  1.72 1.94 1.61* 1.40* 2.82  --  2.47   IMMATURE GRANS (ABS) 0.01  --   --  0.03 0.03 0.02 0.04  --  0.04   LYMPHS ABS 1.32  --   --  1.96 1.56 1.54 1.21  --  1.70   MONOS ABS 0.51  --   --  0.82 0.69 0.46 0.55  --  0.64   EOS ABS 0.04  --  0.17 0.06 0.07 0.06 0.05  --  0.03   .9*  --  111.1* 107.1* 108.6* 110.0* 107.9*  --  111.6*   SED RATE 47*  --   --  85* 73*  --   --   --   --    CRP 5.67*  --  8.20* 7.87* 6.25* 8.77* 9.27*  --  13.52*   PROCALCITONIN  --   --  0.30*  --  0.33*  --  0.36*  --  0.46*   LACTATE  --   --   --   --   --   --   --  1.9  --    D DIMER QUANT  --  2.84*  --   --   --   --   --   --   --                    Lab 04/27/25  0523 04/26/25  0428 04/25/25  0506 04/24/25  1114 04/23/25  0417 04/22/25  0534   SODIUM 139 134* 135* 132* 133* 134*   POTASSIUM 4.1 4.3 4.6 4.5 4.2 4.4   CHLORIDE 109* 103 105 101 104 101   CO2 18.0* 17.0* 18.0* 19.0* 18.0* 20.0*   ANION GAP 12.0 14.0 12.0 12.0 11.0 13.0   BUN 22 29* 26* 23 25* 21   CREATININE 1.17 1.47* 1.30* 1.35* 1.32* 1.26   EGFR 67.1 51.0* 59.1* 56.5* 58.0* 61.4   GLUCOSE 120* 102* 124* 173* 193* 193*   CALCIUM 8.3* 8.6 9.0 8.2* 8.3* 8.2*   MAGNESIUM 1.7 1.8 1.7 1.6 1.7 1.8   PHOSPHORUS 3.1 3.5 3.3  --  3.3 2.8                  Lab 04/27/25  0523 04/26/25  0428 04/25/25  0506 04/24/25  1114 04/23/25  0417   TOTAL PROTEIN 6.2 6.9 7.4 6.6 6.7   ALBUMIN 3.0* 2.9* 3.2* 2.9* 3.0*   GLOBULIN 3.2 4.0 4.2 3.7 3.7   ALT (SGPT) 36 44* 54* 49* 35   AST (SGOT) 55* 76* 105* 105* 59*   BILIRUBIN 0.8 1.1 1.0 1.1 0.7   ALK PHOS 286* 279* 299* 240* 237*                             Brief Urine Lab Results  (Last result in the past 365 days)          Color   Clarity   Blood   Leuk Est   Nitrite   Protein   CREAT   Urine HCG         04/21/25 1742 Yellow    Clear    Negative    Negative    Negative    Negative                             Microbiology Results Abnormal         None                  Radiology results from the last 24 hours:   CT Angiogram Chest Pulmonary Embolism  Result Date: 4/26/2025  CT ANGIOGRAM CHEST PULMONARY EMBOLISM Date of Exam: 4/26/2025 5:02 PM EDT Indication: Pulmonary embolism (PE) suspected, high prob, shortness of breath. Comparison: None available. Technique: Axial CT images were obtained of the chest after the uneventful intravenous administration of 160 mL Isovue-370 utilizing pulmonary embolism protocol.  In addition, a 3-D volume rendered image was created for interpretation.  Reconstructed coronal and sagittal images were also obtained. Automated exposure control and iterative construction methods were used. Findings: Exam was repeated due to suboptimal contrast opacification of pulmonary arteries on initial imaging. Evaluation of segmental and subsegmental pulmonary arteries is limited by respiratory motion artifact, but no central pulmonary emboli are seen in the main or lobar pulmonary arteries. Main pulmonary artery is not dilated. Heart size is mildly enlarged. No pericardial or pleural effusion.  Mild patchy groundglass opacities in the right lower lobe. Lungs otherwise clear. Nodular liver contour and splenomegaly in the partially included upper abdomen. Small right renal cysts. No acute osseous abnormality is identified. Severe arthritic changes of the right shoulder.      Impression: Impression: 1.No central pulmonary emboli seen. Evaluation of segmental/subsegmental pulmonary arteries limited by respiratory motion artifact. 2.Mild patchy groundglass opacities in the right lower lobe, likely due to an infectious/inflammatory process. 3.Cirrhotic  liver morphology with splenomegaly. Electronically Signed: Joan Radford MD  4/26/2025 5:40 PM EDT  Workstation ID: [id number]                  Current medications:  Scheduled Meds:  Scheduled Medication   carvedilol, 6.25 mg, Oral, BID With Meals  cefTRIAXone, 2,000 mg, Intravenous, Q24H  colchicine, 1.2 mg, Oral, Daily  DAPTOmycin, 6 mg/kg (Adjusted), Intravenous, Q24H  dorzolamide (TRUSOPT) 2 % 1 drop, timolol (TIMOPTIC) 0.5 % 1 drop for Cosopt 22.3-6.8 mg/mL, , Left Eye, BID  enoxaparin sodium, 40 mg, Subcutaneous, Daily  gabapentin, 100 mg, Oral, Nightly  insulin glargine, 30 Units, Subcutaneous, Daily  Insulin Lispro, 10 Units, Subcutaneous, TID With Meals  insulin lispro, 2-9 Units, Subcutaneous, 4x Daily AC & at Bedtime  [START ON 4/28/2025] lactulose, 30 g, Oral, Daily  Lidocaine, 1 patch, Transdermal, Q24H  losartan, 100 mg, Oral, Q24H  pantoprazole, 40 mg, Oral, Q AM  rifAXIMin, 550 mg, Oral, BID  sodium chloride, 10 mL, Intravenous, Q12H  tamsulosin, 0.4 mg, Oral, Daily         Continuous Infusions:  Infusion Medications   Pharmacy Consult,             PRN Meds:.  PRN Medication     [DISCONTINUED] acetaminophen **OR** acetaminophen **OR** acetaminophen    Calcium Replacement - Follow Nurse / BPA Driven Protocol    dextrose    dextrose    glucagon (human recombinant)    Magnesium Standard Dose Replacement - Follow Nurse / BPA Driven Protocol    nitroglycerin    oxyCODONE    Pharmacy Consult    Phosphorus Replacement - Follow Nurse / BPA Driven Protocol    Potassium Replacement - Follow Nurse / BPA Driven Protocol    sodium chloride    sodium chloride        Assessment & Plan  Assessment & Plan            Active Hospital Problems     Diagnosis   POA    **Hepatic encephalopathy [K76.82]   Yes       Resolved Hospital Problems   No resolved problems to display.         Brief Hospital Course to date:  Jimmy Gabehart is a 70 y.o. male with past medical history of insulin-dependent diabetes, hypertension, recent  diagnosis of liver cirrhosis, thrombocytopenia, chronic pain presenting with altered mental status and fall at home.     Acute hepatic encephalopathy, improved  Decompensated liver cirrhosis  Patient with remote history of alcohol use. New diagnosis of Stage F4 cirrhosis based on FibroScan in March 2023. Following with GI as out patient  Presented with acute encephalopathy secondary to hepatic encephalopathy  Somewhat improved with lactulose  Continue scheduled lactulose   Continue rifaximin  Patient family advised to keep his follow-up with GI as outpatient.  Will need EGD  Normal TSH and B12  CT head with no acute intracranial pathology  Gabapentin dose reduced to 100 mg at bedtime   Discontinue baclofen upon discharge because of his advanced liver disease     Left elbow bursitis, improving  Patient had a fever of 101.1 around midnight of 4/21/2025.  Now with recurrent fever of 100.7 night of 4/24/2025  Elevated procalcitonin, CRP and sed rate  Negative blood culture.  Normal lactic acid, Normal chest x-ray and normal UA, Negative respiratory panel  X-ray left elbow with olecranon bursitis  Continue empiric antibiotic therapy with Rocephin and daptomycin  Negative cultures  Continue to monitor inflammatory markers  Discussed with Dr. Rainey/orthopedic.  Appreciate help     Right forearm superficial thrombophlebitis  Elevated D-dimer  CTA chest negative for PE  Elevated D-dimer is likely admitted secondary to clinical right forearm thrombophlebitis.  Continue conservative treatment     Acute on chronic right shoulder pain  Possible rotator cuff tear   Fall at home landing on right shoulder  Has been evaluated by orthopedic surgery outpatient.  Patient was found to have a tear in his right shoulder.  Patient was deemed a poor surgical candidate and nonoperative approach was indicated.  X-ray right shoulder with no evidence of acute fracture  Continue supportive care  Discussed with Dr. Rainey/orthopedic.   Patient will need right shoulder replacement versus rotator cuff repair in the near future.  No plan for surgical intervention during this hospitalization      Anemia of chronic disease  Severe iron deficiency, iron saturation 2%  Hemoglobin is slowly trending down.  It was 10.8 on admission.  Currently stable around 8-8.5  No evidence of GI bleed  Iron studies consistent with severe iron deficiency anemia  Status post IV iron infusion   Monitor CBC     Type 2 diabetes  A1c 11.5%  Continue insulin glargine 50 units daily.  Continue Premeal insulin 15 units 3 times daily and continue SSI  Hold Januvia and metformin   The patient drinks plenty of ski (still drink rich in sugar) and eats a quart of gallon of ice cream daily.  Theand family counseled about the importance to comply with diabetic diet     Hypertension  Continue home Coreg and losartan in the morning     GERD  Continue PPI     BPH  Continue tamsulosin     Hx of thrombocytopenia  Follows with Heme/Onc outpatient. 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 Will need to follow up outpatient for biopsy results.   Continue SCD for DVT prophylaxis     Weakness and debility  PT/OT recommended short-term rehab     Expected Discharge Location and Transportation: Inpatient rehab  Expected Discharge   Expected Discharge Date: 4/30/2025; Expected Discharge Time:       VTE Prophylaxis:  Pharmacologic & mechanical VTE prophylaxis orders are present.           AM-PAC 6 Clicks Score (PT): 21 (04/26/25 2010)     CODE STATUS:       Code Status and Medical Interventions: CPR (Attempt to Resuscitate); Full Support   Ordered at: 04/19/25 1341     Code Status (Patient has no pulse and is not breathing):     CPR (Attempt to Resuscitate)     Medical Interventions (Patient has pulse or is breathing):     Full Support     Level Of Support Discussed With:     Next of Kin (If No Surrogate)         Bailey Howell MD  04/27/25                           Kirsten Rainey MD  "  Physician  Surgery Orthopedic     Progress Notes      Signed     Date of Service: 25 1100  Creation Time: 25 1100     Signed       Expand All Collapse All[]Expand All by Default    Show:Clear all  [x]Written[x]Templated[x]Copied    Added by:  [x]Kirsten Rainey MD    []Leroy for details                Orthopaedic Surgery Progress Note       LOS: 8 days   Patient Care Team:  Miiram Mckeon DO as PCP - General (Family Medicine)  Liz Kaur APRN as Nurse Practitioner (Nurse Practitioner)  Avery Scanlon MD as Consulting Physician (Cardiology)     Subjective  CC:  Right shoulder pain     Interval History:   Patient reports that his left elbow pain has significantly improved.  He continues to have right shoulder pain is largely unchanged.  He overall does feel that he is improving with time.  No other complaints.     Objective  Vital Signs:  Temp (24hrs), Av.2 °F (36.8 °C), Min:98 °F (36.7 °C), Max:98.4 °F (36.9 °C)     /77 (BP Location: Right arm, Patient Position: Sitting)   Pulse 70   Temp 98 °F (36.7 °C) (Oral)   Resp 18   Ht 188 cm (74\")   Wt 116 kg (255 lb 4.7 oz)   SpO2 91%   BMI 32.78 kg/m²      Labs:  Lab Results (last 24 hours)         Procedure Component Value Units Date/Time     POC Glucose Once [[id number]]  (Abnormal) Collected: 25 0746     Specimen: Blood Updated: 25 0747       Glucose 142 mg/dL       Comprehensive Metabolic Panel [[id number]]  (Abnormal) Collected: 25 0523     Specimen: Blood Updated: 25 0639       Glucose 120 mg/dL         BUN 22 mg/dL         Creatinine 1.17 mg/dL         Sodium 139 mmol/L         Potassium 4.1 mmol/L         Chloride 109 mmol/L         CO2 18.0 mmol/L         Calcium 8.3 mg/dL         Total Protein 6.2 g/dL         Albumin 3.0 g/dL         ALT (SGPT) 36 U/L         AST (SGOT) 55 U/L         Alkaline Phosphatase 286 U/L         Total Bilirubin 0.8 mg/dL         Globulin 3.2 gm/dL         " Comment: Calculated Result          A/G Ratio 0.9 g/dL         BUN/Creatinine Ratio 18.8       Anion Gap 12.0 mmol/L         eGFR 67.1 mL/min/1.73       Narrative:       GFR Categories in Chronic Kidney Disease (CKD)                         GFR Category          GFR (mL/min/1.73)    Interpretation  G1                       90 or greater              Normal or high (1)  G2                               60-89                Mild decrease (1)  G3a                   45-59                Mild to moderate decrease  G3b                   30-44                Moderate to severe decrease  G4                    15-29                Severe decrease  G5                    14 or less           Kidney failure                                                 (1)In the absence of evidence of kidney disease, neither GFR category G1 or G2 fulfill the criteria for CKD.     eGFR calculation 2021 CKD-EPI creatinine equation, which does not include race as a factor     Magnesium [[id number]]  (Normal) Collected: 04/27/25 0523     Specimen: Blood Updated: 04/27/25 0639       Magnesium 1.7 mg/dL       Phosphorus [[id number]]  (Normal) Collected: 04/27/25 0523     Specimen: Blood Updated: 04/27/25 0639       Phosphorus 3.1 mg/dL       C-reactive Protein [[id number]]  (Abnormal) Collected: 04/27/25 0523     Specimen: Blood Updated: 04/27/25 0639       C-Reactive Protein 5.67 mg/dL       Sedimentation Rate [[id number]]  (Abnormal) Collected: 04/27/25 0523     Specimen: Blood Updated: 04/27/25 0633       Sed Rate 47 mm/hr       CBC & Differential [[id number]]  (Abnormal) Collected: 04/27/25 0523     Specimen: Blood Updated: 04/27/25 0602     Narrative:       The following orders were created for panel order CBC & Differential.  Procedure                               Abnormality         Status                     ---------                               -----------         ------                     CBC Auto Differential[[id number]]        Abnormal             Final result               Scan Slide[[id number]]                                                                     Please view results for these tests on the individual orders.     CBC Auto Differential [[id number]]  (Abnormal) Collected: 04/27/25 0523     Specimen: Blood Updated: 04/27/25 0601       WBC 2.67 10*3/mm3         RBC 2.36 10*6/mm3         Hemoglobin 8.0 g/dL         Hematocrit 25.7 %         .9 fL         MCH 33.9 pg         MCHC 31.1 g/dL         RDW 13.6 %         RDW-SD 54.3 fl         MPV 11.7 fL         Platelets 113 10*3/mm3         Neutrophil % 29.2 %         Lymphocyte % 49.4 %         Monocyte % 19.1 %         Eosinophil % 1.5 %         Basophil % 0.4 %         Immature Grans % 0.4 %         Neutrophils, Absolute 0.78 10*3/mm3         Lymphocytes, Absolute 1.32 10*3/mm3         Monocytes, Absolute 0.51 10*3/mm3         Eosinophils, Absolute 0.04 10*3/mm3         Basophils, Absolute 0.01 10*3/mm3         Immature Grans, Absolute 0.01 10*3/mm3         nRBC 0.0 /100 WBC       POC Glucose Once [[id number]]  (Abnormal) Collected: 04/26/25 2030     Specimen: Blood Updated: 04/26/25 2031       Glucose 198 mg/dL       Blood Culture - Blood, Arm, Left [[id number]]  (Normal) Collected: 04/21/25 1817     Specimen: Blood from Arm, Left Updated: 04/26/25 1946       Blood Culture No growth at 5 days     Blood Culture - Blood, Arm, Right [[id number]]  (Normal) Collected: 04/25/25 1536     Specimen: Blood from Arm, Right Updated: 04/26/25 1745       Blood Culture No growth at 24 hours     POC Glucose Once [[id number]]  (Abnormal) Collected: 04/26/25 1646     Specimen: Blood Updated: 04/26/25 1648       Glucose 155 mg/dL       POC Glucose Once [[id number]]  (Abnormal) Collected: 04/26/25 1129     Specimen: Blood Updated: 04/26/25 1131       Glucose 158 mg/dL       D-dimer, Quantitative [[id number]]  (Abnormal) Collected: 04/26/25 1059     Specimen: Blood Updated: 04/26/25 1122       D-Dimer,  "Quantitative 2.84 MCGFEU/mL       Narrative:       According to the assay 's published package insert, a normal (<0.50 MCGFEU/mL) D-dimer result in conjunction with a non-high clinical probability assessment, excludes deep vein thrombosis (DVT) and pulmonary embolism (PE) with high sensitivity.     D-dimer values increase with age and this can make VTE exclusion of an older population difficult. To address this, the [employer], based on best available evidence and recent guidelines, recommends that clinicians use age-adjusted D-dimer thresholds in patients greater than 50 years of age with: a) a low probability of PE who do not meet all Pulmonary Embolism Rule Out Criteria, or b) in those with intermediate probability of PE.   The formula for an age-adjusted D-dimer cut-off is \"age/100\".  For example, a 60 year old patient would have an age-adjusted cut-off of 0.60 MCGFEU/mL and an 80 year old 0.80 MCGFEU/mL.                Physical Exam:  Examination of the left upper extremity demonstrates improvements in the erythema and swelling along the posterior aspect of the elbow.  No palpable bursal fluid.  Painless active and passive short arc range of motion of the elbow.  Full elbow flexion, extension, pronation and supination.  Sensations intact light touch of the hand.  Warm well-perfused distally.     Examination of the right upper extremity demonstrates no deformity.  There is a small area of swelling and bruising in the antecubital fossa at site of prior IV insertion.  This is minimally tender to palpation.  He is diffusely nontender to palpation throughout the shoulder girdle.  Unchanged shoulder range of motion, 80 degrees of forward flexion and 60 degrees of abduction.  Limited internal and external rotation secondary to pain.  Rotator cuff not assessed due to pain.  Sensations intact light touch of the hand.  Warm well-perfused distally.     Assessment & Plan  Con Gabehart is a " 70-year-old male with right glenohumeral joint rotator cuff arthropathy and improved left elbow cellulitis versus olecranon bursitis.     - Weight bearing status: Weight-bear as tolerated bilateral upper extremities.  Ace wrap compression and ice to the left elbow.  - Pain control: Per medicine  - DVT PPX: Mechanical, lovenox  - ABX: Rocephin, daptomycin  - Diet: Regular  - Medical management and optimization   - PT/OT  - The patient has had clinical improvement to his left elbow.  His right shoulder pain has stabilized.  No indications for operative interventions.  The patient is an established patient at [facility] orthopedics and he may follow-up with them regarding his right shoulder arthritis or with [facility] orthopedics.  Outpatient orthopedic follow-up recommended for his right shoulder and outpatient primary care follow-up for his left elbow.  Please call with any additional questions or concerns.     Kirsten Rainey MD  04/27/25  11:00 EDT

## 2025-04-28 NOTE — CASE MANAGEMENT/SOCIAL WORK
Continued Stay Note   [facility]     Patient Name: Jimmy Gabehart  MRN: [id number]  Today's Date: 4/28/2025    Admit Date: 4/19/2025    Plan: SNF   Discharge Plan       Row Name 04/28/25 1212       Plan    Plan SNF    Patient/Family in Agreement with Plan yes    Plan Comments I have spoken with Karrie at [facility] and Angelina at [facility].  I have faxed over updated clinical notes.  At this time case management is waiting on a SNF to offer a bed.  I have updated Mr Gabehart and his son at bedside.  Case management will continue to follow.    Final Discharge Disposition Code 03 - skilled nursing facility (SNF)                   Discharge Codes    No documentation.                 Expected Discharge Date and Time       Expected Discharge Date Expected Discharge Time    Apr 30, 2025               Nannette Galdamez RN

## 2025-04-28 NOTE — PLAN OF CARE
Goal Outcome Evaluation:              Outcome Evaluation: VSS on room air. NSR. complains of pain, given prn meds. alert and oriented X 4.

## 2025-04-28 NOTE — PROGRESS NOTES
Nutrition Services    Patient Name:  Jimmy Gabehart  YOB: 1955  MRN: [id number]  Admit Date:  4/19/2025    Pt identified 2/2 LOS. No nutrition risk noted at this time. Please consult for intake avg <50% or specific nutrition needs.      Electronically signed by:  Sara Soto MS,RD,LD  04/28/25 07:08 EDT

## 2025-04-28 NOTE — PLAN OF CARE
Problem: Adult Inpatient Plan of Care  Goal: Plan of Care Review  Outcome: Progressing  Goal: Patient-Specific Goal (Individualized)  Outcome: Progressing  Goal: Absence of Hospital-Acquired Illness or Injury  Outcome: Progressing  Intervention: Identify and Manage Fall Risk  Recent Flowsheet Documentation  Taken 4/28/2025 0410 by Charles Chaves RN  Safety Promotion/Fall Prevention:   safety round/check completed   activity supervised   assistive device/personal items within reach   clutter free environment maintained   fall prevention program maintained  Taken 4/28/2025 0210 by Charles Chaves RN  Safety Promotion/Fall Prevention:   activity supervised   assistive device/personal items within reach   safety round/check completed   clutter free environment maintained   nonskid shoes/slippers when out of bed  Taken 4/28/2025 0010 by Charles Chaves RN  Safety Promotion/Fall Prevention:   safety round/check completed   activity supervised   assistive device/personal items within reach   clutter free environment maintained   fall prevention program maintained  Taken 4/27/2025 2210 by Charles Chaves RN  Safety Promotion/Fall Prevention:   activity supervised   assistive device/personal items within reach   safety round/check completed   clutter free environment maintained   nonskid shoes/slippers when out of bed  Taken 4/27/2025 2010 by Charles Chaves RN  Safety Promotion/Fall Prevention:   safety round/check completed   activity supervised   assistive device/personal items within reach   clutter free environment maintained   fall prevention program maintained  Intervention: Prevent Skin Injury  Recent Flowsheet Documentation  Taken 4/28/2025 0410 by Charles Chaves RN  Skin Protection:   incontinence pads utilized   silicone foam dressing in place   transparent dressing maintained  Taken 4/28/2025 0210 by Charles Chaves RN  Skin Protection:   incontinence pads utilized   transparent  dressing maintained  Taken 4/28/2025 0010 by Charles Chaves RN  Body Position: position changed independently  Skin Protection:   incontinence pads utilized   silicone foam dressing in place   transparent dressing maintained  Taken 4/27/2025 2210 by Charles Chaves RN  Body Position: position changed independently  Skin Protection:   incontinence pads utilized   transparent dressing maintained  Taken 4/27/2025 2010 by Charles Chaves RN  Body Position: position changed independently  Skin Protection: incontinence pads utilized  Intervention: Prevent and Manage VTE (Venous Thromboembolism) Risk  Recent Flowsheet Documentation  Taken 4/27/2025 2010 by Charles Chaves RN  VTE Prevention/Management:   bilateral   SCDs (sequential compression devices) off  Intervention: Prevent Infection  Recent Flowsheet Documentation  Taken 4/28/2025 0410 by Charles Chaves RN  Infection Prevention:   hand hygiene promoted   rest/sleep promoted  Taken 4/28/2025 0210 by Charles Chaves RN  Infection Prevention:   environmental surveillance performed   rest/sleep promoted  Taken 4/28/2025 0010 by Charles Chaves RN  Infection Prevention:   hand hygiene promoted   rest/sleep promoted  Taken 4/27/2025 2210 by Charles Chaves RN  Infection Prevention:   environmental surveillance performed   rest/sleep promoted  Taken 4/27/2025 2010 by Charles Chaves RN  Infection Prevention:   environmental surveillance performed   hand hygiene promoted   rest/sleep promoted   single patient room provided   cohorting utilized  Goal: Optimal Comfort and Wellbeing  Outcome: Progressing  Intervention: Provide Person-Centered Care  Recent Flowsheet Documentation  Taken 4/27/2025 2010 by Charles Chaves RN  Trust Relationship/Rapport:   care explained   emotional support provided   questions answered   questions encouraged   thoughts/feelings acknowledged  Goal: Readiness for Transition of Care  Outcome: Progressing      Problem: Fall Injury Risk  Goal: Absence of Fall and Fall-Related Injury  Outcome: Progressing  Intervention: Identify and Manage Contributors  Recent Flowsheet Documentation  Taken 4/28/2025 0210 by Charles Chaves RN  Self-Care Promotion: independence encouraged  Taken 4/27/2025 2210 by Charles Chaves RN  Self-Care Promotion: independence encouraged  Taken 4/27/2025 2010 by Charles Chaves, RN  Medication Review/Management: medications reviewed  Intervention: Promote Injury-Free Environment  Recent Flowsheet Documentation  Taken 4/28/2025 0410 by Charles Chaves, RN  Safety Promotion/Fall Prevention:   safety round/check completed   activity supervised   assistive device/personal items within reach   clutter free environment maintained   fall prevention program maintained  Taken 4/28/2025 0210 by Charles Chaves RN  Safety Promotion/Fall Prevention:   activity supervised   assistive device/personal items within reach   safety round/check completed   clutter free environment maintained   nonskid shoes/slippers when out of bed  Taken 4/28/2025 0010 by Charles Chaves RN  Safety Promotion/Fall Prevention:   safety round/check completed   activity supervised   assistive device/personal items within reach   clutter free environment maintained   fall prevention program maintained  Taken 4/27/2025 2210 by Charles Chaves RN  Safety Promotion/Fall Prevention:   activity supervised   assistive device/personal items within reach   safety round/check completed   clutter free environment maintained   nonskid shoes/slippers when out of bed  Taken 4/27/2025 2010 by Charles Chaves, RN  Safety Promotion/Fall Prevention:   safety round/check completed   activity supervised   assistive device/personal items within reach   clutter free environment maintained   fall prevention program maintained     Problem: Comorbidity Management  Goal: Blood Glucose Level Within Target Range  Outcome:  Progressing  Intervention: Monitor and Manage Glycemia  Recent Flowsheet Documentation  Taken 4/27/2025 2010 by Charles Chaves RN  Medication Review/Management: medications reviewed  Goal: Blood Pressure in Desired Range  Outcome: Progressing  Intervention: Maintain Blood Pressure Management  Recent Flowsheet Documentation  Taken 4/27/2025 2010 by Charles Chaves RN  Medication Review/Management: medications reviewed  Goal: Maintenance of Osteoarthritis Symptom Control  Outcome: Progressing  Intervention: Maintain Osteoarthritis Symptom Control  Recent Flowsheet Documentation  Taken 4/28/2025 0410 by Charles Chaves RN  Activity Management: activity minimized  Taken 4/28/2025 0210 by Charles Chaves RN  Activity Management: activity minimized  Taken 4/28/2025 0010 by Charles Chaves RN  Activity Management: activity minimized  Taken 4/27/2025 2210 by Charles Chaves RN  Activity Management: activity minimized  Taken 4/27/2025 2010 by Charles Chaves RN  Activity Management: activity encouraged  Medication Review/Management: medications reviewed     Problem: Violence Risk or Actual  Goal: Anger and Impulse Control  Outcome: Progressing  Intervention: Minimize Safety Risk  Recent Flowsheet Documentation  Taken 4/28/2025 0410 by Charles Chaves RN  Enhanced Safety Measures: bed alarm set  Taken 4/28/2025 0210 by Charles Chaves RN  Enhanced Safety Measures: bed alarm set  Taken 4/28/2025 0010 by Charles Chaves RN  Enhanced Safety Measures: bed alarm set  Taken 4/27/2025 2210 by Charles Chaves RN  Enhanced Safety Measures: bed alarm set  Taken 4/27/2025 2010 by Charles Chaves RN  Enhanced Safety Measures:   bed alarm set   room near unit station     Problem: Noninvasive Ventilation Acute  Goal: Effective Unassisted Ventilation and Oxygenation  Outcome: Progressing     Problem: Skin Injury Risk Increased  Goal: Skin Health and Integrity  Outcome:  Progressing  Intervention: Optimize Skin Protection  Recent Flowsheet Documentation  Taken 4/28/2025 0410 by Charles Chaves RN  Activity Management: activity minimized  Pressure Reduction Techniques:   frequent weight shift encouraged   heels elevated off bed   weight shift assistance provided   positioned off wounds  Head of Bed (HOB) Positioning: HOB elevated  Pressure Reduction Devices:   positioning supports utilized   pressure-redistributing mattress utilized  Skin Protection:   incontinence pads utilized   silicone foam dressing in place   transparent dressing maintained  Taken 4/28/2025 0210 by Charles Chaves RN  Activity Management: activity minimized  Pressure Reduction Techniques:   frequent weight shift encouraged   pressure points protected  Head of Bed (HOB) Positioning: HOB elevated  Pressure Reduction Devices:   positioning supports utilized   pressure-redistributing mattress utilized  Skin Protection:   incontinence pads utilized   transparent dressing maintained  Taken 4/28/2025 0010 by Charles Chaves RN  Activity Management: activity minimized  Pressure Reduction Techniques:   frequent weight shift encouraged   heels elevated off bed   weight shift assistance provided   positioned off wounds  Head of Bed (HOB) Positioning: HOB elevated  Pressure Reduction Devices:   positioning supports utilized   pressure-redistributing mattress utilized  Skin Protection:   incontinence pads utilized   silicone foam dressing in place   transparent dressing maintained  Taken 4/27/2025 2210 by Charles Chaves RN  Activity Management: activity minimized  Pressure Reduction Techniques:   frequent weight shift encouraged   pressure points protected  Head of Bed (HOB) Positioning: HOB elevated  Pressure Reduction Devices:   positioning supports utilized   pressure-redistributing mattress utilized  Skin Protection:   incontinence pads utilized   transparent dressing maintained  Taken 4/27/2025 2010 by  Charles Chaves, RN  Activity Management: activity encouraged  Pressure Reduction Techniques:   frequent weight shift encouraged   heels elevated off bed   weight shift assistance provided   pressure points protected  Head of Bed (HOB) Positioning: HOB elevated  Pressure Reduction Devices:   pressure-redistributing mattress utilized   positioning supports utilized  Skin Protection: incontinence pads utilized   Goal Outcome Evaluation:             Pt has no new nursing issues at this time. Pt is a&ox4, NSR/Sinus Tach, RA, VSS. Pt has no complaints at this time. Will continue plan of care.

## 2025-04-29 LAB
ANION GAP SERPL CALCULATED.3IONS-SCNC: 13 MMOL/L (ref 5–15)
BASOPHILS # BLD AUTO: 0 10*3/MM3 (ref 0–0.2)
BASOPHILS NFR BLD AUTO: 0 % (ref 0–1.5)
BUN SERPL-MCNC: 17 MG/DL (ref 8–23)
BUN/CREAT SERPL: 14.3 (ref 7–25)
CALCIUM SPEC-SCNC: 8.9 MG/DL (ref 8.6–10.5)
CHLORIDE SERPL-SCNC: 103 MMOL/L (ref 98–107)
CO2 SERPL-SCNC: 20 MMOL/L (ref 22–29)
CREAT SERPL-MCNC: 1.19 MG/DL (ref 0.76–1.27)
DEPRECATED RDW RBC AUTO: 52.6 FL (ref 37–54)
EGFRCR SERPLBLD CKD-EPI 2021: 65.7 ML/MIN/1.73
EOSINOPHIL # BLD AUTO: 0.05 10*3/MM3 (ref 0–0.4)
EOSINOPHIL NFR BLD AUTO: 1.8 % (ref 0.3–6.2)
ERYTHROCYTE [DISTWIDTH] IN BLOOD BY AUTOMATED COUNT: 13.5 % (ref 12.3–15.4)
GLUCOSE BLDC GLUCOMTR-MCNC: 120 MG/DL (ref 70–130)
GLUCOSE BLDC GLUCOMTR-MCNC: 151 MG/DL (ref 70–130)
GLUCOSE BLDC GLUCOMTR-MCNC: 158 MG/DL (ref 70–130)
GLUCOSE BLDC GLUCOMTR-MCNC: 242 MG/DL (ref 70–130)
GLUCOSE SERPL-MCNC: 228 MG/DL (ref 65–99)
HCT VFR BLD AUTO: 28.6 % (ref 37.5–51)
HGB BLD-MCNC: 9.3 G/DL (ref 13–17.7)
IMM GRANULOCYTES # BLD AUTO: 0.02 10*3/MM3 (ref 0–0.05)
IMM GRANULOCYTES NFR BLD AUTO: 0.7 % (ref 0–0.5)
LYMPHOCYTES # BLD AUTO: 1.32 10*3/MM3 (ref 0.7–3.1)
LYMPHOCYTES NFR BLD AUTO: 47.5 % (ref 19.6–45.3)
MACROCYTES BLD QL SMEAR: NORMAL
MCH RBC QN AUTO: 35.2 PG (ref 26.6–33)
MCHC RBC AUTO-ENTMCNC: 32.5 G/DL (ref 31.5–35.7)
MCV RBC AUTO: 108.3 FL (ref 79–97)
MONOCYTES # BLD AUTO: 0.3 10*3/MM3 (ref 0.1–0.9)
MONOCYTES NFR BLD AUTO: 10.8 % (ref 5–12)
NEUTROPHILS NFR BLD AUTO: 1.09 10*3/MM3 (ref 1.7–7)
NEUTROPHILS NFR BLD AUTO: 39.2 % (ref 42.7–76)
NRBC BLD AUTO-RTO: 0 /100 WBC (ref 0–0.2)
PLAT MORPH BLD: NORMAL
PLATELET # BLD AUTO: 151 10*3/MM3 (ref 140–450)
PMV BLD AUTO: 11.2 FL (ref 6–12)
POTASSIUM SERPL-SCNC: 4.1 MMOL/L (ref 3.5–5.2)
RBC # BLD AUTO: 2.64 10*6/MM3 (ref 4.14–5.8)
SODIUM SERPL-SCNC: 136 MMOL/L (ref 136–145)
WBC MORPH BLD: NORMAL
WBC NRBC COR # BLD AUTO: 2.78 10*3/MM3 (ref 3.4–10.8)

## 2025-04-29 PROCEDURE — 25010000002 DAPTOMYCIN PER 1 MG

## 2025-04-29 PROCEDURE — 85007 BL SMEAR W/DIFF WBC COUNT: CPT | Performed by: INTERNAL MEDICINE

## 2025-04-29 PROCEDURE — 82948 REAGENT STRIP/BLOOD GLUCOSE: CPT

## 2025-04-29 PROCEDURE — 97530 THERAPEUTIC ACTIVITIES: CPT

## 2025-04-29 PROCEDURE — 80048 BASIC METABOLIC PNL TOTAL CA: CPT | Performed by: INTERNAL MEDICINE

## 2025-04-29 PROCEDURE — 97535 SELF CARE MNGMENT TRAINING: CPT

## 2025-04-29 PROCEDURE — 63710000001 INSULIN LISPRO (HUMAN) PER 5 UNITS: Performed by: INTERNAL MEDICINE

## 2025-04-29 PROCEDURE — 63710000001 INSULIN LISPRO (HUMAN) PER 5 UNITS: Performed by: STUDENT IN AN ORGANIZED HEALTH CARE EDUCATION/TRAINING PROGRAM

## 2025-04-29 PROCEDURE — 63710000001 INSULIN GLARGINE PER 5 UNITS: Performed by: INTERNAL MEDICINE

## 2025-04-29 PROCEDURE — 25010000002 ENOXAPARIN PER 10 MG: Performed by: INTERNAL MEDICINE

## 2025-04-29 PROCEDURE — 85025 COMPLETE CBC W/AUTO DIFF WBC: CPT | Performed by: INTERNAL MEDICINE

## 2025-04-29 PROCEDURE — 99232 SBSQ HOSP IP/OBS MODERATE 35: CPT | Performed by: INTERNAL MEDICINE

## 2025-04-29 RX ORDER — LACTULOSE 10 G/15ML
30 SOLUTION ORAL
Status: DISCONTINUED | OUTPATIENT
Start: 2025-04-29 | End: 2025-04-30

## 2025-04-29 RX ORDER — COLCHICINE 0.6 MG/1
0.6 TABLET ORAL DAILY
Status: DISCONTINUED | OUTPATIENT
Start: 2025-04-30 | End: 2025-05-02 | Stop reason: HOSPADM

## 2025-04-29 RX ADMIN — DORZOLAMIDE HYDROCHLORIDE: 20 SOLUTION/ DROPS OPHTHALMIC at 09:25

## 2025-04-29 RX ADMIN — GABAPENTIN 100 MG: 100 CAPSULE ORAL at 20:53

## 2025-04-29 RX ADMIN — OXYCODONE HYDROCHLORIDE 10 MG: 10 TABLET ORAL at 14:45

## 2025-04-29 RX ADMIN — LACTULOSE 30 G: 20 SOLUTION ORAL at 17:08

## 2025-04-29 RX ADMIN — LIDOCAINE 1 PATCH: 4 PATCH TOPICAL at 09:25

## 2025-04-29 RX ADMIN — RIFAXIMIN 550 MG: 550 TABLET ORAL at 09:25

## 2025-04-29 RX ADMIN — RIFAXIMIN 550 MG: 550 TABLET ORAL at 20:53

## 2025-04-29 RX ADMIN — Medication 10 ML: at 20:54

## 2025-04-29 RX ADMIN — INSULIN GLARGINE 30 UNITS: 100 INJECTION, SOLUTION SUBCUTANEOUS at 09:24

## 2025-04-29 RX ADMIN — PANTOPRAZOLE SODIUM 40 MG: 40 TABLET, DELAYED RELEASE ORAL at 05:34

## 2025-04-29 RX ADMIN — LACTULOSE 30 G: 20 SOLUTION ORAL at 09:25

## 2025-04-29 RX ADMIN — INSULIN LISPRO 10 UNITS: 100 INJECTION, SOLUTION INTRAVENOUS; SUBCUTANEOUS at 12:11

## 2025-04-29 RX ADMIN — INSULIN LISPRO 10 UNITS: 100 INJECTION, SOLUTION INTRAVENOUS; SUBCUTANEOUS at 17:09

## 2025-04-29 RX ADMIN — LOSARTAN POTASSIUM 100 MG: 50 TABLET, FILM COATED ORAL at 09:25

## 2025-04-29 RX ADMIN — INSULIN LISPRO 3 UNITS: 100 INJECTION, SOLUTION INTRAVENOUS; SUBCUTANEOUS at 12:10

## 2025-04-29 RX ADMIN — CARVEDILOL 6.25 MG: 6.25 TABLET, FILM COATED ORAL at 17:09

## 2025-04-29 RX ADMIN — TAMSULOSIN HYDROCHLORIDE 0.4 MG: 0.4 CAPSULE ORAL at 09:25

## 2025-04-29 RX ADMIN — Medication 10 ML: at 09:25

## 2025-04-29 RX ADMIN — CARVEDILOL 6.25 MG: 6.25 TABLET, FILM COATED ORAL at 09:25

## 2025-04-29 RX ADMIN — ENOXAPARIN SODIUM 40 MG: 100 INJECTION SUBCUTANEOUS at 09:24

## 2025-04-29 RX ADMIN — INSULIN LISPRO 10 UNITS: 100 INJECTION, SOLUTION INTRAVENOUS; SUBCUTANEOUS at 09:24

## 2025-04-29 RX ADMIN — INSULIN LISPRO 2 UNITS: 100 INJECTION, SOLUTION INTRAVENOUS; SUBCUTANEOUS at 17:09

## 2025-04-29 RX ADMIN — DORZOLAMIDE HYDROCHLORIDE: 20 SOLUTION/ DROPS OPHTHALMIC at 20:53

## 2025-04-29 RX ADMIN — DAPTOMYCIN 550 MG: 500 INJECTION, POWDER, LYOPHILIZED, FOR SOLUTION INTRAVENOUS at 14:39

## 2025-04-29 RX ADMIN — OXYCODONE HYDROCHLORIDE 10 MG: 10 TABLET ORAL at 09:24

## 2025-04-29 RX ADMIN — COLCHICINE 1.2 MG: 0.6 TABLET ORAL at 09:25

## 2025-04-29 NOTE — PLAN OF CARE
Goal Outcome Evaluation:  Plan of Care Reviewed With: patient        Progress: improving  Outcome Evaluation: Pt continues to make improvements with ADL performance and functional mobility. VC during ambulation for sequencing, but improved with time. Performed toileting with CGA. Continue IPOT POC.    Anticipated Discharge Disposition (OT): inpatient rehabilitation facility

## 2025-04-29 NOTE — THERAPY TREATMENT NOTE
Patient Name: Jimmy Gabehart  : 1955    MRN: [id number]                              Today's Date: 2025       Admit Date: 2025    Visit Dx: No diagnosis found.  Patient Active Problem List   Diagnosis    Shoulder pain, bilateral    Bursitis/tendonitis, shoulder    Pain    Cervical radiculopathy    Cervical spondylosis    Lumbar spondylosis    Elevated liver enzymes    History of diabetic ulcer of foot    Hypertensive disorder    Long-term current use of opiate analgesic    Osteoarthritis, generalized    Type 2 diabetes mellitus with diabetic neuropathy    Hepatic encephalopathy     Past Medical History:   Diagnosis Date    Diabetes     Hypertension      Past Surgical History:   Procedure Laterality Date    BACK SURGERY      CARPAL TUNNEL RELEASE      COLONOSCOPY      EYE SURGERY      FOOT SURGERY Left     Ankle fusion    HERNIA REPAIR      KNEE SURGERY Right 2022    TKA right knee manipulation 23    TONSILLECTOMY AND ADENOIDECTOMY      TRIGGER FINGER RELEASE      WISDOM TOOTH EXTRACTION        General Information       Row Name 25 0893          Physical Therapy Time and Intention    Document Type therapy note (daily note)  -ML     Mode of Treatment physical therapy  -ML       Row Name 25 0838          General Information    Patient Profile Reviewed yes  -ML     Existing Precautions/Restrictions fall;other (see comments)  blind L eye, R glenohumeral joint rotator cuff arthropathy, L elbow cellulitis vs olecranon bursitis  -ML     Barriers to Rehab medically complex;previous functional deficit  -ML       Row Name 25 0838          Cognition    Orientation Status (Cognition) oriented x 3  -ML       Row Name 25 0838          Safety Issues/Impairments Affecting Functional Mobility    Safety Issues Affecting Function (Mobility) insight into deficits/self-awareness;safety precaution awareness;sequencing abilities  -ML     Impairments Affecting Function (Mobility)  balance;endurance/activity tolerance;strength;pain;range of motion (ROM);motor planning  -ML               User Key  (r) = Recorded By, (t) = Taken By, (c) = Cosigned By      Initials Name Provider Type    Kirsten Lopez Physical Therapist                   Mobility       Row Name 04/29/25 0840          Bed Mobility    Bed Mobility supine-sit  -ML     Supine-Sit [place] (Bed Mobility) standby assist  -ML     Assistive Device (Bed Mobility) bed rails;head of bed elevated  -ML       Row Name 04/29/25 0840          Sit-Stand Transfer    Sit-Stand [place] (Transfers) contact guard  -ML     Assistive Device (Sit-Stand Transfers) walker, front-wheeled  -ML       Row Name 04/29/25 0840          Gait/Stairs (Locomotion)    [place] Level (Gait) contact guard;verbal cues  -ML     Assistive Device (Gait) walker, front-wheeled  -ML     Distance in Feet (Gait) 40  + 40  -ML     Deviations/Abnormal Patterns (Gait) bilateral deviations;laurence decreased;gait speed decreased  -ML     Bilateral Gait Deviations heel strike decreased  -ML     Comment, (Gait/Stairs) Patient demonstrates step through gait pattern, minimal verbal cues to keep hands on RW during ambulation. Patient without loss of balance, however, additional time required to complete ambulation distance.  -ML               User Key  (r) = Recorded By, (t) = Taken By, (c) = Cosigned By      Initials Name Provider Type    Kirsten Lopez Physical Therapist                   Obj/Interventions       Row Name 04/29/25 0842          Balance    Static Sitting Balance standby assist  -ML     Dynamic Sitting Balance standby assist  -ML     Position, Sitting Balance unsupported;sitting edge of bed  -ML     Static Standing Balance contact guard  -ML     Dynamic Standing Balance contact guard;verbal cues  -ML     Position/Device Used, Standing Balance supported;walker, front-wheeled  -ML     Balance Interventions sitting;standing;sit to stand;supported;occupation  based/functional task  -ML               User Key  (r) = Recorded By, (t) = Taken By, (c) = Cosigned By      Initials Name Provider Type    Kirsten Lopez Physical Therapist                   Goals/Plan    No documentation.                  Clinical Impression       Row Name 04/29/25 0843          Pain    Pretreatment Pain Rating 7/10  -ML     Posttreatment Pain Rating 7/10  -ML     Pain Location shoulder  -ML     Pain Side/Orientation right  -ML     Pain Management Interventions exercise or physical activity utilized;positioning techniques utilized  -ML     Response to Pain Interventions activity participation with tolerable pain  -ML       Row Name 04/29/25 0843          Plan of Care Review    Plan of Care Reviewed With patient  -ML     Progress improving  -ML     Outcome Evaluation Patient progressed with transfers and increased ambulation distance compared to previous treatment session. Patient with improved stability with use of RW during ambulation. Patient continues to present below baseline for mobility and would continue to benefit from skilled PT to address strength, balance and activity tolerance deficits. Continue current PT POC.  -ML       [facility] Name 04/29/25 0843          Positioning and Restraints    Pre-Treatment Position in bed  -ML     Post Treatment Position chair  -ML     In Chair notified nsg;reclined;call light within reach;encouraged to call for assist;exit alarm on;waffle cushion;legs elevated  -ML               User Key  (r) = Recorded By, (t) = Taken By, (c) = Cosigned By      Initials Name Provider Type    Kirsten Lopez Physical Therapist                   Outcome Measures       Row Name 04/29/25 0845          How much help from another person do you currently need...    Turning from your back to your side while in flat bed without using bedrails? 4  -ML     Moving from lying on back to sitting on the side of a flat bed without bedrails? 3  -ML     Moving to and from a bed to a chair  (including a wheelchair)? 3  -ML     Standing up from a chair using your arms (e.g., wheelchair, bedside chair)? 3  -ML     Climbing 3-5 steps with a railing? 3  -ML     To walk in hospital room? 3  -ML     AM-PAC 6 Clicks Score (PT) 19  -ML     Highest Level of Mobility Goal 6 --> Walk 10 steps or more  -ML       Row Name 04/29/25 0845          Functional Assessment    Outcome Measure Options AM-PAC 6 Clicks Basic Mobility (PT)  -ML               User Key  (r) = Recorded By, (t) = Taken By, (c) = Cosigned By      Initials Name Provider Type    Kirsten Lopez Physical Therapist                                 Physical Therapy Education       Title: PT OT SLP Therapies (Done)       Topic: Physical Therapy (Done)       Point: Mobility training (Done)       Learning Progress Summary            Patient Acceptance, E, VU,NR by ML at 4/29/2025 0846    Acceptance, E, VU,NR by ML at 4/24/2025 1552    Acceptance, E, VU by ER at 4/21/2025 0938    Comment: progress, d/c planning, shoulder pain edu   Family Acceptance, E, VU,NR by ML at 4/24/2025 1552                      Point: Home exercise program (Done)       Learning Progress Summary            Patient Acceptance, E, VU,NR by ML at 4/24/2025 1552    Acceptance, E, VU by ER at 4/21/2025 0938    Comment: progress, d/c planning, shoulder pain edu   Family Acceptance, E, VU,NR by ML at 4/24/2025 1552                      Point: Body mechanics (Done)       Learning Progress Summary            Patient Acceptance, E, VU,NR by ML at 4/24/2025 1552    Acceptance, E, VU by ER at 4/21/2025 0938    Comment: progress, d/c planning, shoulder pain edu   Family Acceptance, E, VU,NR by ML at 4/24/2025 1552                      Point: Precautions (Done)       Learning Progress Summary            Patient Acceptance, E, VU,NR by ML at 4/29/2025 0846    Acceptance, E, VU,NR by ML at 4/24/2025 1552    Acceptance, E, VU by ER at 4/21/2025 0938    Comment: progress, d/c planning, shoulder pain  edu   Family Eugenio, E, VU,NR by  at 4/24/2025 1552                                      User Key       Initials Effective Dates Name Provider Type Discipline     04/22/21 -  Kirsten Vallejo Physical Therapist PT    ER 12/13/24 -  Sophie Bolanos, PT Physical Therapist PT                  PT Recommendation and Plan     Progress: improving  Outcome Evaluation: Patient progressed with transfers and increased ambulation distance compared to previous treatment session. Patient with improved stability with use of RW during ambulation. Patient continues to present below baseline for mobility and would continue to benefit from skilled PT to address strength, balance and activity tolerance deficits. Continue current PT POC.     Time Calculation:         PT Charges       Row Name 04/29/25 0847             Time Calculation    Start Time 0818  -ML      PT Received On 04/29/25  -ML         Timed Charges    24682 - PT Therapeutic Activity Minutes 18  -ML         Total Minutes    Timed Charges Total Minutes 18  -ML       Total Minutes 18  -ML                User Key  (r) = Recorded By, (t) = Taken By, (c) = Cosigned By      Initials Name Provider Type     Kirsten Vallejo Physical Therapist                  Therapy Charges for Today       Code Description Service Date Service Provider Modifiers Qty    [id number]  PT THERAPEUTIC ACT EA 15 MIN 4/29/2025 Kirsten Vallejo GP 1            PT G-Codes  Outcome Measure Options: AM-PAC 6 Clicks Basic Mobility (PT)  AM-PAC 6 Clicks Score (PT): 19  AM-PAC 6 Clicks Score (OT): 16  PT Discharge Summary  Anticipated Discharge Disposition (PT): inpatient rehabilitation facility    Kirsten Vallejo  4/29/2025

## 2025-04-29 NOTE — PLAN OF CARE
Goal Outcome Evaluation:              Outcome Evaluation: VSS on room air. NSR. complains of pain, given prn meds.

## 2025-04-29 NOTE — PLAN OF CARE
Problem: Adult Inpatient Plan of Care  Goal: Plan of Care Review  Outcome: Progressing  Goal: Patient-Specific Goal (Individualized)  Outcome: Progressing  Goal: Absence of Hospital-Acquired Illness or Injury  Outcome: Progressing  Intervention: Identify and Manage Fall Risk  Recent Flowsheet Documentation  Taken 4/29/2025 0410 by Charles Chaves RN  Safety Promotion/Fall Prevention:   activity supervised   assistive device/personal items within reach   safety round/check completed   clutter free environment maintained   nonskid shoes/slippers when out of bed  Taken 4/29/2025 0204 by Charles Chaves RN  Safety Promotion/Fall Prevention:   activity supervised   assistive device/personal items within reach   safety round/check completed   clutter free environment maintained   nonskid shoes/slippers when out of bed  Taken 4/29/2025 0004 by Charles Chaves RN  Safety Promotion/Fall Prevention:   safety round/check completed   activity supervised   assistive device/personal items within reach   clutter free environment maintained   fall prevention program maintained  Taken 4/28/2025 2204 by Charles Chaves RN  Safety Promotion/Fall Prevention:   activity supervised   assistive device/personal items within reach   safety round/check completed   clutter free environment maintained   nonskid shoes/slippers when out of bed  Taken 4/28/2025 2004 by Charles Chaves RN  Safety Promotion/Fall Prevention:   safety round/check completed   activity supervised   assistive device/personal items within reach   clutter free environment maintained   fall prevention program maintained  Intervention: Prevent Skin Injury  Recent Flowsheet Documentation  Taken 4/29/2025 0410 by Charles Chaves RN  Body Position: position changed independently  Skin Protection:   incontinence pads utilized   transparent dressing maintained  Taken 4/29/2025 0204 by Charles Chaves RN  Body Position: position changed  independently  Skin Protection:   incontinence pads utilized   transparent dressing maintained  Taken 4/29/2025 0004 by Charles Chaves RN  Skin Protection:   incontinence pads utilized   silicone foam dressing in place   transparent dressing maintained  Taken 4/28/2025 2204 by Charles Chaves RN  Skin Protection:   incontinence pads utilized   transparent dressing maintained  Taken 4/28/2025 2004 by Charles Chaves RN  Skin Protection: incontinence pads utilized  Intervention: Prevent and Manage VTE (Venous Thromboembolism) Risk  Recent Flowsheet Documentation  Taken 4/28/2025 2004 by Charles Chaves RN  VTE Prevention/Management:   bilateral   SCDs (sequential compression devices) off  Intervention: Prevent Infection  Recent Flowsheet Documentation  Taken 4/29/2025 0410 by Charles Chaves RN  Infection Prevention:   environmental surveillance performed   rest/sleep promoted  Taken 4/29/2025 0204 by Charles Chaves RN  Infection Prevention:   environmental surveillance performed   rest/sleep promoted  Taken 4/29/2025 0004 by Charles Chaves RN  Infection Prevention:   hand hygiene promoted   rest/sleep promoted  Taken 4/28/2025 2204 by Charles Chaves RN  Infection Prevention:   environmental surveillance performed   rest/sleep promoted  Taken 4/28/2025 2004 by Charles hCaves RN  Infection Prevention:   environmental surveillance performed   hand hygiene promoted   rest/sleep promoted   single patient room provided   cohorting utilized  Goal: Optimal Comfort and Wellbeing  Outcome: Progressing  Intervention: Provide Person-Centered Care  Recent Flowsheet Documentation  Taken 4/28/2025 2004 by Charles Chaves RN  Trust Relationship/Rapport:   care explained   emotional support provided   questions answered   questions encouraged   thoughts/feelings acknowledged  Goal: Readiness for Transition of Care  Outcome: Progressing     Problem: Fall Injury Risk  Goal: Absence of Fall  and Fall-Related Injury  Outcome: Progressing  Intervention: Identify and Manage Contributors  Recent Flowsheet Documentation  Taken 4/29/2025 0410 by Charles Chaves, RN  Self-Care Promotion: independence encouraged  Taken 4/29/2025 0204 by Charles Chaves RN  Self-Care Promotion: independence encouraged  Taken 4/28/2025 2204 by Charles Chaves, RN  Self-Care Promotion: independence encouraged  Taken 4/28/2025 2004 by Charles Chaves, RN  Medication Review/Management: medications reviewed  Intervention: Promote Injury-Free Environment  Recent Flowsheet Documentation  Taken 4/29/2025 0410 by Charles Chaves, RN  Safety Promotion/Fall Prevention:   activity supervised   assistive device/personal items within reach   safety round/check completed   clutter free environment maintained   nonskid shoes/slippers when out of bed  Taken 4/29/2025 0204 by Charles Chaves RN  Safety Promotion/Fall Prevention:   activity supervised   assistive device/personal items within reach   safety round/check completed   clutter free environment maintained   nonskid shoes/slippers when out of bed  Taken 4/29/2025 0004 by Charles Chaves RN  Safety Promotion/Fall Prevention:   safety round/check completed   activity supervised   assistive device/personal items within reach   clutter free environment maintained   fall prevention program maintained  Taken 4/28/2025 2204 by Charles Chaves RN  Safety Promotion/Fall Prevention:   activity supervised   assistive device/personal items within reach   safety round/check completed   clutter free environment maintained   nonskid shoes/slippers when out of bed  Taken 4/28/2025 2004 by Charles Chaves, RN  Safety Promotion/Fall Prevention:   safety round/check completed   activity supervised   assistive device/personal items within reach   clutter free environment maintained   fall prevention program maintained     Problem: Comorbidity Management  Goal: Blood Glucose  Level Within Target Range  Outcome: Progressing  Intervention: Monitor and Manage Glycemia  Recent Flowsheet Documentation  Taken 4/28/2025 2004 by Charles Chaves, RN  Medication Review/Management: medications reviewed  Goal: Blood Pressure in Desired Range  Outcome: Progressing  Intervention: Maintain Blood Pressure Management  Recent Flowsheet Documentation  Taken 4/28/2025 2004 by Charles Chaves RN  Medication Review/Management: medications reviewed  Goal: Maintenance of Osteoarthritis Symptom Control  Outcome: Progressing  Intervention: Maintain Osteoarthritis Symptom Control  Recent Flowsheet Documentation  Taken 4/29/2025 0410 by Charles Chaves, RN  Activity Management: activity minimized  Taken 4/29/2025 0204 by Charles Chaves RN  Activity Management: activity minimized  Taken 4/29/2025 0004 by Charles Chaves RN  Activity Management: activity minimized  Taken 4/28/2025 2204 by Charles Chaves, RN  Activity Management: activity minimized  Taken 4/28/2025 2004 by Charles Chaves, RN  Activity Management: up in chair  Medication Review/Management: medications reviewed     Problem: Violence Risk or Actual  Goal: Anger and Impulse Control  Outcome: Progressing  Intervention: Minimize Safety Risk  Recent Flowsheet Documentation  Taken 4/29/2025 0410 by Charles Chaves RN  Enhanced Safety Measures:   bed alarm set   room near unit station  Taken 4/29/2025 0204 by Charles Chaves RN  Enhanced Safety Measures: bed alarm set  Taken 4/29/2025 0004 by Charles Chaves RN  Enhanced Safety Measures: bed alarm set  Taken 4/28/2025 2204 by Charles Chaves, RN  Enhanced Safety Measures: bed alarm set  Taken 4/28/2025 2004 by Charles Chaves RN  Enhanced Safety Measures:   chair alarm set   room near unit station     Problem: Skin Injury Risk Increased  Goal: Skin Health and Integrity  Outcome: Progressing  Intervention: Optimize Skin Protection  Recent Flowsheet  Documentation  Taken 4/29/2025 0410 by Charles Chaves RN  Activity Management: activity minimized  Pressure Reduction Techniques:   frequent weight shift encouraged   pressure points protected  Head of Bed (HOB) Positioning: [facility] [place] elevated  Pressure Reduction Devices:   positioning supports utilized   pressure-redistributing mattress utilized  Skin Protection:   incontinence pads utilized   transparent dressing maintained  Taken 4/29/2025 0204 by Charles Chaves RN  Activity Management: activity minimized  Pressure Reduction Techniques:   frequent weight shift encouraged   pressure points protected  Head of Bed (HOB) Positioning: [facility] [place] elevated  Pressure Reduction Devices:   positioning supports utilized   pressure-redistributing mattress utilized  Skin Protection:   incontinence pads utilized   transparent dressing maintained  Taken 4/29/2025 0004 by Charles Chaves RN  Activity Management: activity minimized  Pressure Reduction Techniques:   frequent weight shift encouraged   heels elevated off bed   weight shift assistance provided   positioned off wounds  Head of Bed (HOB) Positioning: [facility] [place] elevated  Pressure Reduction Devices:   positioning supports utilized   pressure-redistributing mattress utilized  Skin Protection:   incontinence pads utilized   silicone foam dressing in place   transparent dressing maintained  Taken 4/28/2025 2204 by Charles Chaves RN  Activity Management: activity minimized  Pressure Reduction Techniques:   frequent weight shift encouraged   pressure points protected  Head of Bed (HOB) Positioning: [facility] [place] elevated  Pressure Reduction Devices:   positioning supports utilized   pressure-redistributing mattress utilized  Skin Protection:   incontinence pads utilized   transparent dressing maintained  Taken 4/28/2025 2004 by Charles Chaves RN  Activity Management: up in chair  Pressure Reduction Techniques:   frequent weight shift encouraged   heels elevated off bed   weight  shift assistance provided   pressure points protected  Head of Bed (HOB) Positioning: HOB elevated  Pressure Reduction Devices:   pressure-redistributing mattress utilized   positioning supports utilized  Skin Protection: incontinence pads utilized   Goal Outcome Evaluation:           Pt has no new nursing issues at this time. Pt is a&ox4, NSR/Sinus Tach, RA, VSS. Prn pain medication given per pt request. Pt has no complaints at this time. Will continue plan of care.

## 2025-04-29 NOTE — PLAN OF CARE
Goal Outcome Evaluation:  Plan of Care Reviewed With: patient        Progress: improving  Outcome Evaluation: Patient progressed with transfers and increased ambulation distance compared to previous treatment session. Patient with improved stability with use of RW during ambulation. Patient continues to present below baseline for mobility and would continue to benefit from skilled PT to address strength, balance and activity tolerance deficits. Continue current PT POC.    Anticipated Discharge Disposition (PT): inpatient rehabilitation facility

## 2025-04-29 NOTE — THERAPY TREATMENT NOTE
Patient Name: Jimmy Gabehart  : 1955    MRN: [id number]                              Today's Date: 2025       Admit Date: 2025    Visit Dx: No diagnosis found.  Patient Active Problem List   Diagnosis    Shoulder pain, bilateral    Bursitis/tendonitis, shoulder    Pain    Cervical radiculopathy    Cervical spondylosis    Lumbar spondylosis    Elevated liver enzymes    History of diabetic ulcer of foot    Hypertensive disorder    Long-term current use of opiate analgesic    Osteoarthritis, generalized    Type 2 diabetes mellitus with diabetic neuropathy    Hepatic encephalopathy     Past Medical History:   Diagnosis Date    Diabetes     Hypertension      Past Surgical History:   Procedure Laterality Date    BACK SURGERY      CARPAL TUNNEL RELEASE      COLONOSCOPY      EYE SURGERY      FOOT SURGERY Left     Ankle fusion    HERNIA REPAIR      KNEE SURGERY Right 2022    TKA right knee manipulation 23    TONSILLECTOMY AND ADENOIDECTOMY      TRIGGER FINGER RELEASE      WISDOM TOOTH EXTRACTION        General Information       Row Name 25 1532          OT Time and Intention    Document Type therapy note (daily note)  -LR     Mode of Treatment occupational therapy  -LR       Row Name 25 1532          General Information    Patient Profile Reviewed yes  -LR     Existing Precautions/Restrictions fall;other (see comments)  blind L eye, R glenohumeral joint rotator cuff arthropathy, L elbow cellulitis vs olecranon bursitis  -LR     Barriers to Rehab previous functional deficit;medically complex  -LR       Row Name 25 1532          Cognition    Orientation Status (Cognition) oriented x 3  -LR       Row Name 25 1532          Safety Issues/Impairments Affecting Functional Mobility    Safety Issues Affecting Function (Mobility) safety precaution awareness;awareness of need for assistance;sequencing abilities  -LR     Impairments Affecting Function (Mobility)  balance;endurance/activity tolerance;strength;pain;range of motion (ROM)  -LR               User Key  (r) = Recorded By, (t) = Taken By, (c) = Cosigned By      Initials Name Provider Type    Digna Vazquez OT Occupational Therapist                     Mobility/ADL's       Row Name 04/29/25 1533          Bed Mobility    Comment, (Bed Mobility) UIC on arrival. Left UIC  -LR       Row Name 04/29/25 1533          Transfers    Transfers sit-stand transfer  -LR       Row Name 04/29/25 1533          Sit-Stand Transfer    Sit-Stand Jennings (Transfers) contact guard  -LR     Assistive Device (Sit-Stand Transfers) walker, front-wheeled  -LR     Comment, (Sit-Stand Transfer) Good hand placement  -LR       Row Name 04/29/25 1533          Functional Mobility    Functional Mobility- Ind. Level contact guard assist;1 person  -LR     Functional Mobility- Device walker, front-wheeled  -LR     Functional Mobility-Distance (Feet) --  <HH distance  -LR     Patient was able to Ambulate yes  -LR       Row Name 04/29/25 1533          Activities of Daily Living    BADL Assessment/Intervention toileting;grooming  -LR       Row Name 04/29/25 1533          Toileting Assessment/Training    Jennings Level (Toileting) perform perineal hygiene;adjust/manage clothing;contact guard assist  -LR     Position (Toileting) unsupported standing  -LR       Row Name 04/29/25 1533          Grooming Assessment/Training    Jennings Level (Grooming) wash face, hands;set up  -LR     Position (Grooming) unsupported sitting  -LR               User Key  (r) = Recorded By, (t) = Taken By, (c) = Cosigned By      Initials Name Provider Type    Digna Vazquez OT Occupational Therapist                   Obj/Interventions       Row Name 04/29/25 1536          Balance    Balance Assessment sitting static balance;sitting dynamic balance;standing static balance;standing dynamic balance  -LR     Static Sitting Balance standby assist  -LR      Dynamic Sitting Balance standby assist  -LR     Position, Sitting Balance unsupported;sitting in chair  -LR     Static Standing Balance contact guard  -LR     Dynamic Standing Balance minimal assist;contact guard;other (see comments)  progressed to CGA  -LR     Position/Device Used, Standing Balance supported;walker, 4-wheeled  -LR     Balance Interventions sitting;standing;supported;static;dynamic;occupation based/functional task  -LR               User Key  (r) = Recorded By, (t) = Taken By, (c) = Cosigned By      Initials Name Provider Type    LR Digna Atkinson, GENNY Occupational Therapist                   Goals/Plan    No documentation.                  Clinical Impression       Row Name 04/29/25 1537          Pain Assessment    Pretreatment Pain Rating 0/10 - no pain  -LR     Posttreatment Pain Rating 0/10 - no pain  -LR       Row Name 04/29/25 1537          Plan of Care Review    Plan of Care Reviewed With patient  -LR     Progress improving  -LR     Outcome Evaluation Pt continues to make improvements with ADL performance and functional mobility. VC during ambulation for sequencing, but improved with time. Performed toileting with CGA. Continue IPOT POC.  -LR       Row Name 04/29/25 1537          Therapy Plan Review/Discharge Plan (OT)    Anticipated Discharge Disposition (OT) inpatient rehabilitation facility  -LR       Row Name 04/29/25 153          Vital Signs    Pre Systolic BP Rehab 129  -LR     Pre Treatment Diastolic BP 64  -LR     Pretreatment Heart Rate (beats/min) 67  -LR     Pre SpO2 (%) 96  -LR     O2 Delivery Pre Treatment room air  -LR     O2 Delivery Intra Treatment room air  -LR     O2 Delivery Post Treatment room air  -LR     Pre Patient Position Sitting  -LR     Post Patient Position Sitting  -LR       Row Name 04/29/25 1532          Positioning and Restraints    Pre-Treatment Position sitting in chair/recliner  -LR     Post Treatment Position chair  -LR     In Chair notified  nsg;reclined;call light within reach;encouraged to call for assist;exit alarm on;waffle cushion;legs elevated  -LR               User Key  (r) = Recorded By, (t) = Taken By, (c) = Cosigned By      Initials Name Provider Type    Digna Vazquez, GENNY Occupational Therapist                   Outcome Measures       Row Name 04/29/25 1542          How much help from another is currently needed...    Putting on and taking off regular lower body clothing? 3  -LR     Bathing (including washing, rinsing, and drying) 3  -LR     Toileting (which includes using toilet bed pan or urinal) 3  -LR     Putting on and taking off regular upper body clothing 3  -LR     Taking care of personal grooming (such as brushing teeth) 3  -LR     Eating meals 4  -LR     AM-PAC 6 Clicks Score (OT) 19  -LR       Row Name 04/29/25 0845          How much help from another person do you currently need...    Turning from your back to your side while in flat bed without using bedrails? 4  -ML     Moving from lying on back to sitting on the side of a flat bed without bedrails? 3  -ML     Moving to and from a bed to a chair (including a wheelchair)? 3  -ML     Standing up from a chair using your arms (e.g., wheelchair, bedside chair)? 3  -ML     Climbing 3-5 steps with a railing? 3  -ML     To walk in hospital room? 3  -ML     AM-PAC 6 Clicks Score (PT) 19  -ML     Highest Level of Mobility Goal 6 --> Walk 10 steps or more  -ML       Row Name 04/29/25 1542 04/29/25 0845       Functional Assessment    Outcome Measure Options AM-PAC 6 Clicks Daily Activity (OT)  -LR AM-PAC 6 Clicks Basic Mobility (PT)  -ML              User Key  (r) = Recorded By, (t) = Taken By, (c) = Cosigned By      Initials Name Provider Type    Kirsten Lopez Physical Therapist    Digna Vazquez, GENNY Occupational Therapist                    Occupational Therapy Education       Title: PT OT SLP Therapies (In Progress)       Topic: Occupational Therapy (In Progress)        Point: ADL training (In Progress)       Learning Progress Summary            Patient Acceptance, E, NR by LR at 4/29/2025 1543                      Point: Home exercise program (In Progress)       Learning Progress Summary            Patient Acceptance, E, NR by LR at 4/29/2025 1543                      Point: Precautions (In Progress)       Learning Progress Summary            Patient Acceptance, E, NR by LR at 4/29/2025 1543                      Point: Body mechanics (In Progress)       Learning Progress Summary            Patient Acceptance, E, NR by LR at 4/29/2025 1543                                      User Key       Initials Effective Dates Name Provider Type Discipline    LR 10/09/24 -  Digna Atkinson OT Occupational Therapist OT                  OT Recommendation and Plan     Plan of Care Review  Plan of Care Reviewed With: patient  Progress: improving  Outcome Evaluation: Pt continues to make improvements with ADL performance and functional mobility. VC during ambulation for sequencing, but improved with time. Performed toileting with CGA. Continue IPOT POC.     Time Calculation:         Time Calculation- OT       Row Name 04/29/25 1543             Time Calculation- OT    OT Start Time 1503  -LR      OT Received On 04/29/25  -LR      OT Goal Re-Cert Due Date 05/01/25  -LR         Timed Charges    56998 - OT Therapeutic Activity Minutes 13  -LR      40968 - OT Self Care/Mgmt Minutes 10  -LR         Total Minutes    Timed Charges Total Minutes 23  -LR       Total Minutes 23  -LR                User Key  (r) = Recorded By, (t) = Taken By, (c) = Cosigned By      Initials Name Provider Type    LR Digna Atkinson OT Occupational Therapist                  Therapy Charges for Today       Code Description Service Date Service Provider Modifiers Qty    54679596752 HC OT THERAPEUTIC ACT EA 15 MIN 4/29/2025 Digna Atkinson, OT GO 1    48917058935 HC OT SELF CARE/MGMT/TRAIN EA 15 MIN 4/29/2025 Digna Atkinson  Brea, OT GO 1                 Digna Atkinson, OT  4/29/2025

## 2025-04-29 NOTE — PROGRESS NOTES
[facility] Medicine Services  PROGRESS NOTE    Patient Name: Jimmy Gabehart  : 1955  MRN: [id number]    Date of Admission: 2025  Primary Care Physician: Miriam Mckeon DO    Subjective   Subjective     CC:  Follow-up for hepatic encephalopathy    HPI:  Patient seen and examined this morning.  Comfortable in the recliner at bedside.  Mild pain in his right shoulder.  Pain in his left elbow improved.  Son at bedside and happy about his progress.  Awaiting rehab    Objective   Objective     Vital Signs:   Temp:  [98 °F (36.7 °C)-98.7 °F (37.1 °C)] 98.3 °F (36.8 °C)  Heart Rate:  [58-80] 72  Resp:  [16-18] 18  BP: (121-132)/(63-74) 127/67     Physical Exam:  General: Comfortable, not in distress, conversant and cooperative  Head: Atraumatic and normocephalic  Eyes: No Icterus. No pallor  Ears:  Ears appear intact with no abnormalities noted  Throat: No oral lesions, no thrush  Neck: Supple, trachea midline  Lungs: Clear to auscultation bilaterally, equal air entry, no wheezing or crackles  Heart:  Normal S1 and S2, no murmur, no gallop, No JVD, no lower extremity swelling  Abdomen:  Soft, no tenderness, no organomegaly, normal bowel sounds, no organomegaly  Extremities: Improved left bursitis  Skin: No bleeding, bruising or rash, normal skin turgor and elasticity  Neurologic: Cranial nerves appear intact with no evidence of facial asymmetry, normal motor and sensory functions in all 4 extremities  Psych: Alert and oriented x3    Results Reviewed:  LAB RESULTS:      Lab 25  0216 25  0523 25  1059 25  0428 25  0506 25  1114 25  0417   WBC 2.97* 2.67*  --  4.19 4.81 3.97 3.48   HEMOGLOBIN 8.7* 8.0*  --  8.5* 9.4* 8.6* 8.6*   HEMATOCRIT 27.1* 25.7*  --  27.0* 28.6* 26.6* 27.6*   PLATELETS 126* 113*  --  114* 118* 104* 82*   NEUTROS ABS 0.65* 0.78*  --  1.72 1.94 1.61* 1.40*   IMMATURE GRANS (ABS)  --  0.01  --   --  0.03 0.03 0.02    LYMPHS ABS  --  1.32  --   --  1.96 1.56 1.54   MONOS ABS  --  0.51  --   --  0.82 0.69 0.46   EOS ABS 0.06 0.04  --  0.17 0.06 0.07 0.06   .7* 108.9*  --  111.1* 107.1* 108.6* 110.0*   SED RATE  --  47*  --   --  85* 73*  --    CRP  --  5.67*  --  8.20* 7.87* 6.25* 8.77*   PROCALCITONIN  --   --   --  0.30*  --  0.33*  --    D DIMER QUANT  --   --  2.84*  --   --   --   --          Lab 04/28/25 0217 04/27/25  0523 04/26/25  0428 04/25/25  0506 04/24/25  1114 04/23/25  0417   SODIUM 139 139 134* 135* 132* 133*   POTASSIUM 4.1 4.1 4.3 4.6 4.5 4.2   CHLORIDE 108* 109* 103 105 101 104   CO2 19.0* 18.0* 17.0* 18.0* 19.0* 18.0*   ANION GAP 12.0 12.0 14.0 12.0 12.0 11.0   BUN 20 22 29* 26* 23 25*   CREATININE 1.10 1.17 1.47* 1.30* 1.35* 1.32*   EGFR 72.2 67.1 51.0* 59.1* 56.5* 58.0*   GLUCOSE 120* 120* 102* 124* 173* 193*   CALCIUM 8.4* 8.3* 8.6 9.0 8.2* 8.3*   MAGNESIUM 1.7 1.7 1.8 1.7 1.6 1.7   PHOSPHORUS 3.1 3.1 3.5 3.3  --  3.3         Lab 04/28/25  0217 04/27/25  0523 04/26/25  0428 04/25/25  0506 04/24/25  1114   TOTAL PROTEIN 6.8 6.2 6.9 7.4 6.6   ALBUMIN 3.0* 3.0* 2.9* 3.2* 2.9*   GLOBULIN 3.8 3.2 4.0 4.2 3.7   ALT (SGPT) 32 36 44* 54* 49*   AST (SGOT) 49* 55* 76* 105* 105*   BILIRUBIN 0.7 0.8 1.1 1.0 1.1   ALK PHOS 265* 286* 279* 299* 240*                         Brief Urine Lab Results  (Last result in the past 365 days)        Color   Clarity   Blood   Leuk Est   Nitrite   Protein   CREAT   Urine HCG        04/21/25 1742 Yellow   Clear   Negative   Negative   Negative   Negative                   Microbiology Results Abnormal       None            No radiology results from the last 24 hrs            Current medications:  Scheduled Meds:carvedilol, 6.25 mg, Oral, BID With Meals  colchicine, 1.2 mg, Oral, Daily  DAPTOmycin, 6 mg/kg (Adjusted), Intravenous, Q24H  dorzolamide (TRUSOPT) 2 % 1 drop, timolol (TIMOPTIC) 0.5 % 1 drop for Cosopt 22.3-6.8 mg/mL, , Left Eye, BID  enoxaparin sodium, 40 mg,  Subcutaneous, Daily  gabapentin, 100 mg, Oral, Nightly  insulin glargine, 30 Units, Subcutaneous, Daily  Insulin Lispro, 10 Units, Subcutaneous, TID With Meals  insulin lispro, 2-9 Units, Subcutaneous, 4x Daily AC & at Bedtime  lactulose, 30 g, Oral, Daily  Lidocaine, 1 patch, Transdermal, Q24H  losartan, 100 mg, Oral, Q24H  pantoprazole, 40 mg, Oral, Q AM  rifAXIMin, 550 mg, Oral, BID  sodium chloride, 10 mL, Intravenous, Q12H  tamsulosin, 0.4 mg, Oral, Daily      Continuous Infusions:Pharmacy Consult,         PRN Meds:.  [DISCONTINUED] acetaminophen **OR** acetaminophen **OR** acetaminophen    Calcium Replacement - Follow Nurse / BPA Driven Protocol    dextrose    dextrose    glucagon (human recombinant)    Magnesium Standard Dose Replacement - Follow Nurse / BPA Driven Protocol    nitroglycerin    oxyCODONE    Pharmacy Consult    Phosphorus Replacement - Follow Nurse / BPA Driven Protocol    Potassium Replacement - Follow Nurse / BPA Driven Protocol    sodium chloride    sodium chloride    Assessment & Plan   Assessment & Plan     Active Hospital Problems    Diagnosis  POA    **Hepatic encephalopathy [K76.82]  Yes      Resolved Hospital Problems   No resolved problems to display.        Brief Hospital Course to date:  Jimmy Gabehart is a 70 y.o. male with past medical history of insulin-dependent diabetes, hypertension, recent diagnosis of liver cirrhosis, thrombocytopenia, chronic pain presenting with altered mental status and fall at home.     Acute hepatic encephalopathy, improved  Decompensated liver cirrhosis  Patient with remote history of alcohol use. New diagnosis of Stage F4 cirrhosis based on FibroScan in March 2023. Following with GI as out patient  Presented with acute encephalopathy secondary to hepatic encephalopathy  Somewhat improved with lactulose  Continue scheduled lactulose twice daily  Continue rifaximin  Patient family advised to keep his follow-up with GI as outpatient.  Will need  EGD  Normal TSH and B12  CT head with no acute intracranial pathology  Gabapentin dose reduced to 100 mg at bedtime   Discontinue baclofen upon discharge because of his advanced liver disease    Left elbow bursitis, resolved  Patient had a fever of 101.1 around midnight of 4/21/2025.  Now with recurrent fever of 100.7 night of 4/24/2025  Elevated procalcitonin, CRP and sed rate  Negative blood culture.  Normal lactic acid, Normal chest x-ray and normal UA, Negative respiratory panel  X-ray left elbow with olecranon bursitis  Significantly improved with empiric antibiotic therapy with Rocephin and daptomycin  Negative cultures  Inflammatory markers trending down   Dr. Rainey/orthopedic followed, appreciate the help    Acute on chronic right shoulder pain  Possible rotator cuff tear   Fall at home landing on right shoulder  Has been evaluated by orthopedic surgery outpatient.  Patient was found to have a tear in his right shoulder.  Patient was deemed a poor surgical candidate and nonoperative approach was indicated.  X-ray right shoulder with no evidence of acute fracture  Continue supportive care  Discussed with Dr. Rainey/orthopedic.  Patient will need right shoulder replacement versus rotator cuff repair in the near future.  No plan for surgical intervention during this hospitalization     Right forearm superficial thrombophlebitis  Elevated D-dimer  CTA chest negative for PE  Elevated D-dimer is likely secondary to clinical right forearm thrombophlebitis.  Continue conservative treatment    Anemia of chronic disease  Severe iron deficiency, iron saturation 2%  Hemoglobin is slowly trending down.  It was 10.8 on admission.  Currently stable around 9  No evidence of GI bleed  Iron studies consistent with severe iron deficiency anemia. Status post IV iron infusion   Monitor CBC    Type 2 diabetes  A1c 11.5%  Continue insulin glargine 30 units daily.  Continue Premeal insulin 10 units 3 times daily and  continue SSI  Hold Januvia and metformin   The patient drinks plenty of ski (still drink rich in sugar) and eats a quart of gallon of ice cream daily.  Theand family counseled about the importance to comply with diabetic diet    Hypertension  Continue home Coreg and losartan in the morning     GERD  Continue PPI     BPH  Continue tamsulosin     History of thrombocytopenia  Follows with Heme/Onc outpatient. 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 Will need to follow up outpatient for biopsy results.   Continue SCD for DVT prophylaxis    Weakness and debility  PT/OT recommended short-term rehab     Expected Discharge Location and Transportation: Inpatient rehab  Expected Discharge   Expected Discharge Date: 4/30/2025; Expected Discharge Time:      VTE Prophylaxis:  Pharmacologic & mechanical VTE prophylaxis orders are present.         AM-PAC 6 Clicks Score (PT): 20 (04/28/25 2004)    CODE STATUS:   Code Status and Medical Interventions: CPR (Attempt to Resuscitate); Full Support   Ordered at: 04/19/25 8672     Code Status (Patient has no pulse and is not breathing):    CPR (Attempt to Resuscitate)     Medical Interventions (Patient has pulse or is breathing):    Full Support     Level Of Support Discussed With:    Next of Kin (If No Surrogate)       Bailey Howell MD  04/29/25

## 2025-04-30 LAB
BACTERIA SPEC AEROBE CULT: NORMAL
GLUCOSE BLDC GLUCOMTR-MCNC: 120 MG/DL (ref 70–130)
GLUCOSE BLDC GLUCOMTR-MCNC: 123 MG/DL (ref 70–130)
GLUCOSE BLDC GLUCOMTR-MCNC: 132 MG/DL (ref 70–130)
GLUCOSE BLDC GLUCOMTR-MCNC: 194 MG/DL (ref 70–130)

## 2025-04-30 PROCEDURE — 63710000001 INSULIN GLARGINE PER 5 UNITS: Performed by: INTERNAL MEDICINE

## 2025-04-30 PROCEDURE — 25010000002 DAPTOMYCIN PER 1 MG

## 2025-04-30 PROCEDURE — 63710000001 INSULIN LISPRO (HUMAN) PER 5 UNITS: Performed by: INTERNAL MEDICINE

## 2025-04-30 PROCEDURE — 99231 SBSQ HOSP IP/OBS SF/LOW 25: CPT | Performed by: INTERNAL MEDICINE

## 2025-04-30 PROCEDURE — 82948 REAGENT STRIP/BLOOD GLUCOSE: CPT

## 2025-04-30 PROCEDURE — 63710000001 INSULIN LISPRO (HUMAN) PER 5 UNITS: Performed by: STUDENT IN AN ORGANIZED HEALTH CARE EDUCATION/TRAINING PROGRAM

## 2025-04-30 PROCEDURE — 25010000002 ENOXAPARIN PER 10 MG: Performed by: INTERNAL MEDICINE

## 2025-04-30 RX ORDER — LACTULOSE 10 G/15ML
20 SOLUTION ORAL
Status: DISCONTINUED | OUTPATIENT
Start: 2025-04-30 | End: 2025-05-02 | Stop reason: HOSPADM

## 2025-04-30 RX ADMIN — CARVEDILOL 6.25 MG: 6.25 TABLET, FILM COATED ORAL at 08:50

## 2025-04-30 RX ADMIN — TAMSULOSIN HYDROCHLORIDE 0.4 MG: 0.4 CAPSULE ORAL at 08:51

## 2025-04-30 RX ADMIN — LACTULOSE 20 G: 20 SOLUTION ORAL at 17:22

## 2025-04-30 RX ADMIN — COLCHICINE 0.6 MG: 0.6 TABLET ORAL at 08:50

## 2025-04-30 RX ADMIN — RIFAXIMIN 550 MG: 550 TABLET ORAL at 08:50

## 2025-04-30 RX ADMIN — DAPTOMYCIN 550 MG: 500 INJECTION, POWDER, LYOPHILIZED, FOR SOLUTION INTRAVENOUS at 15:59

## 2025-04-30 RX ADMIN — INSULIN LISPRO 2 UNITS: 100 INJECTION, SOLUTION INTRAVENOUS; SUBCUTANEOUS at 12:03

## 2025-04-30 RX ADMIN — OXYCODONE HYDROCHLORIDE 10 MG: 10 TABLET ORAL at 09:08

## 2025-04-30 RX ADMIN — Medication 10 ML: at 21:33

## 2025-04-30 RX ADMIN — LACTULOSE 30 G: 20 SOLUTION ORAL at 08:51

## 2025-04-30 RX ADMIN — INSULIN LISPRO 10 UNITS: 100 INJECTION, SOLUTION INTRAVENOUS; SUBCUTANEOUS at 12:01

## 2025-04-30 RX ADMIN — OXYCODONE HYDROCHLORIDE 10 MG: 10 TABLET ORAL at 18:37

## 2025-04-30 RX ADMIN — LIDOCAINE 1 PATCH: 4 PATCH TOPICAL at 08:52

## 2025-04-30 RX ADMIN — RIFAXIMIN 550 MG: 550 TABLET ORAL at 21:32

## 2025-04-30 RX ADMIN — PANTOPRAZOLE SODIUM 40 MG: 40 TABLET, DELAYED RELEASE ORAL at 06:18

## 2025-04-30 RX ADMIN — DORZOLAMIDE HYDROCHLORIDE: 20 SOLUTION/ DROPS OPHTHALMIC at 08:53

## 2025-04-30 RX ADMIN — INSULIN LISPRO 10 UNITS: 100 INJECTION, SOLUTION INTRAVENOUS; SUBCUTANEOUS at 17:22

## 2025-04-30 RX ADMIN — GABAPENTIN 100 MG: 100 CAPSULE ORAL at 21:32

## 2025-04-30 RX ADMIN — LOSARTAN POTASSIUM 100 MG: 50 TABLET, FILM COATED ORAL at 08:51

## 2025-04-30 RX ADMIN — Medication 10 ML: at 08:54

## 2025-04-30 RX ADMIN — CARVEDILOL 6.25 MG: 6.25 TABLET, FILM COATED ORAL at 17:22

## 2025-04-30 RX ADMIN — ENOXAPARIN SODIUM 40 MG: 100 INJECTION SUBCUTANEOUS at 08:52

## 2025-04-30 RX ADMIN — INSULIN LISPRO 10 UNITS: 100 INJECTION, SOLUTION INTRAVENOUS; SUBCUTANEOUS at 08:51

## 2025-04-30 RX ADMIN — INSULIN GLARGINE 30 UNITS: 100 INJECTION, SOLUTION SUBCUTANEOUS at 08:52

## 2025-04-30 NOTE — DISCHARGE PLACEMENT REQUEST
"Gabehart, Jimmy (70 y.o. Male)       Date of Birth   1955    Social Security Number       Address   [address] [address]    Home Phone   [phone]    MRN   [id number]       Evangel[employer]   Unknown    Marital Status                               Admission Date   4/19/2025    Admission Type   Urgent    Admitting Provider   Bailey Howell MD    Attending Provider   Bailey Howell MD    Department, Room/Bed   [address], [address]/1       Discharge Date       Discharge Disposition       Discharge Destination                                 Attending Provider: Bailey Howell MD    Allergies: Demerol [Meperidine], Penicillins    Isolation: None   Infection: None   Code Status: CPR    Ht: 188 cm (74\")   Wt: 116 kg (255 lb 4.7 oz)    Admission Cmt: None   Principal Problem: Hepatic encephalopathy [K76.82]                   Active Insurance as of 4/19/2025       Primary Coverage       Payor Plan Insurance Group Employer/Plan Group    HUMANA HUMANA VASYL HMO X [id number]       Payor Plan Address Payor Plan Phone Number Payor Plan Fax Number Effective Dates    [address] [address]   3/1/2025 - None Entered    [facility] [id number]         Subscriber Name Subscriber Birth Date Member ID       GABEHART,JIMMY 1955 [id number]               Secondary Coverage       Payor Plan Insurance Group Employer/Plan Group    HUMANA MEDICARE REPLACEMENT HUMANA MEDICARE ADVANTAGE HMO [id number]       Payor Plan Address Payor Plan Phone Number Payor Plan Fax Number Effective Dates    [address] [address] [phone]  3/1/2025 - None Entered    [facility] [id number]         Subscriber Name Subscriber Birth Date Member ID       GABEHART,JIMMY 1955 [id number]                     Emergency Contacts        (Rel.) Home Phone Work Phone Mobile Phone    Gabehart,Sherry (Other) -- -- [phone]                 History & Physical        Delbert Nettles DO at 04/19/25 1350  " "            [facility] Medicine Services  HISTORY AND PHYSICAL    Patient Name: Jimmy Gabehart  : 1955  MRN: [id number]  Primary Care Physician: Miriam Mckeon DO  Date of admission: 2025      Subjective  Subjective     Chief Complaint:  Altered mental status, fall at home    HPI:  Jimmy Gabehart is a 70 y.o. male with past medical history of insulin-dependent diabetes, hypertension, recent diagnosis of cirrhosis, thrombocytopenia, chronic pain presenting with altered mental status and fall at home.    History obtained from wife over the phone.  Wife states since Chloe in  patient has become progressively weaker, staying in bed for 20 to 23 hours a day, with progressively worsening confusion.  Patient will talk about items or situations that are not present currently ongoing.  Wife states patient has had limited food intake and has been drinking pop excessively.  Wife states he has been \"out of character\" since .  Weakness and confusion have been worsening over the past 3 to 4 weeks, with acute worsening over the past 3 to 4 days.  Patient and wife note constipation recently.  Wife states that he was recently diagnosed with cirrhosis but does not have follow-up with his liver team until 2025.  Wife denies any current use of lactulose or rifaximin.  Of note patient was recently seen by rheumatology for abnormal autoimmune lab work.  Per wife, patient does not have lupus but rheumatologist is more concerned of possible MDS.  Patient had recent bone marrow biopsy with hematology/oncology this past Thursday, currently awaiting biopsy results.  Wife denies any past EGDs.    Patient was initially seen at an outside hospital secondary to fall at home.  Patient reports acute on chronic right shoulder pain.  Wife states that patient is in need of a right shoulder replacement.  He has been seen by orthopedic surgery is pursuing nonoperative management.  CT " head at outside hospital unremarkable.  Chest x-ray unremarkable.  Ammonia 28. UA without signs of infection.  Patient received lactulose prior to transfer.  Patient transferred to [facility] for concern for hepatic encephalopathy.    In the room, patient is oriented to name, date of birth, city, year, and president.  Patient denies any abdominal pain, shortness of breath, chest pain, GI or  symptoms.  Denies any tobacco use.  Quit alcohol 10 years ago.  Denies any illicit drug use besides prescribed chronic pain pills.  Mild asterixis on exam.  Wife confirms full CODE STATUS.      Personal History     Past Medical History:   Diagnosis Date    Diabetes     Hypertension            Past Surgical History:   Procedure Laterality Date    BACK SURGERY      CARPAL TUNNEL RELEASE      COLONOSCOPY      EYE SURGERY      FOOT SURGERY Left 1986    Ankle fusion    HERNIA REPAIR      KNEE SURGERY Right 12/14/2022    TKA right knee manipulation 01-25-23    TONSILLECTOMY AND ADENOIDECTOMY      TRIGGER FINGER RELEASE      WISDOM TOOTH EXTRACTION         Family History: family history includes Arthritis in his mother; Heart attack in his father and mother; High cholesterol in his mother; Hypertension in his mother; Kidney disease in his mother.     Social History:  reports that he has never smoked. He has never used smokeless tobacco. He reports current alcohol use. He reports that he does not use drugs.  Social History     Social History Narrative    Not on file       Medications:  Available home medication information reviewed.  Diclofenac Sodium, SITagliptin, Zinc, baclofen, carvedilol, diclofenac, fexofenadine, fish oil, gabapentin, losartan, magnesium chloride ER, metFORMIN ER, oxyCODONE, oxyCODONE-acetaminophen, pantoprazole, and tamsulosin    Allergies   Allergen Reactions    Demerol [Meperidine]     Penicillins        Objective  Objective     Vital Signs:   Temp:  [98.9 °F (37.2 °C)] 98.9 °F (37.2 °C)  Heart  Rate:  [79] 79  Resp:  [18] 18  BP: (139)/(84) 139/84       Physical Exam  Constitutional:       General: He is not in acute distress.  Cardiovascular:      Rate and Rhythm: Normal rate.      Pulses: Normal pulses.   Pulmonary:      Effort: Pulmonary effort is normal. No respiratory distress.   Abdominal:      General: There is no distension.      Palpations: Abdomen is soft.      Tenderness: There is no abdominal tenderness. There is no guarding or rebound.   Musculoskeletal:      Right lower leg: No edema.      Left lower leg: No edema.   Skin:     General: Skin is warm.   Neurological:      Mental Status: He is alert and oriented to person, place, and time.      Comments: Mild asterixis on exam.  Limited range of motion of right arm secondary to right shoulder pain   Psychiatric:         Mood and Affect: Mood normal.         Behavior: Behavior normal.            Result Review:  I have personally reviewed the results from the time of this admission to 4/19/2025 13:50 EDT and agree with these findings:  [x]  Laboratory list / accordion  []  Microbiology  []  Radiology  []  EKG/Telemetry   []  Cardiology/Vascular   []  Pathology  [x]  Old records  []  Other:  Most notable findings include: CT head unremarkable, ammonia 28      LAB RESULTS:                                  Microbiology Results (last 10 days)       ** No results found for the last 240 hours. **            No radiology results from the last 24 hrs        Assessment & Plan  Assessment & Plan       Hepatic encephalopathy    Jimmy Gabehart is a 70 y.o. male with past medical history of insulin-dependent diabetes, hypertension, recent diagnosis of cirrhosis, thrombocytopenia, chronic pain presenting with altered mental status and fall at home.    Concern for hepatic encephalopathy  -Progressively worsening weakness and confusion since December 2024 with significant worsening over the past 3-4 days.   - New diagnosis of cirrhosis.  Initially seen by  gastroenterology in February 2025 for concern for cirrhosis.  FibroScan ordered and obtained in March 2025 revealing stiffness consistent with F4 cirrhosis. No recent EGD or AFP.  No follow-up with GI until September 2025 per family.  -Recently evaluated by rheumatology, low concern for autoimmune process including lupus.  -Currently not on lactulose or rifaximin, though patient suffers from constipation  -Per chart review of OSH records, CT head unremarkable, Ammonia 28, UA negative for infection, chest x-ray unremarkable.  -Mild asterixis on exam  PLAN  -Start lactulose and rifaximin. Titrate to 2-3 BM's/daily  -Will need outpatient GI follow up  -Consider GI/neurology consultation pending clinical response with aforementioned treatment  -Will obtain CBC, CMP, Mag, Phos, PT/INR, TSH, lactate, blood cultures  -PT/OT consulted     Acute on chronic right shoulder pain  - Fall at home landing on right shoulder  - Has been evaluated by orthopedic surgery outpatient.  Patient was found to have a tear in his right shoulder.  Patient was deemed a poor surgical candidate and nonoperative approach was indicated.  - Will obtain x-ray to evaluate for fracture    IDDM  -Hold home medications   -SSI for now    HTN  -Plan to resume home Coreg and losartan in the morning    GERD  -Continue PPI    BPH  -Continue tamsulosin    Hx of thrombocytopenia  -Follows with Heme/Onc outpatient. 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 Will need to follow up outpatient for biopsy results.   -Labs pending, SCD's for now     VTE Prophylaxis:  Mechanical VTE prophylaxis orders are present.          CODE STATUS:    Code Status and Medical Interventions: CPR (Attempt to Resuscitate); Full Support   Ordered at: 04/19/25 1349     Code Status (Patient has no pulse and is not breathing):    CPR (Attempt to Resuscitate)     Medical Interventions (Patient has pulse or is breathing):    Full Support     Level Of Support Discussed With:    Next of Kin (If No  Surrogate)       Expected Discharge   Expected discharge date/ time has not been documented.     Delbert Nettles DO  25      Electronically signed by Delbert Nettles DO at 25 1412          Physician Progress Notes (most recent note)        Bailey Howell MD at 25 0608              [facility] Medicine Services  PROGRESS NOTE    Patient Name: Jimmy Gabehart  : 1955  MRN: [id number]    Date of Admission: 2025  Primary Care Physician: Miriam Mckeon DO    Subjective   Subjective     CC:  Follow-up for hepatic encephalopathy    HPI:  Patient seen and examined this morning.  Comfortable in the recliner at bedside.  Mild pain in his right shoulder.  Pain in his left elbow improved.  Son at bedside and happy about his progress.  Awaiting rehab    Objective   Objective     Vital Signs:   Temp:  [98 °F (36.7 °C)-98.7 °F (37.1 °C)] 98.3 °F (36.8 °C)  Heart Rate:  [58-80] 72  Resp:  [16-18] 18  BP: (121-132)/(63-74) 127/67     Physical Exam:  General: Comfortable, not in distress, conversant and cooperative  Head: Atraumatic and normocephalic  Eyes: No Icterus. No pallor  Ears:  Ears appear intact with no abnormalities noted  Throat: No oral lesions, no thrush  Neck: Supple, trachea midline  Lungs: Clear to auscultation bilaterally, equal air entry, no wheezing or crackles  Heart:  Normal S1 and S2, no murmur, no gallop, No JVD, no lower extremity swelling  Abdomen:  Soft, no tenderness, no organomegaly, normal bowel sounds, no organomegaly  Extremities: Improved left bursitis  Skin: No bleeding, bruising or rash, normal skin turgor and elasticity  Neurologic: Cranial nerves appear intact with no evidence of facial asymmetry, normal motor and sensory functions in all 4 extremities  Psych: Alert and oriented x3    Results Reviewed:  LAB RESULTS:      Lab 25  0216 25  0523 25  1059 25  0428 25  0506 25  1114 25  0417   WBC 2.97*  2.67*  --  4.19 4.81 3.97 3.48   HEMOGLOBIN 8.7* 8.0*  --  8.5* 9.4* 8.6* 8.6*   HEMATOCRIT 27.1* 25.7*  --  27.0* 28.6* 26.6* 27.6*   PLATELETS 126* 113*  --  114* 118* 104* 82*   NEUTROS ABS 0.65* 0.78*  --  1.72 1.94 1.61* 1.40*   IMMATURE GRANS (ABS)  --  0.01  --   --  0.03 0.03 0.02   LYMPHS ABS  --  1.32  --   --  1.96 1.56 1.54   MONOS ABS  --  0.51  --   --  0.82 0.69 0.46   EOS ABS 0.06 0.04  --  0.17 0.06 0.07 0.06   .7* 108.9*  --  111.1* 107.1* 108.6* 110.0*   SED RATE  --  47*  --   --  85* 73*  --    CRP  --  5.67*  --  8.20* 7.87* 6.25* 8.77*   PROCALCITONIN  --   --   --  0.30*  --  0.33*  --    D DIMER QUANT  --   --  2.84*  --   --   --   --          Lab 04/28/25  0217 04/27/25  0523 04/26/25  0428 04/25/25  0506 04/24/25  1114 04/23/25  0417   SODIUM 139 139 134* 135* 132* 133*   POTASSIUM 4.1 4.1 4.3 4.6 4.5 4.2   CHLORIDE 108* 109* 103 105 101 104   CO2 19.0* 18.0* 17.0* 18.0* 19.0* 18.0*   ANION GAP 12.0 12.0 14.0 12.0 12.0 11.0   BUN 20 22 29* 26* 23 25*   CREATININE 1.10 1.17 1.47* 1.30* 1.35* 1.32*   EGFR 72.2 67.1 51.0* 59.1* 56.5* 58.0*   GLUCOSE 120* 120* 102* 124* 173* 193*   CALCIUM 8.4* 8.3* 8.6 9.0 8.2* 8.3*   MAGNESIUM 1.7 1.7 1.8 1.7 1.6 1.7   PHOSPHORUS 3.1 3.1 3.5 3.3  --  3.3         Lab 04/28/25  0217 04/27/25  0523 04/26/25  0428 04/25/25  0506 04/24/25  1114   TOTAL PROTEIN 6.8 6.2 6.9 7.4 6.6   ALBUMIN 3.0* 3.0* 2.9* 3.2* 2.9*   GLOBULIN 3.8 3.2 4.0 4.2 3.7   ALT (SGPT) 32 36 44* 54* 49*   AST (SGOT) 49* 55* 76* 105* 105*   BILIRUBIN 0.7 0.8 1.1 1.0 1.1   ALK PHOS 265* 286* 279* 299* 240*                         Brief Urine Lab Results  (Last result in the past 365 days)        Color   Clarity   Blood   Leuk Est   Nitrite   Protein   CREAT   Urine HCG        04/21/25 1742 Yellow   Clear   Negative   Negative   Negative   Negative                   Microbiology Results Abnormal       None            No radiology results from the last 24 hrs            Current  medications:  Scheduled Meds:carvedilol, 6.25 mg, Oral, BID With Meals  colchicine, 1.2 mg, Oral, Daily  DAPTOmycin, 6 mg/kg (Adjusted), Intravenous, Q24H  dorzolamide (TRUSOPT) 2 % 1 drop, timolol (TIMOPTIC) 0.5 % 1 drop for Cosopt 22.3-6.8 mg/mL, , Left Eye, BID  enoxaparin sodium, 40 mg, Subcutaneous, Daily  gabapentin, 100 mg, Oral, Nightly  insulin glargine, 30 Units, Subcutaneous, Daily  Insulin Lispro, 10 Units, Subcutaneous, TID With Meals  insulin lispro, 2-9 Units, Subcutaneous, 4x Daily AC & at Bedtime  lactulose, 30 g, Oral, Daily  Lidocaine, 1 patch, Transdermal, Q24H  losartan, 100 mg, Oral, Q24H  pantoprazole, 40 mg, Oral, Q AM  rifAXIMin, 550 mg, Oral, BID  sodium chloride, 10 mL, Intravenous, Q12H  tamsulosin, 0.4 mg, Oral, Daily      Continuous Infusions:Pharmacy Consult,         PRN Meds:.  [DISCONTINUED] acetaminophen **OR** acetaminophen **OR** acetaminophen    Calcium Replacement - Follow Nurse / BPA Driven Protocol    dextrose    dextrose    glucagon (human recombinant)    Magnesium Standard Dose Replacement - Follow Nurse / BPA Driven Protocol    nitroglycerin    oxyCODONE    Pharmacy Consult    Phosphorus Replacement - Follow Nurse / BPA Driven Protocol    Potassium Replacement - Follow Nurse / BPA Driven Protocol    sodium chloride    sodium chloride    Assessment & Plan   Assessment & Plan     Active Hospital Problems    Diagnosis  POA    **Hepatic encephalopathy [K76.82]  Yes      Resolved Hospital Problems   No resolved problems to display.        Brief Hospital Course to date:  Jimmy Gabehart is a 70 y.o. male with past medical history of insulin-dependent diabetes, hypertension, recent diagnosis of liver cirrhosis, thrombocytopenia, chronic pain presenting with altered mental status and fall at home.     Acute hepatic encephalopathy, improved  Decompensated liver cirrhosis  Patient with remote history of alcohol use. New diagnosis of Stage F4 cirrhosis based on FibroScan in March 2023.  Following with GI as out patient  Presented with acute encephalopathy secondary to hepatic encephalopathy  Somewhat improved with lactulose  Continue scheduled lactulose twice daily  Continue rifaximin  Patient family advised to keep his follow-up with GI as outpatient.  Will need EGD  Normal TSH and B12  CT head with no acute intracranial pathology  Gabapentin dose reduced to 100 mg at bedtime   Discontinue baclofen upon discharge because of his advanced liver disease    Left elbow bursitis, resolved  Patient had a fever of 101.1 around midnight of 4/21/2025.  Now with recurrent fever of 100.7 night of 4/24/2025  Elevated procalcitonin, CRP and sed rate  Negative blood culture.  Normal lactic acid, Normal chest x-ray and normal UA, Negative respiratory panel  X-ray left elbow with olecranon bursitis  Significantly improved with empiric antibiotic therapy with Rocephin and daptomycin  Negative cultures  Inflammatory markers trending down   Dr. Rainey/orthopedic followed, appreciate the help    Acute on chronic right shoulder pain  Possible rotator cuff tear   Fall at home landing on right shoulder  Has been evaluated by orthopedic surgery outpatient.  Patient was found to have a tear in his right shoulder.  Patient was deemed a poor surgical candidate and nonoperative approach was indicated.  X-ray right shoulder with no evidence of acute fracture  Continue supportive care  Discussed with Dr. Rainey/orthopedic.  Patient will need right shoulder replacement versus rotator cuff repair in the near future.  No plan for surgical intervention during this hospitalization     Right forearm superficial thrombophlebitis  Elevated D-dimer  CTA chest negative for PE  Elevated D-dimer is likely secondary to clinical right forearm thrombophlebitis.  Continue conservative treatment    Anemia of chronic disease  Severe iron deficiency, iron saturation 2%  Hemoglobin is slowly trending down.  It was 10.8 on admission.   Currently stable around 9  No evidence of GI bleed  Iron studies consistent with severe iron deficiency anemia. Status post IV iron infusion   Monitor CBC    Type 2 diabetes  A1c 11.5%  Continue insulin glargine 30 units daily.  Continue Premeal insulin 10 units 3 times daily and continue SSI  Hold Januvia and metformin   The patient drinks plenty of ski (still drink rich in sugar) and eats a quart of gallon of ice cream daily.  Theand family counseled about the importance to comply with diabetic diet    Hypertension  Continue home Coreg and losartan in the morning     GERD  Continue PPI     BPH  Continue tamsulosin     History of thrombocytopenia  Follows with Heme/Onc outpatient. 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 Will need to follow up outpatient for biopsy results.   Continue SCD for DVT prophylaxis    Weakness and debility  PT/OT recommended short-term rehab     Expected Discharge Location and Transportation: Inpatient rehab  Expected Discharge   Expected Discharge Date: 2025; Expected Discharge Time:      VTE Prophylaxis:  Pharmacologic & mechanical VTE prophylaxis orders are present.         AM-PAC 6 Clicks Score (PT): 20 (25)    CODE STATUS:   Code Status and Medical Interventions: CPR (Attempt to Resuscitate); Full Support   Ordered at: 25 1349     Code Status (Patient has no pulse and is not breathing):    CPR (Attempt to Resuscitate)     Medical Interventions (Patient has pulse or is breathing):    Full Support     Level Of Support Discussed With:    Next of Kin (If No Surrogate)       Bailey Howell MD  25        Electronically signed by Bailey Howell MD at 25 1310          Physical Therapy Notes (most recent note)        Kirsten Vallejo at 25 0818  Version 1 of 1         Patient Name: Jimmy Gabehart  : 1955    MRN: [id number]                              Today's Date: 2025       Admit Date: 2025    Visit Dx: No diagnosis  found.  Patient Active Problem List   Diagnosis    Shoulder pain, bilateral    Bursitis/tendonitis, shoulder    Pain    Cervical radiculopathy    Cervical spondylosis    Lumbar spondylosis    Elevated liver enzymes    History of diabetic ulcer of foot    Hypertensive disorder    Long-term current use of opiate analgesic    Osteoarthritis, generalized    Type 2 diabetes mellitus with diabetic neuropathy    Hepatic encephalopathy     Past Medical History:   Diagnosis Date    Diabetes     Hypertension      Past Surgical History:   Procedure Laterality Date    BACK SURGERY      CARPAL TUNNEL RELEASE      COLONOSCOPY      EYE SURGERY      FOOT SURGERY Left 1986    Ankle fusion    HERNIA REPAIR      KNEE SURGERY Right 12/14/2022    TKA right knee manipulation 01-25-23    TONSILLECTOMY AND ADENOIDECTOMY      TRIGGER FINGER RELEASE      WISDOM TOOTH EXTRACTION        General Information       Row Name 04/29/25 0838          Physical Therapy Time and Intention    Document Type therapy note (daily note)  -ML     Mode of Treatment physical therapy  -ML       Row Name 04/29/25 0838          General Information    Patient Profile Reviewed yes  -ML     Existing Precautions/Restrictions fall;other (see comments)  blind L eye, R glenohumeral joint rotator cuff arthropathy, L elbow cellulitis vs olecranon bursitis  -ML     Barriers to Rehab medically complex;previous functional deficit  -ML       Row Name 04/29/25 0838          Cognition    Orientation Status (Cognition) oriented x 3  -ML       Row Name 04/29/25 0838          Safety Issues/Impairments Affecting Functional Mobility    Safety Issues Affecting Function (Mobility) insight into deficits/self-awareness;safety precaution awareness;sequencing abilities  -ML     Impairments Affecting Function (Mobility) balance;endurance/activity tolerance;strength;pain;range of motion (ROM);motor planning  -ML               User Key  (r) = Recorded By, (t) = Taken By, (c) = Cosigned By       Initials Name Provider Type    Kirsten Lopez Physical Therapist                   Mobility       Row Name 04/29/25 0840          Bed Mobility    Bed Mobility supine-sit  -ML     Supine-Sit [place] (Bed Mobility) standby assist  -ML     Assistive Device (Bed Mobility) bed rails;head of bed elevated  -ML       Row Name 04/29/25 0840          Sit-Stand Transfer    Sit-Stand [place] (Transfers) contact guard  -ML     Assistive Device (Sit-Stand Transfers) walker, front-wheeled  -ML       Row Name 04/29/25 0840          Gait/Stairs (Locomotion)    [place] Level (Gait) contact guard;verbal cues  -ML     Assistive Device (Gait) walker, front-wheeled  -ML     Distance in Feet (Gait) 40  + 40  -ML     Deviations/Abnormal Patterns (Gait) bilateral deviations;laurence decreased;gait speed decreased  -ML     Bilateral Gait Deviations heel strike decreased  -ML     Comment, (Gait/Stairs) Patient demonstrates step through gait pattern, minimal verbal cues to keep hands on RW during ambulation. Patient without loss of balance, however, additional time required to complete ambulation distance.  -ML               User Key  (r) = Recorded By, (t) = Taken By, (c) = Cosigned By      Initials Name Provider Type    ML Kirsten Vallejo Physical Therapist                   Obj/Interventions       Row Name 04/29/25 0842          Balance    Static Sitting Balance standby assist  -ML     Dynamic Sitting Balance standby assist  -ML     Position, Sitting Balance unsupported;sitting edge of bed  -ML     Static Standing Balance contact guard  -ML     Dynamic Standing Balance contact guard;verbal cues  -ML     Position/Device Used, Standing Balance supported;walker, front-wheeled  -ML     Balance Interventions sitting;standing;sit to stand;supported;occupation based/functional task  -ML               User Key  (r) = Recorded By, (t) = Taken By, (c) = Cosigned By      Initials Name Provider Type    ML Kirsten Vallejo Physical Therapist                    Goals/Plan    No documentation.                  Clinical Impression       Row Name 04/29/25 0843          Pain    Pretreatment Pain Rating 7/10  -ML     Posttreatment Pain Rating 7/10  -ML     Pain Location shoulder  -ML     Pain Side/Orientation right  -ML     Pain Management Interventions exercise or physical activity utilized;positioning techniques utilized  -ML     Response to Pain Interventions activity participation with tolerable pain  -ML       Row Name 04/29/25 0843          Plan of Care Review    Plan of Care Reviewed With patient  -ML     Progress improving  -ML     Outcome Evaluation Patient progressed with transfers and increased ambulation distance compared to previous treatment session. Patient with improved stability with use of RW during ambulation. Patient continues to present below baseline for mobility and would continue to benefit from skilled PT to address strength, balance and activity tolerance deficits. Continue current PT POC.  -ML       Row Name 04/29/25 0843          Positioning and Restraints    Pre-Treatment Position in bed  -ML     Post Treatment Position chair  -ML     In Chair notified nsg;reclined;call light within reach;encouraged to call for assist;exit alarm on;waffle cushion;legs elevated  -ML               User Key  (r) = Recorded By, (t) = Taken By, (c) = Cosigned By      Initials Name Provider Type    ML Kirsten Vallejo Physical Therapist                   Outcome Measures       Row Name 04/29/25 0845          How much help from another person do you currently need...    Turning from your back to your side while in flat bed without using bedrails? 4  -ML     Moving from lying on back to sitting on the side of a flat bed without bedrails? 3  -ML     Moving to and from a bed to a chair (including a wheelchair)? 3  -ML     Standing up from a chair using your arms (e.g., wheelchair, bedside chair)? 3  -ML     Climbing 3-5 steps with a railing? 3  -ML     To walk  in hospital room? 3  -ML     AM-PAC 6 Clicks Score (PT) 19  -ML     Highest Level of Mobility Goal 6 --> Walk 10 steps or more  -ML       Row Name 04/29/25 0845          Functional Assessment    Outcome Measure Options AM-PAC 6 Clicks Basic Mobility (PT)  -ML               User Key  (r) = Recorded By, (t) = Taken By, (c) = Cosigned By      Initials Name Provider Type     Kirsten Vallejo Physical Therapist                                 Physical Therapy Education       Title: PT OT SLP Therapies (Done)       Topic: Physical Therapy (Done)       Point: Mobility training (Done)       Learning Progress Summary            Patient Acceptance, E, VU,NR by ML at 4/29/2025 0846    Acceptance, E, VU,NR by ML at 4/24/2025 1552    Acceptance, E, VU by ER at 4/21/2025 0938    Comment: progress, d/c planning, shoulder pain edu   Family Acceptance, E, VU,NR by ML at 4/24/2025 1552                      Point: Home exercise program (Done)       Learning Progress Summary            Patient Acceptance, E, VU,NR by ML at 4/24/2025 1552    Acceptance, E, VU by ER at 4/21/2025 0938    Comment: progress, d/c planning, shoulder pain edu   Family Acceptance, E, VU,NR by ML at 4/24/2025 1552                      Point: Body mechanics (Done)       Learning Progress Summary            Patient Acceptance, E, VU,NR by ML at 4/24/2025 1552    Acceptance, E, VU by ER at 4/21/2025 0938    Comment: progress, d/c planning, shoulder pain edu   Family Acceptance, E, VU,NR by ML at 4/24/2025 1552                      Point: Precautions (Done)       Learning Progress Summary            Patient Acceptance, E, VU,NR by ML at 4/29/2025 0846    Acceptance, E, VU,NR by ML at 4/24/2025 1552    Acceptance, E, VU by ER at 4/21/2025 0938    Comment: progress, d/c planning, shoulder pain edu   Family Acceptance, E, VU,NR by ML at 4/24/2025 1552                                      User Key       Initials Effective Dates Name Provider Type Discipline     04/22/21  -  Kirsten Vallejo Physical Therapist PT    ER 24 -  Sophie Bolanos, PT Physical Therapist PT                  PT Recommendation and Plan     Progress: improving  Outcome Evaluation: Patient progressed with transfers and increased ambulation distance compared to previous treatment session. Patient with improved stability with use of RW during ambulation. Patient continues to present below baseline for mobility and would continue to benefit from skilled PT to address strength, balance and activity tolerance deficits. Continue current PT POC.     Time Calculation:         PT Charges       Row Name 25 0847             Time Calculation    Start Time 0818  -ML      PT Received On 25  -ML         Timed Charges    91558 - PT Therapeutic Activity Minutes 18  -ML         Total Minutes    Timed Charges Total Minutes 18  -ML       Total Minutes 18  -ML                User Key  (r) = Recorded By, (t) = Taken By, (c) = Cosigned By      Initials Name Provider Type    Kirsten Lopez Physical Therapist                  Therapy Charges for Today       Code Description Service Date Service Provider Modifiers Qty    41885214189 HC PT THERAPEUTIC ACT EA 15 MIN 2025 Kirsten Vallejo GP 1            PT G-Codes  Outcome Measure Options: AM-PAC 6 Clicks Basic Mobility (PT)  AM-PAC 6 Clicks Score (PT): 19  AM-PAC 6 Clicks Score (OT): 16  PT Discharge Summary  Anticipated Discharge Disposition (PT): inpatient rehabilitation facility    Kirsten Vallejo  2025      Electronically signed by Kirsten Vallejo at 25 0847          Occupational Therapy Notes (most recent note)        Digna Atkinson, OT at 25 1503          Patient Name: Jimmy Gabehart  : 1955    MRN: [id number]                              Today's Date: 2025       Admit Date: 2025    Visit Dx: No diagnosis found.  Patient Active Problem List   Diagnosis    Shoulder pain, bilateral    Bursitis/tendonitis, shoulder    Pain    Cervical  radiculopathy    Cervical spondylosis    Lumbar spondylosis    Elevated liver enzymes    History of diabetic ulcer of foot    Hypertensive disorder    Long-term current use of opiate analgesic    Osteoarthritis, generalized    Type 2 diabetes mellitus with diabetic neuropathy    Hepatic encephalopathy     Past Medical History:   Diagnosis Date    Diabetes     Hypertension      Past Surgical History:   Procedure Laterality Date    BACK SURGERY      CARPAL TUNNEL RELEASE      COLONOSCOPY      EYE SURGERY      FOOT SURGERY Left 1986    Ankle fusion    HERNIA REPAIR      KNEE SURGERY Right 12/14/2022    TKA right knee manipulation 01-25-23    TONSILLECTOMY AND ADENOIDECTOMY      TRIGGER FINGER RELEASE      WISDOM TOOTH EXTRACTION        General Information       Row Name 04/29/25 1532          OT Time and Intention    Document Type therapy note (daily note)  -LR     Mode of Treatment occupational therapy  -LR       Row Name 04/29/25 1532          General Information    Patient Profile Reviewed yes  -LR     Existing Precautions/Restrictions fall;other (see comments)  blind L eye, R glenohumeral joint rotator cuff arthropathy, L elbow cellulitis vs olecranon bursitis  -LR     Barriers to Rehab previous functional deficit;medically complex  -LR       Row Name 04/29/25 1532          Cognition    Orientation Status (Cognition) oriented x 3  -LR       Row Name 04/29/25 1532          Safety Issues/Impairments Affecting Functional Mobility    Safety Issues Affecting Function (Mobility) safety precaution awareness;awareness of need for assistance;sequencing abilities  -LR     Impairments Affecting Function (Mobility) balance;endurance/activity tolerance;strength;pain;range of motion (ROM)  -LR               User Key  (r) = Recorded By, (t) = Taken By, (c) = Cosigned By      Initials Name Provider Type    LR Digna Atkinson, OT Occupational Therapist                     Mobility/ADL's       Row Name 04/29/25 1533          Bed  Mobility    Comment, (Bed Mobility) UIC on arrival. Left UIC  -LR       Row Name 04/29/25 1533          Transfers    Transfers sit-stand transfer  -LR       Row Name 04/29/25 1533          Sit-Stand Transfer    Sit-Stand Mill[place] (Transfers) contact guard  -LR     Assistive Device (Sit-Stand Transfers) walker, front-wheeled  -LR     Comment, (Sit-Stand Transfer) Good hand placement  -LR       Row Name 04/29/25 1533          Functional Mobility    Functional Mobility- Ind. Level contact guard assist;1 person  -LR     Functional Mobility- Device walker, front-wheeled  -LR     Functional Mobility-Distance (Feet) --  <HH distance  -LR     Patient was able to Ambulate yes  -LR       Row Name 04/29/25 1533          Activities of Daily Living    BADL Assessment/Intervention toileting;grooming  -LR       Row Name 04/29/25 1533          Toileting Assessment/Training    Mill[place] Level (Toileting) perform perineal hygiene;adjust/manage clothing;contact guard assist  -LR     Position (Toileting) unsupported standing  -LR       Row Name 04/29/25 1533          Grooming Assessment/Training    [place] Level (Grooming) wash face, hands;set up  -LR     Position (Grooming) unsupported sitting  -LR               User Key  (r) = Recorded By, (t) = Taken By, (c) = Cosigned By      Initials Name Provider Type    Digna Vazquez OT Occupational Therapist                   Obj/Interventions       Row Name 04/29/25 1536          Balance    Balance Assessment sitting static balance;sitting dynamic balance;standing static balance;standing dynamic balance  -LR     Static Sitting Balance standby assist  -LR     Dynamic Sitting Balance standby assist  -LR     Position, Sitting Balance unsupported;sitting in chair  -LR     Static Standing Balance contact guard  -LR     Dynamic Standing Balance minimal assist;contact guard;other (see comments)  progressed to CGA  -LR     Position/Device Used, Standing Balance supported;walker,  4-wheeled  -LR     Balance Interventions sitting;standing;supported;static;dynamic;occupation based/functional task  -LR               User Key  (r) = Recorded By, (t) = Taken By, (c) = Cosigned By      Initials Name Provider Type    Digna Vazquez OT Occupational Therapist                   Goals/Plan    No documentation.                  Clinical Impression       Row Name 04/29/25 1537          Pain Assessment    Pretreatment Pain Rating 0/10 - no pain  -LR     Posttreatment Pain Rating 0/10 - no pain  -LR       Row Name 04/29/25 1537          Plan of Care Review    Plan of Care Reviewed With patient  -LR     Progress improving  -LR     Outcome Evaluation Pt continues to make improvements with ADL performance and functional mobility. VC during ambulation for sequencing, but improved with time. Performed toileting with CGA. Continue IPOT POC.  -LR       Row Name 04/29/25 1537          Therapy Plan Review/Discharge Plan (OT)    Anticipated Discharge Disposition (OT) inpatient rehabilitation facility  -LR       Row Name 04/29/25 1537          Vital Signs    Pre Systolic BP Rehab 129  -LR     Pre Treatment Diastolic BP 64  -LR     Pretreatment Heart Rate (beats/min) 67  -LR     Pre SpO2 (%) 96  -LR     O2 Delivery Pre Treatment room air  -LR     O2 Delivery Intra Treatment room air  -LR     O2 Delivery Post Treatment room air  -LR     Pre Patient Position Sitting  -LR     Post Patient Position Sitting  -LR       Row Name 04/29/25 1537          Positioning and Restraints    Pre-Treatment Position sitting in chair/recliner  -LR     Post Treatment Position chair  -LR     In Chair notified nsg;reclined;call light within reach;encouraged to call for assist;exit alarm on;waffle cushion;legs elevated  -LR               User Key  (r) = Recorded By, (t) = Taken By, (c) = Cosigned By      Initials Name Provider Type    Digna Vazquez OT Occupational Therapist                   Outcome Measures       Row Name  04/29/25 1542          How much help from another is currently needed...    Putting on and taking off regular lower body clothing? 3  -LR     Bathing (including washing, rinsing, and drying) 3  -LR     Toileting (which includes using toilet bed pan or urinal) 3  -LR     Putting on and taking off regular upper body clothing 3  -LR     Taking care of personal grooming (such as brushing teeth) 3  -LR     Eating meals 4  -LR     AM-PAC 6 Clicks Score (OT) 19  -LR       Row Name 04/29/25 0845          How much help from another person do you currently need...    Turning from your back to your side while in flat bed without using bedrails? 4  -ML     Moving from lying on back to sitting on the side of a flat bed without bedrails? 3  -ML     Moving to and from a bed to a chair (including a wheelchair)? 3  -ML     Standing up from a chair using your arms (e.g., wheelchair, bedside chair)? 3  -ML     Climbing 3-5 steps with a railing? 3  -ML     To walk in hospital room? 3  -ML     AM-PAC 6 Clicks Score (PT) 19  -ML     Highest Level of Mobility Goal 6 --> Walk 10 steps or more  -ML       Row Name 04/29/25 1542 04/29/25 0845       Functional Assessment    Outcome Measure Options AM-PAC 6 Clicks Daily Activity (OT)  -LR AM-PAC 6 Clicks Basic Mobility (PT)  -ML              User Key  (r) = Recorded By, (t) = Taken By, (c) = Cosigned By      Initials Name Provider Type    Kirsten Lopez Physical Therapist    Digna Vazquez, OT Occupational Therapist                    Occupational Therapy Education       Title: PT OT SLP Therapies (In Progress)       Topic: Occupational Therapy (In Progress)       Point: ADL training (In Progress)       Learning Progress Summary            Patient Acceptance, E, NR by LR at 4/29/2025 1543                      Point: Home exercise program (In Progress)       Learning Progress Summary            Patient Acceptance, E, NR by LR at 4/29/2025 1543                      Point: Precautions (In  Progress)       Learning Progress Summary            Patient Acceptance, E, NR by LR at 4/29/2025 1543                      Point: Body mechanics (In Progress)       Learning Progress Summary            Patient Acceptance, E, NR by LR at 4/29/2025 1543                                      User Key       Initials Effective Dates Name Provider Type Discipline    LR 10/09/24 -  Digna Atkinson OT Occupational Therapist OT                  OT Recommendation and Plan     Plan of Care Review  Plan of Care Reviewed With: patient  Progress: improving  Outcome Evaluation: Pt continues to make improvements with ADL performance and functional mobility. VC during ambulation for sequencing, but improved with time. Performed toileting with CGA. Continue IPOT POC.     Time Calculation:         Time Calculation- OT       Row Name 04/29/25 1543             Time Calculation- OT    OT Start Time 1503  -LR      OT Received On 04/29/25  -LR      OT Goal Re-Cert Due Date 05/01/25  -LR         Timed Charges    08863 - OT Therapeutic Activity Minutes 13  -LR      10551 - OT Self Care/Mgmt Minutes 10  -LR         Total Minutes    Timed Charges Total Minutes 23  -LR       Total Minutes 23  -LR                User Key  (r) = Recorded By, (t) = Taken By, (c) = Cosigned By      Initials Name Provider Type    LR Digna Atkinson OT Occupational Therapist                  Therapy Charges for Today       Code Description Service Date Service Provider Modifiers Qty    11177377785 HC OT THERAPEUTIC ACT EA 15 MIN 4/29/2025 Digna Atkinson OT GO 1    49884855826 HC OT SELF CARE/MGMT/TRAIN EA 15 MIN 4/29/2025 Digna Atkinson OT GO 1                 Digna Atkinson OT  4/29/2025    Electronically signed by Digna Atkinson OT at 04/29/25 1543

## 2025-04-30 NOTE — PLAN OF CARE
Problem: Adult Inpatient Plan of Care  Goal: Plan of Care Review  Outcome: Progressing  Flowsheets (Taken 4/30/2025 0515)  Progress: no change  Outcome Evaluation: Patient given PRN oxycodone once this shift for right shoulder pain with improvement. Patient remains alert and oriented, on RA, and VSS. Will continue with POC.  Plan of Care Reviewed With: patient   Goal Outcome Evaluation:  Plan of Care Reviewed With: patient        Progress: no change  Outcome Evaluation: Patient given PRN oxycodone once this shift for right shoulder pain with improvement. Patient remains alert and oriented, on RA, and VSS. Will continue with POC.

## 2025-04-30 NOTE — PROGRESS NOTES
[facility]  PROGRESS NOTE    Patient Name: Jimmy Gabehart  : 1955  MRN: [id number]    Date of Admission: 2025  Primary Care Physician: Miriam Mckeon DO    Subjective   Subjective     CC:  Follow-up for hepatic encephalopathy    HPI:  Patient seen and examined this morning.  Having multiple bowel movements and had an episode of stool incontinence yesterday.  Otherwise no acute complaints.  Awaiting rehab    Objective   Objective     Vital Signs:   Temp:  [97.8 °F (36.6 °C)-98.7 °F (37.1 °C)] 98 °F (36.7 °C)  Heart Rate:  [59-80] 75  Resp:  [16-18] 16  BP: (121-142)/(60-68) 132/60  Flow (L/min) (Oxygen Therapy):  [2] 2     Physical Exam:  General: Comfortable, not in distress, conversant and cooperative  Head: Atraumatic and normocephalic  Eyes: No Icterus. No pallor  Ears:  Ears appear intact with no abnormalities noted  Throat: No oral lesions, no thrush  Neck: Supple, trachea midline  Lungs: Clear to auscultation bilaterally, equal air entry, no wheezing or crackles  Heart:  Normal S1 and S2, no murmur, no gallop, No JVD, no lower extremity swelling  Abdomen:  Soft, no tenderness, no organomegaly, normal bowel sounds, no organomegaly  Extremities: Improved left bursitis  Skin: No bleeding, bruising or rash, normal skin turgor and elasticity  Neurologic: Cranial nerves appear intact with no evidence of facial asymmetry, normal motor and sensory functions in all 4 extremities  Psych: Alert and oriented x3    Results Reviewed:  LAB RESULTS:      Lab 25  1044 25  0216 25  0523 25  1059 25  0428 25  0506 25  1114   WBC 2.78* 2.97* 2.67*  --  4.19 4.81 3.97   HEMOGLOBIN 9.3* 8.7* 8.0*  --  8.5* 9.4* 8.6*   HEMATOCRIT 28.6* 27.1* 25.7*  --  27.0* 28.6* 26.6*   PLATELETS 151 126* 113*  --  114* 118* 104*   NEUTROS ABS 1.09* 0.65* 0.78*  --  1.72 1.94 1.61*   IMMATURE GRANS (ABS) 0.02  --  0.01  --   --  0.03 0.03    LYMPHS ABS 1.32  --  1.32  --   --  1.96 1.56   MONOS ABS 0.30  --  0.51  --   --  0.82 0.69   EOS ABS 0.05 0.06 0.04  --  0.17 0.06 0.07   .3* 109.7* 108.9*  --  111.1* 107.1* 108.6*   SED RATE  --   --  47*  --   --  85* 73*   CRP  --   --  5.67*  --  8.20* 7.87* 6.25*   PROCALCITONIN  --   --   --   --  0.30*  --  0.33*   D DIMER QUANT  --   --   --  2.84*  --   --   --          Lab 04/29/25  1044 04/28/25  0217 04/27/25  0523 04/26/25  0428 04/25/25  0506 04/24/25  1114   SODIUM 136 139 139 134* 135* 132*   POTASSIUM 4.1 4.1 4.1 4.3 4.6 4.5   CHLORIDE 103 108* 109* 103 105 101   CO2 20.0* 19.0* 18.0* 17.0* 18.0* 19.0*   ANION GAP 13.0 12.0 12.0 14.0 12.0 12.0   BUN 17 20 22 29* 26* 23   CREATININE 1.19 1.10 1.17 1.47* 1.30* 1.35*   EGFR 65.7 72.2 67.1 51.0* 59.1* 56.5*   GLUCOSE 228* 120* 120* 102* 124* 173*   CALCIUM 8.9 8.4* 8.3* 8.6 9.0 8.2*   MAGNESIUM  --  1.7 1.7 1.8 1.7 1.6   PHOSPHORUS  --  3.1 3.1 3.5 3.3  --          Lab 04/28/25  0217 04/27/25  0523 04/26/25  0428 04/25/25  0506 04/24/25  1114   TOTAL PROTEIN 6.8 6.2 6.9 7.4 6.6   ALBUMIN 3.0* 3.0* 2.9* 3.2* 2.9*   GLOBULIN 3.8 3.2 4.0 4.2 3.7   ALT (SGPT) 32 36 44* 54* 49*   AST (SGOT) 49* 55* 76* 105* 105*   BILIRUBIN 0.7 0.8 1.1 1.0 1.1   ALK PHOS 265* 286* 279* 299* 240*                         Brief Urine Lab Results  (Last result in the past 365 days)        Color   Clarity   Blood   Leuk Est   Nitrite   Protein   CREAT   Urine HCG        04/21/25 1742 Yellow   Clear   Negative   Negative   Negative   Negative                   Microbiology Results Abnormal       None            No radiology results from the last 24 hrs            Current medications:  Scheduled Meds:carvedilol, 6.25 mg, Oral, BID With Meals  colchicine, 0.6 mg, Oral, Daily  DAPTOmycin, 6 mg/kg (Adjusted), Intravenous, Q24H  dorzolamide (TRUSOPT) 2 % 1 drop, timolol (TIMOPTIC) 0.5 % 1 drop for Cosopt 22.3-6.8 mg/mL, , Left Eye, BID  enoxaparin sodium, 40 mg,  Subcutaneous, Daily  gabapentin, 100 mg, Oral, Nightly  insulin glargine, 30 Units, Subcutaneous, Daily  Insulin Lispro, 10 Units, Subcutaneous, TID With Meals  insulin lispro, 2-9 Units, Subcutaneous, 4x Daily AC & at Bedtime  lactulose, 20 g, Oral, BID Diuretics  Lidocaine, 1 patch, Transdermal, Q24H  losartan, 100 mg, Oral, Q24H  pantoprazole, 40 mg, Oral, Q AM  rifAXIMin, 550 mg, Oral, BID  sodium chloride, 10 mL, Intravenous, Q12H  tamsulosin, 0.4 mg, Oral, Daily      Continuous Infusions:Pharmacy Consult,         PRN Meds:.  [DISCONTINUED] acetaminophen **OR** acetaminophen **OR** acetaminophen    Calcium Replacement - Follow Nurse / BPA Driven Protocol    dextrose    dextrose    glucagon (human recombinant)    Magnesium Standard Dose Replacement - Follow Nurse / BPA Driven Protocol    nitroglycerin    oxyCODONE    Pharmacy Consult    Phosphorus Replacement - Follow Nurse / BPA Driven Protocol    Potassium Replacement - Follow Nurse / BPA Driven Protocol    sodium chloride    sodium chloride    Assessment & Plan   Assessment & Plan     Active Hospital Problems    Diagnosis  POA    **Hepatic encephalopathy [K76.82]  Yes      Resolved Hospital Problems   No resolved problems to display.        Brief Hospital Course to date:  Jimmy Gabehart is a 70 y.o. male with past medical history of insulin-dependent diabetes, hypertension, recent diagnosis of liver cirrhosis, thrombocytopenia, chronic pain presenting with altered mental status and fall at home.     Acute hepatic encephalopathy, improved  Decompensated liver cirrhosis  Patient with remote history of alcohol use. New diagnosis of Stage F4 cirrhosis based on FibroScan in March 2023. Following with GI as out patient  Presented with acute encephalopathy secondary to hepatic encephalopathy  Somewhat improved with lactulose  Continue scheduled lactulose twice daily  Continue rifaximin  Patient family advised to keep his follow-up with GI as outpatient.  Will need  EGD  Normal TSH and B12  CT head with no acute intracranial pathology  Gabapentin dose reduced to 100 mg at bedtime   Discontinue baclofen upon discharge because of his advanced liver disease    Left elbow bursitis, resolved  Patient had a fever of 101.1 around midnight of 4/21/2025.  Now with recurrent fever of 100.7 night of 4/24/2025  Elevated procalcitonin, CRP and sed rate  Negative blood culture.  Normal lactic acid, Normal chest x-ray and normal UA, Negative respiratory panel  X-ray left elbow with olecranon bursitis  Significantly improved with empiric antibiotic therapy with Rocephin and daptomycin  Negative cultures  Inflammatory markers trending down   Dr. Rainey/orthopedic followed, appreciate the help    Acute on chronic right shoulder pain  Possible rotator cuff tear   Fall at home landing on right shoulder  Has been evaluated by orthopedic surgery outpatient.  Patient was found to have a tear in his right shoulder.  Patient was deemed a poor surgical candidate and nonoperative approach was indicated.  X-ray right shoulder with no evidence of acute fracture  Continue supportive care  Discussed with Dr. Rainey/orthopedic.  Patient will need right shoulder replacement versus rotator cuff repair in the near future.  No plan for surgical intervention during this hospitalization     Right forearm superficial thrombophlebitis  Elevated D-dimer  CTA chest negative for PE  Elevated D-dimer is likely secondary to clinical right forearm thrombophlebitis.  Continue conservative treatment    Anemia of chronic disease  Severe iron deficiency, iron saturation 2%  Hemoglobin is slowly trending down.  It was 10.8 on admission.  Currently stable around 9  No evidence of GI bleed  Iron studies consistent with severe iron deficiency anemia. Status post IV iron infusion   Monitor CBC    Type 2 diabetes  A1c 11.5%  Continue insulin glargine 30 units daily.  Continue Premeal insulin 10 units 3 times daily and  continue SSI  Hold Januvia and metformin   The patient drinks plenty of ski (still drink rich in sugar) and eats a quart of gallon of ice cream daily.  Theand family counseled about the importance to comply with diabetic diet    Hypertension  Continue home Coreg and losartan in the morning     GERD  Continue PPI     BPH  Continue tamsulosin     History of thrombocytopenia  Follows with Heme/Onc outpatient. 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 Will need to follow up outpatient for biopsy results.   Continue SCD for DVT prophylaxis    Weakness and debility  PT/OT recommended short-term rehab     Expected Discharge Location and Transportation: Inpatient rehab  Expected Discharge   Expected Discharge Date: 5/1/2025; Expected Discharge Time:      VTE Prophylaxis:  Pharmacologic & mechanical VTE prophylaxis orders are present.         AM-PAC 6 Clicks Score (PT): 24 (04/30/25 6435)    CODE STATUS:   Code Status and Medical Interventions: CPR (Attempt to Resuscitate); Full Support   Ordered at: 04/19/25 4667     Code Status (Patient has no pulse and is not breathing):    CPR (Attempt to Resuscitate)     Medical Interventions (Patient has pulse or is breathing):    Full Support     Level Of Support Discussed With:    Next of Kin (If No Surrogate)       Bailey Howell MD  04/30/25

## 2025-04-30 NOTE — PLAN OF CARE
Problem: Adult Inpatient Plan of Care  Goal: Plan of Care Review  Outcome: Progressing  Goal: Patient-Specific Goal (Individualized)  Outcome: Progressing  Goal: Absence of Hospital-Acquired Illness or Injury  Outcome: Progressing  Intervention: Identify and Manage Fall Risk  Recent Flowsheet Documentation  Taken 4/30/2025 0420 by Charles Chaves RN  Safety Promotion/Fall Prevention:   safety round/check completed   activity supervised   assistive device/personal items within reach   clutter free environment maintained   fall prevention program maintained  Taken 4/30/2025 0220 by Charles Chaves RN  Safety Promotion/Fall Prevention:   activity supervised   assistive device/personal items within reach   safety round/check completed   clutter free environment maintained   nonskid shoes/slippers when out of bed  Taken 4/30/2025 0020 by Charles Chaves RN  Safety Promotion/Fall Prevention:   safety round/check completed   activity supervised   assistive device/personal items within reach   clutter free environment maintained   fall prevention program maintained  Taken 4/29/2025 2220 by Charles Chaves RN  Safety Promotion/Fall Prevention:   activity supervised   assistive device/personal items within reach   safety round/check completed   clutter free environment maintained   nonskid shoes/slippers when out of bed  Taken 4/29/2025 2020 by Charles Chaves RN  Safety Promotion/Fall Prevention:   safety round/check completed   activity supervised   assistive device/personal items within reach   clutter free environment maintained   fall prevention program maintained  Intervention: Prevent Skin Injury  Recent Flowsheet Documentation  Taken 4/30/2025 0420 by Charles Chaves RN  Body Position: position changed independently  Skin Protection:   incontinence pads utilized   silicone foam dressing in place   transparent dressing maintained  Taken 4/30/2025 0220 by Charles Chaves RN  Body Position:  position changed independently  Skin Protection:   incontinence pads utilized   transparent dressing maintained  Taken 4/30/2025 0020 by Charles Chaves RN  Body Position: position changed independently  Skin Protection:   incontinence pads utilized   silicone foam dressing in place   transparent dressing maintained  Taken 4/29/2025 2220 by Charles Chaves RN  Body Position: position changed independently  Skin Protection:   incontinence pads utilized   transparent dressing maintained  Taken 4/29/2025 2020 by Charles Chaves RN  Body Position: position changed independently  Skin Protection: incontinence pads utilized  Intervention: Prevent and Manage VTE (Venous Thromboembolism) Risk  Recent Flowsheet Documentation  Taken 4/29/2025 2020 by Charles Chaves RN  VTE Prevention/Management: (see MAR) --  Intervention: Prevent Infection  Recent Flowsheet Documentation  Taken 4/30/2025 0420 by Charles Chaves RN  Infection Prevention:   hand hygiene promoted   rest/sleep promoted  Taken 4/30/2025 0220 by Charles Chaves RN  Infection Prevention:   environmental surveillance performed   rest/sleep promoted  Taken 4/30/2025 0020 by Charles Chaves RN  Infection Prevention:   hand hygiene promoted   rest/sleep promoted  Taken 4/29/2025 2220 by Charles hCaves RN  Infection Prevention:   environmental surveillance performed   rest/sleep promoted  Taken 4/29/2025 2020 by Charles Chaves RN  Infection Prevention:   environmental surveillance performed   hand hygiene promoted   rest/sleep promoted   single patient room provided   cohorting utilized  Goal: Optimal Comfort and Wellbeing  Outcome: Progressing  Intervention: Provide Person-Centered Care  Recent Flowsheet Documentation  Taken 4/29/2025 2020 by Charles Chaves RN  Trust Relationship/Rapport:   care explained   emotional support provided   questions answered   questions encouraged   thoughts/feelings acknowledged  Goal: Readiness  for Transition of Care  Outcome: Progressing     Problem: Fall Injury Risk  Goal: Absence of Fall and Fall-Related Injury  Outcome: Progressing  Intervention: Identify and Manage Contributors  Recent Flowsheet Documentation  Taken 4/30/2025 0220 by Charles Chaves RN  Self-Care Promotion: independence encouraged  Taken 4/29/2025 2220 by Charles Chaves RN  Self-Care Promotion: independence encouraged  Taken 4/29/2025 2020 by Charles Chaves RN  Medication Review/Management: medications reviewed  Intervention: Promote Injury-Free Environment  Recent Flowsheet Documentation  Taken 4/30/2025 0420 by Charles Chaves RN  Safety Promotion/Fall Prevention:   safety round/check completed   activity supervised   assistive device/personal items within reach   clutter free environment maintained   fall prevention program maintained  Taken 4/30/2025 0220 by Charles Chaves RN  Safety Promotion/Fall Prevention:   activity supervised   assistive device/personal items within reach   safety round/check completed   clutter free environment maintained   nonskid shoes/slippers when out of bed  Taken 4/30/2025 0020 by Charles Chaves RN  Safety Promotion/Fall Prevention:   safety round/check completed   activity supervised   assistive device/personal items within reach   clutter free environment maintained   fall prevention program maintained  Taken 4/29/2025 2220 by Charles Chaves RN  Safety Promotion/Fall Prevention:   activity supervised   assistive device/personal items within reach   safety round/check completed   clutter free environment maintained   nonskid shoes/slippers when out of bed  Taken 4/29/2025 2020 by Charles Chaves RN  Safety Promotion/Fall Prevention:   safety round/check completed   activity supervised   assistive device/personal items within reach   clutter free environment maintained   fall prevention program maintained     Problem: Comorbidity Management  Goal: Blood Glucose  Level Within Target Range  Outcome: Progressing  Intervention: Monitor and Manage Glycemia  Recent Flowsheet Documentation  Taken 4/29/2025 2020 by Charles Chaves RN  Medication Review/Management: medications reviewed  Goal: Blood Pressure in Desired Range  Outcome: Progressing  Intervention: Maintain Blood Pressure Management  Recent Flowsheet Documentation  Taken 4/29/2025 2020 by Charles Chaves RN  Medication Review/Management: medications reviewed  Goal: Maintenance of Osteoarthritis Symptom Control  Outcome: Progressing  Intervention: Maintain Osteoarthritis Symptom Control  Recent Flowsheet Documentation  Taken 4/30/2025 0420 by Charles Chaves RN  Activity Management: activity minimized  Taken 4/30/2025 0220 by Charles Chaves RN  Activity Management: activity minimized  Taken 4/30/2025 0020 by Charles Chaves RN  Activity Management: activity minimized  Taken 4/29/2025 2220 by Charles Chaves RN  Activity Management: activity minimized  Taken 4/29/2025 2020 by Charles Chaves RN  Activity Management: activity encouraged  Medication Review/Management: medications reviewed     Problem: Violence Risk or Actual  Goal: Anger and Impulse Control  Outcome: Progressing  Intervention: Minimize Safety Risk  Recent Flowsheet Documentation  Taken 4/30/2025 0420 by Charles Chaves RN  Enhanced Safety Measures: bed alarm set  Taken 4/30/2025 0220 by Charles Chaves RN  Enhanced Safety Measures: bed alarm set  Taken 4/30/2025 0020 by Charles Chaves RN  Enhanced Safety Measures: bed alarm set  Taken 4/29/2025 2220 by Charles Chaves RN  Enhanced Safety Measures: bed alarm set  Taken 4/29/2025 2020 by Charles Chaves RN  Enhanced Safety Measures:   chair alarm set   room near unit station     Problem: Noninvasive Ventilation Acute  Goal: Effective Unassisted Ventilation and Oxygenation  Outcome: Progressing     Problem: Skin Injury Risk Increased  Goal: Skin Health and  Integrity  Outcome: Progressing  Intervention: Optimize Skin Protection  Recent Flowsheet Documentation  Taken 4/30/2025 0420 by Charles Chaves RN  Activity Management: activity minimized  Pressure Reduction Techniques:   frequent weight shift encouraged   heels elevated off bed   weight shift assistance provided   positioned off wounds  Head of Bed (HOB) Positioning: HOB elevated  Pressure Reduction Devices:   positioning supports utilized   pressure-redistributing mattress utilized  Skin Protection:   incontinence pads utilized   silicone foam dressing in place   transparent dressing maintained  Taken 4/30/2025 0220 by Charles Chaves RN  Activity Management: activity minimized  Pressure Reduction Techniques:   frequent weight shift encouraged   pressure points protected  Head of Bed (HOB) Positioning: \[facility] [place] [facility]\"" elevated  Pressure Reduction Devices:   positioning supports utilized   pressure-redistributing mattress utilized  Skin Protection:   incontinence pads utilized   transparent dressing maintained  Taken 4/30/2025 0020 by Charles Chaves RN  Activity Management: activity minimized  Pressure Reduction Techniques:   frequent weight shift encouraged   heels elevated off bed   weight shift assistance provided   positioned off wounds  Head of Bed (HOB) Positioning: \[facility] [place] [facility]\"" elevated  Pressure Reduction Devices:   positioning supports utilized   pressure-redistributing mattress utilized  Skin Protection:   incontinence pads utilized   silicone foam dressing in place   transparent dressing maintained  Taken 4/29/2025 2220 by Charles Chaves RN  Activity Management: activity minimized  Pressure Reduction Techniques:   frequent weight shift encouraged   pressure points protected  Head of Bed (HOB) Positioning: \[facility] [place] [facility]\"" elevated  Pressure Reduction Devices:   positioning supports utilized   pressure-redistributing mattress utilized  Skin Protection:   incontinence pads utilized   transparent dressing  maintained  Taken 4/29/2025 2020 by Charles Chaves, RN  Activity Management: activity encouraged  Pressure Reduction Techniques:   frequent weight shift encouraged   heels elevated off bed   weight shift assistance provided   pressure points protected  Head of Bed (HOB) Positioning: HOB elevated  Pressure Reduction Devices:   pressure-redistributing mattress utilized   positioning supports utilized  Skin Protection: incontinence pads utilized   Goal Outcome Evaluation:             Pt has no new nursing issues at this time. Pt is a&ox4, NSR/Sinus Tach, RA, VSS. Prn medication given per pt request. Pt has no complaints at this time. Will continue plan of care.

## 2025-04-30 NOTE — CASE MANAGEMENT/SOCIAL WORK
Continued Stay Note  [facility]     Patient Name: Jimmy Gabehart  MRN: [id number]  Today's Date: 4/30/2025    Admit Date: 4/19/2025    Plan: [facility]   Discharge Plan       Row Name 04/30/25 0847       Plan    Plan [facility]    Plan Comments Mr Gabehart has been offered a bed at [facility].  Angelina (admissions coordinator for [facility]) did ask about the IV antibiotics.  I spoke with Dr. Howell, who states that Mr Gabehart will be switched to oral antibiotics at discharge from this hospital.  I have spoken with Rosa Maria, Mr Gabehart's ex wife, over the phone and she is agreeable to rehab at [facility].  I called Angelina back and updated her on the PO antibiotics.  I have emailed the SNF team to start the pre-cert for Mr Gabehart. Case management will continue to follow.    Final Discharge Disposition Code 03 - skilled nursing facility (SNF)                   Discharge Codes    No documentation.                 Expected Discharge Date and Time       Expected Discharge Date Expected Discharge Time    Apr 30, 2025               Nannette Galdamez, RN

## 2025-05-01 LAB
GLUCOSE BLDC GLUCOMTR-MCNC: 104 MG/DL (ref 70–130)
GLUCOSE BLDC GLUCOMTR-MCNC: 130 MG/DL (ref 70–130)
GLUCOSE BLDC GLUCOMTR-MCNC: 138 MG/DL (ref 70–130)
GLUCOSE BLDC GLUCOMTR-MCNC: 239 MG/DL (ref 70–130)

## 2025-05-01 PROCEDURE — 97530 THERAPEUTIC ACTIVITIES: CPT

## 2025-05-01 PROCEDURE — 82948 REAGENT STRIP/BLOOD GLUCOSE: CPT

## 2025-05-01 PROCEDURE — 99232 SBSQ HOSP IP/OBS MODERATE 35: CPT | Performed by: INTERNAL MEDICINE

## 2025-05-01 PROCEDURE — 63710000001 INSULIN LISPRO (HUMAN) PER 5 UNITS: Performed by: INTERNAL MEDICINE

## 2025-05-01 PROCEDURE — 63710000001 INSULIN LISPRO (HUMAN) PER 5 UNITS: Performed by: STUDENT IN AN ORGANIZED HEALTH CARE EDUCATION/TRAINING PROGRAM

## 2025-05-01 PROCEDURE — 25010000002 ENOXAPARIN PER 10 MG: Performed by: INTERNAL MEDICINE

## 2025-05-01 PROCEDURE — 97535 SELF CARE MNGMENT TRAINING: CPT

## 2025-05-01 PROCEDURE — 63710000001 INSULIN GLARGINE PER 5 UNITS: Performed by: INTERNAL MEDICINE

## 2025-05-01 PROCEDURE — 25010000002 DAPTOMYCIN PER 1 MG

## 2025-05-01 RX ADMIN — COLCHICINE 0.6 MG: 0.6 TABLET ORAL at 08:43

## 2025-05-01 RX ADMIN — CARVEDILOL 6.25 MG: 6.25 TABLET, FILM COATED ORAL at 08:43

## 2025-05-01 RX ADMIN — GABAPENTIN 100 MG: 100 CAPSULE ORAL at 22:01

## 2025-05-01 RX ADMIN — RIFAXIMIN 550 MG: 550 TABLET ORAL at 08:43

## 2025-05-01 RX ADMIN — CARVEDILOL 6.25 MG: 6.25 TABLET, FILM COATED ORAL at 17:39

## 2025-05-01 RX ADMIN — ENOXAPARIN SODIUM 40 MG: 100 INJECTION SUBCUTANEOUS at 08:44

## 2025-05-01 RX ADMIN — INSULIN LISPRO 4 UNITS: 100 INJECTION, SOLUTION INTRAVENOUS; SUBCUTANEOUS at 11:56

## 2025-05-01 RX ADMIN — OXYCODONE HYDROCHLORIDE 10 MG: 10 TABLET ORAL at 12:01

## 2025-05-01 RX ADMIN — DORZOLAMIDE HYDROCHLORIDE: 20 SOLUTION/ DROPS OPHTHALMIC at 08:54

## 2025-05-01 RX ADMIN — LIDOCAINE 1 PATCH: 4 PATCH TOPICAL at 08:44

## 2025-05-01 RX ADMIN — INSULIN GLARGINE 30 UNITS: 100 INJECTION, SOLUTION SUBCUTANEOUS at 08:45

## 2025-05-01 RX ADMIN — INSULIN LISPRO 10 UNITS: 100 INJECTION, SOLUTION INTRAVENOUS; SUBCUTANEOUS at 17:39

## 2025-05-01 RX ADMIN — Medication 10 ML: at 22:01

## 2025-05-01 RX ADMIN — LACTULOSE 20 G: 20 SOLUTION ORAL at 08:43

## 2025-05-01 RX ADMIN — RIFAXIMIN 550 MG: 550 TABLET ORAL at 22:01

## 2025-05-01 RX ADMIN — Medication 10 ML: at 08:53

## 2025-05-01 RX ADMIN — LACTULOSE 20 G: 20 SOLUTION ORAL at 17:39

## 2025-05-01 RX ADMIN — OXYCODONE HYDROCHLORIDE 10 MG: 10 TABLET ORAL at 18:41

## 2025-05-01 RX ADMIN — INSULIN LISPRO 10 UNITS: 100 INJECTION, SOLUTION INTRAVENOUS; SUBCUTANEOUS at 11:56

## 2025-05-01 RX ADMIN — OXYCODONE HYDROCHLORIDE 10 MG: 10 TABLET ORAL at 07:19

## 2025-05-01 RX ADMIN — LOSARTAN POTASSIUM 100 MG: 50 TABLET, FILM COATED ORAL at 08:43

## 2025-05-01 RX ADMIN — TAMSULOSIN HYDROCHLORIDE 0.4 MG: 0.4 CAPSULE ORAL at 08:43

## 2025-05-01 RX ADMIN — OXYCODONE HYDROCHLORIDE 10 MG: 10 TABLET ORAL at 01:32

## 2025-05-01 RX ADMIN — DAPTOMYCIN 550 MG: 500 INJECTION, POWDER, LYOPHILIZED, FOR SOLUTION INTRAVENOUS at 14:38

## 2025-05-01 RX ADMIN — PANTOPRAZOLE SODIUM 40 MG: 40 TABLET, DELAYED RELEASE ORAL at 07:19

## 2025-05-01 RX ADMIN — INSULIN LISPRO 10 UNITS: 100 INJECTION, SOLUTION INTRAVENOUS; SUBCUTANEOUS at 08:44

## 2025-05-01 RX ADMIN — DORZOLAMIDE HYDROCHLORIDE: 20 SOLUTION/ DROPS OPHTHALMIC at 22:01

## 2025-05-01 NOTE — CASE MANAGEMENT/SOCIAL WORK
Continued Stay Note  [facility]     Patient Name: Jimmy Gabehart  MRN: [id number]  Today's Date: 5/1/2025    Admit Date: 4/19/2025    Plan: [facility]   Discharge Plan       Row Name 05/01/25 1338       Plan    Plan Comments Insurance requesting a peer to peer. I have submitted this to the PA today. Phone number to call is [phone] opt 5.      Row Name 05/01/25 1143       Plan    Plan [facility]    Patient/Family in Agreement with Plan yes    Plan Comments I spoke with the patient at the bedside. I had called Angelina at [facility] and she confirmed that the patient's insurance is still in process. She will update CM when more is known from insurance. Patient's ex wife, Rosa Maria, called me requesting an update. Informed her that insurance is still pending. IMM given to patient at bedside today. Patient likely to need transport at discharge. CM following.    Final Discharge Disposition Code 03 - skilled nursing facility (SNF)                   Discharge Codes    No documentation.                 Expected Discharge Date and Time       Expected Discharge Date Expected Discharge Time    May 1, 2025               Nahomi Goddard RN

## 2025-05-01 NOTE — THERAPY PROGRESS REPORT/RE-CERT
Patient Name: Jimmy Gabehart  : 1955    MRN: [id number]                              Today's Date: 2025       Admit Date: 2025    Visit Dx: No diagnosis found.  Patient Active Problem List   Diagnosis    Shoulder pain, bilateral    Bursitis/tendonitis, shoulder    Pain    Cervical radiculopathy    Cervical spondylosis    Lumbar spondylosis    Elevated liver enzymes    History of diabetic ulcer of foot    Hypertensive disorder    Long-term current use of opiate analgesic    Osteoarthritis, generalized    Type 2 diabetes mellitus with diabetic neuropathy    Hepatic encephalopathy     Past Medical History:   Diagnosis Date    Diabetes     Hypertension      Past Surgical History:   Procedure Laterality Date    BACK SURGERY      CARPAL TUNNEL RELEASE      COLONOSCOPY      EYE SURGERY      FOOT SURGERY Left     Ankle fusion    HERNIA REPAIR      KNEE SURGERY Right 2022    TKA right knee manipulation 23    TONSILLECTOMY AND ADENOIDECTOMY      TRIGGER FINGER RELEASE      WISDOM TOOTH EXTRACTION        General Information       Row Name 25 1319          OT Time and Intention    Document Type progress note/recertification  -LR     Mode of Treatment occupational therapy  -LR       Row Name 25 1319          General Information    Patient Profile Reviewed yes  -LR     Existing Precautions/Restrictions fall;other (see comments)  blind L eye, R glenohumeral joint rotator cuff arthropathy, L elbow cellulitis vs olecranon bursitis  -LR     Barriers to Rehab medically complex;previous functional deficit  -LR       Row Name 25 1319          Cognition    Orientation Status (Cognition) oriented x 4  -LR       Row Name 25 1319          Safety Issues/Impairments Affecting Functional Mobility    Safety Issues Affecting Function (Mobility) awareness of need for assistance;safety precaution awareness;sequencing abilities  -LR     Impairments Affecting Function (Mobility)  balance;endurance/activity tolerance;strength;pain;range of motion (ROM)  -LR               User Key  (r) = Recorded By, (t) = Taken By, (c) = Cosigned By      Initials Name Provider Type    LR Digna Atkinson, OT Occupational Therapist                     Mobility/ADL's       Row Name 05/01/25 1320          Bed Mobility    Comment, (Bed Mobility) UIC on arrival. Left UIC  -LR       Row Name 05/01/25 1320          Transfers    Transfers sit-stand transfer;stand-sit transfer  -LR       Row Name 05/01/25 1320          Sit-Stand Transfer    Sit-Stand [place] (Transfers) contact guard  -LR     Assistive Device (Sit-Stand Transfers) walker, front-wheeled  -LR       Row Name 05/01/25 1320          Stand-Sit Transfer    Stand-Sit [place] (Transfers) contact guard;verbal cues  -LR     Assistive Device (Stand-Sit Transfers) walker, front-wheeled  -LR     Comment, (Stand-Sit Transfer) VC for FWW placement for safety  -       Row Name 05/01/25 1320          Functional Mobility    Functional Mobility- Ind. Level contact guard assist;1 person;standby assist;other (see comments)  bouts of SBA  -LR     Functional Mobility- Device walker, front-wheeled  -LR     Functional Mobility-Distance (Feet) --  <HH distance  -LR     Patient was able to Ambulate yes  -       Row Name 05/01/25 1320          Activities of Daily Living    BADL Assessment/Intervention toileting;grooming  -       Row Name 05/01/25 1320          Toileting Assessment/Training    [place] Level (Toileting) perform perineal hygiene;adjust/manage clothing;standby assist  -LR     Assistive Devices (Toileting) commode  -LR     Position (Toileting) unsupported standing  -LR     Comment, (Toileting) VC and demo for FWW placement  -LR       Row Name 05/01/25 1320          Grooming Assessment/Training    [place] Level (Grooming) wash face, hands  -LR     Position (Grooming) sink side  -LR     Comment, (Grooming) VC for FWW placement when standing at  sink  -LR               User Key  (r) = Recorded By, (t) = Taken By, (c) = Cosigned By      Initials Name Provider Type    Digna Vazquez OT Occupational Therapist                   Obj/Interventions       Row Name 05/01/25 1328          Balance    Balance Assessment sitting static balance;sitting dynamic balance;standing static balance;standing dynamic balance  -LR     Static Sitting Balance independent  -LR     Dynamic Sitting Balance modified independence  -LR     Position, Sitting Balance unsupported;supported;sitting in chair  -LR     Static Standing Balance standby assist  -LR     Dynamic Standing Balance contact guard  -LR     Position/Device Used, Standing Balance supported;walker, front-wheeled  -LR     Balance Interventions sitting;standing;supported;static;dynamic;occupation based/functional task  -LR               User Key  (r) = Recorded By, (t) = Taken By, (c) = Cosigned By      Initials Name Provider Type    Digna Vazquez OT Occupational Therapist                   Goals/Plan       Row Name 05/01/25 1333          Transfer Goal 1 (OT)    Activity/Assistive Device (Transfer Goal 1, OT) toilet  -LR     [place] Level/Cues Needed (Transfer Goal 1, OT) standby assist  -LR     Time Frame (Transfer Goal 1, OT) short term goal (STG);5 days  -LR     Strategies/Barriers (Transfers Goal 1, OT) grab bar use, good safety demonstrated  -LR     Progress/Outcome (Transfer Goal 1, OT) goal met  -LR       Row Name 05/01/25 1333          Dressing Goal 1 (OT)    Activity/Device (Dressing Goal 1, OT) upper body dressing;lower body dressing;sock-aid  -LR     [place]/Cues Needed (Dressing Goal 1, OT) minimum assist (75% or more patient effort)  -LR     Time Frame (Dressing Goal 1, OT) long term goal (LTG);10 days  -LR     Strategies/Barriers (Dressing Goal 1, OT) [place] dressing, trial sock-aid use  -LR     Progress/Outcome (Dressing Goal 1, OT) continuing progress toward goal;goal partially met  -LR        Row Name 05/01/25 1333          Grooming Goal 1 (OT)    Activity/Device (Grooming Goal 1, OT) hair care;oral care;wash face, hands  -LR     [place] (Grooming Goal 1, OT) standby assist;set-up required  -LR     Time Frame (Grooming Goal 1, OT) short term goal (STG);5 days  -LR     Strategies/Barriers (Grooming Goal 1, OT) standing sinkside  -LR     Progress/Outcome (Grooming Goal 1, OT) goal met  -LR       Row Name 05/01/25 1333          Therapy Assessment/Plan (OT)    Planned Therapy Interventions (OT) activity tolerance training;adaptive equipment training;BADL retraining;occupation/activity based interventions;strengthening exercise;transfer/mobility retraining;IADL retraining;functional balance retraining;passive ROM/stretching;ROM/therapeutic exercise;patient/caregiver education/training  -LR               User Key  (r) = Recorded By, (t) = Taken By, (c) = Cosigned By      Initials Name Provider Type    LR Digna Atkinson, OT Occupational Therapist                   Clinical Impression       Row Name 05/01/25 1329          Pain Assessment    Pretreatment Pain Rating 0/10 - no pain  -LR     Posttreatment Pain Rating 0/10 - no pain  -LR       Row Name 05/01/25 1329          Plan of Care Review    Plan of Care Reviewed With patient  -LR     Progress improving  -LR     Outcome Evaluation Pt continues to make improvements with functional mobility, balance, ADL peformance, strength, and safety awareness. Reviewed FWW placement with pt during toileting and grooming activities in bathroom. Pt educated on visual scanning when using FWW. Recommend IPOT POC and home with assist at D/C.  -LR       Row Name 05/01/25 1324          Therapy Plan Review/Discharge Plan (OT)    Anticipated Discharge Disposition (OT) home;home with assist  -LR       Row Name 05/01/25 1324          Vital Signs    Pre SpO2 (%) 95  -LR     O2 Delivery Pre Treatment room air  -LR     O2 Delivery Intra Treatment room air  -LR     Post SpO2  (%) 96  -LR     O2 Delivery Post Treatment room air  -LR     Pre Patient Position Sitting  -LR     Intra Patient Position Standing  -LR     Post Patient Position Sitting  -LR       Row Name 05/01/25 1329          Positioning and Restraints    Pre-Treatment Position sitting in chair/recliner  -LR     Post Treatment Position chair  -LR     In Chair notified nsg;reclined;call light within reach;exit alarm on;encouraged to call for assist;waffle cushion;legs elevated  -LR               User Key  (r) = Recorded By, (t) = Taken By, (c) = Cosigned By      Initials Name Provider Type    LR Digna Atkinson, OT Occupational Therapist                   Outcome Measures       Row Name 05/01/25 1333          How much help from another is currently needed...    Putting on and taking off regular lower body clothing? 3  -LR     Bathing (including washing, rinsing, and drying) 3  -LR     Toileting (which includes using toilet bed pan or urinal) 4  -LR     Putting on and taking off regular upper body clothing 4  -LR     Taking care of personal grooming (such as brushing teeth) 4  -LR     Eating meals 4  -LR     AM-PAC 6 Clicks Score (OT) 22  -LR       Row Name 05/01/25 0843          How much help from another person do you currently need...    Turning from your back to your side while in flat bed without using bedrails? 4  -OCTAVIANO     Moving from lying on back to sitting on the side of a flat bed without bedrails? 4  -OCTAVIANO     Moving to and from a bed to a chair (including a wheelchair)? 4  -OCTAVIANO     Standing up from a chair using your arms (e.g., wheelchair, bedside chair)? 4  -OCTAVIANO     Climbing 3-5 steps with a railing? 3  -OCTAVIANO     To walk in hospital room? 3  -OCTAVIANO     AM-PAC 6 Clicks Score (PT) 22  -OCTAVIANO     Highest Level of Mobility Goal 7 --> Walk 25 feet or more  -OCTAVIANO       Row Name 05/01/25 1333          Functional Assessment    Outcome Measure Options AM-PAC 6 Clicks Daily Activity (OT)  -LR               User Key  (r) = Recorded By, (t)  = Taken By, (c) = Cosigned By      Initials Name Provider Type    Clint Marlow, RN Registered Nurse    LR Digna Atkinson OT Occupational Therapist                    Occupational Therapy Education       Title: PT OT SLP Therapies (In Progress)       Topic: Occupational Therapy (In Progress)       Point: ADL training (In Progress)       Learning Progress Summary            Patient Acceptance, E, NR by LR at 5/1/2025 1334    Acceptance, E, NR by LR at 4/29/2025 1543                      Point: Home exercise program (In Progress)       Learning Progress Summary            Patient Acceptance, E, NR by LR at 5/1/2025 1334    Acceptance, E, NR by LR at 4/29/2025 1543                      Point: Precautions (In Progress)       Learning Progress Summary            Patient Acceptance, E, NR by LR at 5/1/2025 1334    Acceptance, E, NR by LR at 4/29/2025 1543                      Point: Body mechanics (In Progress)       Learning Progress Summary            Patient Acceptance, E, NR by LR at 5/1/2025 1334    Acceptance, E, NR by LR at 4/29/2025 1543                                      User Key       Initials Effective Dates Name Provider Type Discipline     10/09/24 -  Digna Atkinson OT Occupational Therapist OT                  OT Recommendation and Plan  Planned Therapy Interventions (OT): activity tolerance training, adaptive equipment training, BADL retraining, occupation/activity based interventions, strengthening exercise, transfer/mobility retraining, IADL retraining, functional balance retraining, passive ROM/stretching, ROM/therapeutic exercise, patient/caregiver education/training  Plan of Care Review  Plan of Care Reviewed With: patient  Progress: improving  Outcome Evaluation: Pt continues to make improvements with functional mobility, balance, ADL peformance, strength, and safety awareness. Reviewed FWW placement with pt during toileting and grooming activities in bathroom. Pt educated on visual  scanning when using FWW. Recommend IPOT POC and home with assist at D/C.     Time Calculation:         Time Calculation- OT       Row Name 05/01/25 1335             Time Calculation- OT    OT Start Time 0754  -LR      OT Received On 05/01/25  -LR      OT Goal Re-Cert Due Date 05/11/25  -LR         Timed Charges    32222 - OT Therapeutic Activity Minutes 13  -LR      45134 - OT Self Care/Mgmt Minutes 10  -LR         Total Minutes    Timed Charges Total Minutes 23  -LR       Total Minutes 23  -LR                User Key  (r) = Recorded By, (t) = Taken By, (c) = Cosigned By      Initials Name Provider Type    LR Digna Atkinson OT Occupational Therapist                  Therapy Charges for Today       Code Description Service Date Service Provider Modifiers Qty    10648246958 HC OT THERAPEUTIC ACT EA 15 MIN 5/1/2025 Digna Atkinson OT GO 1    00736364094 HC OT SELF CARE/MGMT/TRAIN EA 15 MIN 5/1/2025 Digna Atkinson OT GO 1                 Digna Atkinson OT  5/1/2025

## 2025-05-01 NOTE — PLAN OF CARE
Problem: Adult Inpatient Plan of Care  Goal: Plan of Care Review  Outcome: Progressing  Goal: Patient-Specific Goal (Individualized)  Outcome: Progressing  Goal: Absence of Hospital-Acquired Illness or Injury  Outcome: Progressing  Intervention: Identify and Manage Fall Risk  Recent Flowsheet Documentation  Taken 5/1/2025 0405 by Charles Chaves RN  Safety Promotion/Fall Prevention:   safety round/check completed   activity supervised   assistive device/personal items within reach   clutter free environment maintained   fall prevention program maintained  Taken 5/1/2025 0202 by Charles Chaves RN  Safety Promotion/Fall Prevention:   activity supervised   assistive device/personal items within reach   safety round/check completed   clutter free environment maintained   nonskid shoes/slippers when out of bed  Taken 5/1/2025 0005 by Charles Chaves RN  Safety Promotion/Fall Prevention:   safety round/check completed   activity supervised   assistive device/personal items within reach   clutter free environment maintained   fall prevention program maintained  Taken 4/30/2025 2205 by Charles Chaves RN  Safety Promotion/Fall Prevention:   activity supervised   assistive device/personal items within reach   safety round/check completed   clutter free environment maintained   nonskid shoes/slippers when out of bed  Taken 4/30/2025 2005 by Charles Chaves RN  Safety Promotion/Fall Prevention:   safety round/check completed   activity supervised   assistive device/personal items within reach   clutter free environment maintained   fall prevention program maintained  Intervention: Prevent Skin Injury  Recent Flowsheet Documentation  Taken 5/1/2025 0405 by Charles Chaves RN  Body Position: position changed independently  Skin Protection:   incontinence pads utilized   silicone foam dressing in place   transparent dressing maintained  Taken 5/1/2025 0202 by Charles Chaves RN  Body Position:  position changed independently  Skin Protection:   incontinence pads utilized   transparent dressing maintained  Taken 5/1/2025 0005 by Charles Chaves RN  Body Position: position changed independently  Skin Protection:   incontinence pads utilized   silicone foam dressing in place   transparent dressing maintained  Taken 4/30/2025 2205 by Charles Chaves RN  Body Position: position changed independently  Skin Protection:   incontinence pads utilized   transparent dressing maintained  Taken 4/30/2025 2005 by Charles Chaves RN  Body Position: position changed independently  Skin Protection: incontinence pads utilized  Intervention: Prevent and Manage VTE (Venous Thromboembolism) Risk  Recent Flowsheet Documentation  Taken 4/30/2025 2005 by Charles Chaves RN  VTE Prevention/Management:   bilateral   SCDs (sequential compression devices) off  Intervention: Prevent Infection  Recent Flowsheet Documentation  Taken 5/1/2025 0405 by Charles Chaves RN  Infection Prevention:   hand hygiene promoted   rest/sleep promoted  Taken 5/1/2025 0202 by Charles Chaves RN  Infection Prevention:   environmental surveillance performed   rest/sleep promoted  Taken 5/1/2025 0005 by Charles Chaves RN  Infection Prevention:   hand hygiene promoted   rest/sleep promoted  Taken 4/30/2025 2205 by Charles Chaves RN  Infection Prevention:   environmental surveillance performed   rest/sleep promoted  Taken 4/30/2025 2005 by Charles Chaves RN  Infection Prevention:   environmental surveillance performed   hand hygiene promoted   rest/sleep promoted   single patient room provided   cohorting utilized  Goal: Optimal Comfort and Wellbeing  Outcome: Progressing  Intervention: Provide Person-Centered Care  Recent Flowsheet Documentation  Taken 4/30/2025 2005 by Charles Chaves RN  Trust Relationship/Rapport:   care explained   emotional support provided   questions answered   questions encouraged    thoughts/feelings acknowledged  Goal: Readiness for Transition of Care  Outcome: Progressing     Problem: Fall Injury Risk  Goal: Absence of Fall and Fall-Related Injury  Outcome: Progressing  Intervention: Identify and Manage Contributors  Recent Flowsheet Documentation  Taken 5/1/2025 0202 by Charles Chaves RN  Self-Care Promotion: independence encouraged  Taken 4/30/2025 2205 by Charles Chaves RN  Self-Care Promotion: independence encouraged  Taken 4/30/2025 2005 by Charles Chaves RN  Medication Review/Management: medications reviewed  Intervention: Promote Injury-Free Environment  Recent Flowsheet Documentation  Taken 5/1/2025 0405 by Charles Chaves RN  Safety Promotion/Fall Prevention:   safety round/check completed   activity supervised   assistive device/personal items within reach   clutter free environment maintained   fall prevention program maintained  Taken 5/1/2025 0202 by Charles Chaves RN  Safety Promotion/Fall Prevention:   activity supervised   assistive device/personal items within reach   safety round/check completed   clutter free environment maintained   nonskid shoes/slippers when out of bed  Taken 5/1/2025 0005 by Charles Chaves RN  Safety Promotion/Fall Prevention:   safety round/check completed   activity supervised   assistive device/personal items within reach   clutter free environment maintained   fall prevention program maintained  Taken 4/30/2025 2205 by Charles Chaves RN  Safety Promotion/Fall Prevention:   activity supervised   assistive device/personal items within reach   safety round/check completed   clutter free environment maintained   nonskid shoes/slippers when out of bed  Taken 4/30/2025 2005 by Charles Chaves RN  Safety Promotion/Fall Prevention:   safety round/check completed   activity supervised   assistive device/personal items within reach   clutter free environment maintained   fall prevention program maintained     Problem:  Comorbidity Management  Goal: Blood Glucose Level Within Target Range  Outcome: Progressing  Intervention: Monitor and Manage Glycemia  Recent Flowsheet Documentation  Taken 4/30/2025 2005 by Charles Chaves RN  Medication Review/Management: medications reviewed  Goal: Blood Pressure in Desired Range  Outcome: Progressing  Intervention: Maintain Blood Pressure Management  Recent Flowsheet Documentation  Taken 4/30/2025 2005 by Charles Chaves RN  Medication Review/Management: medications reviewed  Goal: Maintenance of Osteoarthritis Symptom Control  Outcome: Progressing  Intervention: Maintain Osteoarthritis Symptom Control  Recent Flowsheet Documentation  Taken 5/1/2025 0405 by Charles Chaves RN  Activity Management: activity minimized  Taken 5/1/2025 0202 by Charles Chaves RN  Activity Management: activity minimized  Taken 5/1/2025 0005 by Charles Chaves RN  Activity Management: activity minimized  Taken 4/30/2025 2205 by Charles Chaves RN  Activity Management: activity minimized  Taken 4/30/2025 2005 by Charles Chaves RN  Activity Management: up in chair  Medication Review/Management: medications reviewed     Problem: Violence Risk or Actual  Goal: Anger and Impulse Control  Outcome: Progressing  Intervention: Minimize Safety Risk  Recent Flowsheet Documentation  Taken 5/1/2025 0405 by Charles Chaves RN  Enhanced Safety Measures: bed alarm set  Taken 5/1/2025 0202 by Charles Chaves RN  Enhanced Safety Measures: bed alarm set  Taken 5/1/2025 0005 by Charles Chaves RN  Enhanced Safety Measures: bed alarm set  Taken 4/30/2025 2205 by Charles Chaves RN  Enhanced Safety Measures: bed alarm set  Taken 4/30/2025 2005 by Charles Chaves RN  Enhanced Safety Measures: chair alarm set     Problem: Noninvasive Ventilation Acute  Goal: Effective Unassisted Ventilation and Oxygenation  Outcome: Progressing     Problem: Skin Injury Risk Increased  Goal: Skin Health and  Integrity  Outcome: Progressing  Intervention: Optimize Skin Protection  Recent Flowsheet Documentation  Taken 5/1/2025 0405 by Charles Chaves RN  Activity Management: activity minimized  Pressure Reduction Techniques:   frequent weight shift encouraged   heels elevated off bed   weight shift assistance provided   positioned off wounds  Head of Bed (HOB) Positioning: [facility] elevated  Pressure Reduction Devices:   positioning supports utilized   pressure-redistributing mattress utilized  Skin Protection:   incontinence pads utilized   silicone foam dressing in place   transparent dressing maintained  Taken 5/1/2025 0202 by Charles Chaves RN  Activity Management: activity minimized  Pressure Reduction Techniques:   frequent weight shift encouraged   pressure points protected  Head of Bed (HOB) Positioning: [facility] elevated  Pressure Reduction Devices:   positioning supports utilized   pressure-redistributing mattress utilized  Skin Protection:   incontinence pads utilized   transparent dressing maintained  Taken 5/1/2025 0005 by Charles Chaves RN  Activity Management: activity minimized  Pressure Reduction Techniques:   frequent weight shift encouraged   heels elevated off bed   weight shift assistance provided   positioned off wounds  Head of Bed (HOB) Positioning: [facility] elevated  Pressure Reduction Devices:   positioning supports utilized   pressure-redistributing mattress utilized  Skin Protection:   incontinence pads utilized   silicone foam dressing in place   transparent dressing maintained  Taken 4/30/2025 2205 by Charles Chaves RN  Activity Management: activity minimized  Pressure Reduction Techniques:   frequent weight shift encouraged   pressure points protected  Head of Bed (HOB) Positioning: [facility] elevated  Pressure Reduction Devices:   positioning supports utilized   pressure-redistributing mattress utilized  Skin Protection:   incontinence pads utilized   transparent dressing maintained  Taken  4/30/2025 2005 by Charles Chaves, RN  Activity Management: up in chair  Pressure Reduction Techniques:   frequent weight shift encouraged   heels elevated off bed   weight shift assistance provided   pressure points protected  Head of Bed (HOB) Positioning: HOB elevated  Pressure Reduction Devices:   pressure-redistributing mattress utilized   positioning supports utilized  Skin Protection: incontinence pads utilized   Goal Outcome Evaluation:        Pt has no new nursing issues at this time. Pt is a&ox4, NSR/Sinus Tach, RA, VSS. Prn medication given per pt request. Pt has no complaints at this time. Will continue plan of care.

## 2025-05-01 NOTE — PROGRESS NOTES
[facility] Medicine Services  PROGRESS NOTE    Patient Name: Jimmy Gabehart  : 1955  MRN: [id number]    Date of Admission: 2025  Primary Care Physician: Miriam Mckeon DO    Subjective   Subjective     CC:  Follow-up for hepatic encephalopathy    HPI:  Patient seen and examined this morning.  Comfortable today.  Other than pain in his right shoulder, he does not have any other acute complaints.  Awaiting pre-CERT for SNF placement.  Updated the son about the result of his bone marrow biopsy that was faxed couple days ago showing no evidence of bone marrow dysplasia    Objective   Objective     Vital Signs:   Temp:  [97.6 °F (36.4 °C)-99.1 °F (37.3 °C)] 97.6 °F (36.4 °C)  Heart Rate:  [58-75] 62  Resp:  [16-20] 18  BP: (113-142)/(59-83) 113/63  Flow (L/min) (Oxygen Therapy):  [2] 2     Physical Exam:  General: Comfortable, not in distress, conversant and cooperative  Head: Atraumatic and normocephalic  Eyes: No Icterus. No pallor  Ears:  Ears appear intact with no abnormalities noted  Throat: No oral lesions, no thrush  Neck: Supple, trachea midline  Lungs: Clear to auscultation bilaterally, equal air entry, no wheezing or crackles  Heart:  Normal S1 and S2, no murmur, no gallop, No JVD, no lower extremity swelling  Abdomen:  Soft, no tenderness, no organomegaly, normal bowel sounds, no organomegaly  Extremities: Improved left bursitis  Skin: No bleeding, bruising or rash, normal skin turgor and elasticity  Neurologic: Cranial nerves appear intact with no evidence of facial asymmetry, normal motor and sensory functions in all 4 extremities  Psych: Alert and oriented x3    Results Reviewed:  LAB RESULTS:      Lab 25  1044 25  0216 25  0523 25  1059 25  0428 25  0506 25  1114   WBC 2.78* 2.97* 2.67*  --  4.19 4.81 3.97   HEMOGLOBIN 9.3* 8.7* 8.0*  --  8.5* 9.4* 8.6*   HEMATOCRIT 28.6* 27.1* 25.7*  --  27.0* 28.6* 26.6*   PLATELETS  151 126* 113*  --  114* 118* 104*   NEUTROS ABS 1.09* 0.65* 0.78*  --  1.72 1.94 1.61*   IMMATURE GRANS (ABS) 0.02  --  0.01  --   --  0.03 0.03   LYMPHS ABS 1.32  --  1.32  --   --  1.96 1.56   MONOS ABS 0.30  --  0.51  --   --  0.82 0.69   EOS ABS 0.05 0.06 0.04  --  0.17 0.06 0.07   .3* 109.7* 108.9*  --  111.1* 107.1* 108.6*   SED RATE  --   --  47*  --   --  85* 73*   CRP  --   --  5.67*  --  8.20* 7.87* 6.25*   PROCALCITONIN  --   --   --   --  0.30*  --  0.33*   D DIMER QUANT  --   --   --  2.84*  --   --   --          Lab 04/29/25  1044 04/28/25 0217 04/27/25  0523 04/26/25  0428 04/25/25  0506 04/24/25  1114   SODIUM 136 139 139 134* 135* 132*   POTASSIUM 4.1 4.1 4.1 4.3 4.6 4.5   CHLORIDE 103 108* 109* 103 105 101   CO2 20.0* 19.0* 18.0* 17.0* 18.0* 19.0*   ANION GAP 13.0 12.0 12.0 14.0 12.0 12.0   BUN 17 20 22 29* 26* 23   CREATININE 1.19 1.10 1.17 1.47* 1.30* 1.35*   EGFR 65.7 72.2 67.1 51.0* 59.1* 56.5*   GLUCOSE 228* 120* 120* 102* 124* 173*   CALCIUM 8.9 8.4* 8.3* 8.6 9.0 8.2*   MAGNESIUM  --  1.7 1.7 1.8 1.7 1.6   PHOSPHORUS  --  3.1 3.1 3.5 3.3  --          Lab 04/28/25  0217 04/27/25  0523 04/26/25  0428 04/25/25  0506 04/24/25  1114   TOTAL PROTEIN 6.8 6.2 6.9 7.4 6.6   ALBUMIN 3.0* 3.0* 2.9* 3.2* 2.9*   GLOBULIN 3.8 3.2 4.0 4.2 3.7   ALT (SGPT) 32 36 44* 54* 49*   AST (SGOT) 49* 55* 76* 105* 105*   BILIRUBIN 0.7 0.8 1.1 1.0 1.1   ALK PHOS 265* 286* 279* 299* 240*                         Brief Urine Lab Results  (Last result in the past 365 days)        Color   Clarity   Blood   Leuk Est   Nitrite   Protein   CREAT   Urine HCG        04/21/25 1742 Yellow   Clear   Negative   Negative   Negative   Negative                   Microbiology Results Abnormal       None            No radiology results from the last 24 hrs            Current medications:  Scheduled Meds:carvedilol, 6.25 mg, Oral, BID With Meals  colchicine, 0.6 mg, Oral, Daily  DAPTOmycin, 6 mg/kg (Adjusted), Intravenous,  Q24H  dorzolamide (TRUSOPT) 2 % 1 drop, timolol (TIMOPTIC) 0.5 % 1 drop for Cosopt 22.3-6.8 mg/mL, , Left Eye, BID  enoxaparin sodium, 40 mg, Subcutaneous, Daily  gabapentin, 100 mg, Oral, Nightly  insulin glargine, 30 Units, Subcutaneous, Daily  Insulin Lispro, 10 Units, Subcutaneous, TID With Meals  insulin lispro, 2-9 Units, Subcutaneous, 4x Daily AC & at Bedtime  lactulose, 20 g, Oral, BID Diuretics  Lidocaine, 1 patch, Transdermal, Q24H  losartan, 100 mg, Oral, Q24H  pantoprazole, 40 mg, Oral, Q AM  rifAXIMin, 550 mg, Oral, BID  sodium chloride, 10 mL, Intravenous, Q12H  tamsulosin, 0.4 mg, Oral, Daily      Continuous Infusions:Pharmacy Consult,         PRN Meds:.  [DISCONTINUED] acetaminophen **OR** acetaminophen **OR** acetaminophen    Calcium Replacement - Follow Nurse / BPA Driven Protocol    dextrose    dextrose    glucagon (human recombinant)    Magnesium Standard Dose Replacement - Follow Nurse / BPA Driven Protocol    nitroglycerin    oxyCODONE    Pharmacy Consult    Phosphorus Replacement - Follow Nurse / BPA Driven Protocol    Potassium Replacement - Follow Nurse / BPA Driven Protocol    sodium chloride    sodium chloride    Assessment & Plan   Assessment & Plan     Active Hospital Problems    Diagnosis  POA    **Hepatic encephalopathy [K76.82]  Yes      Resolved Hospital Problems   No resolved problems to display.        Brief Hospital Course to date:  Jimmy Gabehart is a 70 y.o. male with past medical history of insulin-dependent diabetes, hypertension, recent diagnosis of liver cirrhosis, thrombocytopenia, chronic pain presenting with altered mental status and fall at home.     Acute hepatic encephalopathy, improved  Decompensated liver cirrhosis  Patient with remote history of alcohol use. New diagnosis of Stage F4 cirrhosis based on FibroScan in March 2023. Following with GI as out patient  Presented with acute encephalopathy secondary to hepatic encephalopathy  Somewhat improved with  lactulose  Continue scheduled lactulose twice daily  Continue rifaximin  Patient family advised to keep his follow-up with GI as outpatient.  Will need EGD  Normal TSH and B12  CT head with no acute intracranial pathology  Gabapentin dose reduced to 100 mg at bedtime   Discontinue baclofen upon discharge because of his advanced liver disease    Left elbow bursitis, resolved  Patient had a fever of 101.1 around midnight of 4/21/2025.  Now with recurrent fever of 100.7 night of 4/24/2025  Elevated procalcitonin, CRP and sed rate  Negative blood culture.  Normal lactic acid, Normal chest x-ray and normal UA, Negative respiratory panel  X-ray left elbow with olecranon bursitis  Significantly improved with empiric antibiotic therapy with Rocephin and daptomycin  Negative cultures  Inflammatory markers trending down   Dr. Rainey/orthopedic followed, appreciate the help    Acute on chronic right shoulder pain  Possible rotator cuff tear   Fall at home landing on right shoulder  Has been evaluated by orthopedic surgery outpatient.  Patient was found to have a tear in his right shoulder.  Patient was deemed a poor surgical candidate and nonoperative approach was indicated.  X-ray right shoulder with no evidence of acute fracture  Continue supportive care  Discussed with Dr. Rainey/orthopedic.  Patient will need right shoulder replacement versus rotator cuff repair in the near future.  No plan for surgical intervention during this hospitalization     Right forearm superficial thrombophlebitis  Elevated D-dimer  CTA chest negative for PE  Elevated D-dimer is likely secondary to clinical right forearm thrombophlebitis.  Continue conservative treatment    Anemia of chronic disease  Severe iron deficiency, iron saturation 2%  Hemoglobin is slowly trending down.  It was 10.8 on admission.  Currently stable around 9  No evidence of GI bleed  Iron studies consistent with severe iron deficiency anemia. Status post IV iron  infusion   Monitor CBC    Type 2 diabetes  A1c 11.5%  Continue insulin glargine 30 units daily.  Continue Premeal insulin 10 units 3 times daily and continue SSI  Hold Januvia and metformin   The patient drinks plenty of ski (still drink rich in sugar) and eats a quart of gallon of ice cream daily.  Theand family counseled about the importance to comply with diabetic diet    Hypertension  Continue home Coreg and losartan in the morning     GERD  Continue PPI     BPH  Continue tamsulosin     History of thrombocytopenia  Follows with Heme/Onc outpatient. 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 Will need to follow up outpatient for biopsy results.   Initial report faxed from his facility showing no bone marrow dysplasia  Continue SCD for DVT prophylaxis    Weakness and debility  PT/OT recommended short-term rehab     Expected Discharge Location and Transportation: Inpatient rehab  Expected Discharge   Expected Discharge Date: 5/1/2025; Expected Discharge Time:      VTE Prophylaxis:  Pharmacologic & mechanical VTE prophylaxis orders are present.         AM-PAC 6 Clicks Score (PT): 21 (04/30/25 2005)    CODE STATUS:   Code Status and Medical Interventions: CPR (Attempt to Resuscitate); Full Support   Ordered at: 04/19/25 1346     Code Status (Patient has no pulse and is not breathing):    CPR (Attempt to Resuscitate)     Medical Interventions (Patient has pulse or is breathing):    Full Support     Level Of Support Discussed With:    Next of Kin (If No Surrogate)       Bailey Howell MD  05/01/25

## 2025-05-01 NOTE — PLAN OF CARE
Problem: Adult Inpatient Plan of Care  Goal: Plan of Care Review  Outcome: Progressing  Flowsheets (Taken 5/1/2025 1480)  Progress: improving  Outcome Evaluation: PRN oxycodone goven for right shoulder pain with improvement of pain. No acute events noted this shift. Patient remains on RA, SA-NSR on tele and VSS. Patient to discharge tomorrow.  Plan of Care Reviewed With:   patient   family   Goal Outcome Evaluation:  Plan of Care Reviewed With: patient, family        Progress: improving  Outcome Evaluation: PRN oxycodone goven for right shoulder pain with improvement of pain. No acute events noted this shift. Patient remains on RA, SA-NSR on tele and VSS. Patient to discharge tomorrow.                              spoke with Jose Smalls he is not interested in making f/u with Dr Robert Valentin.  He said he spoke with his primary Dr and said he wasn't going to f/u. merissa

## 2025-05-01 NOTE — PLAN OF CARE
Goal Outcome Evaluation:  Plan of Care Reviewed With: patient        Progress: improving  Outcome Evaluation: Pt continues to make improvements with functional mobility, balance, ADL peformance, strength, and safety awareness. Reviewed FWW placement with pt during toileting and grooming activities in bathroom. Pt educated on visual scanning when using FWW. Recommend IPOT POC and home with assist at D/C.    Anticipated Discharge Disposition (OT): home, home with assist

## 2025-05-01 NOTE — CASE MANAGEMENT/SOCIAL WORK
Case Management Discharge Note      Final Note: Patient's insurance peer to peer was approved. Patient can go to [facility].  EMS arranged for 5-2-25 at 10am. PCS faxed. RN will need to call report to [phone] and ask for the [place] to give report. CM will need to fax the discharge summary to [phone]. Updated patient's family by phone. Diann stated that the patient has a blue bag and CPAP in his room that need to be transported to the facility with him. Facility pharmacy is [facility]. I have updated Epic.         Selected Continued Care - Admitted Since 4/19/2025       Destination Coordination complete.      Service Provider Services Address Phone Fax Patient Preferred    [facility] [address] [address] [phone] --              Durable Medical Equipment    No services have been selected for the patient.                Dialysis/Infusion    No services have been selected for the patient.                Home Medical Care    No services have been selected for the patient.                Therapy    No services have been selected for the patient.                Community Resources    No services have been selected for the patient.                Community & DME    No services have been selected for the patient.                    Transportation Services  Ambulance: [facility]    Final Discharge Disposition Code: 03 - skilled nursing facility (SNF)

## 2025-05-01 NOTE — CASE MANAGEMENT/SOCIAL WORK
Continued Stay Note  [facility]     Patient Name: Jimmy Gabehart  MRN: [id number]  Today's Date: 5/1/2025    Admit Date: 4/19/2025    Plan: [facility]   Discharge Plan       Row Name 05/01/25 1148       Plan    Plan [facility]    Patient/Family in Agreement with Plan yes    Plan Comments I spoke with the patient at the bedside. I had called Angelina at [facility] and she confirmed that the patient's insurance is still in process. She will update CM when more is known from insurance. Patient's ex wife, Rosa Maria, called me requesting an update. Informed her that insurance is still pending. IMM given to patient at bedside today. Patient likely to need transport at discharge. CM following.    Final Discharge Disposition Code 03 - skilled nursing facility (SNF)                   Discharge Codes    No documentation.                 Expected Discharge Date and Time       Expected Discharge Date Expected Discharge Time    May 1, 2025               Nahomi Goddard RN

## 2025-05-02 ENCOUNTER — APPOINTMENT (OUTPATIENT)
Dept: OTHER | Facility: HOSPITAL | Age: 70
End: 2025-05-02
Payer: COMMERCIAL

## 2025-05-02 VITALS
SYSTOLIC BLOOD PRESSURE: 131 MMHG | BODY MASS INDEX: 32.76 KG/M2 | HEART RATE: 90 BPM | OXYGEN SATURATION: 93 % | DIASTOLIC BLOOD PRESSURE: 73 MMHG | HEIGHT: 74 IN | TEMPERATURE: 97.9 F | WEIGHT: 255.29 LBS | RESPIRATION RATE: 10 BRPM

## 2025-05-02 LAB — GLUCOSE BLDC GLUCOMTR-MCNC: 123 MG/DL (ref 70–130)

## 2025-05-02 PROCEDURE — 25010000002 ENOXAPARIN PER 10 MG: Performed by: INTERNAL MEDICINE

## 2025-05-02 PROCEDURE — 82948 REAGENT STRIP/BLOOD GLUCOSE: CPT

## 2025-05-02 PROCEDURE — 63710000001 INSULIN LISPRO (HUMAN) PER 5 UNITS: Performed by: INTERNAL MEDICINE

## 2025-05-02 PROCEDURE — 99238 HOSP IP/OBS DSCHRG MGMT 30/<: CPT | Performed by: INTERNAL MEDICINE

## 2025-05-02 PROCEDURE — 63710000001 INSULIN GLARGINE PER 5 UNITS: Performed by: INTERNAL MEDICINE

## 2025-05-02 RX ORDER — INSULIN LISPRO 100 [IU]/ML
2-9 INJECTION, SOLUTION INTRAVENOUS; SUBCUTANEOUS
Start: 2025-05-02

## 2025-05-02 RX ORDER — LACTULOSE 10 G/15ML
20 SOLUTION ORAL 2 TIMES DAILY
Start: 2025-05-02

## 2025-05-02 RX ORDER — OXYCODONE HYDROCHLORIDE 10 MG/1
10 TABLET ORAL EVERY 6 HOURS PRN
Qty: 12 TABLET | Refills: 0 | Status: SHIPPED | OUTPATIENT
Start: 2025-05-02

## 2025-05-02 RX ORDER — COLCHICINE 0.6 MG/1
0.6 TABLET ORAL DAILY
Start: 2025-05-02 | End: 2025-05-16

## 2025-05-02 RX ORDER — INSULIN LISPRO 100 [IU]/ML
10 INJECTION, SOLUTION INTRAVENOUS; SUBCUTANEOUS
Start: 2025-05-02

## 2025-05-02 RX ADMIN — RIFAXIMIN 550 MG: 550 TABLET ORAL at 08:28

## 2025-05-02 RX ADMIN — LOSARTAN POTASSIUM 100 MG: 50 TABLET, FILM COATED ORAL at 08:28

## 2025-05-02 RX ADMIN — OXYCODONE HYDROCHLORIDE 10 MG: 10 TABLET ORAL at 08:40

## 2025-05-02 RX ADMIN — LIDOCAINE 1 PATCH: 4 PATCH TOPICAL at 08:28

## 2025-05-02 RX ADMIN — DORZOLAMIDE HYDROCHLORIDE: 20 SOLUTION/ DROPS OPHTHALMIC at 08:29

## 2025-05-02 RX ADMIN — TAMSULOSIN HYDROCHLORIDE 0.4 MG: 0.4 CAPSULE ORAL at 08:28

## 2025-05-02 RX ADMIN — OXYCODONE HYDROCHLORIDE 10 MG: 10 TABLET ORAL at 04:52

## 2025-05-02 RX ADMIN — COLCHICINE 0.6 MG: 0.6 TABLET ORAL at 08:28

## 2025-05-02 RX ADMIN — PANTOPRAZOLE SODIUM 40 MG: 40 TABLET, DELAYED RELEASE ORAL at 04:52

## 2025-05-02 RX ADMIN — LACTULOSE 20 G: 20 SOLUTION ORAL at 08:28

## 2025-05-02 RX ADMIN — INSULIN LISPRO 10 UNITS: 100 INJECTION, SOLUTION INTRAVENOUS; SUBCUTANEOUS at 08:29

## 2025-05-02 RX ADMIN — INSULIN GLARGINE 30 UNITS: 100 INJECTION, SOLUTION SUBCUTANEOUS at 08:28

## 2025-05-02 RX ADMIN — ENOXAPARIN SODIUM 40 MG: 100 INJECTION SUBCUTANEOUS at 08:28

## 2025-05-02 RX ADMIN — CARVEDILOL 6.25 MG: 6.25 TABLET, FILM COATED ORAL at 08:28

## 2025-05-02 NOTE — DISCHARGE SUMMARY
[facility] Medicine Services  DISCHARGE SUMMARY    Patient Name: Jimmy Gabehart  : 1955  MRN: [id number]    Date of Admission: 2025 11:50 AM  Date of Discharge: 2025  Primary Care Physician: Miriam Mckeon DO    Consults       Date and Time Order Name Status Description    2025 10:12 AM Inpatient Orthopedic Surgery Consult Completed             Hospital Course     Presenting Problem:     Active Hospital Problems    Diagnosis  POA   • **Hepatic encephalopathy [K76.82]  Yes      Resolved Hospital Problems   No resolved problems to display.        Hospital Course:  Jimmy Gabehart is a 70 y.o. male with past medical history of insulin-dependent diabetes, hypertension, recent diagnosis of liver cirrhosis, thrombocytopenia, chronic pain presenting with altered mental status and fall at home.  He was found to have decompensated liver cirrhosis with hepatic encephalopathy.  Significantly improved after initiating lactulose and rifaximin.  Hospital stay was complicated with left elbow bursitis that was treated with full course of IV antibiotics with Rocephin and daptomycin and improved.  Additionally, he was found to have severe iron deficiency anemia with iron saturation of 2% requiring iron infusion.  No evidence of GI bleed and he did not require any blood transfusion during this hospitalization.  He was started on insulin for his poorly controlled type 2 diabetes with A1c of 11.5% with adequate blood sugar control.  Patient has established follow-up with GI and he will need to continue to follow with his GI provider and evaluated for the for EGD and colonoscopy down the road.  Because of acute debility, he will be discharged to acute rehab     Acute hepatic encephalopathy, resolved  Decompensated liver cirrhosis  Patient with remote history of alcohol use. New diagnosis of Stage F4 cirrhosis based on FibroScan in 2023. Following with GI as out  patient  Presented with acute encephalopathy secondary to hepatic encephalopathy that significantly improved/resolved with lactulose and rifaximin  Patient family advised to keep his follow-up with GI as outpatient as he will need further evaluation for EGD and colonoscopy  Normal TSH and B12  CT head with no acute intracranial pathology  Gabapentin dose reduced to 100 mg at bedtime   Discontinue baclofen upon discharge because of his advanced liver disease     Left elbow bursitis, resolved  Patient had a fever of 101.1 around midnight of 4/21/2025.  Now with recurrent fever of 100.7 night of 4/24/2025  Elevated procalcitonin, CRP and sed rate  Negative blood culture.  Normal lactic acid, Normal chest x-ray and normal UA, Negative respiratory panel  X-ray left elbow with olecranon bursitis  Improved/bursitis resolved with empiric antibiotic therapy with Rocephin and daptomycin as well as colchicine.  Continue colchicine x 2 weeks  Dr. Rainey/orthopedic followed, appreciate the help     Acute on chronic right shoulder pain  Possible rotator cuff tear   Fall at home landing on right shoulder  Has been evaluated by orthopedic surgery outpatient.  Patient was found to have a tear in his right shoulder.  Patient was deemed a poor surgical candidate and nonoperative approach was indicated.  X-ray right shoulder with no evidence of acute fracture  Continue supportive care  Discussed with Dr. Rainey/orthopedic.  Patient will need right shoulder replacement versus rotator cuff repair in the near future.  No plan for surgical intervention during this hospitalization.  Patient already has an established orthopedic surgeon that he follows with.  He was advised to continue to follow with his orthopedic provider after discharge from rehab     Right forearm superficial thrombophlebitis  Elevated D-dimer  CTA chest negative for PE  Elevated D-dimer is likely secondary to clinical right forearm thrombophlebitis.  Continue  conservative treatment     Anemia of chronic disease  Severe iron deficiency, iron saturation 2%  Hemoglobin is slowly trending down.  It was 10.8 on admission.  Currently stable around 9  No evidence of GI bleed  He will need follow-up with his GI provider for EGD and colonoscopy  Iron studies consistent with severe iron deficiency anemia. Status post IV iron infusion      Type 2 diabetes  A1c 11.5%  Continue insulin glargine 30 units daily.  Continue Premeal insulin 10 units 3 times daily and continue SSI  The patient drinks plenty of ski (still drink rich in sugar) and eats a quart of gallon of ice cream daily.  Theand family counseled about the importance to comply with diabetic diet     Hypertension  Continue home Coreg and losartan     GERD  Continue PPI     BPH  Continue tamsulosin     History of thrombocytopenia  Follows with Heme/Onc outpatient. 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 Will need to follow up outpatient for biopsy results.   Initial report faxed from his facility showing no bone marrow dysplasia  Continue SCD for DVT prophylaxis     Weakness and debility  PT/OT recommended short-term rehab      Discharge Follow Up Recommendations for outpatient labs/diagnostics:  Facility physician 1 to 2 days  Instructed to follow-up with his GI and orthopedic providers after discharge from rehab    Day of Discharge     HPI:   Patient was seen and examined this morning.  Comfortable in bed.  No acute complaints.  Fully awake and alert.  Still having some pain in his right shoulder.  Again, I revisited with him the need to contact his GI and orthopedic providers and have follow-up with them after discharge from rehab and he voiced understanding.  He will be discharged to acute rehab today    Vital Signs:   Temp:  [97.8 °F (36.6 °C)-98.5 °F (36.9 °C)] 97.9 °F (36.6 °C)  Heart Rate:  [57-90] 90  Resp:  [10-18] 10  BP: (119-133)/(57-76) 131/73    Physical Exam:  General: Comfortable, not in distress, conversant  and cooperative  Head: Atraumatic and normocephalic  Eyes: No Icterus. No pallor  Ears:  Ears appear intact with no abnormalities noted  Throat: No oral lesions, no thrush  Neck: Supple, trachea midline  Lungs: Clear to auscultation bilaterally, equal air entry, no wheezing or crackles  Heart:  Normal S1 and S2, no murmur, no gallop, No JVD, no lower extremity swelling  Abdomen:  Soft, no tenderness, no organomegaly, normal bowel sounds, no organomegaly  Extremities: Improved left bursitis  Skin: No bleeding, bruising or rash, normal skin turgor and elasticity  Neurologic: Cranial nerves appear intact with no evidence of facial asymmetry, normal motor and sensory functions in all 4 extremities  Psych: Alert and oriented x3      Pertinent  and/or Most Recent Results     LAB RESULTS:      Lab 04/29/25  1044 04/28/25  0216 04/27/25  0523 04/26/25  1059 04/26/25  0428   WBC 2.78* 2.97* 2.67*  --  4.19   HEMOGLOBIN 9.3* 8.7* 8.0*  --  8.5*   HEMATOCRIT 28.6* 27.1* 25.7*  --  27.0*   PLATELETS 151 126* 113*  --  114*   NEUTROS ABS 1.09* 0.65* 0.78*  --  1.72   IMMATURE GRANS (ABS) 0.02  --  0.01  --   --    LYMPHS ABS 1.32  --  1.32  --   --    MONOS ABS 0.30  --  0.51  --   --    EOS ABS 0.05 0.06 0.04  --  0.17   .3* 109.7* 108.9*  --  111.1*   SED RATE  --   --  47*  --   --    CRP  --   --  5.67*  --  8.20*   PROCALCITONIN  --   --   --   --  0.30*   D DIMER QUANT  --   --   --  2.84*  --          Lab 04/29/25  1044 04/28/25  0217 04/27/25  0523 04/26/25  0428   SODIUM 136 139 139 134*   POTASSIUM 4.1 4.1 4.1 4.3   CHLORIDE 103 108* 109* 103   CO2 20.0* 19.0* 18.0* 17.0*   ANION GAP 13.0 12.0 12.0 14.0   BUN 17 20 22 29*   CREATININE 1.19 1.10 1.17 1.47*   EGFR 65.7 72.2 67.1 51.0*   GLUCOSE 228* 120* 120* 102*   CALCIUM 8.9 8.4* 8.3* 8.6   MAGNESIUM  --  1.7 1.7 1.8   PHOSPHORUS  --  3.1 3.1 3.5         Lab 04/28/25  0217 04/27/25  0523 04/26/25  0428   TOTAL PROTEIN 6.8 6.2 6.9   ALBUMIN 3.0* 3.0* 2.9*    GLOBULIN 3.8 3.2 4.0   ALT (SGPT) 32 36 44*   AST (SGOT) 49* 55* 76*   BILIRUBIN 0.7 0.8 1.1   ALK PHOS 265* 286* 279*                     Brief Urine Lab Results  (Last result in the past 365 days)        Color   Clarity   Blood   Leuk Est   Nitrite   Protein   CREAT   Urine HCG        04/21/25 1742 Yellow   Clear   Negative   Negative   Negative   Negative                 Microbiology Results (last 10 days)       Procedure Component Value - Date/Time    Blood Culture - Blood, Arm, Right [[id number]]  (Normal) Collected: 04/25/25 1536    Lab Status: Final result Specimen: Blood from Arm, Right Updated: 04/30/25 1745     Blood Culture No growth at 5 days    MRSA Screen, PCR (Inpatient) - Swab, Nares [[id number]]  (Normal) Collected: 04/25/25 1410    Lab Status: Final result Specimen: Swab from Nares Updated: 04/25/25 1558     MRSA PCR Negative    Narrative:      The negative predictive value of this diagnostic test is high and should only be used to consider de-escalating anti-MRSA therapy. A positive result may indicate colonization with MRSA and must be correlated clinically.  MRSA Negative            CT Angiogram Chest Pulmonary Embolism  Result Date: 4/26/2025  CT ANGIOGRAM CHEST PULMONARY EMBOLISM Date of Exam: 4/26/2025 5:02 PM EDT Indication: Pulmonary embolism (PE) suspected, high prob, shortness of breath. Comparison: None available. Technique: Axial CT images were obtained of the chest after the uneventful intravenous administration of 160 mL Isovue-370 utilizing pulmonary embolism protocol.  In addition, a 3-D volume rendered image was created for interpretation.  Reconstructed coronal and sagittal images were also obtained. Automated exposure control and iterative construction methods were used. Findings: Exam was repeated due to suboptimal contrast opacification of pulmonary arteries on initial imaging. Evaluation of segmental and subsegmental pulmonary arteries is limited by respiratory motion  artifact, but no central pulmonary emboli are seen in the main or lobar pulmonary arteries. Main pulmonary artery is not dilated. Heart size is mildly enlarged. No pericardial or pleural effusion.  Mild patchy groundglass opacities in the right lower lobe. Lungs otherwise clear. Nodular liver contour and splenomegaly in the partially included upper abdomen. Small right renal cysts. No acute osseous abnormality is identified. Severe arthritic changes of the right shoulder.     Impression: 1.No central pulmonary emboli seen. Evaluation of segmental/subsegmental pulmonary arteries limited by respiratory motion artifact. 2.Mild patchy groundglass opacities in the right lower lobe, likely due to an infectious/inflammatory process. 3.Cirrhotic liver morphology with splenomegaly. Electronically Signed: Joan Radford MD  4/26/2025 5:40 PM EDT  Workstation ID: [id number]    CT Head Without Contrast  Result Date: 4/24/2025  CT HEAD WO CONTRAST Date of Exam: 4/24/2025 4:14 PM EDT Indication: AMS. Comparison: None available. Technique: Axial CT images were obtained of the head without contrast administration.  Automated exposure control and iterative construction methods were used. Findings: No acute intracranial hemorrhage.Intact appearing gray-white differentiation.No extra-axial fluid collection.No significant mass effect. No hydrocephalus. Mild generalized parenchymal volume loss. Scattered areas of periventricular and subcortical white matter hypoattenuation, nonspecific, perhaps from small vessel ischemic/hypertensive changes in a patient of this age.There are intracranial atherosclerotic calcifications. Mild scattered mucosal thickening in the paranasal sinuses.Mastoid air cells are essentially clear.. Bilateral lens replacements. Presumed intraocular silicone on the left. No acute or aggressive appearing bony or extracranial soft tissue process.     Impression: No acute intracranial findings. Electronically Signed:  Gallito Hammonds MD  4/24/2025 4:49 PM EDT  Workstation ID: [id number]    XR Chest 1 View  Result Date: 4/24/2025  XR CHEST 1 VW, XR SHOULDER 2+ VW RIGHT, XR ELBOW 3+ VW LEFT Date of Exam: 4/24/2025 10:32 AM EDT Indication: sob right shoulder pain after fall, severe pain and redness left elbow Comparison: Chest 4/21/2025, right shoulder 4/19/2025 Findings: Chest: Heart size is normal for the projection. The pulmonary vascular pattern is normal and the lungs appear clear. Right shoulder: There are degenerative changes at the glenohumeral and AC joint. Findings are unchanged from the last study. There is osteopenia. No fractures are identified. Left elbow: There are degenerative changes of the elbow joint. There is ossification of the triceps insertion. There is no joint effusion and there are no fractures or destructive lesions. There is soft tissue swelling overlying the olecranon.     Impression: 1.No radiographic evidence of acute cardiopulmonary disease. 2.Degenerative changes of the right shoulder and left elbow. 3.Soft tissue swelling overlying the left olecranon, probably representing olecranon bursitis. 4.No fractures or destructive lesions. Electronically Signed: Nima Phillip MD  4/24/2025 11:15 AM EDT  Workstation ID: [id number]    XR Elbow 3+ View Left  Result Date: 4/24/2025  XR CHEST 1 VW, XR SHOULDER 2+ VW RIGHT, XR ELBOW 3+ VW LEFT Date of Exam: 4/24/2025 10:32 AM EDT Indication: sob right shoulder pain after fall, severe pain and redness left elbow Comparison: Chest 4/21/2025, right shoulder 4/19/2025 Findings: Chest: Heart size is normal for the projection. The pulmonary vascular pattern is normal and the lungs appear clear. Right shoulder: There are degenerative changes at the glenohumeral and AC joint. Findings are unchanged from the last study. There is osteopenia. No fractures are identified. Left elbow: There are degenerative changes of the elbow joint. There is ossification of the triceps  insertion. There is no joint effusion and there are no fractures or destructive lesions. There is soft tissue swelling overlying the olecranon.     Impression: 1.No radiographic evidence of acute cardiopulmonary disease. 2.Degenerative changes of the right shoulder and left elbow. 3.Soft tissue swelling overlying the left olecranon, probably representing olecranon bursitis. 4.No fractures or destructive lesions. Electronically Signed: Nima Phillip MD  4/24/2025 11:15 AM EDT  Workstation ID: [id number]    XR Shoulder 2+ View Right  Result Date: 4/24/2025  XR CHEST 1 VW, XR SHOULDER 2+ VW RIGHT, XR ELBOW 3+ VW LEFT Date of Exam: 4/24/2025 10:32 AM EDT Indication: sob right shoulder pain after fall, severe pain and redness left elbow Comparison: Chest 4/21/2025, right shoulder 4/19/2025 Findings: Chest: Heart size is normal for the projection. The pulmonary vascular pattern is normal and the lungs appear clear. Right shoulder: There are degenerative changes at the glenohumeral and AC joint. Findings are unchanged from the last study. There is osteopenia. No fractures are identified. Left elbow: There are degenerative changes of the elbow joint. There is ossification of the triceps insertion. There is no joint effusion and there are no fractures or destructive lesions. There is soft tissue swelling overlying the olecranon.     Impression: 1.No radiographic evidence of acute cardiopulmonary disease. 2.Degenerative changes of the right shoulder and left elbow. 3.Soft tissue swelling overlying the left olecranon, probably representing olecranon bursitis. 4.No fractures or destructive lesions. Electronically Signed: Nima Phillip MD  4/24/2025 11:15 AM EDT  Workstation ID: [id number]    XR Chest 1 View  Result Date: 4/21/2025  XR CHEST 1 VW Date of Exam: 4/21/2025 2:23 PM EDT Indication: Shortness of breath Comparison: None available. Findings: Heart size is normal for the projection. The pulmonary vascular pattern in the  chest is normal and the lungs are clear.     Impression: No active disease. Electronically Signed: Nima Phillip MD  4/21/2025 3:26 PM EDT  Workstation ID: [id number]                  Plan for Follow-up of Pending Labs/Results:     Discharge Details        Discharge Medications        New Medications        Instructions Start Date   colchicine 0.6 MG tablet   0.6 mg, Oral, Daily      insulin glargine 100 UNIT/ML injection  Commonly known as: LANTUS, SEMGLEE   30 Units, Subcutaneous, Daily      Insulin Lispro 100 UNIT/ML injection  Commonly known as: humaLOG   10 Units, Subcutaneous, 3 Times Daily With Meals      Insulin Lispro 100 UNIT/ML injection  Commonly known as: humaLOG   2-9 Units, Subcutaneous, 4 Times Daily Before Meals & Nightly      lactulose 10 GM/15ML solution  Commonly known as: CHRONULAC   20 g, Oral, 2 Times Daily      riFAXIMin 550 MG tablet  Commonly known as: XIFAXAN   550 mg, Oral, 2 Times Daily             Changes to Medications        Instructions Start Date   gabapentin 100 MG tablet  Commonly known as: NEURONTIN  What changed:   medication strength  how much to take  when to take this   200 mg, Oral, Nightly      oxyCODONE 10 MG tablet  Commonly known as: ROXICODONE  What changed: See the new instructions.   10 mg, Oral, Every 6 Hours PRN             Continue These Medications        Instructions Start Date   carvedilol 6.25 MG tablet  Commonly known as: COREG   No dose, route, or frequency recorded.      Diclofenac Sodium 1 % gel gel  Commonly known as: VOLTAREN   apply a thin film (up to 4 grams or 4 pumps) to affected areas of feet up to four-five times daily (max 16 gm/day). Rub in for 2 minutes.      dorzolamide-timolol 2-0.5 % ophthalmic solution  Commonly known as: COSOPT   1 drop, Both Eyes, Daily      fexofenadine 60 MG tablet  Commonly known as: ALLEGRA   60 mg, Daily      fish oil 1000 MG capsule capsule       Januvia 50 MG tablet  Generic drug: SITagliptin   No dose, route, or  frequency recorded.      losartan 100 MG tablet  Commonly known as: COZAAR   No dose, route, or frequency recorded.      magnesium chloride ER 64 MG DR tablet   Daily      pantoprazole 40 MG EC tablet  Commonly known as: PROTONIX   No dose, route, or frequency recorded.      tamsulosin 0.4 MG capsule 24 hr capsule  Commonly known as: FLOMAX   No dose, route, or frequency recorded.      Zinc 30 MG tablet   Take  by mouth.             Stop These Medications      baclofen 10 MG tablet  Commonly known as: LIORESAL     diclofenac 50 MG EC tablet  Commonly known as: VOLTAREN     metFORMIN  MG 24 hr tablet  Commonly known as: GLUCOPHAGE-XR     moxifloxacin 0.5 % ophthalmic solution  Commonly known as: VIGAMOX     oxyCODONE-acetaminophen 7.5-325 MG per tablet  Commonly known as: PERCOCET              Allergies   Allergen Reactions   • Demerol [Meperidine]    • Penicillins Provider Review Needed     Has tolerated ceftriaxone, has had RX for cephalexin         Discharge Disposition:  Rehab Facility or Unit (DC - External)    Diet:  Hospital:  Diet Order   Procedures   • Diet: Cardiac, Diabetic; Healthy Heart (2-3 Na+); Consistent Carbohydrate; Fluid Consistency: Thin (IDDSI 0)            Activity:  As tolerated    Restrictions or Other Recommendations:  None        CODE STATUS:    Code Status and Medical Interventions: CPR (Attempt to Resuscitate); Full Support   Ordered at: 04/19/25 1349     Code Status (Patient has no pulse and is not breathing):    CPR (Attempt to Resuscitate)     Medical Interventions (Patient has pulse or is breathing):    Full Support     Level Of Support Discussed With:    Next of Kin (If No Surrogate)       Future Appointments   Date Time Provider Department Center   5/2/2025 10:00 AM MED 8  [facility]   9/9/2025 10:30 AM Liz Kaur APRN [facility]                 Bailey Howell MD  05/02/25      Time Spent on Discharge:  I spent  25  minutes on this discharge  activity which included: face-to-face encounter with the patient, reviewing the data in the system, coordination of the care with the nursing staff as well as consultants, documentation, and entering orders.

## 2025-05-02 NOTE — DISCHARGE PLACEMENT REQUEST
"Case Management  Nannette Galdamez, [phone]      Gabehart, Jimmy (70 y.o. Male)       Date of Birth   1955    Social Security Number       Address   [address] [id number]    Home Phone   [phone]    MRN   [id number]       Sabianist   Unknown    Marital Status                               Admission Date   4/19/2025    Admission Type   Urgent    Admitting Provider   Bailey Howell MD    Attending Provider   Bailey Howell MD    Department, Room/Bed   [address], [address]/1       Discharge Date       Discharge Disposition       Discharge Destination                                 Attending Provider: Bailey Howell MD    Allergies: Demerol [Meperidine], Penicillins    Isolation: None   Infection: None   Code Status: CPR    Ht: 188 cm (74\")   Wt: 116 kg (255 lb 4.7 oz)    Admission Cmt: None   Principal Problem: Hepatic encephalopathy [K76.82]                   Active Insurance as of 4/19/2025       Primary Coverage       Payor Plan Insurance Group Employer/Plan Group    HUMANA HUMANA VASYL HMO [id number]       Payor Plan Address Payor Plan Phone Number Payor Plan Fax Number Effective Dates    [address] [address]   3/1/2025 - None Entered    [facility] [phone]         Subscriber Name Subscriber Birth Date Member ID       GABEHART,JIMMY 1955 [id number]               Secondary Coverage       Payor Plan Insurance Group Employer/Plan Group    HUMANA MEDICARE REPLACEMENT HUMANA MEDICARE ADVANTAGE HMO [id number]       Payor Plan Address Payor Plan Phone Number Payor Plan Fax Number Effective Dates    [address] [address] [phone]  3/1/2025 - None Entered    [facility] [id number]         Subscriber Name Subscriber Birth Date Member ID       GABEHART,JIMMY 1955 [id number]                     Emergency Contacts        (Rel.) Home Phone Work Phone Mobile Phone    Gabehart,Sherry (Other) -- -- [phone]               "   Discharge Summary        Bailey Howell MD at 25 0638              [facility]  DISCHARGE SUMMARY    Patient Name: Jimmy Gabehart  : 1955  MRN: [id number]    Date of Admission: 2025 11:50 AM  Date of Discharge: 2025  Primary Care Physician: Miriam Mckeon DO    Consults       Date and Time Order Name Status Description    2025 10:12 AM Inpatient Orthopedic Surgery Consult Completed             Hospital Course     Presenting Problem:     Active Hospital Problems    Diagnosis  POA    **Hepatic encephalopathy [K76.82]  Yes      Resolved Hospital Problems   No resolved problems to display.        Hospital Course:  Jimmy Gabehart is a 70 y.o. male with past medical history of insulin-dependent diabetes, hypertension, recent diagnosis of liver cirrhosis, thrombocytopenia, chronic pain presenting with altered mental status and fall at home.  He was found to have decompensated liver cirrhosis with hepatic encephalopathy.  Significantly improved after initiating lactulose and rifaximin.  Hospital stay was complicated with left elbow bursitis that was treated with full course of IV antibiotics with Rocephin and daptomycin and improved.  Additionally, he was found to have severe iron deficiency anemia with iron saturation of 2% requiring iron infusion.  No evidence of GI bleed and he did not require any blood transfusion during this hospitalization.  He was started on insulin for his poorly controlled type 2 diabetes with A1c of 11.5% with adequate blood sugar control.  Patient has established follow-up with GI and he will need to continue to follow with his GI provider and evaluated for the for EGD and colonoscopy down the road.  Because of acute debility, he will be discharged to acute rehab     Acute hepatic encephalopathy, resolved  Decompensated liver cirrhosis  Patient with remote history of alcohol use. New diagnosis of Stage F4  cirrhosis based on FibroScan in March 2023. Following with GI as out patient  Presented with acute encephalopathy secondary to hepatic encephalopathy that significantly improved/resolved with lactulose and rifaximin  Patient family advised to keep his follow-up with GI as outpatient as he will need further evaluation for EGD and colonoscopy  Normal TSH and B12  CT head with no acute intracranial pathology  Gabapentin dose reduced to 100 mg at bedtime   Discontinue baclofen upon discharge because of his advanced liver disease     Left elbow bursitis, resolved  Patient had a fever of 101.1 around midnight of 4/21/2025.  Now with recurrent fever of 100.7 night of 4/24/2025  Elevated procalcitonin, CRP and sed rate  Negative blood culture.  Normal lactic acid, Normal chest x-ray and normal UA, Negative respiratory panel  X-ray left elbow with olecranon bursitis  Improved/bursitis resolved with empiric antibiotic therapy with Rocephin and daptomycin as well as colchicine.  Continue colchicine x 2 weeks  Dr. Rainey/orthopedic followed, appreciate the help     Acute on chronic right shoulder pain  Possible rotator cuff tear   Fall at home landing on right shoulder  Has been evaluated by orthopedic surgery outpatient.  Patient was found to have a tear in his right shoulder.  Patient was deemed a poor surgical candidate and nonoperative approach was indicated.  X-ray right shoulder with no evidence of acute fracture  Continue supportive care  Discussed with Dr. Rainey/orthopedic.  Patient will need right shoulder replacement versus rotator cuff repair in the near future.  No plan for surgical intervention during this hospitalization.  Patient already has an established orthopedic surgeon that he follows with.  He was advised to continue to follow with his orthopedic provider after discharge from rehab     Right forearm superficial thrombophlebitis  Elevated D-dimer  CTA chest negative for PE  Elevated D-dimer is  likely secondary to clinical right forearm thrombophlebitis.  Continue conservative treatment     Anemia of chronic disease  Severe iron deficiency, iron saturation 2%  Hemoglobin is slowly trending down.  It was 10.8 on admission.  Currently stable around 9  No evidence of GI bleed  He will need follow-up with his GI provider for EGD and colonoscopy  Iron studies consistent with severe iron deficiency anemia. Status post IV iron infusion      Type 2 diabetes  A1c 11.5%  Continue insulin glargine 30 units daily.  Continue Premeal insulin 10 units 3 times daily and continue SSI  The patient drinks plenty of ski (still drink rich in sugar) and eats a quart of gallon of ice cream daily.  Theand family counseled about the importance to comply with diabetic diet     Hypertension  Continue home Coreg and losartan     GERD  Continue PPI     BPH  Continue tamsulosin     History of thrombocytopenia  Follows with Heme/Onc outpatient. 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 Will need to follow up outpatient for biopsy results.   Initial report faxed from his facility showing no bone marrow dysplasia  Continue SCD for DVT prophylaxis     Weakness and debility  PT/OT recommended short-term rehab      Discharge Follow Up Recommendations for outpatient labs/diagnostics:  Facility physician 1 to 2 days  Instructed to follow-up with his GI and orthopedic providers after discharge from rehab    Day of Discharge     HPI:   Patient was seen and examined this morning.  Comfortable in bed.  No acute complaints.  Fully awake and alert.  Still having some pain in his right shoulder.  Again, I revisited with him the need to contact his GI and orthopedic providers and have follow-up with them after discharge from rehab and he voiced understanding.  He will be discharged to acute rehab today    Vital Signs:   Temp:  [97.8 °F (36.6 °C)-98.5 °F (36.9 °C)] 98.5 °F (36.9 °C)  Heart Rate:  [57-89] 57  Resp:  [10-18] 18  BP: (119-134)/(57-76)  119/65    Physical Exam:  General: Comfortable, not in distress, conversant and cooperative  Head: Atraumatic and normocephalic  Eyes: No Icterus. No pallor  Ears:  Ears appear intact with no abnormalities noted  Throat: No oral lesions, no thrush  Neck: Supple, trachea midline  Lungs: Clear to auscultation bilaterally, equal air entry, no wheezing or crackles  Heart:  Normal S1 and S2, no murmur, no gallop, No JVD, no lower extremity swelling  Abdomen:  Soft, no tenderness, no organomegaly, normal bowel sounds, no organomegaly  Extremities: Improved left bursitis  Skin: No bleeding, bruising or rash, normal skin turgor and elasticity  Neurologic: Cranial nerves appear intact with no evidence of facial asymmetry, normal motor and sensory functions in all 4 extremities  Psych: Alert and oriented x3      Pertinent  and/or Most Recent Results     LAB RESULTS:      Lab 04/29/25  1044 04/28/25  0216 04/27/25  0523 04/26/25  1059 04/26/25  0428   WBC 2.78* 2.97* 2.67*  --  4.19   HEMOGLOBIN 9.3* 8.7* 8.0*  --  8.5*   HEMATOCRIT 28.6* 27.1* 25.7*  --  27.0*   PLATELETS 151 126* 113*  --  114*   NEUTROS ABS 1.09* 0.65* 0.78*  --  1.72   IMMATURE GRANS (ABS) 0.02  --  0.01  --   --    LYMPHS ABS 1.32  --  1.32  --   --    MONOS ABS 0.30  --  0.51  --   --    EOS ABS 0.05 0.06 0.04  --  0.17   .3* 109.7* 108.9*  --  111.1*   SED RATE  --   --  47*  --   --    CRP  --   --  5.67*  --  8.20*   PROCALCITONIN  --   --   --   --  0.30*   D DIMER QUANT  --   --   --  2.84*  --          Lab 04/29/25  1044 04/28/25  0217 04/27/25  0523 04/26/25  0428   SODIUM 136 139 139 134*   POTASSIUM 4.1 4.1 4.1 4.3   CHLORIDE 103 108* 109* 103   CO2 20.0* 19.0* 18.0* 17.0*   ANION GAP 13.0 12.0 12.0 14.0   BUN 17 20 22 29*   CREATININE 1.19 1.10 1.17 1.47*   EGFR 65.7 72.2 67.1 51.0*   GLUCOSE 228* 120* 120* 102*   CALCIUM 8.9 8.4* 8.3* 8.6   MAGNESIUM  --  1.7 1.7 1.8   PHOSPHORUS  --  3.1 3.1 3.5         Lab 04/28/25  0217  04/27/25  0523 04/26/25  0428   TOTAL PROTEIN 6.8 6.2 6.9   ALBUMIN 3.0* 3.0* 2.9*   GLOBULIN 3.8 3.2 4.0   ALT (SGPT) 32 36 44*   AST (SGOT) 49* 55* 76*   BILIRUBIN 0.7 0.8 1.1   ALK PHOS 265* 286* 279*                     Brief Urine Lab Results  (Last result in the past 365 days)        Color   Clarity   Blood   Leuk Est   Nitrite   Protein   CREAT   Urine HCG        04/21/25 1742 Yellow   Clear   Negative   Negative   Negative   Negative                 Microbiology Results (last 10 days)       Procedure Component Value - Date/Time    Blood Culture - Blood, Arm, Right [[id number]]  (Normal) Collected: 04/25/25 1536    Lab Status: Final result Specimen: Blood from Arm, Right Updated: 04/30/25 1745     Blood Culture No growth at 5 days    MRSA Screen, PCR (Inpatient) - Swab, Nares [[id number]]  (Normal) Collected: 04/25/25 1410    Lab Status: Final result Specimen: Swab from Nares Updated: 04/25/25 1558     MRSA PCR Negative    Narrative:      The negative predictive value of this diagnostic test is high and should only be used to consider de-escalating anti-MRSA therapy. A positive result may indicate colonization with MRSA and must be correlated clinically.  MRSA Negative            CT Angiogram Chest Pulmonary Embolism  Result Date: 4/26/2025  CT ANGIOGRAM CHEST PULMONARY EMBOLISM Date of Exam: 4/26/2025 5:02 PM EDT Indication: Pulmonary embolism (PE) suspected, high prob, shortness of breath. Comparison: None available. Technique: Axial CT images were obtained of the chest after the uneventful intravenous administration of 160 mL Isovue-370 utilizing pulmonary embolism protocol.  In addition, a 3-D volume rendered image was created for interpretation.  Reconstructed coronal and sagittal images were also obtained. Automated exposure control and iterative construction methods were used. Findings: Exam was repeated due to suboptimal contrast opacification of pulmonary arteries on initial imaging. Evaluation of  segmental and subsegmental pulmonary arteries is limited by respiratory motion artifact, but no central pulmonary emboli are seen in the main or lobar pulmonary arteries. Main pulmonary artery is not dilated. Heart size is mildly enlarged. No pericardial or pleural effusion.  Mild patchy groundglass opacities in the right lower lobe. Lungs otherwise clear. Nodular liver contour and splenomegaly in the partially included upper abdomen. Small right renal cysts. No acute osseous abnormality is identified. Severe arthritic changes of the right shoulder.     Impression: 1.No central pulmonary emboli seen. Evaluation of segmental/subsegmental pulmonary arteries limited by respiratory motion artifact. 2.Mild patchy groundglass opacities in the right lower lobe, likely due to an infectious/inflammatory process. 3.Cirrhotic liver morphology with splenomegaly. Electronically Signed: Joan Radford MD  4/26/2025 5:40 PM EDT  Workstation ID: [id number]    CT Head Without Contrast  Result Date: 4/24/2025  CT HEAD WO CONTRAST Date of Exam: 4/24/2025 4:14 PM EDT Indication: AMS. Comparison: None available. Technique: Axial CT images were obtained of the head without contrast administration.  Automated exposure control and iterative construction methods were used. Findings: No acute intracranial hemorrhage.Intact appearing gray-white differentiation.No extra-axial fluid collection.No significant mass effect. No hydrocephalus. Mild generalized parenchymal volume loss. Scattered areas of periventricular and subcortical white matter hypoattenuation, nonspecific, perhaps from small vessel ischemic/hypertensive changes in a patient of this age.There are intracranial atherosclerotic calcifications. Mild scattered mucosal thickening in the paranasal sinuses.Mastoid air cells are essentially clear.. Bilateral lens replacements. Presumed intraocular silicone on the left. No acute or aggressive appearing bony or extracranial soft tissue  process.     Impression: No acute intracranial findings. Electronically Signed: Gallito Hammonds MD  4/24/2025 4:49 PM EDT  Workstation ID: [id number]    XR Chest 1 View  Result Date: 4/24/2025  XR CHEST 1 VW, XR SHOULDER 2+ VW RIGHT, XR ELBOW 3+ VW LEFT Date of Exam: 4/24/2025 10:32 AM EDT Indication: sob right shoulder pain after fall, severe pain and redness left elbow Comparison: Chest 4/21/2025, right shoulder 4/19/2025 Findings: Chest: Heart size is normal for the projection. The pulmonary vascular pattern is normal and the lungs appear clear. Right shoulder: There are degenerative changes at the glenohumeral and AC joint. Findings are unchanged from the last study. There is osteopenia. No fractures are identified. Left elbow: There are degenerative changes of the elbow joint. There is ossification of the triceps insertion. There is no joint effusion and there are no fractures or destructive lesions. There is soft tissue swelling overlying the olecranon.     Impression: 1.No radiographic evidence of acute cardiopulmonary disease. 2.Degenerative changes of the right shoulder and left elbow. 3.Soft tissue swelling overlying the left olecranon, probably representing olecranon bursitis. 4.No fractures or destructive lesions. Electronically Signed: Nima Phillip MD  4/24/2025 11:15 AM EDT  Workstation ID: [id number]    XR Elbow 3+ View Left  Result Date: 4/24/2025  XR CHEST 1 VW, XR SHOULDER 2+ VW RIGHT, XR ELBOW 3+ VW LEFT Date of Exam: 4/24/2025 10:32 AM EDT Indication: sob right shoulder pain after fall, severe pain and redness left elbow Comparison: Chest 4/21/2025, right shoulder 4/19/2025 Findings: Chest: Heart size is normal for the projection. The pulmonary vascular pattern is normal and the lungs appear clear. Right shoulder: There are degenerative changes at the glenohumeral and AC joint. Findings are unchanged from the last study. There is osteopenia. No fractures are identified. Left elbow: There are  degenerative changes of the elbow joint. There is ossification of the triceps insertion. There is no joint effusion and there are no fractures or destructive lesions. There is soft tissue swelling overlying the olecranon.     Impression: 1.No radiographic evidence of acute cardiopulmonary disease. 2.Degenerative changes of the right shoulder and left elbow. 3.Soft tissue swelling overlying the left olecranon, probably representing olecranon bursitis. 4.No fractures or destructive lesions. Electronically Signed: Nima Phillip MD  4/24/2025 11:15 AM EDT  Workstation ID: [id number]    XR Shoulder 2+ View Right  Result Date: 4/24/2025  XR CHEST 1 VW, XR SHOULDER 2+ VW RIGHT, XR ELBOW 3+ VW LEFT Date of Exam: 4/24/2025 10:32 AM EDT Indication: sob right shoulder pain after fall, severe pain and redness left elbow Comparison: Chest 4/21/2025, right shoulder 4/19/2025 Findings: Chest: Heart size is normal for the projection. The pulmonary vascular pattern is normal and the lungs appear clear. Right shoulder: There are degenerative changes at the glenohumeral and AC joint. Findings are unchanged from the last study. There is osteopenia. No fractures are identified. Left elbow: There are degenerative changes of the elbow joint. There is ossification of the triceps insertion. There is no joint effusion and there are no fractures or destructive lesions. There is soft tissue swelling overlying the olecranon.     Impression: 1.No radiographic evidence of acute cardiopulmonary disease. 2.Degenerative changes of the right shoulder and left elbow. 3.Soft tissue swelling overlying the left olecranon, probably representing olecranon bursitis. 4.No fractures or destructive lesions. Electronically Signed: Nima Phillip MD  4/24/2025 11:15 AM EDT  Workstation ID: [id number]    XR Chest 1 View  Result Date: 4/21/2025  XR CHEST 1 VW Date of Exam: 4/21/2025 2:23 PM EDT Indication: Shortness of breath Comparison: None available. Findings: Heart  size is normal for the projection. The pulmonary vascular pattern in the chest is normal and the lungs are clear.     Impression: No active disease. Electronically Signed: Nima Phillip MD  4/21/2025 3:26 PM EDT  Workstation ID: [id number]                  Plan for Follow-up of Pending Labs/Results:     Discharge Details        Discharge Medications        ASK your doctor about these medications        Instructions Start Date   baclofen 10 MG tablet  Commonly known as: LIORESAL   Take 1 tablet every 8 hours by oral route as needed for 30 days.      carvedilol 6.25 MG tablet  Commonly known as: COREG   No dose, route, or frequency recorded.      diclofenac 50 MG EC tablet  Commonly known as: VOLTAREN   No dose, route, or frequency recorded.      Diclofenac Sodium 1 % gel gel  Commonly known as: VOLTAREN   apply a thin film (up to 4 grams or 4 pumps) to affected areas of feet up to four-five times daily (max 16 gm/day). Rub in for 2 minutes.      dorzolamide-timolol 2-0.5 % ophthalmic solution  Commonly known as: COSOPT   1 drop, Both Eyes, Daily      fexofenadine 60 MG tablet  Commonly known as: ALLEGRA   60 mg, Daily      fish oil 1000 MG capsule capsule       gabapentin 800 MG tablet  Commonly known as: NEURONTIN   800 mg, Oral, 3 Times Daily      Januvia 50 MG tablet  Generic drug: SITagliptin   No dose, route, or frequency recorded.      losartan 100 MG tablet  Commonly known as: COZAAR   No dose, route, or frequency recorded.      magnesium chloride ER 64 MG DR tablet   Daily      metFORMIN  MG 24 hr tablet  Commonly known as: GLUCOPHAGE-XR   No dose, route, or frequency recorded.      moxifloxacin 0.5 % ophthalmic solution  Commonly known as: VIGAMOX   1 drop, Both Eyes, Daily      oxyCODONE 10 MG tablet  Commonly known as: ROXICODONE       oxyCODONE-acetaminophen 7.5-325 MG per tablet  Commonly known as: PERCOCET       pantoprazole 40 MG EC tablet  Commonly known as: PROTONIX   No dose, route, or frequency  recorded.      tamsulosin 0.4 MG capsule 24 hr capsule  Commonly known as: FLOMAX   No dose, route, or frequency recorded.      Zinc 30 MG tablet   Take  by mouth.               Allergies   Allergen Reactions    Demerol [Meperidine]     Penicillins Provider Review Needed     Has tolerated ceftriaxone, has had RX for cephalexin         Discharge Disposition:      Diet:  Hospital:  Diet Order   Procedures    Diet: Cardiac, Diabetic; Healthy Heart (2-3 Na+); Consistent Carbohydrate; Fluid Consistency: Thin (IDDSI 0)            Activity:  As tolerated    Restrictions or Other Recommendations:  None        CODE STATUS:    Code Status and Medical Interventions: CPR (Attempt to Resuscitate); Full Support   Ordered at: 04/19/25 1349     Code Status (Patient has no pulse and is not breathing):    CPR (Attempt to Resuscitate)     Medical Interventions (Patient has pulse or is breathing):    Full Support     Level Of Support Discussed With:    Next of Kin (If No Surrogate)       Future Appointments   Date Time Provider Department Center   5/2/2025 10:00 AM MED 8  [facility]   9/9/2025 10:30 AM Liz Kaur APRN [facility]                 Bailey Howell MD  05/02/25      Time Spent on Discharge:  I spent  25  minutes on this discharge activity which included: face-to-face encounter with the patient, reviewing the data in the system, coordination of the care with the nursing staff as well as consultants, documentation, and entering orders.            Electronically signed by Bailey Howell MD at 05/02/25 0812

## 2025-05-02 NOTE — PLAN OF CARE
Problem: Adult Inpatient Plan of Care  Goal: Plan of Care Review  Outcome: Progressing  Flowsheets (Taken 5/2/2025 0512)  Progress: no change  Outcome Evaluation:   VSS. Complaints of pain medicated per PRN MAR   effective per pt. IV removed as needing to be changed, per provider okay to leave out r/t discharge in AM. No acute events during shift.  Plan of Care Reviewed With: patient  Goal: Patient-Specific Goal (Individualized)  Outcome: Progressing  Goal: Absence of Hospital-Acquired Illness or Injury  Outcome: Progressing  Intervention: Identify and Manage Fall Risk  Recent Flowsheet Documentation  Taken 5/2/2025 0452 by Odessa Sharp RN  Safety Promotion/Fall Prevention:   activity supervised   assistive device/personal items within reach   clutter free environment maintained   fall prevention program maintained   nonskid shoes/slippers when out of bed   safety round/check completed   room organization consistent  Taken 5/2/2025 0200 by Odessa Sharp RN  Safety Promotion/Fall Prevention:   activity supervised   assistive device/personal items within reach   clutter free environment maintained   fall prevention program maintained   nonskid shoes/slippers when out of bed   room organization consistent   safety round/check completed  Taken 5/2/2025 0010 by Odessa Sharp RN  Safety Promotion/Fall Prevention:   activity supervised   assistive device/personal items within reach   clutter free environment maintained   fall prevention program maintained   nonskid shoes/slippers when out of bed   room organization consistent   safety round/check completed  Taken 5/1/2025 2201 by Odessa Sharp RN  Safety Promotion/Fall Prevention:   activity supervised   assistive device/personal items within reach   clutter free environment maintained   fall prevention program maintained   nonskid shoes/slippers when out of bed   room organization consistent   safety round/check completed  Taken 5/1/2025  2023 by Odessa Sharp RN  Safety Promotion/Fall Prevention:   activity supervised   assistive device/personal items within reach   clutter free environment maintained   fall prevention program maintained   nonskid shoes/slippers when out of bed   room organization consistent   safety round/check completed  Intervention: Prevent Skin Injury  Recent Flowsheet Documentation  Taken 5/2/2025 0452 by Odessa Sharp RN  Body Position: position changed independently  Skin Protection:   transparent dressing maintained   skin sealant/moisture barrier applied   incontinence pads utilized  Taken 5/2/2025 0200 by Odessa Sharp RN  Body Position: position changed independently  Skin Protection:   incontinence pads utilized   skin sealant/moisture barrier applied   transparent dressing maintained  Taken 5/2/2025 0010 by Odessa Sharp RN  Body Position: position changed independently  Skin Protection:   incontinence pads utilized   skin sealant/moisture barrier applied   transparent dressing maintained  Taken 5/1/2025 2201 by Odessa Sharp RN  Body Position: position changed independently  Skin Protection:   incontinence pads utilized   skin sealant/moisture barrier applied   transparent dressing maintained  Taken 5/1/2025 2023 by Odessa Sharp RN  Body Position: position changed independently  Skin Protection:   incontinence pads utilized   transparent dressing maintained   skin sealant/moisture barrier applied  Intervention: Prevent and Manage VTE (Venous Thromboembolism) Risk  Recent Flowsheet Documentation  Taken 5/1/2025 2201 by Odessa Sharp RN  VTE Prevention/Management:   bilateral   SCDs (sequential compression devices) off  Intervention: Prevent Infection  Recent Flowsheet Documentation  Taken 5/2/2025 0452 by Odessa Sharp RN  Infection Prevention:   environmental surveillance performed   hand hygiene promoted   rest/sleep promoted  Taken 5/2/2025 0200 by Lila  Odessa ALEJANDRO RN  Infection Prevention:   environmental surveillance performed   hand hygiene promoted   rest/sleep promoted  Taken 5/2/2025 0010 by Odessa Sharp RN  Infection Prevention:   environmental surveillance performed   hand hygiene promoted   rest/sleep promoted  Taken 5/1/2025 2201 by Odessa Sharp RN  Infection Prevention:   environmental surveillance performed   hand hygiene promoted   rest/sleep promoted  Taken 5/1/2025 2023 by Odessa Sharp RN  Infection Prevention:   environmental surveillance performed   hand hygiene promoted   rest/sleep promoted  Goal: Optimal Comfort and Wellbeing  Outcome: Progressing  Intervention: Monitor Pain and Promote Comfort  Recent Flowsheet Documentation  Taken 5/1/2025 2201 by Odessa Sharp RN  Pain Management Interventions:   care clustered   pain management plan reviewed with patient/caregiver  Intervention: Provide Person-Centered Care  Recent Flowsheet Documentation  Taken 5/1/2025 2201 by Odessa Sharp RN  Trust Relationship/Rapport:   care explained   choices provided   emotional support provided   empathic listening provided   questions answered   reassurance provided   thoughts/feelings acknowledged   questions encouraged  Goal: Readiness for Transition of Care  Outcome: Progressing     Problem: Fall Injury Risk  Goal: Absence of Fall and Fall-Related Injury  Outcome: Progressing  Intervention: Identify and Manage Contributors  Recent Flowsheet Documentation  Taken 5/1/2025 2201 by Odessa Sharp RN  Medication Review/Management: medications reviewed  Self-Care Promotion: independence encouraged  Intervention: Promote Injury-Free Environment  Recent Flowsheet Documentation  Taken 5/2/2025 0452 by Odessa Sharp RN  Safety Promotion/Fall Prevention:   activity supervised   assistive device/personal items within reach   clutter free environment maintained   fall prevention program maintained   nonskid shoes/slippers  when out of bed   safety round/check completed   room organization consistent  Taken 5/2/2025 0200 by Odessa Sharp RN  Safety Promotion/Fall Prevention:   activity supervised   assistive device/personal items within reach   clutter free environment maintained   fall prevention program maintained   nonskid shoes/slippers when out of bed   room organization consistent   safety round/check completed  Taken 5/2/2025 0010 by Odessa Sharp RN  Safety Promotion/Fall Prevention:   activity supervised   assistive device/personal items within reach   clutter free environment maintained   fall prevention program maintained   nonskid shoes/slippers when out of bed   room organization consistent   safety round/check completed  Taken 5/1/2025 2201 by Odessa Sharp RN  Safety Promotion/Fall Prevention:   activity supervised   assistive device/personal items within reach   clutter free environment maintained   fall prevention program maintained   nonskid shoes/slippers when out of bed   room organization consistent   safety round/check completed  Taken 5/1/2025 2023 by Odessa Sharp RN  Safety Promotion/Fall Prevention:   activity supervised   assistive device/personal items within reach   clutter free environment maintained   fall prevention program maintained   nonskid shoes/slippers when out of bed   room organization consistent   safety round/check completed     Problem: Comorbidity Management  Goal: Blood Glucose Level Within Target Range  Outcome: Progressing  Intervention: Monitor and Manage Glycemia  Recent Flowsheet Documentation  Taken 5/1/2025 2201 by Odessa Sharp RN  Medication Review/Management: medications reviewed  Goal: Blood Pressure in Desired Range  Outcome: Progressing  Intervention: Maintain Blood Pressure Management  Recent Flowsheet Documentation  Taken 5/1/2025 2201 by Odessa Sharp RN  Medication Review/Management: medications reviewed  Goal: Maintenance of  Osteoarthritis Symptom Control  Outcome: Progressing  Intervention: Maintain Osteoarthritis Symptom Control  Recent Flowsheet Documentation  Taken 5/2/2025 0452 by Odessa Sharp RN  Activity Management: activity encouraged  Assistive Device Utilized: walker  Taken 5/2/2025 0200 by Odessa Sharp RN  Activity Management: activity encouraged  Taken 5/2/2025 0010 by Odessa Sharp RN  Activity Management: activity encouraged  Taken 5/1/2025 2201 by Odessa Sharp RN  Activity Management: activity encouraged  Assistive Device Utilized:   walker   gait belt  Medication Review/Management: medications reviewed  Taken 5/1/2025 2023 by Odessa Sharp RN  Activity Management: activity encouraged     Problem: Violence Risk or Actual  Goal: Anger and Impulse Control  Outcome: Progressing  Intervention: Minimize Safety Risk  Recent Flowsheet Documentation  Taken 5/2/2025 0452 by Odessa Sharp RN  Enhanced Safety Measures: bed alarm set  Taken 5/2/2025 0200 by Odessa Sharp RN  Enhanced Safety Measures: bed alarm set  Taken 5/2/2025 0010 by Odessa Sharp RN  Enhanced Safety Measures: bed alarm set  Taken 5/1/2025 2201 by Odessa Sharp RN  Sensory Stimulation Regulation: care clustered  Enhanced Safety Measures: bed alarm set  Taken 5/1/2025 2023 by Odessa Sharp RN  Enhanced Safety Measures: bed alarm set     Problem: Noninvasive Ventilation Acute  Goal: Effective Unassisted Ventilation and Oxygenation  Outcome: Progressing     Problem: Skin Injury Risk Increased  Goal: Skin Health and Integrity  Outcome: Progressing  Intervention: Optimize Skin Protection  Recent Flowsheet Documentation  Taken 5/2/2025 0452 by Odessa Sharp RN  Activity Management: activity encouraged  Pressure Reduction Techniques: frequent weight shift encouraged  Head of Bed (HOB) Positioning: HOB elevated  Pressure Reduction Devices: pressure-redistributing mattress utilized  Skin  Protection:   transparent dressing maintained   skin sealant/moisture barrier applied   incontinence pads utilized  Taken 5/2/2025 0200 by Odessa Sharp RN  Activity Management: activity encouraged  Pressure Reduction Techniques: frequent weight shift encouraged  Head of Bed (HOB) Positioning: [facility] elevated  Pressure Reduction Devices: pressure-redistributing mattress utilized  Skin Protection:   incontinence pads utilized   skin sealant/moisture barrier applied   transparent dressing maintained  Taken 5/2/2025 0010 by Odessa Sharp RN  Activity Management: activity encouraged  Pressure Reduction Techniques: frequent weight shift encouraged  Head of Bed (HOB) Positioning: HOB elevated  Pressure Reduction Devices: pressure-redistributing mattress utilized  Skin Protection:   incontinence pads utilized   skin sealant/moisture barrier applied   transparent dressing maintained  Taken 5/1/2025 2201 by Odessa Sharp RN  Activity Management: activity encouraged  Pressure Reduction Techniques: frequent weight shift encouraged  Head of Bed (HOB) Positioning: [facility] elevated  Pressure Reduction Devices:   pressure-redistributing mattress utilized   chair cushion utilized  Skin Protection:   incontinence pads utilized   skin sealant/moisture barrier applied   transparent dressing maintained  Taken 5/1/2025 2023 by Odessa Sharp RN  Activity Management: activity encouraged  Pressure Reduction Techniques: frequent weight shift encouraged  Head of Bed (HOB) Positioning: [facility] elevated  Pressure Reduction Devices: pressure-redistributing mattress utilized  Skin Protection:   incontinence pads utilized   transparent dressing maintained   skin sealant/moisture barrier applied   Goal Outcome Evaluation:  Plan of Care Reviewed With: patient        Progress: no change  Outcome Evaluation: VSS. Complaints of pain medicated per PRN MAR; effective per pt. IV removed as needing to be changed, per provider okay to  leave out r/t discharge in AM. No acute events during shift.

## 2025-05-29 ENCOUNTER — TELEPHONE (OUTPATIENT)
Dept: GASTROENTEROLOGY | Facility: CLINIC | Age: 70
End: 2025-05-29
Payer: MEDICARE

## 2025-05-29 NOTE — TELEPHONE ENCOUNTER
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 HE IS ALSO IN STAGE 3 RENAL FAILURE. WANTING TO KNOW WHAT HER THOUGHTS ARE ON THIS SHE IS AWARE IT IS TERMINAL BUT WAS TALKING TO A DOCTOR FRIEND AND SHE SAID CALL TOMMIE AND ASK MORE QUESTIONS LIKE WHAT SHOULD THEY BE DOING, HOW LONG DO THEY THINK HE HAS LIVING WITH THIS PROGNOSIS THINGS LIKE THAT PATIENT DOESN'T REALIZE HOW BAD IT REALLY IS AND POA NEEDS MORE GUIDANCE ON EXPLAINING THIS TO PATIENT  POA IS ALSO HIS CAREGIVER AND WANTS TO KNOW HOW LONG SHOULD SHE KEEP HIM AT HOME STATES HE SLEEPS 22 HOURS A DAY AND GOING TO THE DOCTOR IN [place] WORE HIM OUT

## 2025-05-29 NOTE — TELEPHONE ENCOUNTER
"As previously discussed with them - the F4 is the stage of fibrosis.  F4 fibrosis is cirrhosis.  According to his discharge summary, he is now decompensated given the recent confusion.  Notes indicate that he improved with lactulose and xifaxin.  He will need to continue that.  There is no way to know \"how long he has.\"  I would recommend a sooner office visit to discuss in more detail.  Please schedule him for the week of June 16 when I'm back in the office.   "

## 2025-05-29 NOTE — TELEPHONE ENCOUNTER
Diann returned your call. Attempted to transfer but you weren't available. She said you can call her back but she is at work and will try to answer.

## 2025-05-29 NOTE — TELEPHONE ENCOUNTER
Pt aware. Pt scheduled for 6/17.   Pts wife states she needs to know how bad he is but not sure we should tell pt how bad he is but thinks he deserves to know if he asks but he hasn't asked.

## 2025-05-29 NOTE — TELEPHONE ENCOUNTER
"Pts wife states that the provider in [place] told her she had \"end of life, stage 4 cirrhosis\" pt had a lot of confusion. Pts was started on xifaxan. Pt states he is only going to the bathroom very rarely. Some days not at all but typically only once and its formed and abnormal. He was told his liver was only working about 20% or a little better.     Pt wife states that she knows its bad but wants to know how bad is it.   "

## 2025-06-09 ENCOUNTER — HOSPITAL ENCOUNTER (INPATIENT)
Facility: HOSPITAL | Age: 70
LOS: 3 days | Discharge: HOME | DRG: 617 | End: 2025-06-13
Attending: EMERGENCY MEDICINE | Admitting: INTERNAL MEDICINE
Payer: MEDICARE

## 2025-06-09 ENCOUNTER — APPOINTMENT (OUTPATIENT)
Dept: GENERAL RADIOLOGY | Facility: HOSPITAL | Age: 70
DRG: 617 | End: 2025-06-09
Payer: MEDICARE

## 2025-06-09 DIAGNOSIS — G62.9 PERIPHERAL POLYNEUROPATHY: ICD-10-CM

## 2025-06-09 DIAGNOSIS — E11.40 TYPE 2 DIABETES MELLITUS WITH DIABETIC NEUROPATHY, UNSPECIFIED WHETHER LONG TERM INSULIN USE: ICD-10-CM

## 2025-06-09 DIAGNOSIS — M86.9 OSTEOMYELITIS OF GREAT TOE OF LEFT FOOT: Primary | ICD-10-CM

## 2025-06-09 DIAGNOSIS — M86.272 SUBACUTE OSTEOMYELITIS OF LEFT FOOT: ICD-10-CM

## 2025-06-09 DIAGNOSIS — E08.621 DIABETIC ULCER OF TOE OF LEFT FOOT ASSOCIATED WITH DIABETES MELLITUS DUE TO UNDERLYING CONDITION, WITH BONE INVOLVEMENT WITHOUT EVIDENCE OF NECROSIS: ICD-10-CM

## 2025-06-09 DIAGNOSIS — L97.526 DIABETIC ULCER OF TOE OF LEFT FOOT ASSOCIATED WITH DIABETES MELLITUS DUE TO UNDERLYING CONDITION, WITH BONE INVOLVEMENT WITHOUT EVIDENCE OF NECROSIS: ICD-10-CM

## 2025-06-09 DIAGNOSIS — M86.172 OTHER ACUTE OSTEOMYELITIS OF LEFT FOOT: ICD-10-CM

## 2025-06-09 DIAGNOSIS — K74.60 CIRRHOSIS OF LIVER WITHOUT ASCITES, UNSPECIFIED HEPATIC CIRRHOSIS TYPE: ICD-10-CM

## 2025-06-09 DIAGNOSIS — K76.82 HEPATIC ENCEPHALOPATHY: ICD-10-CM

## 2025-06-09 LAB
ALBUMIN SERPL-MCNC: 3.3 G/DL (ref 3.5–5.2)
ALBUMIN/GLOB SERPL: 0.8 G/DL
ALP SERPL-CCNC: 197 U/L (ref 39–117)
ALT SERPL W P-5'-P-CCNC: 42 U/L (ref 1–41)
ANION GAP SERPL CALCULATED.3IONS-SCNC: 10 MMOL/L (ref 5–15)
AST SERPL-CCNC: 58 U/L (ref 1–40)
BASOPHILS # BLD AUTO: 0.01 10*3/MM3 (ref 0–0.2)
BASOPHILS NFR BLD AUTO: 0.3 % (ref 0–1.5)
BILIRUB SERPL-MCNC: 0.4 MG/DL (ref 0–1.2)
BUN SERPL-MCNC: 23.5 MG/DL (ref 8–23)
BUN/CREAT SERPL: 15.8 (ref 7–25)
CALCIUM SPEC-SCNC: 8.9 MG/DL (ref 8.6–10.5)
CHLORIDE SERPL-SCNC: 103 MMOL/L (ref 98–107)
CK SERPL-CCNC: 33 U/L (ref 20–200)
CO2 SERPL-SCNC: 24 MMOL/L (ref 22–29)
CREAT SERPL-MCNC: 1.49 MG/DL (ref 0.76–1.27)
CRP SERPL-MCNC: 2.13 MG/DL (ref 0–0.5)
D-LACTATE SERPL-SCNC: 1.7 MMOL/L (ref 0.5–2)
DEPRECATED RDW RBC AUTO: 61.8 FL (ref 37–54)
EGFRCR SERPLBLD CKD-EPI 2021: 50.2 ML/MIN/1.73
EOSINOPHIL # BLD AUTO: 0.04 10*3/MM3 (ref 0–0.4)
EOSINOPHIL NFR BLD AUTO: 1.1 % (ref 0.3–6.2)
ERYTHROCYTE [DISTWIDTH] IN BLOOD BY AUTOMATED COUNT: 15.7 % (ref 12.3–15.4)
ERYTHROCYTE [SEDIMENTATION RATE] IN BLOOD: 95 MM/HR (ref 0–20)
GLOBULIN UR ELPH-MCNC: 4.3 GM/DL
GLUCOSE BLDC GLUCOMTR-MCNC: 116 MG/DL (ref 70–130)
GLUCOSE BLDC GLUCOMTR-MCNC: 208 MG/DL (ref 70–130)
GLUCOSE SERPL-MCNC: 167 MG/DL (ref 65–99)
HCT VFR BLD AUTO: 29.8 % (ref 37.5–51)
HGB BLD-MCNC: 9.5 G/DL (ref 13–17.7)
IMM GRANULOCYTES # BLD AUTO: 0.04 10*3/MM3 (ref 0–0.05)
IMM GRANULOCYTES NFR BLD AUTO: 1.1 % (ref 0–0.5)
LYMPHOCYTES # BLD AUTO: 1.23 10*3/MM3 (ref 0.7–3.1)
LYMPHOCYTES NFR BLD AUTO: 34.6 % (ref 19.6–45.3)
MCH RBC QN AUTO: 34.8 PG (ref 26.6–33)
MCHC RBC AUTO-ENTMCNC: 31.9 G/DL (ref 31.5–35.7)
MCV RBC AUTO: 109.2 FL (ref 79–97)
MONOCYTES # BLD AUTO: 0.51 10*3/MM3 (ref 0.1–0.9)
MONOCYTES NFR BLD AUTO: 14.3 % (ref 5–12)
NEUTROPHILS NFR BLD AUTO: 1.73 10*3/MM3 (ref 1.7–7)
NEUTROPHILS NFR BLD AUTO: 48.6 % (ref 42.7–76)
NRBC BLD AUTO-RTO: 0 /100 WBC (ref 0–0.2)
PLATELET # BLD AUTO: 89 10*3/MM3 (ref 140–450)
PMV BLD AUTO: 12 FL (ref 6–12)
POTASSIUM SERPL-SCNC: 4.2 MMOL/L (ref 3.5–5.2)
PROT SERPL-MCNC: 7.6 G/DL (ref 6–8.5)
RBC # BLD AUTO: 2.73 10*6/MM3 (ref 4.14–5.8)
SODIUM SERPL-SCNC: 137 MMOL/L (ref 136–145)
WBC NRBC COR # BLD AUTO: 3.56 10*3/MM3 (ref 3.4–10.8)

## 2025-06-09 PROCEDURE — 99223 1ST HOSP IP/OBS HIGH 75: CPT | Performed by: HOSPITALIST

## 2025-06-09 PROCEDURE — 85025 COMPLETE CBC W/AUTO DIFF WBC: CPT | Performed by: EMERGENCY MEDICINE

## 2025-06-09 PROCEDURE — 82550 ASSAY OF CK (CPK): CPT | Performed by: EMERGENCY MEDICINE

## 2025-06-09 PROCEDURE — 63710000001 INSULIN LISPRO (HUMAN) PER 5 UNITS: Performed by: HOSPITALIST

## 2025-06-09 PROCEDURE — 86140 C-REACTIVE PROTEIN: CPT | Performed by: EMERGENCY MEDICINE

## 2025-06-09 PROCEDURE — 25010000002 DAPTOMYCIN PER 1 MG

## 2025-06-09 PROCEDURE — 36415 COLL VENOUS BLD VENIPUNCTURE: CPT

## 2025-06-09 PROCEDURE — G0378 HOSPITAL OBSERVATION PER HR: HCPCS

## 2025-06-09 PROCEDURE — 82948 REAGENT STRIP/BLOOD GLUCOSE: CPT

## 2025-06-09 PROCEDURE — 25010000002 CEFTRIAXONE PER 250 MG: Performed by: HOSPITALIST

## 2025-06-09 PROCEDURE — 99285 EMERGENCY DEPT VISIT HI MDM: CPT

## 2025-06-09 PROCEDURE — 83605 ASSAY OF LACTIC ACID: CPT | Performed by: EMERGENCY MEDICINE

## 2025-06-09 PROCEDURE — 85652 RBC SED RATE AUTOMATED: CPT | Performed by: EMERGENCY MEDICINE

## 2025-06-09 PROCEDURE — 80053 COMPREHEN METABOLIC PANEL: CPT | Performed by: EMERGENCY MEDICINE

## 2025-06-09 PROCEDURE — 63710000001 INSULIN GLARGINE PER 5 UNITS: Performed by: HOSPITALIST

## 2025-06-09 PROCEDURE — 73660 X-RAY EXAM OF TOE(S): CPT

## 2025-06-09 RX ORDER — INSULIN LISPRO 100 [IU]/ML
5 INJECTION, SOLUTION INTRAVENOUS; SUBCUTANEOUS
Status: DISCONTINUED | OUTPATIENT
Start: 2025-06-09 | End: 2025-06-13 | Stop reason: HOSPADM

## 2025-06-09 RX ORDER — NITROGLYCERIN 0.4 MG/1
0.4 TABLET SUBLINGUAL
Status: DISCONTINUED | OUTPATIENT
Start: 2025-06-09 | End: 2025-06-13 | Stop reason: HOSPADM

## 2025-06-09 RX ORDER — ONDANSETRON 2 MG/ML
4 INJECTION INTRAMUSCULAR; INTRAVENOUS EVERY 6 HOURS PRN
Status: DISCONTINUED | OUTPATIENT
Start: 2025-06-09 | End: 2025-06-13 | Stop reason: HOSPADM

## 2025-06-09 RX ORDER — COLCHICINE 0.6 MG/1
0.6 TABLET ORAL DAILY
COMMUNITY

## 2025-06-09 RX ORDER — TAMSULOSIN HYDROCHLORIDE 0.4 MG/1
0.4 CAPSULE ORAL NIGHTLY
Status: DISCONTINUED | OUTPATIENT
Start: 2025-06-09 | End: 2025-06-13 | Stop reason: HOSPADM

## 2025-06-09 RX ORDER — LACTULOSE 10 G/15ML
20 SOLUTION ORAL 3 TIMES DAILY
Status: DISCONTINUED | OUTPATIENT
Start: 2025-06-09 | End: 2025-06-13 | Stop reason: HOSPADM

## 2025-06-09 RX ORDER — AZELASTINE HYDROCHLORIDE 137 UG/1
2 SPRAY, METERED NASAL 2 TIMES DAILY PRN
COMMUNITY

## 2025-06-09 RX ORDER — BISACODYL 5 MG/1
5 TABLET, DELAYED RELEASE ORAL DAILY PRN
Status: DISCONTINUED | OUTPATIENT
Start: 2025-06-09 | End: 2025-06-13 | Stop reason: HOSPADM

## 2025-06-09 RX ORDER — SODIUM CHLORIDE 0.9 % (FLUSH) 0.9 %
10 SYRINGE (ML) INJECTION EVERY 12 HOURS SCHEDULED
Status: DISCONTINUED | OUTPATIENT
Start: 2025-06-09 | End: 2025-06-13 | Stop reason: HOSPADM

## 2025-06-09 RX ORDER — NICOTINE POLACRILEX 4 MG
15 LOZENGE BUCCAL
Status: DISCONTINUED | OUTPATIENT
Start: 2025-06-09 | End: 2025-06-13 | Stop reason: HOSPADM

## 2025-06-09 RX ORDER — ONDANSETRON 4 MG/1
4 TABLET, ORALLY DISINTEGRATING ORAL EVERY 6 HOURS PRN
Status: DISCONTINUED | OUTPATIENT
Start: 2025-06-09 | End: 2025-06-13 | Stop reason: HOSPADM

## 2025-06-09 RX ORDER — INSULIN LISPRO 100 [IU]/ML
2-7 INJECTION, SOLUTION INTRAVENOUS; SUBCUTANEOUS
Status: DISCONTINUED | OUTPATIENT
Start: 2025-06-09 | End: 2025-06-13 | Stop reason: HOSPADM

## 2025-06-09 RX ORDER — DEXTROSE MONOHYDRATE 25 G/50ML
25 INJECTION, SOLUTION INTRAVENOUS
Status: DISCONTINUED | OUTPATIENT
Start: 2025-06-09 | End: 2025-06-13 | Stop reason: HOSPADM

## 2025-06-09 RX ORDER — GABAPENTIN 100 MG/1
200 CAPSULE ORAL NIGHTLY
Status: DISCONTINUED | OUTPATIENT
Start: 2025-06-09 | End: 2025-06-13 | Stop reason: HOSPADM

## 2025-06-09 RX ORDER — CETIRIZINE HYDROCHLORIDE 10 MG/1
10 TABLET ORAL DAILY
Status: DISCONTINUED | OUTPATIENT
Start: 2025-06-10 | End: 2025-06-13 | Stop reason: HOSPADM

## 2025-06-09 RX ORDER — AMOXICILLIN 250 MG
2 CAPSULE ORAL 2 TIMES DAILY PRN
Status: DISCONTINUED | OUTPATIENT
Start: 2025-06-09 | End: 2025-06-13 | Stop reason: HOSPADM

## 2025-06-09 RX ORDER — BISACODYL 10 MG
10 SUPPOSITORY, RECTAL RECTAL DAILY PRN
Status: DISCONTINUED | OUTPATIENT
Start: 2025-06-09 | End: 2025-06-13 | Stop reason: HOSPADM

## 2025-06-09 RX ORDER — INSULIN LISPRO 100 [IU]/ML
10 INJECTION, SOLUTION INTRAVENOUS; SUBCUTANEOUS
COMMUNITY

## 2025-06-09 RX ORDER — POLYETHYLENE GLYCOL 3350 17 G/17G
17 POWDER, FOR SOLUTION ORAL DAILY PRN
Status: DISCONTINUED | OUTPATIENT
Start: 2025-06-09 | End: 2025-06-13 | Stop reason: HOSPADM

## 2025-06-09 RX ORDER — MOXIFLOXACIN 5 MG/ML
1 SOLUTION/ DROPS OPHTHALMIC 2 TIMES DAILY
COMMUNITY

## 2025-06-09 RX ORDER — OXYCODONE HYDROCHLORIDE 10 MG/1
10 TABLET ORAL EVERY 6 HOURS PRN
Status: DISCONTINUED | OUTPATIENT
Start: 2025-06-09 | End: 2025-06-11

## 2025-06-09 RX ORDER — ALLOPURINOL 100 MG/1
100 TABLET ORAL DAILY
COMMUNITY

## 2025-06-09 RX ORDER — SODIUM CHLORIDE 9 MG/ML
40 INJECTION, SOLUTION INTRAVENOUS AS NEEDED
Status: DISCONTINUED | OUTPATIENT
Start: 2025-06-09 | End: 2025-06-13 | Stop reason: HOSPADM

## 2025-06-09 RX ORDER — CARVEDILOL 6.25 MG/1
6.25 TABLET ORAL 2 TIMES DAILY WITH MEALS
Status: DISCONTINUED | OUTPATIENT
Start: 2025-06-09 | End: 2025-06-13 | Stop reason: HOSPADM

## 2025-06-09 RX ORDER — PANTOPRAZOLE SODIUM 40 MG/1
40 TABLET, DELAYED RELEASE ORAL
Status: DISCONTINUED | OUTPATIENT
Start: 2025-06-10 | End: 2025-06-13 | Stop reason: HOSPADM

## 2025-06-09 RX ORDER — SODIUM CHLORIDE 0.9 % (FLUSH) 0.9 %
10 SYRINGE (ML) INJECTION AS NEEDED
Status: DISCONTINUED | OUTPATIENT
Start: 2025-06-09 | End: 2025-06-13 | Stop reason: HOSPADM

## 2025-06-09 RX ORDER — ELECTROLYTES/DEXTROSE
64 SOLUTION, ORAL ORAL DAILY
COMMUNITY

## 2025-06-09 RX ORDER — IBUPROFEN 600 MG/1
1 TABLET ORAL
Status: DISCONTINUED | OUTPATIENT
Start: 2025-06-09 | End: 2025-06-13 | Stop reason: HOSPADM

## 2025-06-09 RX ADMIN — INSULIN GLARGINE 20 UNITS: 100 INJECTION, SOLUTION SUBCUTANEOUS at 21:42

## 2025-06-09 RX ADMIN — INSULIN LISPRO 3 UNITS: 100 INJECTION, SOLUTION INTRAVENOUS; SUBCUTANEOUS at 17:24

## 2025-06-09 RX ADMIN — RIFAXIMIN 550 MG: 550 TABLET ORAL at 21:41

## 2025-06-09 RX ADMIN — LACTULOSE 20 G: 20 SOLUTION ORAL at 21:41

## 2025-06-09 RX ADMIN — CARVEDILOL 6.25 MG: 6.25 TABLET, FILM COATED ORAL at 17:18

## 2025-06-09 RX ADMIN — LACTULOSE 20 G: 20 SOLUTION ORAL at 17:18

## 2025-06-09 RX ADMIN — DAPTOMYCIN 550 MG: 500 INJECTION, POWDER, LYOPHILIZED, FOR SOLUTION INTRAVENOUS at 21:33

## 2025-06-09 RX ADMIN — Medication 10 ML: at 21:42

## 2025-06-09 RX ADMIN — GABAPENTIN 200 MG: 100 CAPSULE ORAL at 21:41

## 2025-06-09 RX ADMIN — OXYCODONE HYDROCHLORIDE 10 MG: 10 TABLET ORAL at 22:03

## 2025-06-09 RX ADMIN — INSULIN LISPRO 5 UNITS: 100 INJECTION, SOLUTION INTRAVENOUS; SUBCUTANEOUS at 17:18

## 2025-06-09 RX ADMIN — TAMSULOSIN HYDROCHLORIDE 0.4 MG: 0.4 CAPSULE ORAL at 21:41

## 2025-06-09 RX ADMIN — SODIUM CHLORIDE 2000 MG: 900 INJECTION INTRAVENOUS at 22:04

## 2025-06-09 NOTE — ED PROVIDER NOTES
Subjective   History of Present Illness  Mr Gabehart presents with worsening redness, pain, swelling of his left great toe.  Wife tells me he had his toenails trimmed last week and 5 days ago his toe became purple, swollen, draining.  Hospitalized at outside facility for several days.  Cultures grew out 3 bacteria.  Was started on IV daptomycin and Rocephin daily.  Discharged 2 days ago.  Received daptomycin and Rocephin last night.  Wife tells me toe looks more swollen and worse than it did previously.  He reports chills.  Has history of diabetes, on insulin.  Admitted in April for hepatic encephalopathy.      Review of Systems    Past Medical History:   Diagnosis Date    Diabetes     Hypertension        Allergies   Allergen Reactions    Demerol [Meperidine]     Penicillins Provider Review Needed     Has tolerated ceftriaxone, has had RX for cephalexin       Past Surgical History:   Procedure Laterality Date    BACK SURGERY      CARPAL TUNNEL RELEASE      COLONOSCOPY      EYE SURGERY      FOOT SURGERY Left 1986    Ankle fusion    HERNIA REPAIR      KNEE SURGERY Right 12/14/2022    TKA right knee manipulation 01-25-23    TONSILLECTOMY AND ADENOIDECTOMY      TRIGGER FINGER RELEASE      WISDOM TOOTH EXTRACTION         Family History   Problem Relation Age of Onset    Hypertension Mother     Heart attack Mother     Kidney disease Mother     High cholesterol Mother     Arthritis Mother     Heart attack Father        Social History     Socioeconomic History    Marital status:    Tobacco Use    Smoking status: Never    Smokeless tobacco: Never   Vaping Use    Vaping status: Never Used   Substance and Sexual Activity    Alcohol use: Yes     Comment: occasional    Drug use: No    Sexual activity: Defer           Objective   Physical Exam  Vitals and nursing note reviewed.   Constitutional:       General: He is not in acute distress.     Appearance: Normal appearance.   HENT:      Head: Normocephalic and atraumatic.       Nose: Nose normal. No congestion or rhinorrhea.   Eyes:      General: No scleral icterus.     Conjunctiva/sclera: Conjunctivae normal.   Neck:      Comments: No JVD   Cardiovascular:      Rate and Rhythm: Normal rate and regular rhythm.      Heart sounds: No murmur heard.     No friction rub.   Pulmonary:      Effort: Pulmonary effort is normal.      Breath sounds: Normal breath sounds. No wheezing or rales.   Abdominal:      General: Bowel sounds are normal.      Palpations: Abdomen is soft.      Tenderness: There is no abdominal tenderness. There is no guarding or rebound.   Musculoskeletal:         General: Tenderness present.      Cervical back: Normal range of motion and neck supple.      Right lower leg: No edema.      Left lower leg: No edema.      Comments: He has dry ulcer over the tip of his great toe.  There is iodine over his toe.  There is only very minimal swelling, is tender, do not see any streaking running above the ankle.  His foot is not swollen.   Skin:     General: Skin is warm and dry.      Coloration: Skin is not pale.   Neurological:      General: No focal deficit present.      Mental Status: He is alert and oriented to person, place, and time.      Motor: No weakness.      Coordination: Coordination normal.   Psychiatric:         Mood and Affect: Mood normal.         Behavior: Behavior normal.         Thought Content: Thought content normal.         Procedures           ED Course  ED Course as of 06/09/25 1506   Mon Jun 09, 2025   0840 Have reviewed our electronic medical record.  We have no records from his recent hospitalization to an outside hospital.  Wife does not know what bacteria he grew out. [DT]   1037 X-ray suggest osteomyelitis with a little bit of subcu air.  Due for Rocephin and daptomycin at 1800. [DT]      ED Course User Index  [DT] David Lopez MD                                                       Medical Decision Making  Amount and/or Complexity of Data  Reviewed  Labs: ordered.  Radiology: ordered.    Risk  Decision regarding hospitalization.        Final diagnoses:   Osteomyelitis of great toe of left foot   Diabetic ulcer of toe of left foot associated with diabetes mellitus due to underlying condition, with bone involvement without evidence of necrosis       ED Disposition  ED Disposition       ED Disposition   Decision to Admit    Condition   --    Comment   Level of Care: Telemetry [5]   Diagnosis: Osteomyelitis [667927]   Admitting Physician: DUNG ANGEL [[id number]]                 No follow-up provider specified.       Medication List        ASK your doctor about these medications      HumaLOG KwikPen 100 UNIT/ML solution pen-injector  Generic drug: Insulin Lispro (1 Unit Dial)  Ask about: Which instructions should I use?                 David Lopez MD  06/09/25 0433

## 2025-06-09 NOTE — ED NOTES
Jimmy Gabehart    Nursing Report ED to Floor:  Mental status: AO X4  Ambulatory status: Has not ambulated in the ED (great toe wound)  Oxygen Therapy:  RA  Cardiac Rhythm: NSR  Admitted from: Home  Safety Concerns:  Fall Risk  Precautions: None  Social Issues: None  ED Room #:  03    ED Nurse Phone [phone] [phone]  or may call [phone].      HPI:   Chief Complaint   Patient presents with    Toe Pain       Past Medical History:  Past Medical History:   Diagnosis Date    Diabetes     Hypertension         Past Surgical History:  Past Surgical History:   Procedure Laterality Date    BACK SURGERY      CARPAL TUNNEL RELEASE      COLONOSCOPY      EYE SURGERY      FOOT SURGERY Left 1986    Ankle fusion    HERNIA REPAIR      KNEE SURGERY Right 12/14/2022    TKA right knee manipulation 01-25-23    TONSILLECTOMY AND ADENOIDECTOMY      TRIGGER FINGER RELEASE      WISDOM TOOTH EXTRACTION          Admitting Doctor:   Qamar Gomez MD    Consulting Provider(s):  Consults       No orders found from 5/11/2025 to 6/10/2025.             Admitting Diagnosis:   There were no encounter diagnoses.    Most Recent Vitals:   Vitals:    06/09/25 1000 06/09/25 1030 06/09/25 1100 06/09/25 1130   BP:  113/61 115/69 127/59   BP Location:       Patient Position:       Pulse: 64 64 64 63   Resp:       Temp:       TempSrc:       SpO2: 97% 95% 97% 97%   Weight:       Height:           Active LDAs/IV Access:   Lines, Drains & Airways       Active LDAs       None                    Labs (abnormal labs have a star):   Labs Reviewed   COMPREHENSIVE METABOLIC PANEL - Abnormal; Notable for the following components:       Result Value    Glucose 167 (*)     BUN 23.5 (*)     Creatinine 1.49 (*)     Albumin 3.3 (*)     ALT (SGPT) 42 (*)     AST (SGOT) 58 (*)     Alkaline Phosphatase 197 (*)     eGFR 50.2 (*)     All other components within normal limits    Narrative:     GFR Categories in Chronic Kidney Disease (CKD)              GFR Category          GFR  (mL/min/1.73)    Interpretation  G1                    90 or greater        Normal or high (1)  G2                    60-89                Mild decrease (1)  G3a                   45-59                Mild to moderate decrease  G3b                   30-44                Moderate to severe decrease  G4                    15-29                Severe decrease  G5                    14 or less           Kidney failure    (1)In the absence of evidence of kidney disease, neither GFR category G1 or G2 fulfill the criteria for CKD.    eGFR calculation 2021 CKD-EPI creatinine equation, which does not include race as a factor   SEDIMENTATION RATE - Abnormal; Notable for the following components:    Sed Rate 95 (*)     All other components within normal limits   C-REACTIVE PROTEIN - Abnormal; Notable for the following components:    C-Reactive Protein 2.13 (*)     All other components within normal limits   CBC WITH AUTO DIFFERENTIAL - Abnormal; Notable for the following components:    RBC 2.73 (*)     Hemoglobin 9.5 (*)     Hematocrit 29.8 (*)     .2 (*)     MCH 34.8 (*)     RDW 15.7 (*)     RDW-SD 61.8 (*)     Platelets 89 (*)     Monocyte % 14.3 (*)     Immature Grans % 1.1 (*)     All other components within normal limits   LACTIC ACID, PLASMA - Normal   CK - Normal   CBC AND DIFFERENTIAL    Narrative:     The following orders were created for panel order CBC & Differential.  Procedure                               Abnormality         Status                     ---------                               -----------         ------                     CBC Auto Differential[[id number]]        Abnormal            Final result               Scan Slide[[id number]]                                                                    Please view results for these tests on the individual orders.       Meds Given in ED:   Medications - No data to display  No current facility-administered medications for this encounter.       Last  NIH score:                                                          Dysphagia screening results:        Gonzalo Coma Scale:  No data recorded     CIWA:        Restraint Type:            Isolation Status:  No active isolations

## 2025-06-09 NOTE — PROGRESS NOTES
[facility] Medicine Services  PROGRESS NOTE    Patient Name: Jimmy Gabehart  : 1955  MRN: [id number]    Date of Admission: 2025  Primary Care Physician: Miriam Mckeon DO    Subjective   Subjective     CC:  Toe osteo    HPI:  No new issues overnight. Getting ready to go to surgery later today.     Objective   Objective     Vital Signs:   Temp:  [98 °F (36.7 °C)-98.7 °F (37.1 °C)] 98.7 °F (37.1 °C)  Heart Rate:  [59-87] 65  Resp:  [18] 18  BP: (116-161)/(55-80) 137/70     Physical Exam:  Constitutional: No acute distress, awake, alert  HENT: NCAT, mucous membranes moist  Respiratory: Clear to auscultation bilaterally, respiratory effort normal   Cardiovascular: RRR, no murmurs, rubs, or gallops  Gastrointestinal: Positive bowel sounds, soft, nontender, nondistended  Musculoskeletal: No bilateral ankle edema  Psychiatric: Appropriate affect, cooperative  Neurologic: Oriented x 3, strength symmetric in all extremities, Cranial Nerves grossly intact to confrontation, speech clear  Skin: L great toe with eschar distally    Results Reviewed:  LAB RESULTS:      Lab 06/10/25  0440 25  1022   WBC 3.28* 3.56   HEMOGLOBIN 9.4* 9.5*   HEMATOCRIT 28.7* 29.8*   PLATELETS 85* 89*   NEUTROS ABS 1.31* 1.73   IMMATURE GRANS (ABS) 0.02 0.04   LYMPHS ABS 1.44 1.23   MONOS ABS 0.46 0.51   EOS ABS 0.04 0.04   .9* 109.2*   SED RATE  --  95*   CRP  --  2.13*   LACTATE  --  1.7         Lab 06/10/25  0843 25  1022   SODIUM 136 137   POTASSIUM 4.1 4.2   CHLORIDE 103 103   CO2 23.0 24.0   ANION GAP 10.0 10.0   BUN 21.8 23.5*   CREATININE 1.28* 1.49*   EGFR 60.2 50.2*   GLUCOSE 113* 167*   CALCIUM 9.1 8.9         Lab 06/10/25  0843 25  1022   TOTAL PROTEIN 7.5 7.6   ALBUMIN 3.4* 3.3*   GLOBULIN 4.1 4.3   ALT (SGPT) 39 42*   AST (SGOT) 59* 58*   BILIRUBIN 0.5 0.4   ALK PHOS 191* 197*                     Brief Urine Lab Results  (Last result in the past 365 days)        Color    Clarity   Blood   Leuk Est   Nitrite   Protein   CREAT   Urine HCG        04/21/25 1742 Yellow   Clear   Negative   Negative   Negative   Negative                   Microbiology Results Abnormal       None            XR Toe 2+ View Left  Result Date: 6/9/2025  XR TOE 2+ VW LEFT Date of Exam: 6/9/2025 8:46 AM EDT Indication: osteo on gt toe ? Comparison: Foot radiographs 2/8/2023 Findings: Normal soft tissue gas within the first toe. No traumatic malalignment on this nonweightbearing exam. No evidence of fracture. Mild scattered degenerative changes. There appears to be age-indeterminate erosive change/remodeling along the medial tuft of the first distal phalanx.     Impression: Impression: 1.Soft tissue gas in the first toe which could be seen with infection or wound. 2.Age-indeterminate erosive change/remodeling along the medial tuft of the first distal phalanx. This could be seen with osteomyelitis. MRI could be performed for further evaluation as clinically indicated. Electronically Signed: Preston Goodson MD  6/9/2025 9:22 AM EDT  Workstation ID: [id number]          Current medications:  Scheduled Meds:carvedilol, 6.25 mg, Oral, BID With Meals  cefepime, 2,000 mg, Intravenous, Once  cefepime, 2,000 mg, Intravenous, Q8H  cetirizine, 10 mg, Oral, Daily  DAPTOmycin, 6 mg/kg (Adjusted), Intravenous, Q24H  gabapentin, 200 mg, Oral, Nightly  insulin glargine, 20 Units, Subcutaneous, Nightly  insulin lispro, 2-7 Units, Subcutaneous, 4x Daily AC & at Bedtime  Insulin Lispro, 5 Units, Subcutaneous, TID With Meals  lactulose, 20 g, Oral, TID  metroNIDAZOLE, 500 mg, Oral, Q8H  pantoprazole, 40 mg, Oral, Q AM  riFAXIMin, 550 mg, Oral, BID  sodium chloride, 10 mL, Intravenous, Q12H  tamsulosin, 0.4 mg, Oral, Nightly      Continuous Infusions:     PRN Meds:.  senna-docusate sodium **AND** polyethylene glycol **AND** bisacodyl **AND** bisacodyl    Calcium Replacement - Follow Nurse / BPA Driven Protocol    dextrose     dextrose    glucagon (human recombinant)    Magnesium Standard Dose Replacement - Follow Nurse / BPA Driven Protocol    nitroglycerin    ondansetron ODT **OR** ondansetron    oxyCODONE    Phosphorus Replacement - Follow Nurse / BPA Driven Protocol    Potassium Replacement - Follow Nurse / BPA Driven Protocol    sodium chloride    sodium chloride    Assessment & Plan   Assessment & Plan     Active Hospital Problems    Diagnosis  POA    **Osteomyelitis [M86.9]  Yes    Cirrhosis of liver without ascites [K74.60]  Unknown    Peripheral polyneuropathy [G62.9]  Unknown    Osteomyelitis of great toe of left foot [M86.9]  Unknown    Type 2 diabetes mellitus with diabetic neuropathy [E11.40]  Yes    Primary hypertension [I10]  Yes      Resolved Hospital Problems   No resolved problems to display.        Brief Hospital Course to date:  Jimmy Gabehart is a 70 y.o. male with PMH of HTN, T2DM complicated by peripheral neuropathy, and cirrhosis who presented with osteomyelitis of his L great toe.     Plan:    L great toe osteomyelitis/cellulitis, with gas in soft tissue per imaging  -consulted ID  -consulted orthopedic surgery, plan for amputation today, NPO for procedure   -obtain culture data from [facility]  -- CRP elevated at 2.13  -- no leukocytosis and afebrile     Elevated Creatinine  -- appears that he has been 1.1- 1.47 over the last few months, no data available prior to that  -- Cr 1.49 on admission, monitor/renally dose meds      DM  -basal/bolus insulin  -prior A1C 11.5 (April 20, 2025), history of uncontrolled DM.      Cirrhosis  -lactulose/rifaximin  -- LFTs slightly elevated but stable/better today      Peripheral neuropathy  -on neurontin     HTN  -continue appropriate home meds, on coreg     TCP   -unclear etiology, history of BM biopsy, data deficient, possibly due to cirrhosis. Stable today      BPH  -on flomax     GERD  -PPI     Total time spent: Time Spent: Time Spent: 35 minutes  Time spent includes  time reviewing chart, face-to-face time, counseling patient/family/caregiver, ordering medications/tests/procedures, communicating with other health care professionals, documenting clinical information in the electronic health record, and coordination of care.      Expected Discharge Location and Transportation: TBD, likely home post- op  Expected Discharge   Expected Discharge Date: 6/13/2025; Expected Discharge Time:      VTE Prophylaxis:  Mechanical VTE prophylaxis orders are present.         AM-PAC 6 Clicks Score (PT): 22 (06/10/25 0855)    CODE STATUS:   Code Status and Medical Interventions: CPR (Attempt to Resuscitate); Full Support   Ordered at: 06/09/25 1156     Code Status (Patient has no pulse and is not breathing):    CPR (Attempt to Resuscitate)     Medical Interventions (Patient has pulse or is breathing):    Full Support       Rahel Patel MD  06/10/25

## 2025-06-09 NOTE — H&P
[facility] Medicine Services  HISTORY AND PHYSICAL    Patient Name: Jimmy Gabehart  : 1955  MRN: [id number]  Primary Care Physician: Miriam Mckeon DO  Date of admission: 2025      Subjective   Subjective     Chief Complaint: Toe wound    HPI:  Jimmy Gabehart is a 70 y.o. male with history of poorly controlled DM, recently diagnosed cirrhosis, HTN, recent L elbow bursitis, AOCD, thromboyctopenia, with recent BM biopsy, data deficient, here with L great toe wound. Discharged here  for hepatic encephalopathy/elbow bursitis and went to rehab for a few days. Noted L great toe wound, but no change until a few days after rehab noting a scab that opened on his toe. Subsequently went to podiatry and had wound debridement/nail trimming. Subsequent redness/swelling, presented to [facility] for that, found to have cellulitis with suspected osteomyelitis and discharged home with PICC line and advisement to follow up with surgery/ID. Toe with increased swelling. Had drainage at OSH. Reportedly cultures grew 3 organisms, data deficient. He notes chills.     R shoulder pain, persistent, fall prior to  admission, suspected RTC injury.       Personal History     Past Medical History:   Diagnosis Date    Diabetes     Hypertension            Past Surgical History:   Procedure Laterality Date    BACK SURGERY      CARPAL TUNNEL RELEASE      COLONOSCOPY      EYE SURGERY      FOOT SURGERY Left     Ankle fusion    HERNIA REPAIR      KNEE SURGERY Right 2022    TKA right knee manipulation 23    TONSILLECTOMY AND ADENOIDECTOMY      TRIGGER FINGER RELEASE      WISDOM TOOTH EXTRACTION         Family History: family history includes Arthritis in his mother; Heart attack in his father and mother; High cholesterol in his mother; Hypertension in his mother; Kidney disease in his mother.     Social History:  reports that he has never smoked. He has never used smokeless tobacco.  He reports current alcohol use. He reports that he does not use drugs.  Social History     Social History Narrative    Not on file       Medications:  Available home medication information reviewed.  Diclofenac Sodium, Insulin Lispro, SITagliptin, Zinc, carvedilol, dorzolamide-timolol, fexofenadine, fish oil, gabapentin, insulin glargine, lactulose, losartan, magnesium chloride ER, oxyCODONE, pantoprazole, riFAXIMin, and tamsulosin    Allergies   Allergen Reactions    Demerol [Meperidine]     Penicillins Provider Review Needed     Has tolerated ceftriaxone, has had RX for cephalexin       Objective   Objective     Vital Signs:   Temp:  [98.2 °F (36.8 °C)] 98.2 °F (36.8 °C)  Heart Rate:  [63-76] 63  Resp:  [18] 18  BP: (113-127)/(59-69) 127/59       Physical Exam   NAD, alert and oriented  Family at bedside  OP clear, dry MM  RRR  CTAB  +BS, soft  LATHAM  L great toe necrotic  Normal affect  B LE edema      Result Review:  I have personally reviewed the results from the time of this admission to 6/9/2025 11:58 EDT and agree with these findings:  []  Laboratory list / accordion  []  Microbiology  []  Radiology  []  EKG/Telemetry   []  Cardiology/Vascular   []  Pathology  []  Old records  []  Other:  Most notable findings include: Soft tissue gas in L great toe, WBC 3.5, Hgb 9.5, ESR 95, CRP 2.13, LA 1.7      LAB RESULTS:      Lab 06/09/25  1022   WBC 3.56   HEMOGLOBIN 9.5*   HEMATOCRIT 29.8*   PLATELETS 89*   NEUTROS ABS 1.73   IMMATURE GRANS (ABS) 0.04   LYMPHS ABS 1.23   MONOS ABS 0.51   EOS ABS 0.04   .2*   SED RATE 95*   CRP 2.13*   LACTATE 1.7         Lab 06/09/25  1022   SODIUM 137   POTASSIUM 4.2   CHLORIDE 103   CO2 24.0   ANION GAP 10.0   BUN 23.5*   CREATININE 1.49*   EGFR 50.2*   GLUCOSE 167*   CALCIUM 8.9         Lab 06/09/25  1022   TOTAL PROTEIN 7.6   ALBUMIN 3.3*   GLOBULIN 4.3   ALT (SGPT) 42*   AST (SGOT) 58*   BILIRUBIN 0.4   ALK PHOS 197*                     UA          4/21/2025    17:42    Urinalysis   Specific [place], UA 1.011    Ketones, UA Negative    Blood, UA Negative    Leukocytes, UA Negative    Nitrite, UA Negative        Microbiology Results (last 10 days)       ** No results found for the last 240 hours. **            XR Toe 2+ View Left  Result Date: 6/9/2025  XR TOE 2+ VW LEFT Date of Exam: 6/9/2025 8:46 AM EDT Indication: osteo on gt toe ? Comparison: Foot radiographs 2/8/2023 Findings: Normal soft tissue gas within the first toe. No traumatic malalignment on this nonweightbearing exam. No evidence of fracture. Mild scattered degenerative changes. There appears to be age-indeterminate erosive change/remodeling along the medial tuft of the first distal phalanx.     Impression: Impression: 1.Soft tissue gas in the first toe which could be seen with infection or wound. 2.Age-indeterminate erosive change/remodeling along the medial tuft of the first distal phalanx. This could be seen with osteomyelitis. MRI could be performed for further evaluation as clinically indicated. Electronically Signed: Preston Goodson MD  6/9/2025 9:22 AM EDT  Workstation ID: [id number]          Assessment & Plan   Assessment & Plan       Osteomyelitis    Primary hypertension    Type 2 diabetes mellitus with diabetic neuropathy    Cirrhosis of liver without ascites    Peripheral polyneuropathy    L great toe osteomyelitis/cellulitis, with gas in soft tissue per radiography  -consult ID  -consult orthopedic surgery  -obtain culture data from [facility] [place]    DM  -basal/bolus insulin  -prior A1C 11.5, history of uncontrolled DM    Cirrhosis  -lactulose/rifaximin    PN  -on neurontin    HTN  -resume appropriate home meds, on coreg    TCP   -unclear etiology, history of BM biopsy, data deficient, possibly due to cirrhosis    BPH  -on flomax    GERD  -PPI      VTE Prophylaxis:  Mechanical VTE prophylaxis orders are signed & held.            CODE STATUS:    Code Status and Medical Interventions: CPR (Attempt to  Resuscitate); Full Support   Ordered at: 06/09/25 1156     Code Status (Patient has no pulse and is not breathing):    CPR (Attempt to Resuscitate)     Medical Interventions (Patient has pulse or is breathing):    Full Support       Expected Discharge   Expected discharge date/ time has not been documented.     Qamar Gomez MD  06/09/25

## 2025-06-09 NOTE — CASE MANAGEMENT/SOCIAL WORK
Discharge Planning Assessment  [facility]     Patient Name: Jimmy Gabehart  MRN: [id number]  Today's Date: 6/9/2025    Admit Date: 6/9/2025    Plan: IDP   Discharge Needs Assessment       Row Name 06/09/25 1143       Living Environment    People in Home spouse    Current Living Arrangements home    Primary Care Provided by self    Family Caregiver if Needed spouse    Quality of Family Relationships involved    Able to Return to Prior Arrangements yes       Transition Planning    Transportation Anticipated family or friend will provide       Discharge Needs Assessment    Readmission Within the Last 30 Days unable to assess    Equipment Currently Used at Home cpap                   Discharge Plan       Row Name 06/09/25 1143       Plan    Plan IDP    Plan Comments MSW met with Pt and spouse at bedside to complete IDP. Pt lives with wife in [facility]. pt’s PCP is Dr. Carranza and Pt has [employer] Medicare insurance coverage. Pt independent @ baseline; Pt has CPAP, and had [facility] services. Pt’s preferred pharmacy is [facility] in [place]. CM will continue to follow.                  Selected Continued Care - Prior Encounters Includes continued care and service providers with selected services from prior encounters from 3/11/2025 to 6/9/2025      Discharged on 5/2/2025 Admission date: 4/19/2025 - Discharge disposition: Skilled Nursing Facility (DC - External)      Destination       Service Provider Services Address Phone Fax Patient Preferred    [facility] [address] [place] [id number] [phone] --                             Demographic Summary       Row Name 06/09/25 1143       General Information    Arrived From home    Referral Source admission list;community    Reason for Consult discharge planning    Preferred Language English                   Functional Status       Row Name 06/09/25 1143       Functional Status     Usual Activity Tolerance good    Current Activity Tolerance moderate       Functional Status, IADL    Medications independent    Meal Preparation independent    Housekeeping independent    Laundry independent    Shopping independent                               VALENTIN Crockett

## 2025-06-09 NOTE — Clinical Note
Level of Care: Telemetry [5]   Diagnosis: Osteomyelitis [591967]   Admitting Physician: DUNG ANGEL [[id number]]   Is patient appropriate for Inpatient Observation Unit?: No [0]

## 2025-06-10 ENCOUNTER — ANESTHESIA (OUTPATIENT)
Dept: PERIOP | Facility: HOSPITAL | Age: 70
End: 2025-06-10
Payer: MEDICARE

## 2025-06-10 ENCOUNTER — APPOINTMENT (OUTPATIENT)
Dept: MRI IMAGING | Facility: HOSPITAL | Age: 70
DRG: 617 | End: 2025-06-10
Payer: MEDICARE

## 2025-06-10 ENCOUNTER — APPOINTMENT (OUTPATIENT)
Dept: CARDIOLOGY | Facility: HOSPITAL | Age: 70
DRG: 617 | End: 2025-06-10
Payer: MEDICARE

## 2025-06-10 ENCOUNTER — ANESTHESIA EVENT CONVERTED (OUTPATIENT)
Dept: ANESTHESIOLOGY | Facility: HOSPITAL | Age: 70
DRG: 617 | End: 2025-06-10
Payer: MEDICARE

## 2025-06-10 ENCOUNTER — ANESTHESIA EVENT (OUTPATIENT)
Dept: PERIOP | Facility: HOSPITAL | Age: 70
End: 2025-06-10
Payer: MEDICARE

## 2025-06-10 PROBLEM — M86.9 OSTEOMYELITIS OF GREAT TOE OF LEFT FOOT: Status: ACTIVE | Noted: 2025-06-09

## 2025-06-10 LAB
ALBUMIN SERPL-MCNC: 3.4 G/DL (ref 3.5–5.2)
ALBUMIN/GLOB SERPL: 0.8 G/DL
ALP SERPL-CCNC: 191 U/L (ref 39–117)
ALT SERPL W P-5'-P-CCNC: 39 U/L (ref 1–41)
ANION GAP SERPL CALCULATED.3IONS-SCNC: 10 MMOL/L (ref 5–15)
AST SERPL-CCNC: 59 U/L (ref 1–40)
BASOPHILS # BLD AUTO: 0.01 10*3/MM3 (ref 0–0.2)
BASOPHILS NFR BLD AUTO: 0.3 % (ref 0–1.5)
BH CV GRAFT BRACHIAL PRESSURE LEFT: 137 MMHG
BH CV GRAFT BRACHIAL PRESSURE RIGHT: NORMAL MMHG
BH CV LEA LEFT ANT TIBIAL A DISTAL PSV: 82 CM/S
BH CV LEA LEFT CFA DISTAL PSV: 92.2 CM/S
BH CV LEA LEFT CFA PROX PSV: 149 CM/S
BH CV LEA LEFT DFA PROX PSV: 87 CM/S
BH CV LEA LEFT DPA PRESSURE: 155 MMHG
BH CV LEA LEFT PERONEAL  MID PSV: 52.5 CM/S
BH CV LEA LEFT POPITEAL A  DISTAL PSV: 131 CM/S
BH CV LEA LEFT POPITEAL A  PROX PSV: 112 CM/S
BH CV LEA LEFT PTA DISTAL PSV: 75 CM/S
BH CV LEA LEFT PTA PRESSURE: 140 MMHG
BH CV LEA LEFT SFA DISTAL PSV: 120 CM/S
BH CV LEA LEFT SFA MID PSV: 112 CM/S
BH CV LEA LEFT SFA PROX PSV: 116 CM/S
BH CV LEA LEFT TIBEOPERONEAL PSV: 169 CM/S
BH CV LEA RIGHT ANT TIBIAL A DISTAL PSV: 94.6 CM/S
BH CV LEA RIGHT CFA DISTAL PSV: 113 CM/S
BH CV LEA RIGHT CFA PROX PSV: 151 CM/S
BH CV LEA RIGHT DFA PROX PSV: 93.9 CM/S
BH CV LEA RIGHT DPA PRESSURE: 142 MMHG
BH CV LEA RIGHT PERONEAL  MID PSV: 56 CM/S
BH CV LEA RIGHT POPITEAL A  DISTAL PSV: 90.9 CM/S
BH CV LEA RIGHT POPITEAL A  PROX PSV: 124 CM/S
BH CV LEA RIGHT PTA DISTAL PSV: 85.5 CM/S
BH CV LEA RIGHT PTA PRESSURE: 150 MMHG
BH CV LEA RIGHT SFA DISTAL PSV: 117 CM/S
BH CV LEA RIGHT SFA MID PSV: 93.9 CM/S
BH CV LEA RIGHT SFA PROX PSV: 111 CM/S
BH CV LEA RIGHT TIBEOPERONEAL PSV: 123 CM/S
BH CV LOWER ARTERIAL LEFT ABI RATIO: 1.1
BH CV LOWER ARTERIAL RIGHT ABI RATIO: 1.1
BILIRUB SERPL-MCNC: 0.5 MG/DL (ref 0–1.2)
BUN SERPL-MCNC: 21.8 MG/DL (ref 8–23)
BUN/CREAT SERPL: 17 (ref 7–25)
CALCIUM SPEC-SCNC: 9.1 MG/DL (ref 8.6–10.5)
CHLORIDE SERPL-SCNC: 103 MMOL/L (ref 98–107)
CO2 SERPL-SCNC: 23 MMOL/L (ref 22–29)
CREAT SERPL-MCNC: 1.28 MG/DL (ref 0.76–1.27)
DEPRECATED RDW RBC AUTO: 61.1 FL (ref 37–54)
EGFRCR SERPLBLD CKD-EPI 2021: 60.2 ML/MIN/1.73
EOSINOPHIL # BLD AUTO: 0.04 10*3/MM3 (ref 0–0.4)
EOSINOPHIL NFR BLD AUTO: 1.2 % (ref 0.3–6.2)
ERYTHROCYTE [DISTWIDTH] IN BLOOD BY AUTOMATED COUNT: 15.7 % (ref 12.3–15.4)
GLOBULIN UR ELPH-MCNC: 4.1 GM/DL
GLUCOSE BLDC GLUCOMTR-MCNC: 106 MG/DL (ref 70–130)
GLUCOSE BLDC GLUCOMTR-MCNC: 108 MG/DL (ref 70–130)
GLUCOSE BLDC GLUCOMTR-MCNC: 126 MG/DL (ref 70–130)
GLUCOSE BLDC GLUCOMTR-MCNC: 266 MG/DL (ref 70–130)
GLUCOSE BLDC GLUCOMTR-MCNC: 97 MG/DL (ref 70–130)
GLUCOSE SERPL-MCNC: 113 MG/DL (ref 65–99)
HCT VFR BLD AUTO: 28.7 % (ref 37.5–51)
HGB BLD-MCNC: 9.4 G/DL (ref 13–17.7)
IMM GRANULOCYTES # BLD AUTO: 0.02 10*3/MM3 (ref 0–0.05)
IMM GRANULOCYTES NFR BLD AUTO: 0.6 % (ref 0–0.5)
LEFT GROIN CFA SYS: 149 CM/SEC
LYMPHOCYTES # BLD AUTO: 1.44 10*3/MM3 (ref 0.7–3.1)
LYMPHOCYTES NFR BLD AUTO: 43.9 % (ref 19.6–45.3)
MCH RBC QN AUTO: 35.3 PG (ref 26.6–33)
MCHC RBC AUTO-ENTMCNC: 32.8 G/DL (ref 31.5–35.7)
MCV RBC AUTO: 107.9 FL (ref 79–97)
MONOCYTES # BLD AUTO: 0.46 10*3/MM3 (ref 0.1–0.9)
MONOCYTES NFR BLD AUTO: 14 % (ref 5–12)
NEUTROPHILS NFR BLD AUTO: 1.31 10*3/MM3 (ref 1.7–7)
NEUTROPHILS NFR BLD AUTO: 40 % (ref 42.7–76)
NRBC BLD AUTO-RTO: 0 /100 WBC (ref 0–0.2)
PLATELET # BLD AUTO: 85 10*3/MM3 (ref 140–450)
PMV BLD AUTO: 12.3 FL (ref 6–12)
POTASSIUM SERPL-SCNC: 4.1 MMOL/L (ref 3.5–5.2)
PROT SERPL-MCNC: 7.5 G/DL (ref 6–8.5)
RBC # BLD AUTO: 2.66 10*6/MM3 (ref 4.14–5.8)
RIGHT GROIN CFA SYS: 151 CM/SEC
SODIUM SERPL-SCNC: 136 MMOL/L (ref 136–145)
WBC NRBC COR # BLD AUTO: 3.28 10*3/MM3 (ref 3.4–10.8)

## 2025-06-10 PROCEDURE — 25010000002 CEFEPIME PER 500 MG: Performed by: INTERNAL MEDICINE

## 2025-06-10 PROCEDURE — 93925 LOWER EXTREMITY STUDY: CPT

## 2025-06-10 PROCEDURE — 25010000002 FENTANYL CITRATE (PF) 50 MCG/ML SOLUTION

## 2025-06-10 PROCEDURE — 25010000002 PROPOFOL 10 MG/ML EMULSION: Performed by: ANESTHESIOLOGY

## 2025-06-10 PROCEDURE — 94660 CPAP INITIATION&MGMT: CPT

## 2025-06-10 PROCEDURE — 63710000001 INSULIN LISPRO (HUMAN) PER 5 UNITS: Performed by: ORTHOPAEDIC SURGERY

## 2025-06-10 PROCEDURE — 80053 COMPREHEN METABOLIC PANEL: CPT | Performed by: HOSPITALIST

## 2025-06-10 PROCEDURE — 87206 SMEAR FLUORESCENT/ACID STAI: CPT | Performed by: ORTHOPAEDIC SURGERY

## 2025-06-10 PROCEDURE — 25010000002 VANCOMYCIN 1 G RECONSTITUTED SOLUTION: Performed by: ORTHOPAEDIC SURGERY

## 2025-06-10 PROCEDURE — 87075 CULTR BACTERIA EXCEPT BLOOD: CPT | Performed by: ORTHOPAEDIC SURGERY

## 2025-06-10 PROCEDURE — 94799 UNLISTED PULMONARY SVC/PX: CPT

## 2025-06-10 PROCEDURE — 25010000002 CEFEPIME PER 500 MG: Performed by: ORTHOPAEDIC SURGERY

## 2025-06-10 PROCEDURE — 82948 REAGENT STRIP/BLOOD GLUCOSE: CPT

## 2025-06-10 PROCEDURE — 25010000002 PHENYLEPHRINE HCL-NACL 1000-0.9 MCG/10ML-% SOLUTION PREFILLED SYRINGE: Performed by: ANESTHESIOLOGY

## 2025-06-10 PROCEDURE — 25010000002 DEXAMETHASONE PER 1 MG: Performed by: ANESTHESIOLOGY

## 2025-06-10 PROCEDURE — 87116 MYCOBACTERIA CULTURE: CPT | Performed by: ORTHOPAEDIC SURGERY

## 2025-06-10 PROCEDURE — 87070 CULTURE OTHR SPECIMN AEROBIC: CPT | Performed by: INTERNAL MEDICINE

## 2025-06-10 PROCEDURE — 88305 TISSUE EXAM BY PATHOLOGIST: CPT | Performed by: ORTHOPAEDIC SURGERY

## 2025-06-10 PROCEDURE — 99233 SBSQ HOSP IP/OBS HIGH 50: CPT | Performed by: INTERNAL MEDICINE

## 2025-06-10 PROCEDURE — 25010000002 DAPTOMYCIN PER 1 MG: Performed by: ORTHOPAEDIC SURGERY

## 2025-06-10 PROCEDURE — 25010000002 DEXAMETHASONE SODIUM PHOSPHATE 10 MG/ML SOLUTION: Performed by: NURSE ANESTHETIST, CERTIFIED REGISTERED

## 2025-06-10 PROCEDURE — 87205 SMEAR GRAM STAIN: CPT | Performed by: INTERNAL MEDICINE

## 2025-06-10 PROCEDURE — 25010000002 ONDANSETRON PER 1 MG: Performed by: HOSPITALIST

## 2025-06-10 PROCEDURE — 93925 LOWER EXTREMITY STUDY: CPT | Performed by: INTERNAL MEDICINE

## 2025-06-10 PROCEDURE — 25010000002 BUPIVACAINE (PF) 0.25 % SOLUTION: Performed by: NURSE ANESTHETIST, CERTIFIED REGISTERED

## 2025-06-10 PROCEDURE — 63710000001 INSULIN GLARGINE PER 5 UNITS: Performed by: ORTHOPAEDIC SURGERY

## 2025-06-10 PROCEDURE — 0Y6Q0Z0 DETACHMENT AT LEFT 1ST TOE, COMPLETE, OPEN APPROACH: ICD-10-PCS | Performed by: ORTHOPAEDIC SURGERY

## 2025-06-10 PROCEDURE — 25010000002 FENTANYL CITRATE (PF) 50 MCG/ML SOLUTION: Performed by: ANESTHESIOLOGY

## 2025-06-10 PROCEDURE — 87102 FUNGUS ISOLATION CULTURE: CPT | Performed by: ORTHOPAEDIC SURGERY

## 2025-06-10 PROCEDURE — 25010000002 CEFAZOLIN IN DEXTROSE 2-4 GM/100ML-% SOLUTION: Performed by: ANESTHESIOLOGY

## 2025-06-10 PROCEDURE — 73718 MRI LOWER EXTREMITY W/O DYE: CPT

## 2025-06-10 PROCEDURE — 85025 COMPLETE CBC W/AUTO DIFF WBC: CPT | Performed by: HOSPITALIST

## 2025-06-10 PROCEDURE — 25010000002 LIDOCAINE PF 1% 1 % SOLUTION: Performed by: ANESTHESIOLOGY

## 2025-06-10 PROCEDURE — 25810000003 LACTATED RINGERS PER 1000 ML: Performed by: ORTHOPAEDIC SURGERY

## 2025-06-10 RX ORDER — COLCHICINE 0.6 MG/1
0.6 TABLET ORAL DAILY
Status: DISCONTINUED | OUTPATIENT
Start: 2025-06-10 | End: 2025-06-13 | Stop reason: HOSPADM

## 2025-06-10 RX ORDER — EPHEDRINE SULFATE 50 MG/ML
INJECTION INTRAVENOUS AS NEEDED
Status: DISCONTINUED | OUTPATIENT
Start: 2025-06-10 | End: 2025-06-10 | Stop reason: SURG

## 2025-06-10 RX ORDER — FAMOTIDINE 10 MG/ML
20 INJECTION, SOLUTION INTRAVENOUS ONCE
Status: CANCELLED | OUTPATIENT
Start: 2025-06-10 | End: 2025-06-10

## 2025-06-10 RX ORDER — LIDOCAINE HYDROCHLORIDE 10 MG/ML
0.5 INJECTION, SOLUTION EPIDURAL; INFILTRATION; INTRACAUDAL; PERINEURAL ONCE AS NEEDED
Status: DISCONTINUED | OUTPATIENT
Start: 2025-06-10 | End: 2025-06-10 | Stop reason: HOSPADM

## 2025-06-10 RX ORDER — CEFAZOLIN SODIUM 2 G/100ML
INJECTION, SOLUTION INTRAVENOUS AS NEEDED
Status: DISCONTINUED | OUTPATIENT
Start: 2025-06-10 | End: 2025-06-10 | Stop reason: SURG

## 2025-06-10 RX ORDER — ALLOPURINOL 100 MG/1
100 TABLET ORAL DAILY
Status: DISCONTINUED | OUTPATIENT
Start: 2025-06-10 | End: 2025-06-13 | Stop reason: HOSPADM

## 2025-06-10 RX ORDER — SODIUM CHLORIDE 0.9 % (FLUSH) 0.9 %
10 SYRINGE (ML) INJECTION EVERY 12 HOURS SCHEDULED
Status: CANCELLED | OUTPATIENT
Start: 2025-06-10

## 2025-06-10 RX ORDER — SODIUM CHLORIDE, SODIUM LACTATE, POTASSIUM CHLORIDE, CALCIUM CHLORIDE 600; 310; 30; 20 MG/100ML; MG/100ML; MG/100ML; MG/100ML
9 INJECTION, SOLUTION INTRAVENOUS CONTINUOUS
Status: ACTIVE | OUTPATIENT
Start: 2025-06-10 | End: 2025-06-11

## 2025-06-10 RX ORDER — ONDANSETRON 2 MG/ML
4 INJECTION INTRAMUSCULAR; INTRAVENOUS ONCE AS NEEDED
Status: DISCONTINUED | OUTPATIENT
Start: 2025-06-10 | End: 2025-06-10 | Stop reason: HOSPADM

## 2025-06-10 RX ORDER — BUPIVACAINE HYDROCHLORIDE 2.5 MG/ML
INJECTION, SOLUTION EPIDURAL; INFILTRATION; INTRACAUDAL; PERINEURAL
Status: COMPLETED | OUTPATIENT
Start: 2025-06-10 | End: 2025-06-10

## 2025-06-10 RX ORDER — FAMOTIDINE 20 MG/1
20 TABLET, FILM COATED ORAL
Status: DISCONTINUED | OUTPATIENT
Start: 2025-06-10 | End: 2025-06-10 | Stop reason: HOSPADM

## 2025-06-10 RX ORDER — SODIUM CHLORIDE, SODIUM LACTATE, POTASSIUM CHLORIDE, CALCIUM CHLORIDE 600; 310; 30; 20 MG/100ML; MG/100ML; MG/100ML; MG/100ML
9 INJECTION, SOLUTION INTRAVENOUS CONTINUOUS
Status: ACTIVE | OUTPATIENT
Start: 2025-06-11 | End: 2025-06-11

## 2025-06-10 RX ORDER — DEXAMETHASONE SODIUM PHOSPHATE 10 MG/ML
INJECTION, SOLUTION INTRAMUSCULAR; INTRAVENOUS
Status: COMPLETED | OUTPATIENT
Start: 2025-06-10 | End: 2025-06-10

## 2025-06-10 RX ORDER — LIDOCAINE HYDROCHLORIDE 10 MG/ML
INJECTION, SOLUTION EPIDURAL; INFILTRATION; INTRACAUDAL; PERINEURAL AS NEEDED
Status: DISCONTINUED | OUTPATIENT
Start: 2025-06-10 | End: 2025-06-10 | Stop reason: SURG

## 2025-06-10 RX ORDER — MIDAZOLAM HYDROCHLORIDE 1 MG/ML
0.5 INJECTION, SOLUTION INTRAMUSCULAR; INTRAVENOUS
Status: DISCONTINUED | OUTPATIENT
Start: 2025-06-10 | End: 2025-06-10 | Stop reason: HOSPADM

## 2025-06-10 RX ORDER — SODIUM CHLORIDE 0.9 % (FLUSH) 0.9 %
10 SYRINGE (ML) INJECTION AS NEEDED
Status: CANCELLED | OUTPATIENT
Start: 2025-06-10

## 2025-06-10 RX ORDER — FENTANYL CITRATE 50 UG/ML
INJECTION, SOLUTION INTRAMUSCULAR; INTRAVENOUS
Status: COMPLETED
Start: 2025-06-10 | End: 2025-06-10

## 2025-06-10 RX ORDER — VANCOMYCIN HYDROCHLORIDE 1 G/20ML
INJECTION, POWDER, LYOPHILIZED, FOR SOLUTION INTRAVENOUS AS NEEDED
Status: DISCONTINUED | OUTPATIENT
Start: 2025-06-10 | End: 2025-06-10 | Stop reason: HOSPADM

## 2025-06-10 RX ORDER — DEXAMETHASONE SODIUM PHOSPHATE 4 MG/ML
INJECTION, SOLUTION INTRA-ARTICULAR; INTRALESIONAL; INTRAMUSCULAR; INTRAVENOUS; SOFT TISSUE AS NEEDED
Status: DISCONTINUED | OUTPATIENT
Start: 2025-06-10 | End: 2025-06-10 | Stop reason: SURG

## 2025-06-10 RX ORDER — FENTANYL CITRATE 50 UG/ML
50 INJECTION, SOLUTION INTRAMUSCULAR; INTRAVENOUS
Status: DISCONTINUED | OUTPATIENT
Start: 2025-06-10 | End: 2025-06-10 | Stop reason: HOSPADM

## 2025-06-10 RX ORDER — FAMOTIDINE 20 MG/1
20 TABLET, FILM COATED ORAL ONCE
Status: CANCELLED | OUTPATIENT
Start: 2025-06-10 | End: 2025-06-10

## 2025-06-10 RX ORDER — METRONIDAZOLE 500 MG/1
500 TABLET ORAL EVERY 8 HOURS SCHEDULED
Status: DISCONTINUED | OUTPATIENT
Start: 2025-06-10 | End: 2025-06-13 | Stop reason: HOSPADM

## 2025-06-10 RX ORDER — PHENYLEPHRINE HCL IN 0.9% NACL 1 MG/10 ML
SYRINGE (ML) INTRAVENOUS AS NEEDED
Status: DISCONTINUED | OUTPATIENT
Start: 2025-06-10 | End: 2025-06-10 | Stop reason: SURG

## 2025-06-10 RX ORDER — PROPOFOL 10 MG/ML
VIAL (ML) INTRAVENOUS AS NEEDED
Status: DISCONTINUED | OUTPATIENT
Start: 2025-06-10 | End: 2025-06-10 | Stop reason: SURG

## 2025-06-10 RX ORDER — HYDROMORPHONE HYDROCHLORIDE 1 MG/ML
0.5 INJECTION, SOLUTION INTRAMUSCULAR; INTRAVENOUS; SUBCUTANEOUS
Refills: 0 | Status: DISCONTINUED | OUTPATIENT
Start: 2025-06-10 | End: 2025-06-10 | Stop reason: HOSPADM

## 2025-06-10 RX ORDER — OXYCODONE HYDROCHLORIDE 5 MG/1
5 TABLET ORAL EVERY 4 HOURS PRN
Qty: 42 TABLET | Refills: 0 | Status: SHIPPED | OUTPATIENT
Start: 2025-06-10

## 2025-06-10 RX ADMIN — ONDANSETRON 4 MG: 2 INJECTION INTRAMUSCULAR; INTRAVENOUS at 15:55

## 2025-06-10 RX ADMIN — DEXAMETHASONE SODIUM PHOSPHATE 4 MG: 4 INJECTION, SOLUTION INTRAMUSCULAR; INTRAVENOUS at 15:55

## 2025-06-10 RX ADMIN — FAMOTIDINE 20 MG: 20 TABLET, FILM COATED ORAL at 15:25

## 2025-06-10 RX ADMIN — CEFEPIME 2000 MG: 2 INJECTION, POWDER, FOR SOLUTION INTRAVENOUS at 21:39

## 2025-06-10 RX ADMIN — EPHEDRINE SULFATE 10 MG: 50 INJECTION INTRAVENOUS at 15:45

## 2025-06-10 RX ADMIN — BUPIVACAINE HYDROCHLORIDE 30 ML: 2.5 INJECTION, SOLUTION EPIDURAL; INFILTRATION; INTRACAUDAL; PERINEURAL at 15:16

## 2025-06-10 RX ADMIN — DEXAMETHASONE SODIUM PHOSPHATE 2 MG: 10 INJECTION INTRAMUSCULAR; INTRAVENOUS at 15:16

## 2025-06-10 RX ADMIN — RIFAXIMIN 550 MG: 550 TABLET ORAL at 08:55

## 2025-06-10 RX ADMIN — CARVEDILOL 6.25 MG: 6.25 TABLET, FILM COATED ORAL at 17:54

## 2025-06-10 RX ADMIN — PROPOFOL 200 MG: 10 INJECTION, EMULSION INTRAVENOUS at 15:34

## 2025-06-10 RX ADMIN — CEFEPIME 2000 MG: 2 INJECTION, POWDER, FOR SOLUTION INTRAVENOUS at 11:45

## 2025-06-10 RX ADMIN — RIFAXIMIN 550 MG: 550 TABLET ORAL at 21:29

## 2025-06-10 RX ADMIN — EPHEDRINE SULFATE 10 MG: 50 INJECTION INTRAVENOUS at 15:41

## 2025-06-10 RX ADMIN — CARVEDILOL 6.25 MG: 6.25 TABLET, FILM COATED ORAL at 08:55

## 2025-06-10 RX ADMIN — OXYCODONE HYDROCHLORIDE 10 MG: 10 TABLET ORAL at 18:07

## 2025-06-10 RX ADMIN — CETIRIZINE HYDROCHLORIDE 10 MG: 10 TABLET, FILM COATED ORAL at 08:55

## 2025-06-10 RX ADMIN — ALLOPURINOL 100 MG: 100 TABLET ORAL at 12:17

## 2025-06-10 RX ADMIN — DEXAMETHASONE SODIUM PHOSPHATE 2 MG: 10 INJECTION, SOLUTION INTRAMUSCULAR; INTRAVENOUS at 15:18

## 2025-06-10 RX ADMIN — METRONIDAZOLE 500 MG: 500 TABLET ORAL at 21:29

## 2025-06-10 RX ADMIN — OXYCODONE HYDROCHLORIDE 10 MG: 10 TABLET ORAL at 09:05

## 2025-06-10 RX ADMIN — FENTANYL CITRATE 50 MCG: 50 INJECTION, SOLUTION INTRAMUSCULAR; INTRAVENOUS at 16:14

## 2025-06-10 RX ADMIN — DAPTOMYCIN 550 MG: 500 INJECTION, POWDER, LYOPHILIZED, FOR SOLUTION INTRAVENOUS at 21:31

## 2025-06-10 RX ADMIN — INSULIN LISPRO 4 UNITS: 100 INJECTION, SOLUTION INTRAVENOUS; SUBCUTANEOUS at 21:30

## 2025-06-10 RX ADMIN — LACTULOSE 20 G: 20 SOLUTION ORAL at 08:55

## 2025-06-10 RX ADMIN — FENTANYL CITRATE 50 MCG: 50 INJECTION, SOLUTION INTRAMUSCULAR; INTRAVENOUS at 16:23

## 2025-06-10 RX ADMIN — Medication 10 ML: at 21:30

## 2025-06-10 RX ADMIN — CEFAZOLIN SODIUM 2 G: 2 INJECTION, SOLUTION INTRAVENOUS at 15:40

## 2025-06-10 RX ADMIN — METRONIDAZOLE 500 MG: 500 TABLET ORAL at 17:55

## 2025-06-10 RX ADMIN — GABAPENTIN 200 MG: 100 CAPSULE ORAL at 21:30

## 2025-06-10 RX ADMIN — LACTULOSE 20 G: 20 SOLUTION ORAL at 21:29

## 2025-06-10 RX ADMIN — Medication 200 MCG: at 15:44

## 2025-06-10 RX ADMIN — SODIUM CHLORIDE, POTASSIUM CHLORIDE, SODIUM LACTATE AND CALCIUM CHLORIDE 9 ML/HR: 600; 310; 30; 20 INJECTION, SOLUTION INTRAVENOUS at 15:11

## 2025-06-10 RX ADMIN — INSULIN GLARGINE 20 UNITS: 100 INJECTION, SOLUTION SUBCUTANEOUS at 21:29

## 2025-06-10 RX ADMIN — TAMSULOSIN HYDROCHLORIDE 0.4 MG: 0.4 CAPSULE ORAL at 21:30

## 2025-06-10 RX ADMIN — LIDOCAINE HYDROCHLORIDE 50 MG: 10 INJECTION, SOLUTION EPIDURAL; INFILTRATION; INTRACAUDAL; PERINEURAL at 15:34

## 2025-06-10 RX ADMIN — BUPIVACAINE HYDROCHLORIDE 30 ML: 2.5 INJECTION, SOLUTION EPIDURAL; INFILTRATION; INTRACAUDAL; PERINEURAL at 15:18

## 2025-06-10 RX ADMIN — Medication 10 ML: at 09:00

## 2025-06-10 NOTE — ANESTHESIA PROCEDURE NOTES
Peripheral Block      Patient reassessed immediately prior to procedure    Start time: 6/10/2025 3:14 PM  Stop time: 6/10/2025 3:16 PM  Reason for block: at surgeon's request and post-op pain management  Performed by  CRNA/CAA: Ramses Rodriguez, CRNA  Assisted by: Margy Samaniego RN  Preanesthetic Checklist  Completed: patient identified, IV checked, site marked, risks and benefits discussed, surgical consent, monitors and equipment checked, pre-op evaluation and timeout performed  Prep:  Pt Position: supine  Sterile barriers:cap, gloves, mask, sterile barriers and washed/disinfected hands  Prep: ChloraPrep  Patient monitoring: blood pressure monitoring, continuous pulse oximetry and EKG  Procedure  Performed under: spinal  Guidance:ultrasound guided    ULTRASOUND INTERPRETATION.  Using ultrasound guidance a 20 G gauge needle was placed in close proximity to the nerve, at which point, under ultrasound guidance anesthetic was injected in the area of the nerve and spread of the anesthesia was seen on ultrasound in close proximity thereto.  There were no abnormalities seen on ultrasound; a digital image was taken; and the patient tolerated the procedure with no complications. Images:still images obtained, printed/placed on chart    Laterality:left  Block Type:adductor canal block  Injection Technique:single-shot  Needle Type:echogenic and short-bevel  Needle Gauge:20 G  Resistance on Injection: none    Medications Used: dexamethasone sodium phosphate injection - Injection   2 mg - 6/10/2025 3:16:00 PM  bupivacaine PF (MARCAINE) 0.25 % injection - Injection   30 mL - 6/10/2025 3:16:00 PM      Medications  Preservative Free Saline:5ml    Post Assessment  Injection Assessment: negative aspiration for heme, incremental injection and no paresthesia on injection  Patient Tolerance:comfortable throughout block  Complications:no  Additional Notes  SINGLE shot   A high-frequency linear transducer, with sterile cover, was  "placed on the anterior mid-thigh (between the anterior superior iliac spine and patella). The transducer was then moved medially to identify the Sartorius muscle (Arina), Vastus Medialis muscle (VMM), Superficial Femoral Artery (SFA) and Vein. The transducer was then moved cephalad or caudad to position the SFA in the middle of the Arina. The insertion site was prepped and draped in sterile fashion. Skin and cutaneous tissue was infiltrated with 2-5 ml of 1% Lidocaine. Using ultrasound-guidance, a 20-gauge B-Saxena 4\" Ultraplex 360 non-stimulating echogenic needle was advanced in plane from lateral to medial. Preservative-free normal saline was utilized for hydro-dissection of tissue, advancement of needle, and to confirm needle placement below the fascial plane of the Arina where the Nerve to the VMM is located. Local anesthetic (LA) 5 ml deposited here. The needle continues its path lateral to the SFA at the level of the Saphenous Nerve. The remainder of the LA was deposited at the 10-11 o'clock position of the SFA. This injection created a space between the Arina and the SFA. Aspiration every 5 ml to prevent intravascular injection. Injection was completed with negative aspiration of blood and negative intravascular injection. Injection pressures were normal with minimal resistance.   Performed by: Ramses Rodriguez, CRNA            "

## 2025-06-10 NOTE — CONSULTS
INFECTIOUS DISEASE CONSULT/INITIAL HOSPITAL VISIT    Jimmy Gabehart  1955  [id number]    Date of consult: 6/10/25    Admit date: 6/9/2025    Requesting Provider: Dr. Patel  Evaluating physician: Allan Drake MD  Reason for Consultation: L great toe osteo   Chief Complaint: toe wound      Subjective   History of present illness:  Jimmy Gabehart is a  70 y.o.  Yr old male with a pmh of HTN, recently diagnosed cirrhosis, recent left elbow bursitis, thrombocytopenia, anemia of chronic disease, and poorly controlled DM who presented on 6/9 with left great toe wound. The patient had recently been discharged from [facility] after an admission for hepatic encephalopathy and left elbow bursitis and went to inpatient rehab for a few days. He had a left great toe wound and had a scab that opened on his toe and subsequently went to podiatry after discharged from inpatient rehab. His wound was debrided by podiatry and he had nail trimming. He subsequently experienced redness, swelling, and pain over his left great toe and he presented to [facility] for cellulitis and was also suspected to have osteomyelitis at that time. He was discharged home with PICC line and IV antibiotics (data deficient) and advised to follow up with surgery and ID. His toe started to have worsening swelling and drainage. Cultures at [facility] grew 3 different organisms but currently data deficient regarding cultures. He was experiencing some chills but no fevers. He presented to the ER yesterday due to worsening left great toe swelling.     Since arrival, the patient has remained afebrile. He has pancytopenia with wbc 3.28, hgb 9.4, and plt count of 85. Cr was 1.5 on arrival but has trended down to 1.28. CRP was 2.13 and ESR was 95. Left foot xray showed soft tissue gas in the first toe and erosive changes consistent with osteomyelitis. Lower extremity arterial duplex has been ordered. Orthopedic surgery has been consulted  and planning on left foot MRI. Tentative plan is for left hallux amputation at the level of the metatarsophalangeal joint this afternoon. The patient has been started on IV daptomycin and ceftriaxone by the primary team. ID has been consulted for abx recs.     Past Medical History:   Diagnosis Date    Diabetes     Hypertension        Past Surgical History:   Procedure Laterality Date    BACK SURGERY      CARPAL TUNNEL RELEASE      COLONOSCOPY      EYE SURGERY      FOOT SURGERY Left 1986    Ankle fusion    HERNIA REPAIR      KNEE SURGERY Right 12/14/2022    TKA right knee manipulation 01-25-23    TONSILLECTOMY AND ADENOIDECTOMY      TRIGGER FINGER RELEASE      WISDOM TOOTH EXTRACTION         Pediatric History   Patient Parents    Not on file     Other Topics Concern    Not on file   Social History Narrative    Not on file       family history includes Arthritis in his mother; Heart attack in his father and mother; High cholesterol in his mother; Hypertension in his mother; Kidney disease in his mother.    Allergies   Allergen Reactions    Demerol [Meperidine]     Penicillins Provider Review Needed     Has tolerated ceftriaxone, has had RX for cephalexin       Immunization History   Administered Date(s) Administered    COVID-19 (PFIZER) BIVALENT 12+YRS 11/14/2022    COVID-19 (PFIZER) Purple Cap Monovalent 08/30/2021, 09/20/2021       Medication:    Current Facility-Administered Medications:     sennosides-docusate (PERICOLACE) 8.6-50 MG per tablet 2 tablet, 2 tablet, Oral, BID PRN **AND** polyethylene glycol (MIRALAX) packet 17 g, 17 g, Oral, Daily PRN **AND** bisacodyl (DULCOLAX) EC tablet 5 mg, 5 mg, Oral, Daily PRN **AND** bisacodyl (DULCOLAX) suppository 10 mg, 10 mg, Rectal, Daily PRN, Qamar Gomez MD    Calcium Replacement - Follow Nurse / BPA Driven Protocol, , Not Applicable, PRN, Qamar Gomez MD    carvedilol (COREG) tablet 6.25 mg, 6.25 mg, Oral, BID With Meals, Qamar Gomez MD, 6.25 mg at  06/10/25 0855    cefTRIAXone (ROCEPHIN) 2,000 mg in sodium chloride 0.9 % 100 mL MBP, 2,000 mg, Intravenous, Q24H, Qamar Gomez MD, Stopped at 06/10/25 0502    cetirizine (zyrTEC) tablet 10 mg, 10 mg, Oral, Daily, Qamar Gomez MD, 10 mg at 06/10/25 0855    DAPTOmycin (CUBICIN) 550 mg in sodium chloride 0.9 % 50 mL IVPB, 6 mg/kg (Adjusted), Intravenous, Q24H, Dale Hull, PharmD, Stopped at 06/10/25 0501    dextrose (D50W) (25 g/50 mL) IV injection 25 g, 25 g, Intravenous, Q15 Min PRN, Qamar Gomez MD    dextrose (GLUTOSE) oral gel 15 g, 15 g, Oral, Q15 Min PRN, Qamar Gomez MD    gabapentin (NEURONTIN) capsule 200 mg, 200 mg, Oral, Nightly, Qamar Gomez MD, 200 mg at 06/09/25 2141    glucagon (GLUCAGEN) injection 1 mg, 1 mg, Intramuscular, Q15 Min PRN, Qamar Gomez MD    insulin glargine (LANTUS, SEMGLEE) injection 20 Units, 20 Units, Subcutaneous, Nightly, Qamar Gomez MD, 20 Units at 06/09/25 2142    Insulin Lispro (humaLOG) injection 2-7 Units, 2-7 Units, Subcutaneous, 4x Daily AC & at Bedtime, Qamar Gomez MD, 3 Units at 06/09/25 1724    Insulin Lispro (humaLOG) injection 5 Units, 5 Units, Subcutaneous, TID With Meals, Qamar Gomez MD, 5 Units at 06/09/25 1718    lactulose (CHRONULAC) 10 GM/15ML solution 20 g, 20 g, Oral, TID, Qamar Gomez MD, 20 g at 06/10/25 0855    Magnesium Standard Dose Replacement - Follow Nurse / BPA Driven Protocol, , Not Applicable, PRN, Qamar Gomez MD    nitroglycerin (NITROSTAT) SL tablet 0.4 mg, 0.4 mg, Sublingual, Q5 Min PRN, Qamar Gomez MD    ondansetron ODT (ZOFRAN-ODT) disintegrating tablet 4 mg, 4 mg, Oral, Q6H PRN **OR** ondansetron (ZOFRAN) injection 4 mg, 4 mg, Intravenous, Q6H PRN, Qamar Gomez MD    oxyCODONE (ROXICODONE) immediate release tablet 10 mg, 10 mg, Oral, Q6H PRN, Qamar Gomez MD, 10 mg at 06/10/25 0905    pantoprazole (PROTONIX) EC tablet 40 mg, 40 mg, Oral, Q AM, Qamar Gomez MD    Pharmacy  Consult, , Not Applicable, Continuous PRJason RAY Marc P, MD    Phosphorus Replacement - Follow Nurse / BPA Driven Protocol, , Not Applicable, Jason MITCHELL Marc P, MD    Potassium Replacement - Follow Nurse / BPA Driven Protocol, , Not Applicable, Jason MITCHELL Marc P, MD    riFAXIMin (XIFAXAN) tablet 550 mg, 550 mg, Oral, BID, Qamar Gomez MD, 550 mg at 06/10/25 0855    sodium chloride 0.9 % flush 10 mL, 10 mL, Intravenous, Q12H, Qamar Gomez MD, 10 mL at 06/10/25 0900    sodium chloride 0.9 % flush 10 mL, 10 mL, Intravenous, PRN, Qamar Gomez MD    sodium chloride 0.9 % infusion 40 mL, 40 mL, Intravenous, PRJason RAY Marc P, MD    tamsulosin (FLOMAX) 24 hr capsule 0.4 mg, 0.4 mg, Oral, Nightly, Qamar Gomez MD, 0.4 mg at 06/09/25 2381    Please refer to the medical record for a full medication list    Review of Systems:    Constitutional-- No Fever. + chills.  Appetite good, and no malaise. No fatigue.  HEENT-- No new vision, hearing or throat complaints.  No epistaxis or oral sores.  Denies odynophagia or dysphagia.  No odynophagia or dysphagia. No headache, photophobia or neck stiffness.  CV-- No chest pain, palpitation or syncope  Resp-- No SOB/cough/Hemoptysis  GI- No nausea, vomiting, or diarrhea.  No hematochezia, melena, or hematemesis. Denies jaundice or chronic liver disease.  -- No dysuria, hematuria, or flank pain.  Denies hesitancy, urgency or flank pain.  Lymph- no swollen lymph nodes in neck/axilla or groin.   Heme- No active bruising or bleeding;   MS-- left great toe wound with swelling and drainage. Otherwise, no swelling or pain in the bones or joints of arms/legs.  No new back pain.  Neuro-- No acute focal weakness or numbness in the arms or legs.  No seizures.  Skin--No rashes or lesions    Physical Exam:   Vital Signs   Temp:  [98 °F (36.7 °C)-98.5 °F (36.9 °C)] 98 °F (36.7 °C)  Heart Rate:  [59-87] 65  Resp:  [18] 18  BP: (113-161)/(55-80) 127/66    Temp  Min: 98 °F  "(36.7 °C)  Max: 98.5 °F (36.9 °C)  BP  Min: 113/61  Max: 161/70  Pulse  Min: 59  Max: 87  Resp  Min: 18  Max: 18  SpO2  Min: 93 %  Max: 98 %    Blood pressure 127/66, pulse 65, temperature 98 °F (36.7 °C), temperature source Oral, resp. rate 18, height 188 cm (74\"), weight 107 kg (235 lb 8 oz), SpO2 93%.  GENERAL: Awake and alert, in no acute distress. Frail appearing  HEENT:  Normocephalic, atraumatic.  Oropharynx without thrush.  Ears externally normal, Nose externally normal.  EYES: PERRL. No conjunctival injection. No icterus.   HEART: RRR, no murmur  LUNGS: Clear to auscultation and percussion. No respiratory distress, no use of accessory muscles.  ABDOMEN: Soft, nontender, nondistended. No appreciable HSM.  Bowel sounds normal.  GENITAL: no Castorena catheter  SKIN: no generalized rashes.  No peripheral stigmata of infective endocarditis noted.  PSYCHIATRIC: cooperative.  Appropriate mood and affect  EXT:  left great toe with necrotic wound over distal toe with some swelling of the toe. No erythema or active drainage noted.  NEURO: awake alert and oriented ×4.  Normal speech and cognition    Left upper extremity PICC line in place, no erythema or drainage at site       File Link    Scan on 6/9/2025 1445 by Jazmine Seay RN        [facility] Information    Document ID File Type Document Type Description   [id number] Image WOUND IMAGE      Import Information    Attached At Date Time User Dept   Encounter Level 6/9/2025  2:45 PM Jazmine Seay RN  [facility]     Encounter    Hospital Encounter on 6/9/25       Results Review:   I reviewed the patient's new clinical results.  I reviewed the patient's new imaging results and agree with the interpretation.  I reviewed the patient's other test results and agree with the interpretation    Results from last 7 days   Lab Units 06/10/25  0440 06/09/25  1022   WBC 10*3/mm3 3.28* 3.56   HEMOGLOBIN g/dL 9.4* 9.5*   HEMATOCRIT % 28.7* 29.8*   PLATELETS 10*3/mm3 85* 89* " "    Results from last 7 days   Lab Units 06/10/25  0843   SODIUM mmol/L 136   POTASSIUM mmol/L 4.1   CHLORIDE mmol/L 103   CO2 mmol/L 23.0   BUN mg/dL 21.8   CREATININE mg/dL 1.28*   GLUCOSE mg/dL 113*   CALCIUM mg/dL 9.1     Results from last 7 days   Lab Units 06/10/25  0843   ALK PHOS U/L 191*   BILIRUBIN mg/dL 0.5   ALT (SGPT) U/L 39   AST (SGOT) U/L 59*     Results from last 7 days   Lab Units 06/09/25  1022   SED RATE mm/hr 95*     Results from last 7 days   Lab Units 06/09/25  1022   CRP mg/dL 2.13*         Results from last 7 days   Lab Units 06/09/25  1022   LACTATE mmol/L 1.7     Estimated Creatinine Clearance: 70 mL/min (A) (by C-G formula based on SCr of 1.28 mg/dL (H)).  CPK          6/9/2025    10:22   Common Labs   Creatine Kinase 33       Procalitonin Results:       Brief Urine Lab Results  (Last result in the past 365 days)        Color   Clarity   Blood   Leuk Est   Nitrite   Protein   CREAT   Urine HCG        04/21/25 1742 Yellow   Clear   Negative   Negative   Negative   Negative                  No results found for: \"SITE\", \"ALLENTEST\", \"PHART\", \"NEV8DEV\", \"PO2ART\", \"JQM2TPD\", \"BASEEXCESS\", \"Q7FNWNHD\", \"HGBBG\", \"HCTABG\", \"OXYHEMOGLOBI\", \"METHHGBN\", \"CARBOXYHGB\", \"CO2CT\", \"BAROMETRIC\", \"MODALITY\", \"FIO2\"     Microbiology:    Micro from [facility]:    Left great toe wound cx: Pseudomonas (s-cefepime, cipro, levaquin, zosyn) , enterococcus faecalis, staph hemolyticus      Radiology:  Imaging Results (Last 72 Hours)       Procedure Component Value Units Date/Time    XR Toe 2+ View Left [[id number]] Collected: 06/09/25 0914     Updated: 06/09/25 0925    Narrative:      XR TOE 2+ VW LEFT    Date of Exam: 6/9/2025 8:46 AM EDT    Indication: osteo on gt toe ?    Comparison: Foot radiographs 2/8/2023    Findings:  Normal soft tissue gas within the first toe. No traumatic malalignment on this nonweightbearing exam. No evidence of fracture. Mild scattered degenerative changes. There appears to be " age-indeterminate erosive change/remodeling along the medial tuft of   the first distal phalanx.      Impression:      Impression:  1.Soft tissue gas in the first toe which could be seen with infection or wound.  2.Age-indeterminate erosive change/remodeling along the medial tuft of the first distal phalanx. This could be seen with osteomyelitis. MRI could be performed for further evaluation as clinically indicated.        Electronically Signed: Preston Goodson MD    6/9/2025 9:22 AM EDT    Workstation ID: [id number]            IMPRESSION:     Problems:  Left great toe wound with cellulitis and osteomyelitis- left great toe wound culture from OSH grew pseudomonas, enterococcus faecalis, and staph hemolyticus  Poorly controlled DM  Cirrhosis of the liver  Chronic pancytopenia- likely due to cirrhosis  Renal dysfunction- unclear baseline Cr. His Cr was elevated to 1.49 on arrival but has improved to 1.28  PCN allergy- has tolerated cephalosporins ok thus far    RECOMMENDATIONS:  -follow cbc and cmp. Baseline CPK level ok at 33  -follow pending arterial studies  -follow pending MRI left foot  -orthopedic surgery follow and planning on possible left great toe amputation at level of MTP joint this afternoon  - agree with daptomycin for now  - stop ceftriaxone  - start cefepime 2g IV q8h  - start flagyl 500 mg PO TID    I discussed in length with the patient and his wife at bedside today    I called the micro lab at [facility]  today    Thank you for asking me to see Jimmy Gabehart.  Our group would be pleased to follow this patient over the course of their hospitalization and assist with outpatient antimicrobial therapy, as indicated.  Further recommendations depend on the results of the cultures and clinical course.    This visit included the following complex service elements:  Complex medical decision-making associated with antimicrobial prescribing.  In-depth chart review with high level synthesis for complex  diagnoses.  Communicated with the clinical microbiology lab.     Allan Drake MD  6/10/2025

## 2025-06-10 NOTE — ANESTHESIA PROCEDURE NOTES
Peripheral Block      Patient reassessed immediately prior to procedure    Reason for block: at surgeon's request and post-op pain management  Performed by  CRNA/CAA: Ramses Rodriguez CRNA  Assisted by: Margy Samaniego RN  Preanesthetic Checklist  Completed: patient identified, IV checked, site marked, risks and benefits discussed, surgical consent, monitors and equipment checked, pre-op evaluation and timeout performed  Prep:  Pt Position: supine  Sterile barriers:cap, gloves, mask, sterile barriers and washed/disinfected hands  Prep: ChloraPrep  Patient monitoring: blood pressure monitoring, continuous pulse oximetry and EKG  Procedure  Performed under: spinal  Guidance:ultrasound guided    ULTRASOUND INTERPRETATION.  Using ultrasound guidance a 20 G gauge needle was placed in close proximity to the nerve, at which point, under ultrasound guidance anesthetic was injected in the area of the nerve and spread of the anesthesia was seen on ultrasound in close proximity thereto.  There were no abnormalities seen on ultrasound; a digital image was taken; and the patient tolerated the procedure with no complications. Images:still images obtained, printed/placed on chart    Laterality:left  Block Type:adductor canal block  Injection Technique:single-shot  Needle Type:echogenic and short-bevel  Needle Gauge:20 G  Resistance on Injection: none          Medications  Preservative Free Saline:5ml    Post Assessment  Injection Assessment: negative aspiration for heme, incremental injection and no paresthesia on injection  Patient Tolerance:comfortable throughout block  Complications:no  Additional Notes  SINGLE shot   A high-frequency linear transducer, with sterile cover, was placed on the anterior mid-thigh (between the anterior superior iliac spine and patella). The transducer was then moved medially to identify the Sartorius muscle (Arina), Vastus Medialis muscle (VMM), Superficial Femoral Artery (SFA) and Vein. The  "transducer was then moved cephalad or caudad to position the SFA in the middle of the Arina. The insertion site was prepped and draped in sterile fashion. Skin and cutaneous tissue was infiltrated with 2-5 ml of 1% Lidocaine. Using ultrasound-guidance, a 20-gauge B-Saxena 4\" Ultraplex 360 non-stimulating echogenic needle was advanced in plane from lateral to medial. Preservative-free normal saline was utilized for hydro-dissection of tissue, advancement of needle, and to confirm needle placement below the fascial plane of the Arina where the Nerve to the VMM is located. Local anesthetic (LA) 5 ml deposited here. The needle continues its path lateral to the SFA at the level of the Saphenous Nerve. The remainder of the LA was deposited at the 10-11 o'clock position of the SFA. This injection created a space between the Arina and the SFA. Aspiration every 5 ml to prevent intravascular injection. Injection was completed with negative aspiration of blood and negative intravascular injection. Injection pressures were normal with minimal resistance.   Performed by: Ramses Rodriguez, BETITO            "

## 2025-06-10 NOTE — ANESTHESIA PROCEDURE NOTES
Peripheral Block      Patient reassessed immediately prior to procedure    Patient location during procedure: pre-op  Reason for block: at surgeon's request and post-op pain management  Performed by  CRNA/CAA: Ramses Rodriguez, CRNA  Preanesthetic Checklist  Completed: patient identified, IV checked, site marked, risks and benefits discussed, surgical consent, monitors and equipment checked, pre-op evaluation and timeout performed  Prep:  Sterile barriers:cap, gloves, mask and washed/disinfected hands  Prep: ChloraPrep  Patient monitoring: blood pressure monitoring, continuous pulse oximetry and EKG  Procedure    Sedation: yes  Performed under: local infiltration  Guidance:ultrasound guided    ULTRASOUND INTERPRETATION.  Using ultrasound guidance a 20 G gauge needle was placed in close proximity to the nerve, at which point, under ultrasound guidance anesthetic was injected in the area of the nerve and spread of the anesthesia was seen on ultrasound in close proximity thereto.  There were no abnormalities seen on ultrasound; a digital image was taken; and the patient tolerated the procedure with no complications. Images:still images obtained, printed/placed on chart    Laterality:left  Block Type:popliteal  Injection Technique:single-shot  Needle Type:echogenic and short-bevel  Needle Gauge:20 G  Resistance on Injection: none          Medications  Preservative Free Saline:5ml    Post Assessment  Injection Assessment: negative aspiration for heme, no paresthesia on injection and incremental injection  Patient Tolerance:comfortable throughout block  Complications:no  Additional Notes  SINGLE shot    A high-frequency linear transducer, with sterile cover, was placed in the popliteal fossa to identify the popliteal artery and vein, Tibial nerve (TN) and Common Peroneal nerve (CP). The transducer was then moved in a cephalad fashion to observe the TN and CP nerve bifurcation to form the Sciatic Nerve. The insertion site  "was prepped and draped in sterile fashion. Skin and cutaneous tissue was infiltrated with 2-5 ml of 1% Lidocaine. Using ultrasound-guidance, a 20-gauge B-Saxena 4\" Ultraplex 360 non-stimulating echogenic needle was then inserted and advanced in plane from lateral to medial. Preservative-free normal saline was utilized for hydro-dissection of tissue, advancement of Touhy, and to confirm final needle placement posterior to the nerves. Local anesthetic injection spread, in incremental 3-5 ml injections, to surround both nerve structures. Aspiration every 5 ml to prevent intravascular injection. Injection was completed with negative aspiration of blood and negative intravascular injection. Injection pressures were normal with minimal resistance    Performed by: Ramses Rodriguez, CRNA            "

## 2025-06-10 NOTE — PLAN OF CARE
Problem: Adult Inpatient Plan of Care  Goal: Plan of Care Review  Outcome: Progressing  Goal: Patient-Specific Goal (Individualized)  Outcome: Progressing  Goal: Absence of Hospital-Acquired Illness or Injury  Outcome: Progressing  Intervention: Identify and Manage Fall Risk  Recent Flowsheet Documentation  Taken 6/10/2025 0200 by Ramon Galarza RN  Safety Promotion/Fall Prevention:   activity supervised   assistive device/personal items within reach   clutter free environment maintained   fall prevention program maintained   lighting adjusted   nonskid shoes/slippers when out of bed   room organization consistent   safety round/check completed  Taken 6/10/2025 0000 by Ramon Galarza RN  Safety Promotion/Fall Prevention:   activity supervised   assistive device/personal items within reach   clutter free environment maintained   fall prevention program maintained   lighting adjusted   nonskid shoes/slippers when out of bed   safety round/check completed   room organization consistent  Taken 6/9/2025 2203 by Ramon Galarza RN  Safety Promotion/Fall Prevention:   activity supervised   assistive device/personal items within reach   clutter free environment maintained   fall prevention program maintained   lighting adjusted   nonskid shoes/slippers when out of bed   safety round/check completed   room organization consistent  Taken 6/9/2025 2000 by Ramon Galarza RN  Safety Promotion/Fall Prevention:   activity supervised   assistive device/personal items within reach   clutter free environment maintained   fall prevention program maintained   lighting adjusted   nonskid shoes/slippers when out of bed   room organization consistent   safety round/check completed  Intervention: Prevent Skin Injury  Recent Flowsheet Documentation  Taken 6/10/2025 0200 by Ramon Galarza, RN  Body Position: position changed independently  Skin Protection: transparent dressing maintained  Taken 6/10/2025 0000 by Ramon Galarza  RN  Body Position: position changed independently  Skin Protection:   transparent dressing maintained   silicone foam dressing in place  Taken 6/9/2025 2203 by Ramon Galarza RN  Body Position: position changed independently  Skin Protection:   transparent dressing maintained   silicone foam dressing in place   incontinence pads utilized  Taken 6/9/2025 2000 by Ramon Galarza RN  Body Position: position changed independently  Skin Protection:   silicone foam dressing in place   transparent dressing maintained  Intervention: Prevent and Manage VTE (Venous Thromboembolism) Risk  Recent Flowsheet Documentation  Taken 6/9/2025 2000 by Ramon Galarza RN  VTE Prevention/Management:   bilateral   SCDs (sequential compression devices) off  Intervention: Prevent Infection  Recent Flowsheet Documentation  Taken 6/10/2025 0000 by Ramon Galarza RN  Infection Prevention:   hand hygiene promoted   rest/sleep promoted  Taken 6/9/2025 2203 by Ramon Galarza RN  Infection Prevention:   rest/sleep promoted   hand hygiene promoted  Taken 6/9/2025 2000 by Ramon Galarza RN  Infection Prevention: hand hygiene promoted  Goal: Optimal Comfort and Wellbeing  Outcome: Progressing  Intervention: Monitor Pain and Promote Comfort  Recent Flowsheet Documentation  Taken 6/9/2025 2000 by Ramon Galarza RN  Pain Management Interventions: pillow support provided  Intervention: Provide Person-Centered Care  Recent Flowsheet Documentation  Taken 6/10/2025 0000 by Ramon Galarza RN  Trust Relationship/Rapport: care explained  Taken 6/9/2025 2203 by Ramon Galarza RN  Trust Relationship/Rapport: care explained  Taken 6/9/2025 2000 by Ramon Galarza RN  Trust Relationship/Rapport: care explained  Goal: Readiness for Transition of Care  Outcome: Progressing   Goal Outcome Evaluation:

## 2025-06-10 NOTE — ANESTHESIA PREPROCEDURE EVALUATION
Anesthesia Evaluation     Patient summary reviewed and Nursing notes reviewed   NPO Solid Status: > 8 hours  NPO Liquid Status: > 8 hours           Airway   Mallampati: II  TM distance: >3 FB  Neck ROM: full  No difficulty expected  Dental - normal exam     Pulmonary     breath sounds clear to auscultation  Cardiovascular   Exercise tolerance: unable to assess    Rhythm: regular  Rate: normal        Neuro/Psych  GI/Hepatic/Renal/Endo      Musculoskeletal     Abdominal    Substance History      OB/GYN          Other                    Anesthesia Plan    ASA 3     general with block     (Popiteal block for post op pain control)    Anesthetic plan, risks, benefits, and alternatives have been provided, discussed and informed consent has been obtained with: patient.    CODE STATUS:    Code Status (Patient has no pulse and is not breathing): CPR (Attempt to Resuscitate)  Medical Interventions (Patient has pulse or is breathing): Full Support

## 2025-06-10 NOTE — DISCHARGE PLACEMENT REQUEST
"Gabehart, Jimmy (70 y.o. Male)       Date of Birth   1955    Social Security Number       Address   [address] [address]    Home Phone   [phone]    MRN   [id number]       Denominational   Unknown    Marital Status                               Admission Date   6/9/2025    Admission Type   Emergency    Admitting Provider   Rahel Patel MD    Attending Provider   Rahel Patel MD    Department, Room/Bed   [facility] OR/MAIN OR       Discharge Date       Discharge Disposition       Discharge Destination                                 Attending Provider: Rahel Patel MD    Allergies: Demerol [Meperidine], Penicillins    Isolation: None   Infection: None   Code Status: CPR    Ht: 188 cm (74\")   Wt: 107 kg (235 lb 8 oz)    Admission Cmt: None   Principal Problem: Osteomyelitis [M86.9]                   Active Insurance as of 6/9/2025       Primary Coverage       Payor Plan Insurance Group Employer/Plan Group    HUMANA MEDICARE REPLACEMENT [id number]       Payor Plan Address Payor Plan Phone Number Payor Plan Fax Number Effective Dates    [address] [address] [phone]  6/1/2025 - None Entered    [facility] [address]         Subscriber Name Subscriber Birth Date Member ID       GABEHART,JIMMY 1955 [id number]                     Emergency Contacts        (Rel.) Home Phone Work Phone Mobile Phone    Gabehart,Sherry (Other) -- -- [phone]              Insurance Information                  HUMANA MEDICARE REPLACEMENT/HUMANA MEDICARE ADVANTAGE [facility] Phone: [phone]    Subscriber: Gabehart, Jimmy Subscriber#: [id number]    Group#: [id number] Precert#: --    Authorization#: [id number] Effective Date: --          Problem List           Codes Noted - Resolved       Hospital    * (Principal) Osteomyelitis ICD-10-CM: M86.9  ICD-9-CM: 730.20 6/9/2025 - Present    Cirrhosis of liver " without ascites ICD-10-CM: K74.60  ICD-9-CM: 571.5 2025 - Present    Peripheral polyneuropathy ICD-10-CM: G62.9  ICD-9-CM: 356.9 2025 - Present    Osteomyelitis of great toe of left foot ICD-10-CM: M86.9  ICD-9-CM: 730.27 2025 - Present    Type 2 diabetes mellitus with diabetic neuropathy ICD-10-CM: E11.40  ICD-9-CM: 250.60, 357.2 2025 - Present    Primary hypertension ICD-10-CM: I10  ICD-9-CM: 401.9 2017 - Present       Non-Hospital    Hepatic encephalopathy ICD-10-CM: K76.82  ICD-9-CM: 572.2 2025 - Present    Elevated liver enzymes ICD-10-CM: R74.8  ICD-9-CM: 790.5 2025 - Present    History of diabetic ulcer of foot ICD-10-CM: Z86.31  ICD-9-CM: V12.29 2025 - Present    Osteoarthritis, generalized ICD-10-CM: M15.9  ICD-9-CM: 715.00 2025 - Present    Cervical radiculopathy ICD-10-CM: M54.12  ICD-9-CM: 723.4 10/18/2022 - Present    Cervical spondylosis ICD-10-CM: M47.812  ICD-9-CM: 721.0 2019 - Present    Lumbar spondylosis ICD-10-CM: M47.816  ICD-9-CM: 721.3 2019 - Present    Long-term current use of opiate analgesic ICD-10-CM: Z79.891  ICD-9-CM: V58.69 2019 - Present    Pain ICD-10-CM: R52  ICD-9-CM: 780.96 2016 - Present    Shoulder pain, bilateral ICD-10-CM: M25.511, M25.512  ICD-9-CM: 719.41 2016 - Present    Bursitis/tendonitis, shoulder ICD-10-CM: [id number]  ICD-9-CM: 726.10 2016 - Present        History & Physical        Qamar Gomez MD at [facility]              [facility] Medicine Services  HISTORY AND PHYSICAL    Patient Name: Jimmy Gabehart  : 1955  MRN: [id number]  Primary Care Physician: Miriam Mckeon DO  Date of admission: 2025      Subjective  Subjective     Chief Complaint: Toe wound    HPI:  Jimmy Gabehart is a 70 y.o. male with history of poorly controlled DM, recently diagnosed cirrhosis, HTN, recent L elbow bursitis, AOCD, thromboyctopenia, with recent BM biopsy, data deficient,  here with L great toe wound. Discharged here 5/2 for hepatic encephalopathy/elbow bursitis and went to rehab for a few days. Noted L great toe wound, but no change until a few days after rehab noting a scab that opened on his toe. Subsequently went to podiatry and had wound debridement/nail trimming. Subsequent redness/swelling, presented to [facility] for that, found to have cellulitis with suspected osteomyelitis and discharged home with PICC line and advisement to follow up with surgery/ID. Toe with increased swelling. Had drainage at OSH. Reportedly cultures grew 3 organisms, data deficient. He notes chills.     R shoulder pain, persistent, fall prior to 5/2 admission, suspected RTC injury.       Personal History     Past Medical History:   Diagnosis Date    Diabetes     Hypertension            Past Surgical History:   Procedure Laterality Date    BACK SURGERY      CARPAL TUNNEL RELEASE      COLONOSCOPY      EYE SURGERY      FOOT SURGERY Left 1986    Ankle fusion    HERNIA REPAIR      KNEE SURGERY Right 12/14/2022    TKA right knee manipulation 01-25-23    TONSILLECTOMY AND ADENOIDECTOMY      TRIGGER FINGER RELEASE      WISDOM TOOTH EXTRACTION         Family History: family history includes Arthritis in his mother; Heart attack in his father and mother; High cholesterol in his mother; Hypertension in his mother; Kidney disease in his mother.     Social History:  reports that he has never smoked. He has never used smokeless tobacco. He reports current alcohol use. He reports that he does not use drugs.  Social History     Social History Narrative    Not on file       Medications:  Available home medication information reviewed.  Diclofenac Sodium, Insulin Lispro, SITagliptin, Zinc, carvedilol, dorzolamide-timolol, fexofenadine, fish oil, gabapentin, insulin glargine, lactulose, losartan, magnesium chloride ER, oxyCODONE, pantoprazole, riFAXIMin, and tamsulosin    Allergies   Allergen Reactions    Demerol  [Meperidine]     Penicillins Provider Review Needed     Has tolerated ceftriaxone, has had RX for cephalexin       Objective  Objective     Vital Signs:   Temp:  [98.2 °F (36.8 °C)] 98.2 °F (36.8 °C)  Heart Rate:  [63-76] 63  Resp:  [18] 18  BP: (113-127)/(59-69) 127/59       Physical Exam   NAD, alert and oriented  Family at bedside  OP clear, dry MM  RRR  CTAB  +BS, soft  LATHAM  L great toe necrotic  Normal affect  B LE edema      Result Review:  I have personally reviewed the results from the time of this admission to 6/9/2025 11:58 EDT and agree with these findings:  []  Laboratory list / accordion  []  Microbiology  []  Radiology  []  EKG/Telemetry   []  Cardiology/Vascular   []  Pathology  []  Old records  []  Other:  Most notable findings include: Soft tissue gas in L great toe, WBC 3.5, Hgb 9.5, ESR 95, CRP 2.13, LA 1.7      LAB RESULTS:      Lab 06/09/25  1022   WBC 3.56   HEMOGLOBIN 9.5*   HEMATOCRIT 29.8*   PLATELETS 89*   NEUTROS ABS 1.73   IMMATURE GRANS (ABS) 0.04   LYMPHS ABS 1.23   MONOS ABS 0.51   EOS ABS 0.04   .2*   SED RATE 95*   CRP 2.13*   LACTATE 1.7         Lab 06/09/25  1022   SODIUM 137   POTASSIUM 4.2   CHLORIDE 103   CO2 24.0   ANION GAP 10.0   BUN 23.5*   CREATININE 1.49*   EGFR 50.2*   GLUCOSE 167*   CALCIUM 8.9         Lab 06/09/25  1022   TOTAL PROTEIN 7.6   ALBUMIN 3.3*   GLOBULIN 4.3   ALT (SGPT) 42*   AST (SGOT) 58*   BILIRUBIN 0.4   ALK PHOS 197*                     UA          4/21/2025    17:42   Urinalysis   Specific [place], UA 1.011    Ketones, UA Negative    Blood, UA Negative    Leukocytes, UA Negative    Nitrite, UA Negative        Microbiology Results (last 10 days)       ** No results found for the last 240 hours. **            XR Toe 2+ View Left  Result Date: 6/9/2025  XR TOE 2+ VW LEFT Date of Exam: 6/9/2025 8:46 AM EDT Indication: osteo on gt toe ? Comparison: Foot radiographs 2/8/2023 Findings: Normal soft tissue gas within the first toe. No traumatic  malalignment on this nonweightbearing exam. No evidence of fracture. Mild scattered degenerative changes. There appears to be age-indeterminate erosive change/remodeling along the medial tuft of the first distal phalanx.     Impression: Impression: 1.Soft tissue gas in the first toe which could be seen with infection or wound. 2.Age-indeterminate erosive change/remodeling along the medial tuft of the first distal phalanx. This could be seen with osteomyelitis. MRI could be performed for further evaluation as clinically indicated. Electronically Signed: Preston Goodson MD  6/9/2025 9:22 AM EDT  Workstation ID: [id number]          Assessment & Plan  Assessment & Plan       Osteomyelitis    Primary hypertension    Type 2 diabetes mellitus with diabetic neuropathy    Cirrhosis of liver without ascites    Peripheral polyneuropathy    L great toe osteomyelitis/cellulitis, with gas in soft tissue per radiography  -consult ID  -consult orthopedic surgery  -obtain culture data from [facility]    DM  -basal/bolus insulin  -prior A1C 11.5, history of uncontrolled DM    Cirrhosis  -lactulose/rifaximin    PN  -on neurontin    HTN  -resume appropriate home meds, on coreg    TCP   -unclear etiology, history of BM biopsy, data deficient, possibly due to cirrhosis    BPH  -on flomax    GERD  -PPI      VTE Prophylaxis:  Mechanical VTE prophylaxis orders are signed & held.            CODE STATUS:    Code Status and Medical Interventions: CPR (Attempt to Resuscitate); Full Support   Ordered at: 06/09/25 1156     Code Status (Patient has no pulse and is not breathing):    CPR (Attempt to Resuscitate)     Medical Interventions (Patient has pulse or is breathing):    Full Support       Expected Discharge   Expected discharge date/ time has not been documented.     Qamar Gomez MD  06/09/25      Electronically signed by Qamar Gomez MD at 06/09/25 1206          Emergency Department Notes        Bebe Cordova, CHERELLE at 06/09/25  1145           [facility]    Nursing Report ED to Floor:  Mental status: AO X4  Ambulatory status: Has not ambulated in the ED (great toe wound)  Oxygen Therapy:  RA  Cardiac Rhythm: NSR  Admitted from: Home  Safety Concerns:  Fall Risk  Precautions: None  Social Issues: None  ED Room #:  03    ED Nurse Phone [place] [phone]  or may call [phone].      HPI:   Chief Complaint   Patient presents with    Toe Pain       Past Medical History:  Past Medical History:   Diagnosis Date    Diabetes     Hypertension         Past Surgical History:  Past Surgical History:   Procedure Laterality Date    BACK SURGERY      CARPAL TUNNEL RELEASE      COLONOSCOPY      EYE SURGERY      FOOT SURGERY Left 1986    Ankle fusion    HERNIA REPAIR      KNEE SURGERY Right 12/14/2022    TKA right knee manipulation 01-25-23    TONSILLECTOMY AND ADENOIDECTOMY      TRIGGER FINGER RELEASE      WISDOM TOOTH EXTRACTION          Admitting Doctor:   Qamar Gomez MD    Consulting Provider(s):  Consults       No orders found from 5/11/2025 to 6/10/2025.             Admitting Diagnosis:   There were no encounter diagnoses.    Most Recent Vitals:   Vitals:    06/09/25 1000 06/09/25 1030 06/09/25 1100 06/09/25 1130   BP:  113/61 115/69 127/59   BP Location:       Patient Position:       Pulse: 64 64 64 63   Resp:       Temp:       TempSrc:       SpO2: 97% 95% 97% 97%   Weight:       Height:           Active LDAs/IV Access:   Lines, Drains & Airways       Active LDAs       None                    Labs (abnormal labs have a star):   Labs Reviewed   COMPREHENSIVE METABOLIC PANEL - Abnormal; Notable for the following components:       Result Value    Glucose 167 (*)     BUN 23.5 (*)     Creatinine 1.49 (*)     Albumin 3.3 (*)     ALT (SGPT) 42 (*)     AST (SGOT) 58 (*)     Alkaline Phosphatase 197 (*)     eGFR 50.2 (*)     All other components within normal limits    Narrative:     GFR Categories in Chronic Kidney Disease (CKD)              GFR Category           GFR (mL/min/1.73)    Interpretation  G1                    90 or greater        Normal or high (1)  G2                    60-89                Mild decrease (1)  G3a                   45-59                Mild to moderate decrease  G3b                   30-44                Moderate to severe decrease  G4                    15-29                Severe decrease  G5                    14 or less           Kidney failure    (1)In the absence of evidence of kidney disease, neither GFR category G1 or G2 fulfill the criteria for CKD.    eGFR calculation 2021 CKD-EPI creatinine equation, which does not include race as a factor   SEDIMENTATION RATE - Abnormal; Notable for the following components:    Sed Rate 95 (*)     All other components within normal limits   C-REACTIVE PROTEIN - Abnormal; Notable for the following components:    C-Reactive Protein 2.13 (*)     All other components within normal limits   CBC WITH AUTO DIFFERENTIAL - Abnormal; Notable for the following components:    RBC 2.73 (*)     Hemoglobin 9.5 (*)     Hematocrit 29.8 (*)     .2 (*)     MCH 34.8 (*)     RDW 15.7 (*)     RDW-SD 61.8 (*)     Platelets 89 (*)     Monocyte % 14.3 (*)     Immature Grans % 1.1 (*)     All other components within normal limits   LACTIC ACID, PLASMA - Normal   CK - Normal   CBC AND DIFFERENTIAL    Narrative:     The following orders were created for panel order CBC & Differential.  Procedure                               Abnormality         Status                     ---------                               -----------         ------                     CBC Auto Differential[[id number]]        Abnormal            Final result               Scan Slide[[id number]]                                                                    Please view results for these tests on the individual orders.       Meds Given in ED:   Medications - No data to display  No current facility-administered medications for this encounter.        Last NIH score:                                                          Dysphagia screening results:        [place] Coma Scale:  No data recorded     CIWA:        Restraint Type:            Isolation Status:  No active isolations          Electronically signed by Bebe Cordova RN at 06/09/25 1146       David Lopez MD at 06/09/25 0876          Subjective   History of Present Illness  Mr Gabehart presents with worsening redness, pain, swelling of his left great toe.  Wife tells me he had his toenails trimmed last week and 5 days ago his toe became purple, swollen, draining.  Hospitalized at outside facility for several days.  Cultures grew out 3 bacteria.  Was started on IV daptomycin and Rocephin daily.  Discharged 2 days ago.  Received daptomycin and Rocephin last night.  Wife tells me toe looks more swollen and worse than it did previously.  He reports chills.  Has history of diabetes, on insulin.  Admitted in April for hepatic encephalopathy.      Review of Systems    Past Medical History:   Diagnosis Date    Diabetes     Hypertension        Allergies   Allergen Reactions    Demerol [Meperidine]     Penicillins Provider Review Needed     Has tolerated ceftriaxone, has had RX for cephalexin       Past Surgical History:   Procedure Laterality Date    BACK SURGERY      CARPAL TUNNEL RELEASE      COLONOSCOPY      EYE SURGERY      FOOT SURGERY Left 1986    Ankle fusion    HERNIA REPAIR      KNEE SURGERY Right 12/14/2022    TKA right knee manipulation 01-25-23    TONSILLECTOMY AND ADENOIDECTOMY      TRIGGER FINGER RELEASE      WISDOM TOOTH EXTRACTION         Family History   Problem Relation Age of Onset    Hypertension Mother     Heart attack Mother     Kidney disease Mother     High cholesterol Mother     Arthritis Mother     Heart attack Father        Social History     Socioeconomic History    Marital status:    Tobacco Use    Smoking status: Never    Smokeless tobacco: Never   Vaping Use     Vaping status: Never Used   Substance and Sexual Activity    Alcohol use: Yes     Comment: occasional    Drug use: No    Sexual activity: Defer           Objective   Physical Exam  Vitals and nursing note reviewed.   Constitutional:       General: He is not in acute distress.     Appearance: Normal appearance.   HENT:      Head: Normocephalic and atraumatic.      Nose: Nose normal. No congestion or rhinorrhea.   Eyes:      General: No scleral icterus.     Conjunctiva/sclera: Conjunctivae normal.   Neck:      Comments: No JVD   Cardiovascular:      Rate and Rhythm: Normal rate and regular rhythm.      Heart sounds: No murmur heard.     No friction rub.   Pulmonary:      Effort: Pulmonary effort is normal.      Breath sounds: Normal breath sounds. No wheezing or rales.   Abdominal:      General: Bowel sounds are normal.      Palpations: Abdomen is soft.      Tenderness: There is no abdominal tenderness. There is no guarding or rebound.   Musculoskeletal:         General: Tenderness present.      Cervical back: Normal range of motion and neck supple.      Right lower leg: No edema.      Left lower leg: No edema.      Comments: He has dry ulcer over the tip of his great toe.  There is iodine over his toe.  There is only very minimal swelling, is tender, do not see any streaking running above the ankle.  His foot is not swollen.   Skin:     General: Skin is warm and dry.      Coloration: Skin is not pale.   Neurological:      General: No focal deficit present.      Mental Status: He is alert and oriented to person, place, and time.      Motor: No weakness.      Coordination: Coordination normal.   Psychiatric:         Mood and Affect: Mood normal.         Behavior: Behavior normal.         Thought Content: Thought content normal.         Procedures          ED Course  ED Course as of 06/09/25 1506   Mon Jun 09, 2025   0840 Have reviewed our electronic medical record.  We have no records from his recent hospitalization to  an outside hospital.  Wife does not know what bacteria he grew out. [DT]   1037 X-ray suggest osteomyelitis with a little bit of subcu air.  Due for Rocephin and daptomycin at 1800. [DT]      ED Course User Index  [DT] David Lopez MD                                                       Medical Decision Making  Amount and/or Complexity of Data Reviewed  Labs: ordered.  Radiology: ordered.    Risk  Decision regarding hospitalization.        Final diagnoses:   Osteomyelitis of great toe of left foot   Diabetic ulcer of toe of left foot associated with diabetes mellitus due to underlying condition, with bone involvement without evidence of necrosis       ED Disposition  ED Disposition       ED Disposition   Decision to Admit    Condition   --    Comment   Level of Care: Telemetry [5]   Diagnosis: Osteomyelitis [280279]   Admitting Physician: DUNG ANGEL [[id number]]                 No follow-up provider specified.       Medication List        ASK your doctor about these medications      HumaLOG KwikPen 100 UNIT/ML solution pen-injector  Generic drug: Insulin Lispro (1 Unit Dial)  Ask about: Which instructions should I use?                 David Lopez MD  06/09/25 1508      Electronically signed by David Lopez MD at 06/09/25 1500

## 2025-06-10 NOTE — CONSULTS
[facility]  [address] [address]  [phone]       Orthopedic Consult    Patient: Jimmy Gabehart    Date of Admission: 6/9/2025  8:18 AM    YOB: 1955    Medical Record Number: [id number]    Attending Physician: Rahel Patel MD    Consulting Physician: NAFISA Stout    Chief Complaint: Osteomyelitis [M86.9]    History of Present Illness: 70 y.o. male admitted to [facility] with Osteomyelitis [M86.9]. I was consulted for further evaluation and treatment.  Patient has a past medical history significant for poorly controlled diabetes mellitus, cirrhosis, hypertension, thrombocytopenia, left great toe wound.  Orthopedics consulted in regards to left great toe wound.  Patient states this has been present for several weeks.  He was recently admitted to [facility] and found to have cellulitis, suspected osteomyelitis.  He was discharged home with IV antibiotics.  Patient reports that he had 1 dose of home antibiotic, noticed increasing chills and left great toe swelling.  \     Allergies   Allergen Reactions    Demerol [Meperidine]     Penicillins Provider Review Needed     Has tolerated ceftriaxone, has had RX for cephalexin        Home Medications:  Medications Prior to Admission   Medication Sig Dispense Refill Last Dose/Taking    allopurinol (ZYLOPRIM) 100 MG tablet Take 1 tablet by mouth Daily.   6/8/2025    Azelastine HCl 137 MCG/SPRAY solution Administer 2 sprays into the nostril(s) as directed by provider 2 (Two) Times a Day As Needed.   Past Week    carvedilol (COREG) 6.25 MG tablet Take 1 tablet by mouth 2 (Two) Times a Day With Meals.   6/8/2025    colchicine 0.6 MG tablet Take 1 tablet by mouth Daily.   6/8/2025    fexofenadine (ALLEGRA) 180 MG tablet Take 1 tablet by mouth Daily.   6/8/2025    gabapentin (NEURONTIN) 100 MG tablet Take 2 tablets by mouth Every Night. 10 tablet 0 6/8/2025    Insulin Lispro, 1 Unit Dial, (HumaLOG  KwikPen) 100 UNIT/ML solution pen-injector Inject 10 Units under the skin into the appropriate area as directed 3 (Three) Times a Day With Meals.   6/8/2025    Januvia 50 MG tablet Take 1 tablet by mouth Daily.   6/8/2025    lactulose (CHRONULAC) 10 GM/15ML solution Take 30 mL by mouth 2 (Two) Times a Day.   6/8/2025    losartan (COZAAR) 100 MG tablet Take 0.5 tablets by mouth Daily.   6/8/2025    Magnesium Chloride 64 MG tablet Take 64 mg by mouth Daily. (Slowmag)   6/8/2025    magnesium chloride ER 64 MG DR tablet Take  by mouth Daily.   6/8/2025    metFORMIN (GLUCOPHAGE) 500 MG tablet Take 1 tablet by mouth Daily With Breakfast.   6/8/2025    moxifloxacin (VIGAMOX) 0.5 % ophthalmic solution Administer 0.05 mL into the left eye 2 (Two) Times a Day.   6/8/2025    Multiple Vitamins-Minerals (ZINC PO) Take 50 mg by mouth Daily.   6/8/2025    Omega-3 Fatty Acids (fish oil) 1000 MG capsule capsule Take 1 capsule by mouth Daily With Breakfast.   6/8/2025    oxyCODONE (ROXICODONE) 10 MG tablet Take 1 tablet by mouth Every 6 (Six) Hours As Needed for Moderate Pain. 12 tablet 0 Past Week    pantoprazole (PROTONIX) 40 MG EC tablet Take 1 tablet by mouth Daily.   6/8/2025    riFAXIMin (XIFAXAN) 550 MG tablet Take 1 tablet by mouth 2 (Two) Times a Day. Indications: Impaired Brain Function due to Liver Disease   6/8/2025    tamsulosin (FLOMAX) 0.4 MG capsule 24 hr capsule Take 1 capsule by mouth Daily.   6/8/2025       Current Medications:  Scheduled Meds:carvedilol, 6.25 mg, Oral, BID With Meals  cefTRIAXone, 2,000 mg, Intravenous, Q24H  cetirizine, 10 mg, Oral, Daily  DAPTOmycin, 6 mg/kg (Adjusted), Intravenous, Q24H  gabapentin, 200 mg, Oral, Nightly  insulin glargine, 20 Units, Subcutaneous, Nightly  insulin lispro, 2-7 Units, Subcutaneous, 4x Daily AC & at Bedtime  Insulin Lispro, 5 Units, Subcutaneous, TID With Meals  lactulose, 20 g, Oral, TID  pantoprazole, 40 mg, Oral, Q AM  riFAXIMin, 550 mg, Oral, BID  sodium  chloride, 10 mL, Intravenous, Q12H  tamsulosin, 0.4 mg, Oral, Nightly      Continuous Infusions:Pharmacy Consult,       PRN Meds:.  senna-docusate sodium **AND** polyethylene glycol **AND** bisacodyl **AND** bisacodyl    Calcium Replacement - Follow Nurse / BPA Driven Protocol    dextrose    dextrose    glucagon (human recombinant)    Magnesium Standard Dose Replacement - Follow Nurse / BPA Driven Protocol    nitroglycerin    ondansetron ODT **OR** ondansetron    oxyCODONE    Pharmacy Consult    Phosphorus Replacement - Follow Nurse / BPA Driven Protocol    Potassium Replacement - Follow Nurse / BPA Driven Protocol    sodium chloride    sodium chloride    Past Medical History:   Diagnosis Date    Diabetes     Hypertension         Past Surgical History:   Procedure Laterality Date    BACK SURGERY      CARPAL TUNNEL RELEASE      COLONOSCOPY      EYE SURGERY      FOOT SURGERY Left 1986    Ankle fusion    HERNIA REPAIR      KNEE SURGERY Right 12/14/2022    TKA right knee manipulation 01-25-23    TONSILLECTOMY AND ADENOIDECTOMY      TRIGGER FINGER RELEASE      WISDOM TOOTH EXTRACTION          Social History     Occupational History    Not on file   Tobacco Use    Smoking status: Never    Smokeless tobacco: Never   Vaping Use    Vaping status: Never Used   Substance and Sexual Activity    Alcohol use: Yes     Comment: occasional    Drug use: No    Sexual activity: Defer      Social History     Social History Narrative    Not on file        Family History   Problem Relation Age of Onset    Hypertension Mother     Heart attack Mother     Kidney disease Mother     High cholesterol Mother     Arthritis Mother     Heart attack Father          Review of Systems:   HEENT: Patient denies any headaches, vision changes, change in hearing, or tinnitus, Patient denies any rhinorrhea,epistaxis, sinus pain, mouth or dental problems, sore throat or hoarseness, or dysphagia  Pulmonary: Patient denies any cough, congestion, SOA, or  wheezing  Cardiovascular: Patient denies any chest pain, dyspnea, palpitations, weakness, intolerance of exercise, varicosities, swelling of extremities, known murmur  Gastrointestinal:  Patient denies nausea, vomiting, diarrhea, constipation, loss  of appetite, change in appetite, dysphagia, gas, heartburn, melena, change in bowel habits, use of laxatives or other drugs to alter the function of the gastrointestinal tract.  Genital/Urinary: Patient denies dysuria, change in color of urine, change in frequency of urination, pain with urgency, incontinence, retention, or nocturia.  Musculoskeletal: Patient denies increased warmth; redness; or swelling of joints; limitation of function; deformity; crepitation: pain in a joint or an extremity, the neck, or the back, especially with movement.  Neurological: Patient denies dizziness, tremor, ataxia, difficulty in speaking, change in speech, paresthesia, loss of sensation, seizures, syncope, changes in memory.  Endocrine system: Patient denies tremors, palpitations, intolerance of heat or cold, polyuria, polydipsia, polyphagia, diaphoresis, exophthalmos, or goiter.  Psychological: Patient denies thoughts/plans or harming self or other; depression,  insomnia, night terrors, amita, memory loss, disorientation.  Skin: Patient denies any bruising, rashes, discoloration, pruritus, wounds, ulcers, decubiti, changes in the hair or nails  Hematopoietic: Patient denies history of spontaneous or excessive bleeding, epistaxis, hematuria, melena, fatigue, enlarged or tender lymph nodes, pallor, history of anemia.    Physical Exam: 70 y.o. male    General Appearance:    Alert, cooperative, in no acute distress                   Vitals:    06/10/25 0025 06/10/25 0538 06/10/25 0703 06/10/25 0855   BP: 140/71 136/80 129/61 127/66   BP Location: Left arm Left arm Left arm    Patient Position: Lying Lying Lying    Pulse: 64 72 66 65   Resp: 18 18 18    Temp: 98.1 °F (36.7 °C) 98.5 °F (36.9  °C) 98 °F (36.7 °C)    TempSrc: Oral Oral Oral    SpO2: 95% 93%     Weight:       Height:            Head:    Normocephalic, without obvious abnormality, atraumatic               Back:     No C-T-L spine tenderness   Lungs:   Symmetric chest rise and fall,respirations regular, even and    unlabored.  No accessory muscle use, speaks in complete sentences.   Chest Wall:    No abnormalities observed       Extremities: Left lower extremity: Distal aspect of left great toe with ulceration, dry gangrenous changes.  Minimal surrounding erythema.  No induration, warmth, janie purulence.  Sensation light touch diminished.   Pulses: Posterior tibial/dorsalis pedis pulses nonpalpable   Skin:   No bleeding, bruising or rash   Lymph nodes:   No palpable adenopathy   Neurologic:  Cranial nerves 2 - 12 grossly intact, DTR present and equal bilaterally           Diagnostic Tests:    Admission on 06/09/2025   Component Date Value Ref Range Status    Glucose 06/09/2025 167 (H)  65 - 99 mg/dL Final    BUN 06/09/2025 23.5 (H)  8.0 - 23.0 mg/dL Final    Creatinine 06/09/2025 1.49 (H)  0.76 - 1.27 mg/dL Final    Sodium 06/09/2025 137  136 - 145 mmol/L Final    Potassium 06/09/2025 4.2  3.5 - 5.2 mmol/L Final    Chloride 06/09/2025 103  98 - 107 mmol/L Final    CO2 06/09/2025 24.0  22.0 - 29.0 mmol/L Final    Calcium 06/09/2025 8.9  8.6 - 10.5 mg/dL Final    Total Protein 06/09/2025 7.6  6.0 - 8.5 g/dL Final    Albumin 06/09/2025 3.3 (L)  3.5 - 5.2 g/dL Final    ALT (SGPT) 06/09/2025 42 (H)  1 - 41 U/L Final    AST (SGOT) 06/09/2025 58 (H)  1 - 40 U/L Final    Alkaline Phosphatase 06/09/2025 197 (H)  39 - 117 U/L Final    Total Bilirubin 06/09/2025 0.4  0.0 - 1.2 mg/dL Final    Globulin 06/09/2025 4.3  gm/dL Final    Calculated Result    A/G Ratio 06/09/2025 0.8  g/dL Final    BUN/Creatinine Ratio 06/09/2025 15.8  7.0 - 25.0 Final    Anion Gap 06/09/2025 10.0  5.0 - 15.0 mmol/L Final    eGFR 06/09/2025 50.2 (L)  >60.0 mL/min/1.73 Final     Sed Rate 06/09/2025 95 (H)  0 - 20 mm/hr Final    C-Reactive Protein 06/09/2025 2.13 (H)  0.00 - 0.50 mg/dL Final    Lactate 06/09/2025 1.7  0.5 - 2.0 mmol/L Final    Falsely depressed results may occur on samples drawn from patients receiving N-Acetylcysteine (NAC) or Metamizole.    Creatine Kinase 06/09/2025 33  20 - 200 U/L Final    WBC 06/09/2025 3.56  3.40 - 10.80 10*3/mm3 Final    RBC 06/09/2025 2.73 (L)  4.14 - 5.80 10*6/mm3 Final    Hemoglobin 06/09/2025 9.5 (L)  13.0 - 17.7 g/dL Final    Hematocrit 06/09/2025 29.8 (L)  37.5 - 51.0 % Final    MCV 06/09/2025 109.2 (H)  79.0 - 97.0 fL Final    MCH 06/09/2025 34.8 (H)  26.6 - 33.0 pg Final    MCHC 06/09/2025 31.9  31.5 - 35.7 g/dL Final    RDW 06/09/2025 15.7 (H)  12.3 - 15.4 % Final    RDW-SD 06/09/2025 61.8 (H)  37.0 - 54.0 fl Final    MPV 06/09/2025 12.0  6.0 - 12.0 fL Final    Platelets 06/09/2025 89 (L)  140 - 450 10*3/mm3 Final    Neutrophil % 06/09/2025 48.6  42.7 - 76.0 % Final    Lymphocyte % 06/09/2025 34.6  19.6 - 45.3 % Final    Monocyte % 06/09/2025 14.3 (H)  5.0 - 12.0 % Final    Eosinophil % 06/09/2025 1.1  0.3 - 6.2 % Final    Basophil % 06/09/2025 0.3  0.0 - 1.5 % Final    Immature Grans % 06/09/2025 1.1 (H)  0.0 - 0.5 % Final    Neutrophils, Absolute 06/09/2025 1.73  1.70 - 7.00 10*3/mm3 Final    Lymphocytes, Absolute 06/09/2025 1.23  0.70 - 3.10 10*3/mm3 Final    Monocytes, Absolute 06/09/2025 0.51  0.10 - 0.90 10*3/mm3 Final    Eosinophils, Absolute 06/09/2025 0.04  0.00 - 0.40 10*3/mm3 Final    Basophils, Absolute 06/09/2025 0.01  0.00 - 0.20 10*3/mm3 Final    Immature Grans, Absolute 06/09/2025 0.04  0.00 - 0.05 10*3/mm3 Final    nRBC 06/09/2025 0.0  0.0 - 0.2 /100 WBC Final    Glucose 06/09/2025 208 (H)  70 - 130 mg/dL Final    Glucose 06/09/2025 116  70 - 130 mg/dL Final    WBC 06/10/2025 3.28 (L)  3.40 - 10.80 10*3/mm3 Final    RBC 06/10/2025 2.66 (L)  4.14 - 5.80 10*6/mm3 Final    Hemoglobin 06/10/2025 9.4 (L)  13.0 - 17.7 g/dL Final     Hematocrit 06/10/2025 28.7 (L)  37.5 - 51.0 % Final    MCV 06/10/2025 107.9 (H)  79.0 - 97.0 fL Final    MCH 06/10/2025 35.3 (H)  26.6 - 33.0 pg Final    MCHC 06/10/2025 32.8  31.5 - 35.7 g/dL Final    RDW 06/10/2025 15.7 (H)  12.3 - 15.4 % Final    RDW-SD 06/10/2025 61.1 (H)  37.0 - 54.0 fl Final    MPV 06/10/2025 12.3 (H)  6.0 - 12.0 fL Final    Platelets 06/10/2025 85 (L)  140 - 450 10*3/mm3 Final    Neutrophil % 06/10/2025 40.0 (L)  42.7 - 76.0 % Final    Lymphocyte % 06/10/2025 43.9  19.6 - 45.3 % Final    Monocyte % 06/10/2025 14.0 (H)  5.0 - 12.0 % Final    Eosinophil % 06/10/2025 1.2  0.3 - 6.2 % Final    Basophil % 06/10/2025 0.3  0.0 - 1.5 % Final    Immature Grans % 06/10/2025 0.6 (H)  0.0 - 0.5 % Final    Neutrophils, Absolute 06/10/2025 1.31 (L)  1.70 - 7.00 10*3/mm3 Final    Lymphocytes, Absolute 06/10/2025 1.44  0.70 - 3.10 10*3/mm3 Final    Monocytes, Absolute 06/10/2025 0.46  0.10 - 0.90 10*3/mm3 Final    Eosinophils, Absolute 06/10/2025 0.04  0.00 - 0.40 10*3/mm3 Final    Basophils, Absolute 06/10/2025 0.01  0.00 - 0.20 10*3/mm3 Final    Immature Grans, Absolute 06/10/2025 0.02  0.00 - 0.05 10*3/mm3 Final    nRBC 06/10/2025 0.0  0.0 - 0.2 /100 WBC Final    Glucose 06/10/2025 106  70 - 130 mg/dL Final       XR Toe 2+ View Left  Result Date: 6/9/2025  Narrative: XR TOE 2+ VW LEFT Date of Exam: 6/9/2025 8:46 AM EDT Indication: osteo on gt toe ? Comparison: Foot radiographs 2/8/2023 Findings: Normal soft tissue gas within the first toe. No traumatic malalignment on this nonweightbearing exam. No evidence of fracture. Mild scattered degenerative changes. There appears to be age-indeterminate erosive change/remodeling along the medial tuft of the first distal phalanx.     Impression: Impression: 1.Soft tissue gas in the first toe which could be seen with infection or wound. 2.Age-indeterminate erosive change/remodeling along the medial tuft of the first distal phalanx. This could be seen with  osteomyelitis. MRI could be performed for further evaluation as clinically indicated. Electronically Signed: Preston Goodson MD  6/9/2025 9:22 AM EDT  Workstation ID: [id number]        Assessment:      Osteomyelitis    Primary hypertension    Type 2 diabetes mellitus with diabetic neuropathy    Cirrhosis of liver without ascites    Peripheral polyneuropathy           Plan:      70-year-old male with diabetes mellitus, left hallux wound/osteomyelitis.    Plain radiographs of the left toe concerning for erosive changes of the distal phalanx of the left hallux.    Plan for left foot MRI to evaluate for underlying osteomyelitis/abscess, lower extremity arterial duplex/ANUSHA to evaluate underlying vascular status.    I outlined further management options the patient at bedside this morning including surgical as well as nonsurgical and the associated risks/benefits alternatives to each.  We discussed continued local wound care, empiric antibiotic coverage as well as surgical intervention including left hallux amputation.  Patient is noncommittal and advised to proceed with surgery, is concerned about gait/balance following toe amputation.  I discussed the proposed procedure, and expected perioperative course with the patient in detail.    Further recommendations pending imaging results/physician review.  If patient were to be agreeable, tentative plan for left hallux amputation at the level of metatarsophalangeal joint this afternoon. NPO      Date: 6/10/2025    NAFISA Stout

## 2025-06-10 NOTE — OP NOTE
[facility]     OPERATIVE REPORT      PATIENT NAME:  Jimmy Gabehart                            YOB: 1955       PREOP DIAGNOSIS:   Left great toe osteomyelitis    POSTOP DIAGNOSIS:   Same.    PROCEDURE:   Left     89979: Great toe amputation at the level of the metatarsophalangeal joint    SURGEON:     Allan Clark MD    OPERATIVE TEAM:   Circulator: Shruti Garcia RN  Scrub Person: Radha Solitario; Caren Clark  Nursing Assistant: Radha Viveros PCT; Lizeth Machado    ANESTHETIST:  Anesthesiologist: Avery Gil Jr., MD    ANESTHESIA:   Choice    ESTIMATED BLOOD LOSS:   < 30 ml    TOURNIQUET TIME:   Not utilized    FINDINGS:     Remaining tissue appeared healthy.    IMPLANTS:     None    COMPLICATIONS:    None.    DISPOSITION:    Stable to recovery.     INDICATIONS:   70 y.o. male with Left great toe osteomyelitis.  I had a discussion with the patient regarding further management including nonoperative management with empiric antibiotics and wound care, debridement, amputations at various levels of the great toe.  After discussion of risks, benefits, alternatives, the patient wished to proceed with Left great toe amputation at the level of the metatarsophalangeal joint.    Risks of surgery were discussed which included but were not limited to pain, bleeding, infection, damage to adjacent structures, need for further surgery, wound healing complications, loss of limb and death.  The patient expressed verbal consent and written consent was obtained for the above procedure.     NARRATIVE:    Patient was identified in preoperative holding.  Operative site was marked in indelible ink.  History, physical, consent were reviewed and updated.  Patient was surrendered to the anesthesia team and transported to the operative suite where anesthesia was induced.  Hip bump was placed on the ipsilateral side.  Operative extremity was prepped and draped in the usual sterile fashion.  The operative  team donned sterile gowns and gloves and a timeout was called.  All in attendance agreed regarding the patient's identity, proposed operative procedure, and operative site.    I turned my attention to the Left great toe.  I made a fishmouth incision at the base of the toe, sharply dissected down to bone.  I dissected in a subperiosteal plane, removed the soft tissue attachments from the base of the proximal phalanx, removed the toe.  I took swab specimens of the wound, copiously irrigated with Xperience wound irrigant.  I achieved hemostasis, irrigated the wound once more.  The remaining tissue appeared healthy and viable without readily apparent evidence of infection.     I applied vancomycin powder to the wound, then closed with monofilament suture.    Sterile dressing applied.    Anesthesia was concluded and the patient was transported to the PACU in stable condition.  Counts were correct x2.  There were no apparent complications.  I was present and scrubbed for the entire case.    Allan Clark Jr, MD  6/10/2025

## 2025-06-10 NOTE — NURSING NOTE
Woc consulted for left great toe.    Orthopedics is on the case.  Believe patient is hopefully getting MRI as we speak.  There is gas noticed on the x-ray so him MRI is warranted.    Most likely osteo if so orthopedics will handle all the wound care.    Patient is high risk for pressure injury development.  Uncontrolled diabetes, cirrhosis, neuropathy and BMI of 30.  Please implement pressure injury prevention interventions.    Woc will sign off.

## 2025-06-10 NOTE — ANESTHESIA PROCEDURE NOTES
Airway  Reason: elective    Date/Time: 6/10/2025 3:36 PM  Airway not difficult    General Information and Staff    Patient location during procedure: OR  Anesthesiologist: Avery Gil Jr., MD    Indications and Patient Condition  Indications for airway management: airway protection    Preoxygenated: yes    Mask difficulty assessment: 1 - vent by mask    Final Airway Details    Final airway type: supraglottic airway      Successful airway: I-gel  Size: 4   Number of attempts at approach: 1  Assessment: lips, teeth, and gum same as pre-op    Additional Comments  LMA placed without difficulty, ventilation with assist, equal breath sounds and symmetric chest rise and fall

## 2025-06-10 NOTE — ANESTHESIA POSTPROCEDURE EVALUATION
Patient: Jimmy Gabehart    Procedure Summary       Date: 06/10/25 Room / Location:  [facility] /  [facility]    Anesthesia Start: 1530 Anesthesia Stop: 1609    Procedure: Left hallux amputation at the level of the metatarsophalangeal joint (Left: Foot) Diagnosis:       Osteomyelitis of great toe of left foot      (Osteomyelitis of great toe of left foot [M86.9])    Surgeons: Alaln Clark Jr., MD Provider: Avery Gil Jr., MD    Anesthesia Type: general with block ASA Status: 3            Anesthesia Type: general with block    Vitals  Vitals Value Taken Time   BP     Temp     Pulse 76 06/10/25 16:09   Resp     SpO2 99 % 06/10/25 16:09   Vitals shown include unfiled device data.        Post Anesthesia Care and Evaluation    Patient location during evaluation: PACU  Patient participation: complete - patient participated  Level of consciousness: awake and alert  Pain management: adequate    Airway patency: patent  Anesthetic complications: No anesthetic complications  PONV Status: none  Cardiovascular status: hemodynamically stable and acceptable  Respiratory status: nonlabored ventilation, acceptable and nasal cannula  Hydration status: acceptable

## 2025-06-10 NOTE — ANESTHESIA PROCEDURE NOTES
Peripheral Block      Patient reassessed immediately prior to procedure    Patient location during procedure: pre-op  Start time: 6/10/2025 3:17 PM  Stop time: 6/10/2025 3:18 PM  Reason for block: at surgeon's request and post-op pain management  Performed by  CRNA/CAA: Ramses Rodriguez, CRNA  Assisted by: Margy Samaniego RN  Preanesthetic Checklist  Completed: patient identified, IV checked, site marked, risks and benefits discussed, surgical consent, monitors and equipment checked, pre-op evaluation and timeout performed  Prep:  Sterile barriers:cap, gloves, mask and washed/disinfected hands  Prep: ChloraPrep  Patient monitoring: blood pressure monitoring, continuous pulse oximetry and EKG  Procedure    Sedation: yes  Performed under: local infiltration  Guidance:ultrasound guided    ULTRASOUND INTERPRETATION.  Using ultrasound guidance a 20 G gauge needle was placed in close proximity to the nerve, at which point, under ultrasound guidance anesthetic was injected in the area of the nerve and spread of the anesthesia was seen on ultrasound in close proximity thereto.  There were no abnormalities seen on ultrasound; a digital image was taken; and the patient tolerated the procedure with no complications. Images:still images obtained, printed/placed on chart    Laterality:left  Block Type:popliteal  Injection Technique:single-shot  Needle Type:echogenic and short-bevel  Needle Gauge:20 G  Resistance on Injection: none    Medications Used: dexamethasone sodium phosphate injection - Injection   2 mg - 6/10/2025 3:18:00 PM  bupivacaine PF (MARCAINE) 0.25 % injection - Injection   30 mL - 6/10/2025 3:18:00 PM      Medications  Preservative Free Saline:5ml    Post Assessment  Injection Assessment: negative aspiration for heme, no paresthesia on injection and incremental injection  Patient Tolerance:comfortable throughout block  Complications:no  Additional Notes  SINGLE shot    A high-frequency linear transducer, with  "sterile cover, was placed in the popliteal fossa to identify the popliteal artery and vein, Tibial nerve (TN) and Common Peroneal nerve (CP). The transducer was then moved in a cephalad fashion to observe the TN and CP nerve bifurcation to form the Sciatic Nerve. The insertion site was prepped and draped in sterile fashion. Skin and cutaneous tissue was infiltrated with 2-5 ml of 1% Lidocaine. Using ultrasound-guidance, a 20-gauge B-Saxena 4\" Ultraplex 360 non-stimulating echogenic needle was then inserted and advanced in plane from lateral to medial. Preservative-free normal saline was utilized for hydro-dissection of tissue, advancement of Touhy, and to confirm final needle placement posterior to the nerves. Local anesthetic injection spread, in incremental 3-5 ml injections, to surround both nerve structures. Aspiration every 5 ml to prevent intravascular injection. Injection was completed with negative aspiration of blood and negative intravascular injection. Injection pressures were normal with minimal resistance    Performed by: Ramess Rodriguez, CRNA            "

## 2025-06-11 LAB
ALBUMIN SERPL-MCNC: 3.5 G/DL (ref 3.5–5.2)
ALBUMIN/GLOB SERPL: 0.8 G/DL
ALP SERPL-CCNC: 204 U/L (ref 39–117)
ALT SERPL W P-5'-P-CCNC: 44 U/L (ref 1–41)
ANION GAP SERPL CALCULATED.3IONS-SCNC: 13 MMOL/L (ref 5–15)
AST SERPL-CCNC: 64 U/L (ref 1–40)
BASOPHILS # BLD AUTO: 0.01 10*3/MM3 (ref 0–0.2)
BASOPHILS NFR BLD AUTO: 0.4 % (ref 0–1.5)
BILIRUB SERPL-MCNC: 0.5 MG/DL (ref 0–1.2)
BUN SERPL-MCNC: 24.7 MG/DL (ref 8–23)
BUN/CREAT SERPL: 20.6 (ref 7–25)
CALCIUM SPEC-SCNC: 8.9 MG/DL (ref 8.6–10.5)
CHLORIDE SERPL-SCNC: 103 MMOL/L (ref 98–107)
CO2 SERPL-SCNC: 20 MMOL/L (ref 22–29)
CREAT SERPL-MCNC: 1.2 MG/DL (ref 0.76–1.27)
DEPRECATED RDW RBC AUTO: 59.9 FL (ref 37–54)
EGFRCR SERPLBLD CKD-EPI 2021: 65.1 ML/MIN/1.73
EOSINOPHIL # BLD AUTO: 0 10*3/MM3 (ref 0–0.4)
EOSINOPHIL NFR BLD AUTO: 0 % (ref 0.3–6.2)
ERYTHROCYTE [DISTWIDTH] IN BLOOD BY AUTOMATED COUNT: 15.2 % (ref 12.3–15.4)
GLOBULIN UR ELPH-MCNC: 4.2 GM/DL
GLUCOSE BLDC GLUCOMTR-MCNC: 196 MG/DL (ref 70–130)
GLUCOSE BLDC GLUCOMTR-MCNC: 227 MG/DL (ref 70–130)
GLUCOSE BLDC GLUCOMTR-MCNC: 227 MG/DL (ref 70–130)
GLUCOSE BLDC GLUCOMTR-MCNC: 228 MG/DL (ref 70–130)
GLUCOSE SERPL-MCNC: 219 MG/DL (ref 65–99)
HCT VFR BLD AUTO: 30.4 % (ref 37.5–51)
HGB BLD-MCNC: 9.9 G/DL (ref 13–17.7)
IMM GRANULOCYTES # BLD AUTO: 0.04 10*3/MM3 (ref 0–0.05)
IMM GRANULOCYTES NFR BLD AUTO: 1.4 % (ref 0–0.5)
LYMPHOCYTES # BLD AUTO: 0.51 10*3/MM3 (ref 0.7–3.1)
LYMPHOCYTES NFR BLD AUTO: 18.2 % (ref 19.6–45.3)
MCH RBC QN AUTO: 34.7 PG (ref 26.6–33)
MCHC RBC AUTO-ENTMCNC: 32.6 G/DL (ref 31.5–35.7)
MCV RBC AUTO: 106.7 FL (ref 79–97)
MONOCYTES # BLD AUTO: 0.1 10*3/MM3 (ref 0.1–0.9)
MONOCYTES NFR BLD AUTO: 3.6 % (ref 5–12)
NEUTROPHILS NFR BLD AUTO: 2.14 10*3/MM3 (ref 1.7–7)
NEUTROPHILS NFR BLD AUTO: 76.4 % (ref 42.7–76)
NRBC BLD AUTO-RTO: 0 /100 WBC (ref 0–0.2)
PLATELET # BLD AUTO: 92 10*3/MM3 (ref 140–450)
PMV BLD AUTO: 12.3 FL (ref 6–12)
POTASSIUM SERPL-SCNC: 4.7 MMOL/L (ref 3.5–5.2)
PROT SERPL-MCNC: 7.7 G/DL (ref 6–8.5)
RBC # BLD AUTO: 2.85 10*6/MM3 (ref 4.14–5.8)
SODIUM SERPL-SCNC: 136 MMOL/L (ref 136–145)
WBC NRBC COR # BLD AUTO: 2.8 10*3/MM3 (ref 3.4–10.8)

## 2025-06-11 PROCEDURE — 63710000001 INSULIN GLARGINE PER 5 UNITS: Performed by: INTERNAL MEDICINE

## 2025-06-11 PROCEDURE — 97166 OT EVAL MOD COMPLEX 45 MIN: CPT

## 2025-06-11 PROCEDURE — 25010000002 CEFEPIME PER 500 MG: Performed by: ORTHOPAEDIC SURGERY

## 2025-06-11 PROCEDURE — 94799 UNLISTED PULMONARY SVC/PX: CPT

## 2025-06-11 PROCEDURE — 82948 REAGENT STRIP/BLOOD GLUCOSE: CPT

## 2025-06-11 PROCEDURE — 97161 PT EVAL LOW COMPLEX 20 MIN: CPT

## 2025-06-11 PROCEDURE — 80053 COMPREHEN METABOLIC PANEL: CPT | Performed by: ORTHOPAEDIC SURGERY

## 2025-06-11 PROCEDURE — 94660 CPAP INITIATION&MGMT: CPT

## 2025-06-11 PROCEDURE — 97530 THERAPEUTIC ACTIVITIES: CPT

## 2025-06-11 PROCEDURE — 85025 COMPLETE CBC W/AUTO DIFF WBC: CPT | Performed by: ORTHOPAEDIC SURGERY

## 2025-06-11 PROCEDURE — 63710000001 INSULIN LISPRO (HUMAN) PER 5 UNITS: Performed by: ORTHOPAEDIC SURGERY

## 2025-06-11 PROCEDURE — 25010000002 DAPTOMYCIN PER 1 MG: Performed by: ORTHOPAEDIC SURGERY

## 2025-06-11 PROCEDURE — 99232 SBSQ HOSP IP/OBS MODERATE 35: CPT | Performed by: INTERNAL MEDICINE

## 2025-06-11 RX ORDER — OXYCODONE HYDROCHLORIDE 10 MG/1
10 TABLET ORAL EVERY 4 HOURS PRN
Refills: 0 | Status: DISCONTINUED | OUTPATIENT
Start: 2025-06-11 | End: 2025-06-13 | Stop reason: HOSPADM

## 2025-06-11 RX ADMIN — CEFEPIME 2000 MG: 2 INJECTION, POWDER, FOR SOLUTION INTRAVENOUS at 21:16

## 2025-06-11 RX ADMIN — ALLOPURINOL 100 MG: 100 TABLET ORAL at 08:44

## 2025-06-11 RX ADMIN — Medication 10 ML: at 21:18

## 2025-06-11 RX ADMIN — OXYCODONE HYDROCHLORIDE 10 MG: 10 TABLET ORAL at 21:17

## 2025-06-11 RX ADMIN — DAPTOMYCIN 550 MG: 500 INJECTION, POWDER, LYOPHILIZED, FOR SOLUTION INTRAVENOUS at 21:15

## 2025-06-11 RX ADMIN — LACTULOSE 20 G: 20 SOLUTION ORAL at 17:03

## 2025-06-11 RX ADMIN — OXYCODONE HYDROCHLORIDE 10 MG: 10 TABLET ORAL at 08:54

## 2025-06-11 RX ADMIN — CEFEPIME 2000 MG: 2 INJECTION, POWDER, FOR SOLUTION INTRAVENOUS at 06:26

## 2025-06-11 RX ADMIN — INSULIN LISPRO 3 UNITS: 100 INJECTION, SOLUTION INTRAVENOUS; SUBCUTANEOUS at 21:16

## 2025-06-11 RX ADMIN — INSULIN LISPRO 2 UNITS: 100 INJECTION, SOLUTION INTRAVENOUS; SUBCUTANEOUS at 11:45

## 2025-06-11 RX ADMIN — METRONIDAZOLE 500 MG: 500 TABLET ORAL at 21:15

## 2025-06-11 RX ADMIN — INSULIN LISPRO 5 UNITS: 100 INJECTION, SOLUTION INTRAVENOUS; SUBCUTANEOUS at 08:45

## 2025-06-11 RX ADMIN — RIFAXIMIN 550 MG: 550 TABLET ORAL at 08:44

## 2025-06-11 RX ADMIN — METRONIDAZOLE 500 MG: 500 TABLET ORAL at 13:49

## 2025-06-11 RX ADMIN — INSULIN LISPRO 2 UNITS: 100 INJECTION, SOLUTION INTRAVENOUS; SUBCUTANEOUS at 08:45

## 2025-06-11 RX ADMIN — INSULIN LISPRO 5 UNITS: 100 INJECTION, SOLUTION INTRAVENOUS; SUBCUTANEOUS at 11:44

## 2025-06-11 RX ADMIN — OXYCODONE HYDROCHLORIDE 10 MG: 10 TABLET ORAL at 15:24

## 2025-06-11 RX ADMIN — CARVEDILOL 6.25 MG: 6.25 TABLET, FILM COATED ORAL at 08:47

## 2025-06-11 RX ADMIN — CARVEDILOL 6.25 MG: 6.25 TABLET, FILM COATED ORAL at 17:03

## 2025-06-11 RX ADMIN — METRONIDAZOLE 500 MG: 500 TABLET ORAL at 06:31

## 2025-06-11 RX ADMIN — PANTOPRAZOLE SODIUM 40 MG: 40 TABLET, DELAYED RELEASE ORAL at 06:28

## 2025-06-11 RX ADMIN — TAMSULOSIN HYDROCHLORIDE 0.4 MG: 0.4 CAPSULE ORAL at 21:15

## 2025-06-11 RX ADMIN — GABAPENTIN 200 MG: 100 CAPSULE ORAL at 21:15

## 2025-06-11 RX ADMIN — CETIRIZINE HYDROCHLORIDE 10 MG: 10 TABLET, FILM COATED ORAL at 08:44

## 2025-06-11 RX ADMIN — INSULIN GLARGINE 25 UNITS: 100 INJECTION, SOLUTION SUBCUTANEOUS at 21:16

## 2025-06-11 RX ADMIN — CEFEPIME 2000 MG: 2 INJECTION, POWDER, FOR SOLUTION INTRAVENOUS at 13:49

## 2025-06-11 RX ADMIN — INSULIN LISPRO 3 UNITS: 100 INJECTION, SOLUTION INTRAVENOUS; SUBCUTANEOUS at 17:02

## 2025-06-11 RX ADMIN — INSULIN LISPRO 5 UNITS: 100 INJECTION, SOLUTION INTRAVENOUS; SUBCUTANEOUS at 17:02

## 2025-06-11 NOTE — CASE MANAGEMENT/SOCIAL WORK
Continued Stay Note  [facility]     Patient Name: Jimmy Gabehart  MRN: [id number]  Today's Date: 6/11/2025    Admit Date: 6/9/2025    Plan: Home Health with wound care   Discharge Plan       Row Name 06/11/25 1454       Plan    Plan Home Health with wound care    Plan Comments Spoke with Margy with [facility]. Patient wass recently active.                   Discharge Codes    No documentation.                 Expected Discharge Date and Time       Expected Discharge Date Expected Discharge Time    Jun 13, 2025               Noemi Sullivan RN

## 2025-06-11 NOTE — CASE MANAGEMENT/SOCIAL WORK
Continued Stay Note   [facility]     Patient Name: Jimmy Gabehart  MRN: [id number]  Today's Date: 6/11/2025    Admit Date: 6/9/2025    Plan: Home with    Discharge Plan       Row Name 06/11/25 0946       Plan    Plan Home with     Patient/Family in Agreement with Plan yes    Plan Comments I spoke with the pt. Confirmed he was set up with [facility] [phone] for Ceftriaxone every 8 hours and Dapto 500mg daily PTA. Any new infusion orders can be faxed to [facility] at [phone].They ship to the pts home.Current with [facility]. Has a knee scooter, crutches and other DME in place.    Final Discharge Disposition Code 06 - home with home health care                   Discharge Codes    No documentation.                 Expected Discharge Date and Time       Expected Discharge Date Expected Discharge Time    Jun 13, 2025               Talia Garcia RN

## 2025-06-11 NOTE — PROGRESS NOTES
"Jimmy Gabehart       LOS: 1 day   Patient Care Team:  Miriam Mckeon DO as PCP - General (Family Medicine)  Liz Kaur APRN as Nurse Practitioner (Nurse Practitioner)  Avery Scanlon MD as Consulting Physician (Cardiology)    Chief Complaint:  Left great toe osteomyelitis    Subjective     Interval History:     Resting comfortably at this morning.  Pain tolerable, no acute events overnight.    Review of Systems:      Gen- No fevers, chills  CV- No chest pain, palpitations  Resp- No cough, dyspnea  GI- No N/V/D, abd pain    Objective     Vital Signs  Vital Signs (last 24 hours)         06/10 0700  06/11 0659 06/11 0700  06/11 0905   Most Recent      Temp (°F) 97.6 -  98.7      97.7     97.7 (36.5) 06/11 0714    Heart Rate 64 -  79    63 -  73     73 06/11 0847    Resp 16 -  18      18     18 06/11 0714    /67 -  152/77    132/72 -  142/66     132/72 06/11 0847    SpO2 (%) 95 -  100      97     97 06/11 0714    Flow (L/min) (Oxygen Therapy) 2 -  4       2 06/11 0400    Oxygen Concentration (%)   28       28 06/11 0243              Physical Exam:     No acute distress.  Nonlabored respirations.  Regular rate and rhythm.  Abdomen nondistended.  Left lower extremity: Operative dressing in place is clean, dry, intact.     Results Review:     I reviewed the patient's new clinical results.    Medication Review:   Hospital Medications (active)         Dose Frequency Start End    allopurinol (ZYLOPRIM) tablet 100 mg 100 mg Daily 6/10/2025 --    Admin Instructions: ([facility]) Take with food if GI upset occurs.    Route: Oral    bisacodyl (DULCOLAX) EC tablet 5 mg 5 mg Daily PRN 6/9/2025 --    Admin Instructions: Use if no bowel movement after 12 hours.  Swallow whole. Do not crush, split, or chew tablet.    Route: Oral    Linked Group 1: Placed in \"And\" Linked Group        bisacodyl (DULCOLAX) suppository 10 mg 10 mg Daily PRN 6/9/2025 --    Admin Instructions: Use if no bowel movement after 12 hours.  Hold " "for diarrhea    Route: Rectal    Linked Group 1: Placed in \"And\" Linked Group        Calcium Replacement - Follow Nurse / BPA Driven Protocol  As Needed 6/9/2025 --    Admin Instructions: Open Order & Select \"BHS Electrolyte Replacement Protocol Algorithm\" to View Details    Route: Not Applicable    carvedilol (COREG) tablet 6.25 mg 6.25 mg 2 Times Daily With Meals 6/9/2025 --    Admin Instructions: Hold for SBP less than 100, DBP less than 60, or heart rate less than 50. If a dose is held, please contact the provider.  Give with food.    Route: Oral    cefepime 2000 mg IVPB in 100 mL NS (MBP) 2,000 mg Every 8 Hours 6/10/2025 6/24/2025    Route: Intravenous    cetirizine (zyrTEC) tablet 10 mg 10 mg Daily 6/10/2025 --    Route: Oral    colchicine tablet 0.6 mg ([Held by provider] since 6/10/2025 11:44 AM) 0.6 mg Daily 6/10/2025 --    Admin Instructions: Group 2 (Pink) Hazardous Drug - Reproductive Risk Only - See Handling Guide    Route: Oral    DAPTOmycin (CUBICIN) 550 mg in sodium chloride 0.9 % 50 mL IVPB 6 mg/kg × 92.1 kg (Adjusted) Every 24 Hours 6/9/2025 6/14/2025    Admin Instructions: Caution: Look alike/sound alike drug alert.  Refrigerate. Do not shake.    Route: Intravenous    dextrose (D50W) (25 g/50 mL) IV injection 25 g 25 g Every 15 Minutes PRN 6/9/2025 --    Admin Instructions: Blood sugar less than 70; patient has IV access - Unresponsive, NPO or Unable To Safely Swallow    Route: Intravenous    dextrose (GLUTOSE) oral gel 15 g 15 g Every 15 Minutes PRN 6/9/2025 --    Admin Instructions: BS<70, Patient Alert, Is not NPO, Can safely swallow.    Route: Oral    gabapentin (NEURONTIN) capsule 200 mg 200 mg Nightly 6/9/2025 --    Admin Instructions: (MARK)    Route: Oral    glucagon (GLUCAGEN) injection 1 mg 1 mg Every 15 Minutes PRN 6/9/2025 --    Admin Instructions: Blood Glucose Less Than 70 - Patient Without IV Access - Unresponsive, NPO or Unable To Safely Swallow  Reconstitute powder for injection by " "adding 1 mL of -supplied sterile diluent or sterile water for injection to a vial containing 1 mg of the drug, to provide solutions containing 1 mg/mL. Shake vial gently to dissolve.    Route: Intramuscular    insulin glargine (LANTUS, SEMGLEE) injection 20 Units 20 Units Nightly 6/9/2025 --    Admin Instructions: Do not hold basal insulin without an order. Consider requesting a dose edit, if needed.  ([facility])    Route: Subcutaneous    Insulin Lispro (humaLOG) injection 2-7 Units 2-7 Units 4 Times Daily Before Meals & Nightly 6/9/2025 --    Admin Instructions: Correction Insulin - Low Dose - Total Insulin Dose Less Than 40 units/day (Lean, Elderly or Renal Patients)    Blood Glucose 150-199 mg/dL - 2 units  Blood Glucose 200-249 mg/dL - 3 units  Blood Glucose 250-299 mg/dL - 4 units  Blood Glucose 300-349 mg/dL - 5 units  Blood Glucose 350-400 mg/dL - 6 units  Blood Glucose Greater Than 400 mg/dL - 7 units & Call Provider  ([facility]) Caution: Look alike/sound alike drug alert([facility])    Route: Subcutaneous    Insulin Lispro (humaLOG) injection 5 Units 5 Units 3 Times Daily With Meals 6/9/2025 --    Admin Instructions:  Solution should be clear. If cloudy, contact pharmacy for a new vial. Scheduled mealtime doses of this medication should be held if patient NPO.  ([facility]) Caution: Look alike/sound alike drug alert([facility])    Route: Subcutaneous    lactated ringers infusion 9 mL/hr Continuous 6/11/2025 6/11/2025    Admin Instructions: May switch to NS IV at [facility] if renal / if indicated    Route: Intravenous    lactulose (CHRONULAC) 10 GM/15ML solution 20 g 20 g 3 Times Daily 6/9/2025 --    Admin Instructions: May be mixed with fruit juice, water, or milk.    Route: Oral    Magnesium Standard Dose Replacement - Follow Nurse / BPA Driven Protocol  As Needed 6/9/2025 --    Admin Instructions: Open Order & Select \"BHS Electrolyte Replacement Protocol Algorithm\" to View Details    Route: Not Applicable    metFORMIN (GLUCOPHAGE) " "tablet 500 mg ([Held by provider] since 6/10/2025 11:44 AM) 500 mg Daily With Breakfast 6/10/2025 --    Admin Instructions: Caution: Look alike/sound alike drug alert    Route: Oral    metroNIDAZOLE (FLAGYL) tablet 500 mg 500 mg Every 8 Hours Scheduled 6/10/2025 6/24/2025    Admin Instructions: Caution: Look alike/sound alike drug alert    Route: Oral    nitroglycerin (NITROSTAT) SL tablet 0.4 mg 0.4 mg Every 5 Minutes PRN 6/9/2025 --    Admin Instructions: If Pain Unrelieved After 3 Doses Notify MD  May administer up to 3 doses per episode. Hold if SBP less than 100.    Route: Sublingual    ondansetron (ZOFRAN) injection 4 mg 4 mg Every 6 Hours PRN 6/9/2025 --    Admin Instructions: If BOTH ondansetron (ZOFRAN) and promethazine (PHENERGAN) are ordered use ondansetron first and THEN promethazine IF ondansetron is ineffective.    Route: Intravenous    Linked Group 2: Placed in \"Or\" Linked Group        ondansetron ODT (ZOFRAN-ODT) disintegrating tablet 4 mg 4 mg Every 6 Hours PRN 6/9/2025 --    Admin Instructions: If BOTH ondansetron (ZOFRAN) and promethazine (PHENERGAN) are ordered use ondansetron first and THEN promethazine IF ondansetron is ineffective.  Place on tongue and allow to dissolve.    Route: Oral    Linked Group 2: Placed in \"Or\" Linked Group        oxyCODONE (ROXICODONE) immediate release tablet 10 mg 10 mg Every 6 Hours PRN 6/9/2025 --    Admin Instructions: If given for pain, use the following pain scale:  Mild Pain = Pain Score of 1-3, CPOT 1-2  Moderate Pain = Pain Score of 4-6, CPOT 3-4  Severe Pain = Pain Score of 7-10, CPOT 5-8    Route: Oral    pantoprazole (PROTONIX) EC tablet 40 mg 40 mg Every Early Morning 6/10/2025 --    Admin Instructions: Do not crush or chew the capsules or tablets. The drug may not work as designed if the capsule or tablet is crushed or chewed. Swallow whole.  Swallow whole; do not crush, split, or chew.    Route: Oral    Phosphorus Replacement - Follow Nurse / BPA " "Driven Protocol  As Needed 6/9/2025 --    Admin Instructions: Open Order & Select \"BHS Electrolyte Replacement Protocol Algorithm\" to View Details    Route: Not Applicable    polyethylene glycol (MIRALAX) packet 17 g 17 g Daily PRN 6/9/2025 --    Admin Instructions: Use if no bowel movement after 12 hours. Mix in 6-8 ounces of water.  Use 4-8 ounces of water, tea, or juice for each 17 gram dose.    Route: Oral    Linked Group 1: Placed in \"And\" Linked Group        Potassium Replacement - Follow Nurse / BPA Driven Protocol  As Needed 6/9/2025 --    Admin Instructions: Open Order & Select \"BHS Electrolyte Replacement Protocol Algorithm\" to View Details    Route: Not Applicable    riFAXIMin (XIFAXAN) tablet 550 mg 550 mg 2 Times Daily 6/9/2025 --    Admin Instructions: Caution: Look alike/sound alike drug alert    Route: Oral    sennosides-docusate (PERICOLACE) 8.6-50 MG per tablet 2 tablet 2 tablet 2 Times Daily PRN 6/9/2025 --    Admin Instructions: Start bowel management regimen if patient has not had a bowel movement after 12 hours.    Route: Oral    Linked Group 1: Placed in \"And\" Linked Group        sodium chloride 0.9 % flush 10 mL 10 mL Every 12 Hours Scheduled 6/9/2025 --    Route: Intravenous    sodium chloride 0.9 % flush 10 mL 10 mL As Needed 6/9/2025 --    Route: Intravenous    sodium chloride 0.9 % infusion 40 mL 40 mL As Needed 6/9/2025 --    Admin Instructions: Following administration of an IV intermittent medication, flush line with 40mL NS at 100mL/hr.    Route: Intravenous    tamsulosin (FLOMAX) 24 hr capsule 0.4 mg 0.4 mg Nightly 6/9/2025 --    Admin Instructions: Do not crush or chew the capsules or tablets. The drug may not work as designed if the capsule or tablet is crushed or chewed. Swallow whole. If patient unable to swallow whole, contact pharmacy for alternative.    Route: Oral              Assessment & Plan     70-year-old male with left great toe osteomyelitis now status post left hallux " amputation at the level of the metatarsophalangeal joint 6/10/2025      Osteomyelitis    Primary hypertension    Type 2 diabetes mellitus with diabetic neuropathy    Cirrhosis of liver without ascites    Peripheral polyneuropathy    Osteomyelitis of great toe of left foot    Heel weightbearing as tolerated left lower extremity in postoperative shoe  Follow cultures; antibiotic management per infectious disease, appreciate their recommendations    To begin postoperative day 3, daily dressing changes per nursing staff: Xeroform, 4 x 4, Kerlix, Ace    Follow-up in 2 weeks for incision check, radiographs    [facility]  [address], [address]  [place], [address]  Please schedule at [phone]      NAFISA Stout  06/11/25  09:05 EDT

## 2025-06-11 NOTE — PROGRESS NOTES
INFECTIOUS DISEASE PROGRESS NOTE    Jimmy Gabehart  1955  [id number]    Date of consult: 6/10/25    Admit date: 6/9/2025    Requesting Provider: Dr. Patel  Evaluating physician: Allan Drake MD  Reason for Consultation: L great toe osteo   Chief Complaint: toe wound      Subjective   History of present illness:  Jimmy Gabehart is a  70 y.o.  Yr old male with a pmh of HTN, recently diagnosed cirrhosis, recent left elbow bursitis, thrombocytopenia, anemia of chronic disease, and poorly controlled DM who presented on 6/9 with left great toe wound. The patient had recently been discharged from [facility] after an admission for hepatic encephalopathy and left elbow bursitis and went to inpatient rehab for a few days. He had a left great toe wound and had a scab that opened on his toe and subsequently went to podiatry after discharged from inpatient rehab. His wound was debrided by podiatry and he had nail trimming. He subsequently experienced redness, swelling, and pain over his left great toe and he presented to [facility] for cellulitis and was also suspected to have osteomyelitis at that time. He was discharged home with PICC line and IV antibiotics (data deficient) and advised to follow up with surgery and ID. His toe started to have worsening swelling and drainage. Cultures at [facility] grew 3 different organisms but currently data deficient regarding cultures. He was experiencing some chills but no fevers. He presented to the ER yesterday due to worsening left great toe swelling.     Since arrival, the patient has remained afebrile. He has pancytopenia with wbc 3.28, hgb 9.4, and plt count of 85. Cr was 1.5 on arrival but has trended down to 1.28. CRP was 2.13 and ESR was 95. Left foot xray showed soft tissue gas in the first toe and erosive changes consistent with osteomyelitis. Lower extremity arterial duplex has been ordered. Orthopedic surgery has been consulted and planning on  left foot MRI. Tentative plan is for left hallux amputation at the level of the metatarsophalangeal joint this afternoon. The patient has been started on IV daptomycin and ceftriaxone by the primary team. ID has been consulted for abx recs.     6/11/25: the patient is doing ok today. Pain under control. S/p left great toe amputation yesterday with surgical cx in progress. No fevers. No n/v/d.     Past Medical History:   Diagnosis Date    Diabetes     Hypertension        Past Surgical History:   Procedure Laterality Date    AMPUTATION FOOT Left 6/10/2025    Procedure: HALLUX AMPUTATION AT THE LEVEL OF THE METATARSOPHALANGEAL JOINT LEFT;  Surgeon: Allan Clark Jr., MD;  Location: [facility];  Service: Orthopedics;  Laterality: Left;    BACK SURGERY      CARPAL TUNNEL RELEASE      COLONOSCOPY      EYE SURGERY      FOOT SURGERY Left 1986    Ankle fusion    HERNIA REPAIR      KNEE SURGERY Right 12/14/2022    TKA right knee manipulation 01-25-23    TONSILLECTOMY AND ADENOIDECTOMY      TRIGGER FINGER RELEASE      WISDOM TOOTH EXTRACTION         Pediatric History   Patient Parents    Not on file     Other Topics Concern    Not on file   Social History Narrative    Not on file       family history includes Arthritis in his mother; Heart attack in his father and mother; High cholesterol in his mother; Hypertension in his mother; Kidney disease in his mother.    Allergies   Allergen Reactions    Demerol [Meperidine]     Penicillins Provider Review Needed     Has tolerated ceftriaxone, has had RX for cephalexin       Immunization History   Administered Date(s) Administered    COVID-19 (PFIZER) BIVALENT 12+YRS 11/14/2022    COVID-19 (PFIZER) Purple Cap Monovalent 08/30/2021, 09/20/2021       Medication:    Current Facility-Administered Medications:     allopurinol (ZYLOPRIM) tablet 100 mg, 100 mg, Oral, Daily, Allan Clark Jr., MD, 100 mg at 06/11/25 0844    sennosides-docusate (PERICOLACE) 8.6-50 MG per tablet 2  tablet, 2 tablet, Oral, BID PRN **AND** polyethylene glycol (MIRALAX) packet 17 g, 17 g, Oral, Daily PRN **AND** bisacodyl (DULCOLAX) EC tablet 5 mg, 5 mg, Oral, Daily PRN **AND** bisacodyl (DULCOLAX) suppository 10 mg, 10 mg, Rectal, Daily PRN, Allan Clark Jr., MD    Calcium Replacement - Follow Nurse / BPA Driven Protocol, , Not Applicable, PRN, Allan Clark Jr., MD    carvedilol (COREG) tablet 6.25 mg, 6.25 mg, Oral, BID With Meals, Allan Clark Jr., MD, 6.25 mg at 06/11/25 0847    cefepime 2000 mg IVPB in 100 mL NS (MBP), 2,000 mg, Intravenous, Q8H, Allan Clark Jr., MD, 2,000 mg at 06/11/25 0626    cetirizine (zyrTEC) tablet 10 mg, 10 mg, Oral, Daily, Allan Clark Jr., MD, 10 mg at 06/11/25 0844    [Held by provider] colchicine tablet 0.6 mg, 0.6 mg, Oral, Daily, Allan Clark Jr., MD    DAPTOmycin (CUBICIN) 550 mg in sodium chloride 0.9 % 50 mL IVPB, 6 mg/kg (Adjusted), Intravenous, Q24H, Allan Clark Jr., MD, Last Rate: 100 mL/hr at 06/10/25 2131, 550 mg at 06/10/25 2131    dextrose (D50W) (25 g/50 mL) IV injection 25 g, 25 g, Intravenous, Q15 Min PRN, Allan Clark Jr., MD    dextrose (GLUTOSE) oral gel 15 g, 15 g, Oral, Q15 Min PRN, Allan Clark Jr., MD    gabapentin (NEURONTIN) capsule 200 mg, 200 mg, Oral, Nightly, Allan Clark Jr., MD, 200 mg at 06/10/25 2130    glucagon (GLUCAGEN) injection 1 mg, 1 mg, Intramuscular, Q15 Min PRN, Allan Clark Jr., MD    insulin glargine (LANTUS, SEMGLEE) injection 25 Units, 25 Units, Subcutaneous, Nightly, Georgiana Perera II, DO    Insulin Lispro (humaLOG) injection 2-7 Units, 2-7 Units, Subcutaneous, 4x Daily AC & at Bedtime, Allan Clark Jr., MD, 2 Units at 06/11/25 1145    Insulin Lispro (humaLOG) injection 5 Units, 5 Units, Subcutaneous, TID With Meals, Allan Clark Jr., MD, 5 Units at 06/11/25 1144    lactated ringers infusion, 9 mL/hr, Intravenous, Continuous, Allan Clark Jr., MD    lactulose  (CHRONULAC) 10 GM/15ML solution 20 g, 20 g, Oral, TID, Allan Clark Jr., MD, 20 g at 06/10/25 2129    Magnesium Standard Dose Replacement - Follow Nurse / BPA Driven Protocol, , Not Applicable, PRNEduardo David A Jr., MD    [Held by provider] metFORMIN (GLUCOPHAGE) tablet 500 mg, 500 mg, Oral, Daily With Breakfast, Allan Clark Jr., MD    metroNIDAZOLE (FLAGYL) tablet 500 mg, 500 mg, Oral, Q8H, Allan Clark Jr., MD, 500 mg at 06/11/25 0631    nitroglycerin (NITROSTAT) SL tablet 0.4 mg, 0.4 mg, Sublingual, Q5 Min PRN, Allan Clark Jr., MD    ondansetron ODT (ZOFRAN-ODT) disintegrating tablet 4 mg, 4 mg, Oral, Q6H PRN **OR** ondansetron (ZOFRAN) injection 4 mg, 4 mg, Intravenous, Q6H PRN, Allan Clark Jr., MD, 4 mg at 06/10/25 1555    oxyCODONE (ROXICODONE) immediate release tablet 10 mg, 10 mg, Oral, Q6H PRN, Allan Clark Jr., MD, 10 mg at 06/11/25 0854    pantoprazole (PROTONIX) EC tablet 40 mg, 40 mg, Oral, Q AM, Allan Clark Jr., MD, 40 mg at 06/11/25 0628    Phosphorus Replacement - Follow Nurse / BPA Driven Protocol, , Not Applicable, PRN, Allan Clark Jr., MD    Potassium Replacement - Follow Nurse / BPA Driven Protocol, , Not Applicable, PRN, lAlan Clark Jr., MD    riFAXIMin (XIFAXAN) tablet 550 mg, 550 mg, Oral, BID, Allan Clark Jr., MD, 550 mg at 06/11/25 0844    sodium chloride 0.9 % flush 10 mL, 10 mL, Intravenous, Q12H, Allan Clark Jr., MD, 10 mL at 06/10/25 2130    sodium chloride 0.9 % flush 10 mL, 10 mL, Intravenous, PRN, Allan Clark Jr., MD    sodium chloride 0.9 % infusion 40 mL, 40 mL, Intravenous, PRN, Allan Clark Jr., MD    tamsulosin (FLOMAX) 24 hr capsule 0.4 mg, 0.4 mg, Oral, Nightly, Allan Clark Jr., MD, 0.4 mg at 06/10/25 2130    Please refer to the medical record for a full medication list    Review of Systems:  As above    Physical Exam:   Vital Signs   Temp:  [97.6 °F (36.4 °C)-98.2 °F (36.8 °C)] 97.6 °F (36.4  "°C)  Heart Rate:  [63-79] 68  Resp:  [16-18] 18  BP: (122-152)/(54-97) 122/54    Temp  Min: 97.6 °F (36.4 °C)  Max: 98.2 °F (36.8 °C)  BP  Min: 122/54  Max: 152/77  Pulse  Min: 63  Max: 79  Resp  Min: 16  Max: 18  SpO2  Min: 95 %  Max: 100 %    Blood pressure 122/54, pulse 68, temperature 97.6 °F (36.4 °C), temperature source Oral, resp. rate 18, height 188 cm (74\"), weight 107 kg (235 lb 8 oz), SpO2 97%.  GENERAL: Awake and alert, in no acute distress. Frail appearing  HEENT:  Normocephalic, atraumatic. No external oral lesions noted  HEART: RRR, no murmur  LUNGS: CTAB. No respiratory distress, no use of accessory muscles.  ABDOMEN: Soft, nontender, nondistended. No appreciable HSM.   GENITAL: no Castorena catheter  SKIN: no generalized rashes.    PSYCHIATRIC: cooperative.  Appropriate mood and affect  EXT:  left foot with surgical dressings in place s/p left great toe amputation. No drainage noted through dressings. No erythema noted over exposed left leg  NEURO: awake alert and oriented ×4.  Normal speech and cognition    Left upper extremity PICC line in place, no erythema or drainage at site       File Link    Scan on 6/9/2025 1445 by Jazmine Seay RN        [facility] Information    Document ID File Type Document Type Description   [id number] Image WOUND IMAGE      Import Information    Attached At Date Time User Dept   Encounter Level 6/9/2025  2:45 PM aJzmine Seay RN  [facility]     Encounter    Hospital Encounter on 6/9/25       Results Review:   I reviewed the patient's new clinical results.  I reviewed the patient's new imaging results and agree with the interpretation.  I reviewed the patient's other test results and agree with the interpretation    Results from last 7 days   Lab Units 06/11/25  0502 06/10/25  0440 06/09/25  1022   WBC 10*3/mm3 2.80* 3.28* 3.56   HEMOGLOBIN g/dL 9.9* 9.4* 9.5*   HEMATOCRIT % 30.4* 28.7* 29.8*   PLATELETS 10*3/mm3 92* 85* 89*     Results from last 7 days   Lab Units " "06/11/25  0502   SODIUM mmol/L 136   POTASSIUM mmol/L 4.7   CHLORIDE mmol/L 103   CO2 mmol/L 20.0*   BUN mg/dL 24.7*   CREATININE mg/dL 1.20   GLUCOSE mg/dL 219*   CALCIUM mg/dL 8.9     Results from last 7 days   Lab Units 06/11/25  0502   ALK PHOS U/L 204*   BILIRUBIN mg/dL 0.5   ALT (SGPT) U/L 44*   AST (SGOT) U/L 64*     Results from last 7 days   Lab Units 06/09/25  1022   SED RATE mm/hr 95*     Results from last 7 days   Lab Units 06/09/25  1022   CRP mg/dL 2.13*         Results from last 7 days   Lab Units 06/09/25  1022   LACTATE mmol/L 1.7     Estimated Creatinine Clearance: 74.6 mL/min (by C-G formula based on SCr of 1.2 mg/dL).  CPK          6/9/2025    10:22   Common Labs   Creatine Kinase 33       Procalitonin Results:       Brief Urine Lab Results  (Last result in the past 365 days)        Color   Clarity   Blood   Leuk Est   Nitrite   Protein   CREAT   Urine HCG        04/21/25 1742 Yellow   Clear   Negative   Negative   Negative   Negative                  No results found for: \"SITE\", \"ALLENTEST\", \"PHART\", \"TUD9KGV\", \"PO2ART\", \"LJE0BVI\", \"BASEEXCESS\", \"O1ATGTOJ\", \"HGBBG\", \"HCTABG\", \"OXYHEMOGLOBI\", \"METHHGBN\", \"CARBOXYHGB\", \"CO2CT\", \"BAROMETRIC\", \"MODALITY\", \"FIO2\"     Microbiology:    Micro from [facility]:    Left great toe wound cx: Pseudomonas (s-cefepime, cipro, levaquin, zosyn) , enterococcus faecalis, staph hemolyticus      Radiology:  Imaging Results (Last 72 Hours)       Procedure Component Value Units Date/Time    MRI Foot Left Without Contrast [[id number]] Collected: 06/10/25 1422     Updated: 06/10/25 1434    Narrative:        MRI FOOT LEFT WO CONTRAST    Date of Exam: 6/10/2025 1:40 PM EDT    Indication: eval osteomyelitis.     Comparison: Foot radiograph 6/9/2025    Technique:  Routine multiplanar/multisequence sequence images of the left foot were obtained without contrast administration.      Findings:  Bones and joints: Marrow edema within the first distal phalanx. There is " loss of normal T1 cortical and marrow signal of the tuft of the first distal phalanx consistent with osteomyelitis. No central joint effusion. Mild degenerative changes most   advanced at the first metatarsophalangeal joint. No suspicious osseous lesions. No evidence of fracture. Partially visualized hardware at the ankle.    Soft tissues: Fatty and edematous atrophy of the intrinsic foot muscles. Visualized flexor extensor tendons appear intact. Visualized plantar plates appear intact. No intermetatarsal bursitis or neuroma.      Impression:      Impression:  Osteomyelitis of the tuft of the first distal phalanx.        Electronically Signed: Preston Goodson MD    6/10/2025 2:31 PM EDT    Workstation ID: [id number]    XR Toe 2+ View Left [[id number]] Collected: 06/09/25 0914     Updated: 06/09/25 0925    Narrative:      XR TOE 2+ VW LEFT    Date of Exam: 6/9/2025 8:46 AM EDT    Indication: osteo on gt toe ?    Comparison: Foot radiographs 2/8/2023    Findings:  Normal soft tissue gas within the first toe. No traumatic malalignment on this nonweightbearing exam. No evidence of fracture. Mild scattered degenerative changes. There appears to be age-indeterminate erosive change/remodeling along the medial tuft of   the first distal phalanx.      Impression:      Impression:  1.Soft tissue gas in the first toe which could be seen with infection or wound.  2.Age-indeterminate erosive change/remodeling along the medial tuft of the first distal phalanx. This could be seen with osteomyelitis. MRI could be performed for further evaluation as clinically indicated.        Electronically Signed: Preston Goodson MD    6/9/2025 9:22 AM EDT    Workstation ID: [id number]            IMPRESSION:     Problems:  Left great toe wound with cellulitis and osteomyelitis of the distal phalanx- left great toe wound culture from OSH grew pseudomonas, enterococcus faecalis, and staph hemolyticus. Now s/p left great toe amputation at level of  MTP joint on 6/10/25.   Poorly controlled DM  Cirrhosis of the liver  Chronic pancytopenia- likely due to cirrhosis  Renal dysfunction- unclear baseline Cr. His Cr was elevated to 1.49 on arrival but has improved to 1.28  PCN allergy- has tolerated cephalosporins ok thus far    RECOMMENDATIONS:  -follow cbc and cmp. Baseline CPK level ok at 33  -MRI left foot- showed osteomyelitis of the tuft of the distal phalanx of left great toe  -orthopedic surgery took the patient to the OR for left great toe amputation at the level of the MTP joint on 6/10/25. Surgical cultures in progress  - continue daptomycin for now  - continue cefepime 2g IV q8h  - continue flagyl 500 mg PO TID    Plan to continue antibiotics for a short course while inpatient. All infected bone was removed during his amputation procedure so we can likely remove his PICC line and stop antibiotics at the time of discharge if his surgical site is looking ok.     I discussed in length with the patient and his wife at bedside today    This visit included the following complex service elements:  Complex medical decision-making associated with antimicrobial prescribing.     Allan Darke MD  6/11/2025

## 2025-06-11 NOTE — PLAN OF CARE
Goal Outcome Evaluation:  Plan of Care Reviewed With: patient        Progress: no change (OT IE)  Outcome Evaluation: OT evaluation completed. Pt presents with decreased I in ADLs, related t/fs and mobility compared to PLOF limited by decreased activity tolerance, impaired balance, muscle weakness at BUEs, fatigue with more dynamic demands,decreased weight bearing at LLE and pain. Pt req'd variable A for ADLs observed including min A for d/d gown, d/d sock at R foot, SUA for face washing. Pt req'd CGA for all fxl t/fs and in room ADL related mobility with FWW use with intermittent cues on AD mgmt all while pt maintained good adherence to heel weight bearing at LLE. Pt would benefit from IPOT POC and home w/ A and HHOT at d/c when medically ready.    Anticipated Discharge Disposition (OT): home with assist, home with outpatient therapy services

## 2025-06-11 NOTE — PROGRESS NOTES
[facility]  PROGRESS NOTE    Patient Name: Jimmy Gabehart  : 1955  MRN: [id number]    Date of Admission: 2025  Primary Care Physician: Miriam Mckeon DO    Subjective   Subjective     CC: f/u om foot    HPI: Up in bed. Block has worn off and now w/ some pain in foot. Otherwise no concerns.      Objective   Objective     Vital Signs:   Temp:  [97.6 °F (36.4 °C)-98.7 °F (37.1 °C)] 97.7 °F (36.5 °C)  Heart Rate:  [63-79] 73  Resp:  [16-18] 18  BP: (125-152)/(66-97) 132/72  Flow (L/min) (Oxygen Therapy):  [2-4] 2     Physical Exam:  Constitutional: No acute distress, awake, alert  HENT: NCAT, mucous membranes moist  Respiratory: Clear to auscultation bilaterally, respiratory effort normal   Cardiovascular: RRR, no murmurs, rubs, or gallops  Gastrointestinal: Positive bowel sounds, soft, nontender, nondistended  Musculoskeletal: Foot dressed  Psychiatric: Appropriate affect, cooperative  Neurologic: Oriented x 3, strength symmetric in all extremities, Cranial Nerves grossly intact to confrontation, speech clear  Skin: No rashes     Results Reviewed:  LAB RESULTS:      Lab 25  0502 06/10/25  0440 25  1022   WBC 2.80* 3.28* 3.56   HEMOGLOBIN 9.9* 9.4* 9.5*   HEMATOCRIT 30.4* 28.7* 29.8*   PLATELETS 92* 85* 89*   NEUTROS ABS 2.14 1.31* 1.73   IMMATURE GRANS (ABS) 0.04 0.02 0.04   LYMPHS ABS 0.51* 1.44 1.23   MONOS ABS 0.10 0.46 0.51   EOS ABS 0.00 0.04 0.04   .7* 107.9* 109.2*   SED RATE  --   --  95*   CRP  --   --  2.13*   LACTATE  --   --  1.7         Lab 25  0502 06/10/25  0843 25  1022   SODIUM 136 136 137   POTASSIUM 4.7 4.1 4.2   CHLORIDE 103 103 103   CO2 20.0* 23.0 24.0   ANION GAP 13.0 10.0 10.0   BUN 24.7* 21.8 23.5*   CREATININE 1.20 1.28* 1.49*   EGFR 65.1 60.2 50.2*   GLUCOSE 219* 113* 167*   CALCIUM 8.9 9.1 8.9         Lab 25  0502 06/10/25  0843 25  1022   TOTAL PROTEIN 7.7 7.5 7.6   ALBUMIN 3.5 3.4*  3.3*   GLOBULIN 4.2 4.1 4.3   ALT (SGPT) 44* 39 42*   AST (SGOT) 64* 59* 58*   BILIRUBIN 0.5 0.5 0.4   ALK PHOS 204* 191* 197*                     Brief Urine Lab Results  (Last result in the past 365 days)        Color   Clarity   Blood   Leuk Est   Nitrite   Protein   CREAT   Urine HCG        04/21/25 1742 Yellow   Clear   Negative   Negative   Negative   Negative                   Microbiology Results Abnormal       None            Peripheral Block  Result Date: 6/10/2025  Ramses Rodriguez, BETITO     6/10/2025  3:32 PM Peripheral Block Patient reassessed immediately prior to procedure Patient location during procedure: pre-op Start time: 6/10/2025 3:17 PM Stop time: 6/10/2025 3:18 PM Reason for block: at surgeon's request and post-op pain management Performed by CRNA/CAA: Ramses Rodriguez CRNA Assisted by: Margy Samaniego RN Preanesthetic Checklist Completed: patient identified, IV checked, site marked, risks and benefits discussed, surgical consent, monitors and equipment checked, pre-op evaluation and timeout performed Prep: Sterile barriers:cap, gloves, mask and washed/disinfected hands Prep: ChloraPrep Patient monitoring: blood pressure monitoring, continuous pulse oximetry and EKG Procedure Sedation: yes Performed under: local infiltration Guidance:ultrasound guided ULTRASOUND INTERPRETATION.  Using ultrasound guidance a 20 G gauge needle was placed in close proximity to the nerve, at which point, under ultrasound guidance anesthetic was injected in the area of the nerve and spread of the anesthesia was seen on ultrasound in close proximity thereto.  There were no abnormalities seen on ultrasound; a digital image was taken; and the patient tolerated the procedure with no complications. Images:still images obtained, printed/placed on chart Laterality:left Block Type:popliteal Injection Technique:single-shot Needle Type:echogenic and short-bevel Needle Gauge:20 G Resistance on Injection: none Medications  "Used: dexamethasone sodium phosphate injection - Injection  2 mg - 6/10/2025 3:18:00 PM bupivacaine PF (MARCAINE) 0.25 % injection - Injection  30 mL - 6/10/2025 3:18:00 PM Medications Preservative Free Saline:5ml Post Assessment Injection Assessment: negative aspiration for heme, no paresthesia on injection and incremental injection Patient Tolerance:comfortable throughout block Complications:no Additional Notes SINGLE shot  A high-frequency linear transducer, with sterile cover, was placed in the popliteal fossa to identify the popliteal artery and vein, Tibial nerve (TN) and Common Peroneal nerve (CP). The transducer was then moved in a cephalad fashion to observe the TN and CP nerve bifurcation to form the Sciatic Nerve. The insertion site was prepped and draped in sterile fashion. Skin and cutaneous tissue was infiltrated with 2-5 ml of 1% Lidocaine. Using ultrasound-guidance, a 20-gauge B-Saxena 4\" Ultraplex 360 non-stimulating echogenic needle was then inserted and advanced in plane from lateral to medial. Preservative-free normal saline was utilized for hydro-dissection of tissue, advancement of Touhy, and to confirm final needle placement posterior to the nerves. Local anesthetic injection spread, in incremental 3-5 ml injections, to surround both nerve structures. Aspiration every 5 ml to prevent intravascular injection. Injection was completed with negative aspiration of blood and negative intravascular injection. Injection pressures were normal with minimal resistance Performed by: Ramses Rodriguez CRNA     Peripheral Block  Result Date: 6/10/2025  Ramses Rodriguez CRNA     6/10/2025  3:32 PM Peripheral Block Patient reassessed immediately prior to procedure Start time: 6/10/2025 3:14 PM Stop time: 6/10/2025 3:16 PM Reason for block: at surgeon's request and post-op pain management Performed by CRNA/CAA: Ramses Rodriguez CRNA Assisted by: Margy Samaniego RN Preanesthetic Checklist Completed: patient " identified, IV checked, site marked, risks and benefits discussed, surgical consent, monitors and equipment checked, pre-op evaluation and timeout performed Prep: Pt Position: supine Sterile barriers:cap, gloves, mask, sterile barriers and washed/disinfected hands Prep: ChloraPrep Patient monitoring: blood pressure monitoring, continuous pulse oximetry and EKG Procedure Performed under: spinal Guidance:ultrasound guided ULTRASOUND INTERPRETATION.  Using ultrasound guidance a 20 G gauge needle was placed in close proximity to the nerve, at which point, under ultrasound guidance anesthetic was injected in the area of the nerve and spread of the anesthesia was seen on ultrasound in close proximity thereto.  There were no abnormalities seen on ultrasound; a digital image was taken; and the patient tolerated the procedure with no complications. Images:still images obtained, printed/placed on chart Laterality:left Block Type:adductor canal block Injection Technique:single-shot Needle Type:echogenic and short-bevel Needle Gauge:20 G Resistance on Injection: none Medications Used: dexamethasone sodium phosphate injection - Injection  2 mg - 6/10/2025 3:16:00 PM bupivacaine PF (MARCAINE) 0.25 % injection - Injection  30 mL - 6/10/2025 3:16:00 PM Medications Preservative Free Saline:5ml Post Assessment Injection Assessment: negative aspiration for heme, incremental injection and no paresthesia on injection Patient Tolerance:comfortable throughout block Complications:no Additional Notes SINGLE shot A high-frequency linear transducer, with sterile cover, was placed on the anterior mid-thigh (between the anterior superior iliac spine and patella). The transducer was then moved medially to identify the Sartorius muscle (Arina), Vastus Medialis muscle (VMM), Superficial Femoral Artery (SFA) and Vein. The transducer was then moved cephalad or caudad to position the SFA in the middle of the Arina. The insertion site was prepped and  "draped in sterile fashion. Skin and cutaneous tissue was infiltrated with 2-5 ml of 1% Lidocaine. Using ultrasound-guidance, a 20-gauge B-Saxena 4\" Ultraplex 360 non-stimulating echogenic needle was advanced in plane from lateral to medial. Preservative-free normal saline was utilized for hydro-dissection of tissue, advancement of needle, and to confirm needle placement below the fascial plane of the Arina where the Nerve to the VMM is located. Local anesthetic (LA) 5 ml deposited here. The needle continues its path lateral to the SFA at the level of the Saphenous Nerve. The remainder of the LA was deposited at the 10-11 o'clock position of the SFA. This injection created a space between the Arina and the SFA. Aspiration every 5 ml to prevent intravascular injection. Injection was completed with negative aspiration of blood and negative intravascular injection. Injection pressures were normal with minimal resistance. Performed by: Ramses Rodriguez CRNA     MRI Foot Left Without Contrast  Result Date: 6/10/2025  MRI FOOT LEFT WO CONTRAST Date of Exam: 6/10/2025 1:40 PM EDT Indication: eval osteomyelitis.  Comparison: Foot radiograph 6/9/2025 Technique:  Routine multiplanar/multisequence sequence images of the left foot were obtained without contrast administration. Findings: Bones and joints: Marrow edema within the first distal phalanx. There is loss of normal T1 cortical and marrow signal of the tuft of the first distal phalanx consistent with osteomyelitis. No central joint effusion. Mild degenerative changes most advanced at the first metatarsophalangeal joint. No suspicious osseous lesions. No evidence of fracture. Partially visualized hardware at the ankle. Soft tissues: Fatty and edematous atrophy of the intrinsic foot muscles. Visualized flexor extensor tendons appear intact. Visualized plantar plates appear intact. No intermetatarsal bursitis or neuroma.     Impression: Impression: Osteomyelitis of the tuft of " the first distal phalanx. Electronically Signed: Preston Goodson MD  6/10/2025 2:31 PM EDT  Workstation ID: [id number]    Duplex Lower Extremity Art / Grafts - Bilateral CAR  Result Date: 6/10/2025    Right lower extremity arteries are patent and without significant stenosis. Left lower extremity arteries are patent and without significant stenosis.   Minimal atherosclerotic plaque noted.   Normal bilateral ANUSHA at 1.1           Current medications:  Scheduled Meds:allopurinol, 100 mg, Oral, Daily  carvedilol, 6.25 mg, Oral, BID With Meals  cefepime, 2,000 mg, Intravenous, Q8H  cetirizine, 10 mg, Oral, Daily  [Held by provider] colchicine, 0.6 mg, Oral, Daily  DAPTOmycin, 6 mg/kg (Adjusted), Intravenous, Q24H  gabapentin, 200 mg, Oral, Nightly  insulin glargine, 20 Units, Subcutaneous, Nightly  insulin lispro, 2-7 Units, Subcutaneous, 4x Daily AC & at Bedtime  Insulin Lispro, 5 Units, Subcutaneous, TID With Meals  lactulose, 20 g, Oral, TID  [Held by provider] metFORMIN, 500 mg, Oral, Daily With Breakfast  metroNIDAZOLE, 500 mg, Oral, Q8H  pantoprazole, 40 mg, Oral, Q AM  riFAXIMin, 550 mg, Oral, BID  sodium chloride, 10 mL, Intravenous, Q12H  tamsulosin, 0.4 mg, Oral, Nightly      Continuous Infusions:lactated ringers, 9 mL/hr      PRN Meds:.  senna-docusate sodium **AND** polyethylene glycol **AND** bisacodyl **AND** bisacodyl    Calcium Replacement - Follow Nurse / BPA Driven Protocol    dextrose    dextrose    glucagon (human recombinant)    Magnesium Standard Dose Replacement - Follow Nurse / BPA Driven Protocol    nitroglycerin    ondansetron ODT **OR** ondansetron    oxyCODONE    Phosphorus Replacement - Follow Nurse / BPA Driven Protocol    Potassium Replacement - Follow Nurse / BPA Driven Protocol    sodium chloride    sodium chloride    Assessment & Plan   Assessment & Plan     Active Hospital Problems    Diagnosis  POA    **Osteomyelitis [M86.9]  Yes    Cirrhosis of liver without ascites [K74.60]  Unknown     Peripheral polyneuropathy [G62.9]  Unknown    Osteomyelitis of great toe of left foot [M86.9]  Unknown    Type 2 diabetes mellitus with diabetic neuropathy [E11.40]  Yes    Primary hypertension [I10]  Yes      Resolved Hospital Problems   No resolved problems to display.        Brief Hospital Course to date:  Jimmy Gabehart is a 70 y.o. male with PMH of HTN, T2DM complicated by peripheral neuropathy, and cirrhosis who presented with osteomyelitis of his L great toe.      L great toe osteomyelitis/cellulitis due to diabetes  -consulted ID  -consulted orthopedic surgery, s/p amputation 6/10. Post op wound care as outtlined in note.  -post op pt/ot w/ heel weight bearing.     Renal insufficiency w/ likely underlying CKD IIIa  -- appears that he has been 1.1- 1.47 over the last few months, no data available prior to that  -- Cr 1.49 on admission. Improved to 1.2-1.3 which is likely baseline.     DM w/ hyperglycemia  -Increase basal/bolus insulin     Cirrhosis  - Refusing lactulose at this time due to immobility. Continue Rifaximin     Peripheral neuropathy  -on neurontin     HTN  -continue appropriate home meds, on coreg     TCP   -unclear etiology, history of BM biopsy, data deficient, possibly due to cirrhosis. Stable today      BPH  -on flomax     GERD  -PPI    Expected Discharge Location and Transportation:   Expected Discharge   Expected Discharge Date: 6/13/2025; Expected Discharge Time:      VTE Prophylaxis:  Mechanical VTE prophylaxis orders are present.         AM-PAC 6 Clicks Score (PT): 22 (06/10/25 4112)    CODE STATUS:   Code Status and Medical Interventions: CPR (Attempt to Resuscitate); Full Support   Ordered at: 06/09/25 1156     Code Status (Patient has no pulse and is not breathing):    CPR (Attempt to Resuscitate)     Medical Interventions (Patient has pulse or is breathing):    Full Support       Georgiana Perera II, DO  06/11/25

## 2025-06-11 NOTE — PLAN OF CARE
Goal Outcome Evaluation:  Plan of Care Reviewed With: patient           Outcome Evaluation: PT eval completed. Pt presents below baseline function d/t L LE WB precautions, acute pain, balance deficits, and decreased activity tolerance. Pt ambulated 100 ft w/ FWW and offloading shoe donned, CGA. No LOB noted and pt was able to maintain LLE heel WB throughout. Pt would benefit from IP PT services while hospitalized. Recommend home w/ assist and HHPT at d/c.    Anticipated Discharge Disposition (PT): home with assist, home with home health

## 2025-06-11 NOTE — THERAPY EVALUATION
Patient Name: Jimmy Gabehart  : 1955    MRN: [id number]                              Today's Date: 2025       Admit Date: 2025    Visit Dx:     ICD-10-CM ICD-9-CM   1. Osteomyelitis of great toe of left foot  M86.9 730.27   2. Diabetic ulcer of toe of left foot associated with diabetes mellitus due to underlying condition, with bone involvement without evidence of necrosis  E08.621 249.80    L97.526 707.15     Patient Active Problem List   Diagnosis    Shoulder pain, bilateral    Bursitis/tendonitis, shoulder    Pain    Cervical radiculopathy    Cervical spondylosis    Lumbar spondylosis    Elevated liver enzymes    History of diabetic ulcer of foot    Primary hypertension    Long-term current use of opiate analgesic    Osteoarthritis, generalized    Type 2 diabetes mellitus with diabetic neuropathy    Hepatic encephalopathy    Osteomyelitis    Cirrhosis of liver without ascites    Peripheral polyneuropathy    Osteomyelitis of great toe of left foot     Past Medical History:   Diagnosis Date    Diabetes     Hypertension      Past Surgical History:   Procedure Laterality Date    AMPUTATION FOOT Left 6/10/2025    Procedure: HALLUX AMPUTATION AT THE LEVEL OF THE METATARSOPHALANGEAL JOINT LEFT;  Surgeon: Allan Clark Jr., MD;  Location: [facility];  Service: Orthopedics;  Laterality: Left;    BACK SURGERY      CARPAL TUNNEL RELEASE      COLONOSCOPY      EYE SURGERY      FOOT SURGERY Left     Ankle fusion    HERNIA REPAIR      KNEE SURGERY Right 2022    TKA right knee manipulation 23    TONSILLECTOMY AND ADENOIDECTOMY      TRIGGER FINGER RELEASE      WISDOM TOOTH EXTRACTION        General Information       Row Name 25 1437          Physical Therapy Time and Intention    Document Type evaluation  -     Mode of Treatment physical therapy  -       Row Name 25 1437          General Information    Patient Profile Reviewed yes  -LH     Prior Level of Function  independent:;all household mobility;community mobility;gait;transfer;bed mobility;ADL's;yard work  No AD at baseline. Has FWW, cane, and knee scooter at home. Endorses 1 acute fall  -     Existing Precautions/Restrictions fall;left;other (see comments)  LLE heel WB in post-op shoe; recent L elbow bursitis, remote RUE injury, concern for RC injury after recent fall, neuropathy  -     Barriers to Rehab medically complex;previous functional deficit;physical barrier;visual deficit  -[facility] Name 06/11/25 1437          Living Environment    Current Living Arrangements home  -     People in Home other (see comments)  ex wife  -[facility] Name 06/11/25 1437          Home Main Entrance    Number of Stairs, Main Entrance three;other (see comments)  2+1  -     Stair Railings, Main Entrance railing on right side (ascending)  -[facility] Name 06/11/25 1437          Stairs Within Home, Primary    Number of Stairs, Within Home, Primary none  -[facility] Name 06/11/25 1437          Cognition    Orientation Status (Cognition) oriented x 4  -[facility] Name 06/11/25 1437          Safety Issues/Impairments Affecting Functional Mobility    Safety Issues Affecting Function (Mobility) awareness of need for assistance;insight into deficits/self-awareness;safety precautions follow-through/compliance;safety precaution awareness;sequencing abilities;judgment;positioning of assistive device  -     Impairments Affecting Function (Mobility) balance;pain;sensation/sensory awareness;strength;range of motion (ROM)  -               User Key  (r) = Recorded By, (t) = Taken By, (c) = Cosigned By      Initials Name Provider Type     Kimberly Arthur PT Physical Therapist                   Mobility       Row Name 06/11/25 1440          Bed Mobility    Comment, (Bed Mobility) UIC. Offloading shoe donned on L foot prior to mobility  -[facility] Name 06/11/25 1440          Transfers    Comment, (Transfers) PT demonstrated STS  w/ use of FWW while performing LLE heel WB. Pt able to perform safely w/ cues  -[facility] Name 06/11/25 1440          Sit-Stand Transfer    Sit-Stand [place] (Transfers) contact guard;verbal cues  -     Assistive Device (Sit-Stand Transfers) walker, front-wheeled  -[facility] Name 06/11/25 1440          Gait/Stairs (Locomotion)    [place] Level (Gait) contact guard;verbal cues  -     Assistive Device (Gait) walker, front-wheeled  -     Patient was able to Ambulate yes  -     Distance in Feet (Gait) 100  -     Deviations/Abnormal Patterns (Gait) left sided deviations;weight shifting decreased;laurence decreased;gait speed decreased;stride length decreased  -     Bilateral Gait Deviations forward flexed posture  -     Right Sided Gait Deviations weight shift ability decreased  -     Maintains Weight-bearing Status (Gait) able to maintain  -     Comment, (Gait/Stairs) Pt demo step to gait pattern w/ decreased step length and speed. VCs for upright posture, AD management, and increased step length. When cued to increase step length on RLE, pt unable to perform w/ LLE heel WB. Able to maintain WB status otherwise. No overt LOB or knee buckling noted  -[facility] Name 06/11/25 1440          Mobility    Extremity Weight-bearing Status left lower extremity  -     Left Lower Extremity (Weight-bearing Status) partial weight-bearing (PWB);other (see comments)   heel WB in post-op shoe  -               User Key  (r) = Recorded By, (t) = Taken By, (c) = Cosigned By      Initials Name Provider Type     Kimberly Arthur PT Physical Therapist                   Obj/Interventions       [facility] Name 06/11/25 1445          Range of Motion Comprehensive    General Range of Motion lower extremity range of motion deficits identified  -     Comment, General Range of Motion remote L ankle fusion  -[facility] Name 06/11/25 1440          Strength Comprehensive (MMT)    General Manual Muscle Testing  (MMT) Assessment lower extremity strength deficits identified  -     Comment, General Manual Muscle Testing (MMT) Assessment RLE grossly 4+/5, LLE observed at least 4/5; able to move L toes  -[facility] Name 06/11/25 1445          Balance    Balance Assessment sitting static balance;sitting dynamic balance;standing static balance;standing dynamic balance  -     Static Sitting Balance supervision  -     Dynamic Sitting Balance standby assist  -     Position, Sitting Balance unsupported;sitting in chair  -     Static Standing Balance contact guard  -     Dynamic Standing Balance contact guard;verbal cues  -     Position/Device Used, Standing Balance supported;walker, front-wheeled  -     Balance Interventions sitting;standing;sit to stand;supported;static;dynamic  -[facility] Name 06/11/25 1445          Sensory Assessment (Somatosensory)    Left LE Sensory Assessment light touch awareness;impaired;other (see comments)  L foot  -               User Key  (r) = Recorded By, (t) = Taken By, (c) = Cosigned By      Initials Name Provider Type     Kimberly Arthur, PT Physical Therapist                   Goals/Plan       Row Name 06/11/25 1448          Bed Mobility Goal 1 (PT)    Activity/Assistive Device (Bed Mobility Goal 1, PT) bed mobility activities, all  -     [place] Level/Cues Needed (Bed Mobility Goal 1, PT) independent  -     Time Frame (Bed Mobility Goal 1, PT) long term goal (LTG);10 days  -     Progress/Outcomes (Bed Mobility Goal 1, PT) new goal  -[facility] hospital Name 06/11/25 1442          Transfer Goal 1 (PT)    Activity/Assistive Device (Transfer Goal 1, PT) sit-to-stand/stand-to-sit;bed-to-chair/chair-to-bed  -     [place] Level/Cues Needed (Transfer Goal 1, PT) supervision required  -     Time Frame (Transfer Goal 1, PT) short term goal (STG);5 days  -     Progress/Outcome (Transfer Goal 1, PT) new goal  -[facility] hospital Name 06/11/25 1445          Gait Training Goal  1 (PT)    Activity/Assistive Device (Gait Training Goal 1, PT) gait (walking locomotion);assistive device use  -     [place] Level (Gait Training Goal 1, PT) modified independence  -     Distance (Gait Training Goal 1, PT) 200 ft  -     Time Frame (Gait Training Goal 1, PT) long term goal (LTG);10 days  -     Progress/Outcome (Gait Training Goal 1, PT) new goal  -[facility] Name 06/11/25 1449          Stairs Goal 1 (PT)    Activity/Assistive Device (Stairs Goal 1, PT) ascending stairs;descending stairs;using handrail, right;using handrail, left;assistive device use  -     [place] Level/Cues Needed (Stairs Goal 1, PT) standby assist  -     Number of Stairs (Stairs Goal 1, PT) 2+1  -     Time Frame (Stairs Goal 1, PT) long term goal (LTG);10 days  -     Progress/Outcome (Stairs Goal 1, PT) new goal  -[facility] Name 06/11/25 1449          Therapy Assessment/Plan (PT)    Planned Therapy Interventions (PT) balance training;bed mobility training;gait training;neuromuscular re-education;patient/family education;home exercise program;postural re-education;motor coordination training;ROM (range of motion);stair training;strengthening;transfer training;stretching  [facility]               User Key  (r) = Recorded By, (t) = Taken By, (c) = Cosigned By      Initials Name Provider Type     Kimberly Arthur, PT Physical Therapist                   Clinical Impression       [facility] Name 06/11/25 1447          Pain    Pretreatment Pain Rating 8/10  -     Posttreatment Pain Rating 8/10  -     Pain Location foot  -     Pain Side/Orientation left  -     Pain Management Interventions activity modification encouraged;exercise or physical activity utilized;positioning techniques utilized;nursing notified  -     Response to Pain Interventions activity participation with tolerable pain  -       Row Name 06/11/25 1449          Plan of Care Review    Plan of Care Reviewed With patient  -     Outcome  Evaluation PT eval completed. Pt presents below baseline function d/t L LE WB precautions, acute pain, balance deficits, and decreased activity tolerance. Pt ambulated 100 ft w/ FWW and offloading shoe donned, CGA. No LOB noted and pt was able to maintain LLE heel WB throughout. Pt would benefit from IP PT services while hospitalized. Recommend home w/ assist and HHPT at d/c.  -[facility] Name 06/11/25 1447          Therapy Assessment/Plan (PT)    Patient/Family Therapy Goals Statement (PT) to go home  -     Rehab Potential (PT) good  -     Criteria for Skilled Interventions Met (PT) yes;meets criteria;skilled treatment is necessary  -     Therapy Frequency (PT) daily  -     Predicted Duration of Therapy Intervention (PT) 10 days  -[facility] Name 06/11/25 1447          Vital Signs    Pre Systolic BP Rehab 122  -     Pre Treatment Diastolic BP 54  -     Pretreatment Heart Rate (beats/min) 65  -     Pre SpO2 (%) 98  -     O2 Delivery Pre Treatment room air  -     O2 Delivery Post Treatment room air  -     Pre Patient Position Sitting  -     Post Patient Position Sitting  -LH       Row Name 06/11/25 1447          Positioning and Restraints    Pre-Treatment Position sitting in chair/recliner  -     Post Treatment Position chair  -     In Chair reclined;notified nsg;sitting;call light within reach;encouraged to call for assist;exit alarm on;waffle cushion;legs elevated;LLE elevated;L heel elevated;with nsg  -               User Key  (r) = Recorded By, (t) = Taken By, (c) = Cosigned By      Initials Name Provider Type     Kimberly Arthur, PT Physical Therapist                   Outcome Measures       [facility] Name 06/11/25 1450 06/11/25 0800       How much help from another person do you currently need...    Turning from your back to your side while in flat bed without using bedrails? 4  - 4  -AP    Moving from lying on back to sitting on the side of a flat bed without bedrails? 3  - 4   -AP    Moving to and from a bed to a chair (including a wheelchair)? 3  - 4  -AP    Standing up from a chair using your arms (e.g., wheelchair, bedside chair)? 3  - 4  -AP    Climbing 3-5 steps with a railing? 2  - 3  -AP    To walk in hospital room? 3  - 3  -AP    AM-PAC 6 Clicks Score (PT) 18  - 22  -AP    Highest Level of Mobility Goal Walk 10 Steps or More-6  - Walk 25 Feet or More-7  -AP      Row Name 06/11/25 1450          Functional Assessment    Outcome Measure Options AM-PAC 6 Clicks Basic Mobility (PT)  -               User Key  (r) = Recorded By, (t) = Taken By, (c) = Cosigned By      Initials Name Provider Type    AP Myra Rucker, RN Registered Nurse     Kimberly Arthur, PT Physical Therapist                                 Physical Therapy Education       Title: PT OT SLP Therapies (In Progress)       Topic: Physical Therapy (In Progress)       Point: Mobility training (Done)       Learning Progress Summary            Patient Acceptance, E,TB,D, VU,DU,NR by  at 6/11/2025 1450                      Point: Home exercise program (Not Started)       Learner Progress:  Not documented in this visit.              Point: Body mechanics (Done)       Learning Progress Summary            Patient Acceptance, E,TB,D, VU,DU,NR by  at 6/11/2025 1450                      Point: Precautions (Done)       Learning Progress Summary            Patient Acceptance, E,TB,D, VU,DU,NR by  at 6/11/2025 1450                                      User Key       Initials Effective Dates Name Provider Type Discipline     09/21/23 -  Kimberly Arthur, PT Physical Therapist PT                  PT Recommendation and Plan  Planned Therapy Interventions (PT): balance training, bed mobility training, gait training, neuromuscular re-education, patient/family education, home exercise program, postural re-education, motor coordination training, ROM (range of motion), stair training, strengthening, transfer training,  stretching  Outcome Evaluation: PT eval completed. Pt presents below baseline function d/t L LE WB precautions, acute pain, balance deficits, and decreased activity tolerance. Pt ambulated 100 ft w/ FWW and offloading shoe donned, CGA. No LOB noted and pt was able to maintain LLE heel WB throughout. Pt would benefit from IP PT services while hospitalized. Recommend home w/ assist and HHPT at d/c.     Time Calculation:   PT Evaluation Complexity  History, PT Evaluation Complexity: 3 or more personal factors and/or comorbidities  Examination of Body Systems (PT Eval Complexity): total of 4 or more elements  Clinical Presentation (PT Evaluation Complexity): stable  Clinical Decision Making (PT Evaluation Complexity): low complexity  Overall Complexity (PT Evaluation Complexity): low complexity     PT Charges       Row Name 06/11/25 1451             Time Calculation    Start Time 1322  -      PT Received On 06/11/25  -      PT Goal Re-Cert Due Date 06/21/25  -         Untimed Charges    PT Eval/Re-eval Minutes 51  -LH         Total Minutes    Untimed Charges Total Minutes 51  -LH       Total Minutes 51  -LH                User Key  (r) = Recorded By, (t) = Taken By, (c) = Cosigned By      Initials Name Provider Type     Kimberly Arthur, PT Physical Therapist                  Therapy Charges for Today       Code Description Service Date Service Provider Modifiers Qty    [id number]  PT EVAL LOW COMPLEXITY 4 6/11/2025 Kimberly Arthur, PT GP 1            PT G-Codes  Outcome Measure Options: AM-PAC 6 Clicks Basic Mobility (PT)  AM-PAC 6 Clicks Score (PT): 18  PT Discharge Summary  Anticipated Discharge Disposition (PT): home with assist, home with home health    Kimberly Arthur PT  6/11/2025

## 2025-06-11 NOTE — PLAN OF CARE
Problem: Adult Inpatient Plan of Care  Goal: Plan of Care Review  Outcome: Progressing  Goal: Patient-Specific Goal (Individualized)  Outcome: Progressing  Goal: Absence of Hospital-Acquired Illness or Injury  Outcome: Progressing  Intervention: Identify and Manage Fall Risk  Recent Flowsheet Documentation  Taken 6/10/2025 1400 by Samara Sweet, RN  Safety Promotion/Fall Prevention: patient off unit  Goal: Optimal Comfort and Wellbeing  Outcome: Progressing  Goal: Readiness for Transition of Care  Outcome: Progressing     Problem: Comorbidity Management  Goal: Blood Glucose Level Within Target Range  Outcome: Progressing  Goal: Maintenance of Osteoarthritis Symptom Control  Outcome: Progressing     Problem: Sepsis/Septic Shock  Goal: Optimal Coping  Outcome: Progressing  Goal: Absence of Bleeding  Outcome: Progressing  Goal: Blood Glucose Level Within Target Range  Outcome: Progressing  Goal: Absence of Infection Signs and Symptoms  Outcome: Progressing  Goal: Optimal Nutrition Delivery  Outcome: Progressing     Problem: Fall Injury Risk  Goal: Absence of Fall and Fall-Related Injury  Outcome: Progressing  Intervention: Promote Injury-Free Environment  Recent Flowsheet Documentation  Taken 6/10/2025 1400 by Samara Sweet RN  Safety Promotion/Fall Prevention: patient off unit     Problem: Skin Injury Risk Increased  Goal: Skin Health and Integrity  Outcome: Progressing   Goal Outcome Evaluation:

## 2025-06-11 NOTE — PLAN OF CARE
Problem: Adult Inpatient Plan of Care  Goal: Plan of Care Review  Outcome: Progressing  Goal: Patient-Specific Goal (Individualized)  Outcome: Progressing  Goal: Absence of Hospital-Acquired Illness or Injury  Outcome: Progressing  Intervention: Identify and Manage Fall Risk  Recent Flowsheet Documentation  Taken 6/11/2025 1800 by Myra Rucker RN  Safety Promotion/Fall Prevention:   activity supervised   assistive device/personal items within reach   clutter free environment maintained   fall prevention program maintained   nonskid shoes/slippers when out of bed   safety round/check completed  Taken 6/11/2025 1600 by Myra Rucker RN  Safety Promotion/Fall Prevention:   activity supervised   assistive device/personal items within reach   clutter free environment maintained   fall prevention program maintained   nonskid shoes/slippers when out of bed   safety round/check completed  Taken 6/11/2025 1400 by Myra Rucker RN  Safety Promotion/Fall Prevention:   activity supervised   assistive device/personal items within reach   clutter free environment maintained   fall prevention program maintained   nonskid shoes/slippers when out of bed   safety round/check completed  Taken 6/11/2025 1200 by Myra Rucker RN  Safety Promotion/Fall Prevention:   activity supervised   assistive device/personal items within reach   clutter free environment maintained   fall prevention program maintained   nonskid shoes/slippers when out of bed   safety round/check completed  Taken 6/11/2025 1000 by Myra Rucker RN  Safety Promotion/Fall Prevention:   activity supervised   assistive device/personal items within reach   clutter free environment maintained   fall prevention program maintained   nonskid shoes/slippers when out of bed   safety round/check completed  Taken 6/11/2025 0800 by Myra Rucker RN  Safety Promotion/Fall Prevention:   activity supervised   assistive device/personal items within reach   clutter free environment  maintained   fall prevention program maintained   nonskid shoes/slippers when out of bed   safety round/check completed  Intervention: Prevent Skin Injury  Recent Flowsheet Documentation  Taken 6/11/2025 1800 by Myra Rucker RN  Body Position: position changed independently  Skin Protection:   incontinence pads utilized   transparent dressing maintained  Taken 6/11/2025 1600 by Myra Rucker RN  Body Position: position changed independently  Skin Protection:   incontinence pads utilized   transparent dressing maintained  Taken 6/11/2025 1400 by yMra Rucker RN  Body Position: position changed independently  Skin Protection:   incontinence pads utilized   transparent dressing maintained  Taken 6/11/2025 1200 by Myra Rucker RN  Body Position: position changed independently  Skin Protection:   incontinence pads utilized   transparent dressing maintained  Taken 6/11/2025 1000 by Myra Rucker RN  Body Position: position changed independently  Taken 6/11/2025 0800 by Myra Rucker RN  Body Position: position changed independently  Skin Protection:   incontinence pads utilized   transparent dressing maintained  Intervention: Prevent Infection  Recent Flowsheet Documentation  Taken 6/11/2025 1800 by Myra Rucker RN  Infection Prevention:   environmental surveillance performed   equipment surfaces disinfected   hand hygiene promoted   personal protective equipment utilized   rest/sleep promoted   single patient room provided   visitors restricted/screened  Taken 6/11/2025 1600 by Myra Rucker RN  Infection Prevention:   environmental surveillance performed   equipment surfaces disinfected   hand hygiene promoted   personal protective equipment utilized   rest/sleep promoted   single patient room provided   visitors restricted/screened  Taken 6/11/2025 1400 by Myra Rucker RN  Infection Prevention:   environmental surveillance performed   equipment surfaces disinfected   hand hygiene promoted   personal protective  equipment utilized   rest/sleep promoted   single patient room provided   visitors restricted/screened  Taken 6/11/2025 1200 by Myra Rucker RN  Infection Prevention:   environmental surveillance performed   equipment surfaces disinfected   hand hygiene promoted   personal protective equipment utilized   rest/sleep promoted   single patient room provided   visitors restricted/screened  Taken 6/11/2025 1000 by Myra Rucker RN  Infection Prevention:   environmental surveillance performed   equipment surfaces disinfected   hand hygiene promoted   personal protective equipment utilized   rest/sleep promoted   single patient room provided   visitors restricted/screened  Taken 6/11/2025 0800 by Myra Rucker RN  Infection Prevention:   environmental surveillance performed   equipment surfaces disinfected   hand hygiene promoted   personal protective equipment utilized   rest/sleep promoted   single patient room provided   visitors restricted/screened  Goal: Optimal Comfort and Wellbeing  Outcome: Progressing  Intervention: Provide Person-Centered Care  Recent Flowsheet Documentation  Taken 6/11/2025 0800 by Myra Rucker RN  Trust Relationship/Rapport:   care explained   choices provided   emotional support provided   empathic listening provided   questions answered   reassurance provided   thoughts/feelings acknowledged   questions encouraged  Goal: Readiness for Transition of Care  Outcome: Progressing     Problem: Comorbidity Management  Goal: Blood Glucose Level Within Target Range  Outcome: Progressing  Intervention: Monitor and Manage Glycemia  Recent Flowsheet Documentation  Taken 6/11/2025 1800 by Myra Rucker RN  Medication Review/Management: medications reviewed  Taken 6/11/2025 1600 by Myra Rucker RN  Medication Review/Management: medications reviewed  Taken 6/11/2025 1400 by Myra Rucker RN  Medication Review/Management: medications reviewed  Taken 6/11/2025 1200 by Myra Rucker RN  Medication  Review/Management: medications reviewed  Taken 6/11/2025 1000 by Myra Rucker RN  Medication Review/Management: medications reviewed  Taken 6/11/2025 0800 by Myra Rucker RN  Medication Review/Management: medications reviewed  Goal: Maintenance of Osteoarthritis Symptom Control  Outcome: Progressing  Intervention: Maintain Osteoarthritis Symptom Control  Recent Flowsheet Documentation  Taken 6/11/2025 1800 by Myra Rucker RN  Activity Management: activity encouraged  Medication Review/Management: medications reviewed  Taken 6/11/2025 1600 by Myra Rucker RN  Activity Management: activity encouraged  Medication Review/Management: medications reviewed  Taken 6/11/2025 1400 by Myra Rucker RN  Activity Management: activity encouraged  Medication Review/Management: medications reviewed  Taken 6/11/2025 1200 by Myra Rucker RN  Activity Management: activity encouraged  Medication Review/Management: medications reviewed  Taken 6/11/2025 1000 by Myra Rucker RN  Activity Management: activity encouraged  Medication Review/Management: medications reviewed  Taken 6/11/2025 0800 by Myra Rucker RN  Activity Management: activity encouraged  Medication Review/Management: medications reviewed     Problem: Sepsis/Septic Shock  Goal: Optimal Coping  Outcome: Progressing  Goal: Absence of Bleeding  Outcome: Progressing  Goal: Blood Glucose Level Within Target Range  Outcome: Progressing  Goal: Absence of Infection Signs and Symptoms  Outcome: Progressing  Intervention: Initiate Sepsis Management  Recent Flowsheet Documentation  Taken 6/11/2025 1800 by Myra Rucker RN  Infection Prevention:   environmental surveillance performed   equipment surfaces disinfected   hand hygiene promoted   personal protective equipment utilized   rest/sleep promoted   single patient room provided   visitors restricted/screened  Taken 6/11/2025 1600 by Myra Rucker RN  Infection Prevention:   environmental surveillance performed   equipment  surfaces disinfected   hand hygiene promoted   personal protective equipment utilized   rest/sleep promoted   single patient room provided   visitors restricted/screened  Taken 6/11/2025 1400 by Myra Rucker RN  Infection Prevention:   environmental surveillance performed   equipment surfaces disinfected   hand hygiene promoted   personal protective equipment utilized   rest/sleep promoted   single patient room provided   visitors restricted/screened  Taken 6/11/2025 1200 by Myra Rucker RN  Infection Prevention:   environmental surveillance performed   equipment surfaces disinfected   hand hygiene promoted   personal protective equipment utilized   rest/sleep promoted   single patient room provided   visitors restricted/screened  Taken 6/11/2025 1000 by Myra Rucker RN  Infection Prevention:   environmental surveillance performed   equipment surfaces disinfected   hand hygiene promoted   personal protective equipment utilized   rest/sleep promoted   single patient room provided   visitors restricted/screened  Taken 6/11/2025 0800 by yMra Rucker RN  Infection Prevention:   environmental surveillance performed   equipment surfaces disinfected   hand hygiene promoted   personal protective equipment utilized   rest/sleep promoted   single patient room provided   visitors restricted/screened  Intervention: Promote Recovery  Recent Flowsheet Documentation  Taken 6/11/2025 1800 by Myra Rucker RN  Activity Management: activity encouraged  Taken 6/11/2025 1600 by Myra Rucker RN  Activity Management: activity encouraged  Taken 6/11/2025 1400 by Myra Rucker RN  Activity Management: activity encouraged  Taken 6/11/2025 1200 by Myra Rucker RN  Activity Management: activity encouraged  Taken 6/11/2025 1000 by Myra Rucker RN  Activity Management: activity encouraged  Taken 6/11/2025 0800 by Myra Rucker RN  Activity Management: activity encouraged  Goal: Optimal Nutrition Delivery  Outcome: Progressing      Problem: Fall Injury Risk  Goal: Absence of Fall and Fall-Related Injury  Outcome: Progressing  Intervention: Identify and Manage Contributors  Recent Flowsheet Documentation  Taken 6/11/2025 1800 by Myra Rucker RN  Medication Review/Management: medications reviewed  Taken 6/11/2025 1600 by Myra Rucker RN  Medication Review/Management: medications reviewed  Taken 6/11/2025 1400 by Myra Rucker RN  Medication Review/Management: medications reviewed  Taken 6/11/2025 1200 by Myra Rucker RN  Medication Review/Management: medications reviewed  Taken 6/11/2025 1000 by Myra Rucker RN  Medication Review/Management: medications reviewed  Taken 6/11/2025 0800 by Myra Rucker RN  Medication Review/Management: medications reviewed  Intervention: Promote Injury-Free Environment  Recent Flowsheet Documentation  Taken 6/11/2025 1800 by Myra Rucker RN  Safety Promotion/Fall Prevention:   activity supervised   assistive device/personal items within reach   clutter free environment maintained   fall prevention program maintained   nonskid shoes/slippers when out of bed   safety round/check completed  Taken 6/11/2025 1600 by Myra Rucker RN  Safety Promotion/Fall Prevention:   activity supervised   assistive device/personal items within reach   clutter free environment maintained   fall prevention program maintained   nonskid shoes/slippers when out of bed   safety round/check completed  Taken 6/11/2025 1400 by Myra Rucker RN  Safety Promotion/Fall Prevention:   activity supervised   assistive device/personal items within reach   clutter free environment maintained   fall prevention program maintained   nonskid shoes/slippers when out of bed   safety round/check completed  Taken 6/11/2025 1200 by Myra Rucker RN  Safety Promotion/Fall Prevention:   activity supervised   assistive device/personal items within reach   clutter free environment maintained   fall prevention program maintained   nonskid shoes/slippers when  out of bed   safety round/check completed  Taken 6/11/2025 1000 by Myra Rucker RN  Safety Promotion/Fall Prevention:   activity supervised   assistive device/personal items within reach   clutter free environment maintained   fall prevention program maintained   nonskid shoes/slippers when out of bed   safety round/check completed  Taken 6/11/2025 0800 by Myra Rucker RN  Safety Promotion/Fall Prevention:   activity supervised   assistive device/personal items within reach   clutter free environment maintained   fall prevention program maintained   nonskid shoes/slippers when out of bed   safety round/check completed     Problem: Skin Injury Risk Increased  Goal: Skin Health and Integrity  Outcome: Progressing  Intervention: Optimize Skin Protection  Recent Flowsheet Documentation  Taken 6/11/2025 1800 by Myra Rucker RN  Activity Management: activity encouraged  Pressure Reduction Techniques:   frequent weight shift encouraged   weight shift assistance provided  Head of Bed (HOB) Positioning: [facility] elevated  Pressure Reduction Devices: pressure-redistributing mattress utilized  Skin Protection:   incontinence pads utilized   transparent dressing maintained  Taken 6/11/2025 1600 by Myra Rucker RN  Activity Management: activity encouraged  Pressure Reduction Techniques:   frequent weight shift encouraged   weight shift assistance provided  Head of Bed (HOB) Positioning: [facility] elevated  Pressure Reduction Devices: pressure-redistributing mattress utilized  Skin Protection:   incontinence pads utilized   transparent dressing maintained  Taken 6/11/2025 1400 by Myra Rucker RN  Activity Management: activity encouraged  Pressure Reduction Techniques:   frequent weight shift encouraged   weight shift assistance provided  Head of Bed (HOB) Positioning: [facility] elevated  Pressure Reduction Devices: pressure-redistributing mattress utilized  Skin Protection:   incontinence pads utilized   transparent dressing  maintained  Taken 6/11/2025 1200 by Myra Rucker RN  Activity Management: activity encouraged  Pressure Reduction Techniques:   frequent weight shift encouraged   weight shift assistance provided  Head of Bed (HOB) Positioning: HOB elevated  Pressure Reduction Devices: pressure-redistributing mattress utilized  Skin Protection:   incontinence pads utilized   transparent dressing maintained  Taken 6/11/2025 1000 by Myra Rucker RN  Activity Management: activity encouraged  Head of Bed (HOB) Positioning: HOB elevated  Taken 6/11/2025 0800 by Myra Rucker RN  Activity Management: activity encouraged  Pressure Reduction Techniques:   frequent weight shift encouraged   weight shift assistance provided  Head of Bed (HOB) Positioning: HOB elevated  Pressure Reduction Devices: pressure-redistributing mattress utilized  Skin Protection:   incontinence pads utilized   transparent dressing maintained     Problem: Noninvasive Ventilation Acute  Goal: Effective Unassisted Ventilation and Oxygenation  Outcome: Progressing

## 2025-06-11 NOTE — PLAN OF CARE
Problem: Adult Inpatient Plan of Care  Goal: Plan of Care Review  Outcome: Progressing     Problem: Adult Inpatient Plan of Care  Goal: Absence of Hospital-Acquired Illness or Injury  Intervention: Identify and Manage Fall Risk  Recent Flowsheet Documentation  Taken 6/11/2025 0400 by Jeri Rees RN  Safety Promotion/Fall Prevention:   activity supervised   safety round/check completed  Taken 6/11/2025 0000 by Jeri Rees RN  Safety Promotion/Fall Prevention:   activity supervised   safety round/check completed  Taken 6/10/2025 2000 by Jeri Rees RN  Safety Promotion/Fall Prevention:   activity supervised   safety round/check completed   Goal Outcome Evaluation:left pedal pulses for doppler. Areas marked.

## 2025-06-11 NOTE — SIGNIFICANT NOTE
06/11/25 0778   Transfer of Care   Person Report Given To CHERELLE Renteria/ CHERELLE malik. patient requested for pain meds.

## 2025-06-11 NOTE — THERAPY EVALUATION
Patient Name: Jimmy Gabehart  : 1955    MRN: [id number]                              Today's Date: 2025       Admit Date: 2025    Visit Dx:     ICD-10-CM ICD-9-CM   1. Osteomyelitis of great toe of left foot  M86.9 730.27   2. Diabetic ulcer of toe of left foot associated with diabetes mellitus due to underlying condition, with bone involvement without evidence of necrosis  E08.621 249.80    L97.526 707.15     Patient Active Problem List   Diagnosis    Shoulder pain, bilateral    Bursitis/tendonitis, shoulder    Pain    Cervical radiculopathy    Cervical spondylosis    Lumbar spondylosis    Elevated liver enzymes    History of diabetic ulcer of foot    Primary hypertension    Long-term current use of opiate analgesic    Osteoarthritis, generalized    Type 2 diabetes mellitus with diabetic neuropathy    Hepatic encephalopathy    Osteomyelitis    Cirrhosis of liver without ascites    Peripheral polyneuropathy    Osteomyelitis of great toe of left foot     Past Medical History:   Diagnosis Date    Diabetes     Hypertension      Past Surgical History:   Procedure Laterality Date    AMPUTATION FOOT Left 6/10/2025    Procedure: HALLUX AMPUTATION AT THE LEVEL OF THE METATARSOPHALANGEAL JOINT LEFT;  Surgeon: Allan Clark Jr., MD;  Location: [facility];  Service: Orthopedics;  Laterality: Left;    BACK SURGERY      CARPAL TUNNEL RELEASE      COLONOSCOPY      EYE SURGERY      FOOT SURGERY Left     Ankle fusion    HERNIA REPAIR      KNEE SURGERY Right 2022    TKA right knee manipulation 23    TONSILLECTOMY AND ADENOIDECTOMY      TRIGGER FINGER RELEASE      WISDOM TOOTH EXTRACTION        General Information       Row Name 25 1318          OT Time and Intention    Document Type evaluation  -JY     Mode of Treatment occupational therapy  -JY       Row Name 25 1318          General Information    Patient Profile Reviewed yes  -JY     Prior Level of Function independent:;all  household mobility;community mobility;gait;transfer;bed mobility;ADL's;feeding;grooming;dressing;bathing;using stairs;yard work;dependent:;home management;cooking;cleaning  was not using AD at baseline, I in all ADLs, related t/fs and mobility, ex spouse A with home mgmt, cooking and cleaning; endorses 1 fall in last 6 months  -SAM     Existing Precautions/Restrictions fall;left;other (see comments)   L LE heel weight bearing as tolerated in post op shoe s/p great toe amputation; recent L elbow bursitis, remote RUE injury, concern for RC injury after recent fall, neuropathy  -SAM     Barriers to Rehab medically complex;previous functional deficit;physical barrier  -ZAOZAO       Row Name 06/11/25 1318          Occupational Profile    Environmental Supports and Barriers (Occupational Profile) shower w/ threshold, standard toilet w/ access to riser and BSC (did not use at baseline), DME: has knee scooter, FWW, crutches - was not using at baseline  -SAM     Patient Goals (Occupational Profile) to return to PLOF  -JY       Row Name 06/11/25 1318          Living Environment    Current Living Arrangements home  -JY     People in Home other (see comments)  ex spouse  -ZAOZAO       Row Name 06/11/25 1318          Home Main Entrance    Number of Stairs, Main Entrance three;other (see comments)  2+1 w/ porch  -JY     Stair Railings, Main Entrance railing on right side (ascending)  -ZAOZAO       Row Name 06/11/25 1318          Stairs Within Home, Primary    Number of Stairs, Within Home, Primary none  -TechstarsY       Row Name 06/11/25 1318          Cognition    Orientation Status (Cognition) oriented x 4  -JY       Row Name 06/11/25 1318          Safety Issues/Impairments Affecting Functional Mobility    Safety Issues Affecting Function (Mobility) awareness of need for assistance;insight into deficits/self-awareness;positioning of assistive device;safety precaution awareness;safety precautions follow-through/compliance;sequencing abilities  -JY      Impairments Affecting Function (Mobility) balance;pain;sensation/sensory awareness;strength  -JY     Comment, Safety Issues/Impairments (Mobility) pt alert and able to follow commands; not fully cognizant of WB precautions initially, improved understanding and compliance with education  -JY               User Key  (r) = Recorded By, (t) = Taken By, (c) = Cosigned By      Initials Name Provider Type    Smiley Leger OT Occupational Therapist                     Mobility/ADL's       Row Name 06/11/25 1434          Bed Mobility    Bed Mobility supine-sit;scooting/bridging  -JY     Scooting/Bridging [place] (Bed Mobility) standby assist  -JY     Supine-Sit [place] (Bed Mobility) standby assist  -JY     Bed Mobility, Safety Issues decreased use of legs for bridging/pushing  -JY     Assistive Device (Bed Mobility) bed rails;head of bed elevated  -JY     Comment, (Bed Mobility) skilled cues for optimal hand placement and seq specifically to advance LEs to EOB, reach across midline to grasp bedrail for aid in pulling self forward and upward, cues to advance hips forward to achieve symmetry for stability; no physical A req'd, SBA for close monitoring of WB adherence; denied any dizziness or feeling LH at EOB  -JY       Row Name 06/11/25 1434          Transfers    Transfers sit-stand transfer;stand-sit transfer;bed-chair transfer  -JY     Comment, (Transfers) cues for optimal hand placement for controlled ascend, descend specifically to push up from seated surface, to reach back prior to sitting once aligned and in close proximity to seated surface; cues to maintain heel weight bearing at LLE; donned off loading shoe prior to any weight bearing or t/fs while pt supine in bed  -JY       Row Name 06/11/25 1434          Bed-Chair Transfer    Bed-Chair [place] (Transfers) contact guard;verbal cues;1 person assist  -JY     Assistive Device (Bed-Chair Transfers) walker, front-wheeled  -SAM       Row Name 06/11/25  1434          Sit-Stand Transfer    Sit-Stand [place] (Transfers) contact guard;verbal cues;1 person assist  -SAM     Assistive Device (Sit-Stand Transfers) walker, front-wheeled  -SAM       Row Name 06/11/25 1434          Stand-Sit Transfer    Stand-Sit [place] (Transfers) contact guard;verbal cues;1 person assist  -SAM     Assistive Device (Stand-Sit Transfers) walker, front-wheeled  -SAM       Row Name 06/11/25 1434          Functional Mobility    Functional Mobility- Ind. Level contact guard assist;verbal cues required;1 person  -JJENNY     Functional Mobility- Device walker, front-wheeled  -SAM     Functional Mobility-Distance (Feet) --  in room ADL related mobility  -     Functional Mobility-Maintain WBing able to maintain weight bearing status  -     Functional Mobility- Safety Issues step length decreased  -     Functional Mobility- Comment defer to PT for specifics however during in room ADL related mobility pt req'd gross CGA with FWW with intermittent cues for AD mgmt specifically to maintain LEs in FWW, push AD forward vs picking up and advancing to conserve energy and maximize I; pt adhered to heel weight bearing at LLE throughout w/ offloading shoe donned; demonstrated smaller steps  -J     Patient was able to Ambulate yes  -JENNY       Row Name 06/11/25 1434          Activities of Daily Living    BADL Assessment/Intervention upper body dressing;lower body dressing;grooming  -       Row Name 06/11/25 1434          Upper Body Dressing Assessment/Training    [place] Level (Upper Body Dressing) doff;don;pajama/robe;minimum assist (75% patient effort)  -J     Position (Upper Body Dressing) unsupported sitting  -     Comment, (Upper Body Dressing) min A for posterior and proximal mgmt of gown; OT educated pt on optimal position, tech, seq given more impacted RUE from remote RC injury, pt requires A of L UE to aid in RUE placement in sleeve  -JENNY       Row Name 06/11/25 1434          Lower Body  Dressing Assessment/Training    [place] Level (Lower Body Dressing) doff;don;socks;minimum assist (75% patient effort)  -     Position (Lower Body Dressing) unsupported sitting  -     Comment, (Lower Body Dressing) min A for mgmt of sock at R foot limited by impacataed use of RUE impacting reach distally and good fxl grasp and pull of sock, not tested at L foot  -[facility] Name 06/11/25 1434          Grooming Assessment/Training    [place] Level (Grooming) wash face, hands;set up  -     Position (Grooming) supported sitting  -     Comment, (Grooming) using L hand pt able to wash face, hands  -               User Key  (r) = Recorded By, (t) = Taken By, (c) = Cosigned By      Initials Name Provider Type    Smiley Leger OT Occupational Therapist                   Obj/Interventions       [facility] Name 06/11/25 1444          Sensory Assessment (Somatosensory)    Sensory Assessment (Somatosensory) bilateral UE;sensation intact  -     Bilateral UE Sensory Assessment light touch awareness;general sensation;intact  -     Sensory Assessment denies any numbness or tingling at BUEs and able to recognize LT stimuli as intact and symmetrical, endorses neuropathy at feet  -[facility] Name 06/11/25 1444          Vision Assessment/Intervention    Visual Impairment/Limitations corrective lenses full-time  -     Vision Assessment Comment no acute changes to vision  -JY       Row Name 06/11/25 1444          Range of Motion Comprehensive    General Range of Motion upper extremity range of motion deficits identified  -     Comment, General Range of Motion LUE AROM WFL, RUE AROM at shoulder only minimal movement, able to elevate upward to ~ 90 degrees with A from LUE; otherwise elbow, wrist and hand ROM WFL at BUEs; some baseline difficulty making full grasp at L hand d/t remote injury to tendons and ligaments at L hand  -[facility] Name 06/11/25 1444          Strength Comprehensive (MMT)    General  Manual Muscle Testing (MMT) Assessment upper extremity strength deficits identified  -JY     Comment, General Manual Muscle Testing (MMT) Assessment LUE 4 to 4=/5 per MMT, RUE not tested d/t pain  -JY       Row Name 06/11/25 1444          Motor Skills    Motor Skills functional endurance;coordination  -JY     Coordination gross motor deficit;right;upper extremity;moderate impairment;fine motor deficit;WFL  -JY     Functional Endurance decreased activity tolerance toward more dynamic demands  -JY       Row Name 06/11/25 1444          Balance    Balance Assessment sitting static balance;sitting dynamic balance;standing static balance;standing dynamic balance  -JY     Static Sitting Balance supervision  -JY     Dynamic Sitting Balance standby assist  -JY     Position, Sitting Balance unsupported;sitting in chair  -JY     Static Standing Balance contact guard  -JY     Dynamic Standing Balance contact guard;verbal cues  -JY     Position/Device Used, Standing Balance supported;walker, front-wheeled  -JY     Balance Interventions sitting;standing;static;dynamic;sit to stand;supported;occupation based/functional task  -JY     Comment, Balance no overt LOB during seated or standing tasks; utilized FWW during standing  -JY               User Key  (r) = Recorded By, (t) = Taken By, (c) = Cosigned By      Initials Name Provider Type    Smiley Leger OT Occupational Therapist                   Goals/Plan       Row Name 06/11/25 1370          Transfer Goal 1 (OT)    Activity/Assistive Device (Transfer Goal 1, OT) sit-to-stand/stand-to-sit;bed-to-chair/chair-to-bed;toilet;commode, bedside without drop arms;walker, rolling  -JJENNY     [place] Level/Cues Needed (Transfer Goal 1, OT) standby assist;verbal cues required  -JY     Time Frame (Transfer Goal 1, OT) long term goal (LTG);5 days  -JY     Progress/Outcome (Transfer Goal 1, OT) new goal  -JJENNY       Row Name 06/11/25 0913          Dressing Goal 1 (OT)    Activity/Device  (Dressing Goal 1, OT) upper body dressing;lower body dressing;other (see comments)  d/d TB garments with LH AE PRN and while adhering to WB precautions throughout  -JY     [place]/Cues Needed (Dressing Goal 1, OT) contact guard required;verbal cues required  -JY     Time Frame (Dressing Goal 1, OT) long term goal (LTG);5 days  -JY     Progress/Outcome (Dressing Goal 1, OT) new goal  -JY       Row Name 06/11/25 1455          Toileting Goal 1 (OT)    Activity/Device (Toileting Goal 1, OT) adjust/manage clothing;perform perineal hygiene;commode;commode, bedside without drop arms;grab bar/safety frame;raised toilet seat  -JY     [place] Level/Cues Needed (Toileting Goal 1, OT) contact guard required;verbal cues required  -JY     Time Frame (Toileting Goal 1, OT) long term goal (LTG);5 days  -JY     Progress/Outcome (Toileting Goal 1, OT) new goal  -Y       Row Name 06/11/25 1455          Strength Goal 1 (OT)    Strength Goal 1 (OT) Pt to complete seated HEP encompassing BUEs targeting strength and endurance with respect to baseline RUE deficits and w/ progressive sets/reps/resistance in order to improve integration in ADLs, related t/fs and mobility as appropriate  -JY     Time Frame (Strength Goal 1, OT) short term goal (STG);4 days  -JY     Progress/Outcome (Strength Goal 1, OT) new goal  -       Row Name 06/11/25 1455          Therapy Assessment/Plan (OT)    Planned Therapy Interventions (OT) activity tolerance training;adaptive equipment training;BADL retraining;functional balance retraining;neuromuscular control/coordination retraining;occupation/activity based interventions;patient/caregiver education/training;ROM/therapeutic exercise;strengthening exercise;transfer/mobility retraining  -JY               User Key  (r) = Recorded By, (t) = Taken By, (c) = Cosigned By      Initials Name Provider Type    Smiley Leger, OT Occupational Therapist                   Clinical Impression       Row Name  06/11/25 1449          Pain Assessment    Pretreatment Pain Rating 8/10  -JY     Posttreatment Pain Rating 8/10  -JY     Pain Location foot;shoulder  -JY     Pain Side/Orientation left;right  -JY     Pain Management Interventions activity modification encouraged;exercise or physical activity utilized;positioning techniques utilized;nursing notified  -JY     Response to Pain Interventions activity participation with tolerable pain  -JY     Pre/Posttreatment Pain Comment persistent pain rating at L foot, instances of increased pain at R shoulder w/ use, certain movements  -JY       Row Name 06/11/25 1449          Plan of Care Review    Plan of Care Reviewed With patient  -JY     Progress no change  OT IE  -JY     Outcome Evaluation OT evaluation completed. Pt presents with decreased I in ADLs, related t/fs and mobility compared to PLOF limited by decreased activity tolerance, impaired balance, muscle weakness at BUEs, fatigue with more dynamic demands,decreased weight bearing at LLE and pain. Pt req'd variable A for ADLs observed including min A for d/d gown, d/d sock at R foot, SUA for face washing. Pt req'd CGA for all fxl t/fs and in room ADL related mobility with FWW use with intermittent cues on AD mgmt all while pt maintained good adherence to heel weight bearing at LLE. Pt would benefit from IPOT POC and home w/ A and HHOT at d/c when medically ready.  -JY       Row Name 06/11/25 1449          Therapy Assessment/Plan (OT)    Patient/Family Therapy Goal Statement (OT) to return to PLOF  -JY     Rehab Potential (OT) good  -JY     Criteria for Skilled Therapeutic Interventions Met (OT) yes;skilled treatment is necessary  -JY     Therapy Frequency (OT) daily  -JY     Predicted Duration of Therapy Intervention (OT) 5 days  -JY       Row Name 06/11/25 1449          Therapy Plan Review/Discharge Plan (OT)    Equipment Needs Upon Discharge (OT) dressing equipment;bathing equipment  -JY     Anticipated Discharge  Disposition (OT) home with assist;home with outpatient therapy services  -SAM       Row Name 06/11/25 1449          Vital Signs    Pre Systolic BP Rehab 122  -JY     Pre Treatment Diastolic BP 54  -JY     Pretreatment Heart Rate (beats/min) 64  -JY     Posttreatment Heart Rate (beats/min) 66  -JY     Pre SpO2 (%) 97  -JY     O2 Delivery Pre Treatment room air  -JY     O2 Delivery Intra Treatment room air  -JY     Post SpO2 (%) 99  -JY     O2 Delivery Post Treatment room air  -JY     Pre Patient Position Supine  -JY     Post Patient Position Sitting  -JY       Row Name 06/11/25 1449          Positioning and Restraints    Pre-Treatment Position in bed  -JY     Post Treatment Position chair  -JY     In Chair notified nsg;reclined;encouraged to call for assist;call light within reach;exit alarm on;RUE elevated;waffle cushion;legs elevated;LLE elevated;L heel elevated  -JY               User Key  (r) = Recorded By, (t) = Taken By, (c) = Cosigned By      Initials Name Provider Type    Smiley Leger, OT Occupational Therapist                   Outcome Measures       Row Name 06/11/25 1457          How much help from another is currently needed...    Putting on and taking off regular lower body clothing? 3  -JY     Bathing (including washing, rinsing, and drying) 2  -JY     Toileting (which includes using toilet bed pan or urinal) 2  -JY     Putting on and taking off regular upper body clothing 3  -JY     Taking care of personal grooming (such as brushing teeth) 3  -JY     Eating meals 4  -JY     AM-PAC 6 Clicks Score (OT) 17  -JY       Row Name 06/11/25 1450 06/11/25 0800       How much help from another person do you currently need...    Turning from your back to your side while in flat bed without using bedrails? 4  -LH 4  -AP    Moving from lying on back to sitting on the side of a flat bed without bedrails? 3  -LH 4  -AP    Moving to and from a bed to a chair (including a wheelchair)? 3  -LH 4  -AP    Standing up  from a chair using your arms (e.g., wheelchair, bedside chair)? 3  - 4  -AP    Climbing 3-5 steps with a railing? 2  -LH 3  -AP    To walk in hospital room? 3  -LH 3  -AP    AM-PAC 6 Clicks Score (PT) 18  -LH 22  -AP    Highest Level of Mobility Goal Walk 10 Steps or More-6  -LH Walk 25 Feet or More-7  -AP      Row Name 06/11/25 1457 06/11/25 1450       Functional Assessment    Outcome Measure Options AM-PAC 6 Clicks Daily Activity (OT)  - AM-PAC 6 Clicks Basic Mobility (PT)  -              User Key  (r) = Recorded By, (t) = Taken By, (c) = Cosigned By      Initials Name Provider Type    Smiley Leger OT Occupational Therapist    Myra Manrique, RN Registered Nurse     Kimberly Arthur, PT Physical Therapist                    Occupational Therapy Education       Title: PT OT SLP Therapies (In Progress)       Topic: Occupational Therapy (In Progress)       Point: ADL training (In Progress)       Learning Progress Summary            Patient Acceptance, E,D, NR by SAM at 6/11/2025 1125                      Point: Precautions (In Progress)       Learning Progress Summary            Patient Acceptance, E,D, NR by SAM at 6/11/2025 1125                      Point: Body mechanics (In Progress)       Learning Progress Summary            Patient Acceptance, E,D, NR by SAM at 6/11/2025 1125                                      User Key       Initials Effective Dates Name Provider Type Discipline    SAM 06/16/21 -  Smiley Gay OT Occupational Therapist OT                  OT Recommendation and Plan  Planned Therapy Interventions (OT): activity tolerance training, adaptive equipment training, BADL retraining, functional balance retraining, neuromuscular control/coordination retraining, occupation/activity based interventions, patient/caregiver education/training, ROM/therapeutic exercise, strengthening exercise, transfer/mobility retraining  Therapy Frequency (OT): daily  Plan of Care Review  Plan of Care Reviewed  With: patient  Progress: no change (OT IE)  Outcome Evaluation: OT evaluation completed. Pt presents with decreased I in ADLs, related t/fs and mobility compared to PLOF limited by decreased activity tolerance, impaired balance, muscle weakness at BUEs, fatigue with more dynamic demands,decreased weight bearing at LLE and pain. Pt req'd variable A for ADLs observed including min A for d/d gown, d/d sock at R foot, SUA for face washing. Pt req'd CGA for all fxl t/fs and in room ADL related mobility with FWW use with intermittent cues on AD mgmt all while pt maintained good adherence to heel weight bearing at LLE. Pt would benefit from IPOT POC and home w/ A and HHOT at d/c when medically ready.     Time Calculation:   Evaluation Complexity (OT)  Review Occupational Profile/Medical/Therapy History Complexity: expanded/moderate complexity  Assessment, Occupational Performance/Identification of Deficit Complexity: 3-5 performance deficits  Clinical Decision Making Complexity (OT): detailed assessment/moderate complexity  Overall Complexity of Evaluation (OT): moderate complexity     Time Calculation- OT       Row Name 06/11/25 1459             Time Calculation- OT    OT Start Time 1125  -JY      OT Received On 06/11/25  -JY      OT Goal Re-Cert Due Date 06/21/25  -JY         Timed Charges    18903 - OT Therapeutic Activity Minutes 18  -JY         Untimed Charges    OT Eval/Re-eval Minutes 58  -JY         Total Minutes    Timed Charges Total Minutes 18  -JY      Untimed Charges Total Minutes 58  -JY       Total Minutes 76  -JY                User Key  (r) = Recorded By, (t) = Taken By, (c) = Cosigned By      Initials Name Provider Type    Smiley Leger OT Occupational Therapist                  Therapy Charges for Today       Code Description Service Date Service Provider Modifiers Qty    [id number]  OT EVAL MOD COMPLEXITY 4 6/11/2025 Smiley Gay OT GO 1    [id number]  OT THERAPEUTIC ACT EA 15 MIN  6/11/2025 Smiley Gay, OT GO 1                 Smiley Gay, OT  6/11/2025

## 2025-06-12 LAB
GLUCOSE BLDC GLUCOMTR-MCNC: 103 MG/DL (ref 70–130)
GLUCOSE BLDC GLUCOMTR-MCNC: 121 MG/DL (ref 70–130)
GLUCOSE BLDC GLUCOMTR-MCNC: 174 MG/DL (ref 70–130)
GLUCOSE BLDC GLUCOMTR-MCNC: 270 MG/DL (ref 70–130)

## 2025-06-12 PROCEDURE — 63710000001 INSULIN GLARGINE PER 5 UNITS: Performed by: INTERNAL MEDICINE

## 2025-06-12 PROCEDURE — 99232 SBSQ HOSP IP/OBS MODERATE 35: CPT | Performed by: INTERNAL MEDICINE

## 2025-06-12 PROCEDURE — 82948 REAGENT STRIP/BLOOD GLUCOSE: CPT

## 2025-06-12 PROCEDURE — 25010000002 DAPTOMYCIN PER 1 MG: Performed by: ORTHOPAEDIC SURGERY

## 2025-06-12 PROCEDURE — 25010000002 CEFEPIME PER 500 MG: Performed by: ORTHOPAEDIC SURGERY

## 2025-06-12 PROCEDURE — 63710000001 INSULIN LISPRO (HUMAN) PER 5 UNITS: Performed by: ORTHOPAEDIC SURGERY

## 2025-06-12 RX ADMIN — OXYCODONE HYDROCHLORIDE 10 MG: 10 TABLET ORAL at 17:07

## 2025-06-12 RX ADMIN — INSULIN LISPRO 4 UNITS: 100 INJECTION, SOLUTION INTRAVENOUS; SUBCUTANEOUS at 11:50

## 2025-06-12 RX ADMIN — Medication 10 ML: at 21:23

## 2025-06-12 RX ADMIN — CEFEPIME 2000 MG: 2 INJECTION, POWDER, FOR SOLUTION INTRAVENOUS at 13:46

## 2025-06-12 RX ADMIN — CEFEPIME 2000 MG: 2 INJECTION, POWDER, FOR SOLUTION INTRAVENOUS at 21:21

## 2025-06-12 RX ADMIN — ALLOPURINOL 100 MG: 100 TABLET ORAL at 08:11

## 2025-06-12 RX ADMIN — RIFAXIMIN 550 MG: 550 TABLET ORAL at 08:11

## 2025-06-12 RX ADMIN — TAMSULOSIN HYDROCHLORIDE 0.4 MG: 0.4 CAPSULE ORAL at 21:21

## 2025-06-12 RX ADMIN — OXYCODONE HYDROCHLORIDE 10 MG: 10 TABLET ORAL at 06:43

## 2025-06-12 RX ADMIN — CETIRIZINE HYDROCHLORIDE 10 MG: 10 TABLET, FILM COATED ORAL at 08:11

## 2025-06-12 RX ADMIN — RIFAXIMIN 550 MG: 550 TABLET ORAL at 21:23

## 2025-06-12 RX ADMIN — OXYCODONE HYDROCHLORIDE 10 MG: 10 TABLET ORAL at 11:50

## 2025-06-12 RX ADMIN — INSULIN LISPRO 5 UNITS: 100 INJECTION, SOLUTION INTRAVENOUS; SUBCUTANEOUS at 17:12

## 2025-06-12 RX ADMIN — INSULIN LISPRO 5 UNITS: 100 INJECTION, SOLUTION INTRAVENOUS; SUBCUTANEOUS at 11:49

## 2025-06-12 RX ADMIN — OXYCODONE HYDROCHLORIDE 10 MG: 10 TABLET ORAL at 21:20

## 2025-06-12 RX ADMIN — INSULIN LISPRO 5 UNITS: 100 INJECTION, SOLUTION INTRAVENOUS; SUBCUTANEOUS at 08:10

## 2025-06-12 RX ADMIN — METRONIDAZOLE 500 MG: 500 TABLET ORAL at 13:47

## 2025-06-12 RX ADMIN — LACTULOSE 20 G: 20 SOLUTION ORAL at 08:11

## 2025-06-12 RX ADMIN — PANTOPRAZOLE SODIUM 40 MG: 40 TABLET, DELAYED RELEASE ORAL at 06:43

## 2025-06-12 RX ADMIN — INSULIN GLARGINE 25 UNITS: 100 INJECTION, SOLUTION SUBCUTANEOUS at 20:20

## 2025-06-12 RX ADMIN — METRONIDAZOLE 500 MG: 500 TABLET ORAL at 06:43

## 2025-06-12 RX ADMIN — METRONIDAZOLE 500 MG: 500 TABLET ORAL at 21:20

## 2025-06-12 RX ADMIN — CARVEDILOL 6.25 MG: 6.25 TABLET, FILM COATED ORAL at 17:08

## 2025-06-12 RX ADMIN — INSULIN LISPRO 2 UNITS: 100 INJECTION, SOLUTION INTRAVENOUS; SUBCUTANEOUS at 17:11

## 2025-06-12 RX ADMIN — GABAPENTIN 200 MG: 100 CAPSULE ORAL at 21:20

## 2025-06-12 RX ADMIN — DAPTOMYCIN 550 MG: 500 INJECTION, POWDER, LYOPHILIZED, FOR SOLUTION INTRAVENOUS at 21:21

## 2025-06-12 RX ADMIN — CARVEDILOL 6.25 MG: 6.25 TABLET, FILM COATED ORAL at 08:11

## 2025-06-12 RX ADMIN — CEFEPIME 2000 MG: 2 INJECTION, POWDER, FOR SOLUTION INTRAVENOUS at 06:43

## 2025-06-12 NOTE — PROGRESS NOTES
[facility] Medicine Services  PROGRESS NOTE    Patient Name: Jimmy Gabehart  : 1955  MRN: [id number]    Date of Admission: 2025  Primary Care Physician: Miriam Mckeon DO    Subjective   Subjective     CC:  Following for Osteomyelitis Left great toe    HPI:  Awake in bed, son at bedside.  Has some pain this morning, just took pain pill.  Denies fevers, chills, cough.  No other complaints.      Objective   Objective     Vital Signs:   Temp:  [97.4 °F (36.3 °C)-98.3 °F (36.8 °C)] 98.3 °F (36.8 °C)  Heart Rate:  [56-73] 57  Resp:  [18] 18  BP: (121-152)/(54-79) 141/77     Physical Exam:  Constitutional: No acute distress, awake, alert  HENT: NCAT, mucous membranes moist  Respiratory: Clear to auscultation bilaterally, respiratory effort normal   Cardiovascular: RRR, no murmurs, rubs, or gallops  Gastrointestinal: Positive bowel sounds, soft, nontender, nondistended  Musculoskeletal: No bilateral ankle edema  Psychiatric: Appropriate affect, cooperative  Neurologic: Oriented x 3, strength symmetric in all extremities, Cranial Nerves grossly intact to confrontation, speech clear  Skin: No rashes.  Dressing left foot just changed C/D/I    Results Reviewed:  LAB RESULTS:      Lab 25  0502 06/10/25  0440 25  1022   WBC 2.80* 3.28* 3.56   HEMOGLOBIN 9.9* 9.4* 9.5*   HEMATOCRIT 30.4* 28.7* 29.8*   PLATELETS 92* 85* 89*   NEUTROS ABS 2.14 1.31* 1.73   IMMATURE GRANS (ABS) 0.04 0.02 0.04   LYMPHS ABS 0.51* 1.44 1.23   MONOS ABS 0.10 0.46 0.51   EOS ABS 0.00 0.04 0.04   .7* 107.9* 109.2*   SED RATE  --   --  95*   CRP  --   --  2.13*   LACTATE  --   --  1.7         Lab 25  0502 06/10/25  0843 25  1022   SODIUM 136 136 137   POTASSIUM 4.7 4.1 4.2   CHLORIDE 103 103 103   CO2 20.0* 23.0 24.0   ANION GAP 13.0 10.0 10.0   BUN 24.7* 21.8 23.5*   CREATININE 1.20 1.28* 1.49*   EGFR 65.1 60.2 50.2*   GLUCOSE 219* 113* 167*   CALCIUM 8.9 9.1 8.9         Lab  06/11/25  0502 06/10/25  0843 06/09/25  1022   TOTAL PROTEIN 7.7 7.5 7.6   ALBUMIN 3.5 3.4* 3.3*   GLOBULIN 4.2 4.1 4.3   ALT (SGPT) 44* 39 42*   AST (SGOT) 64* 59* 58*   BILIRUBIN 0.5 0.5 0.4   ALK PHOS 204* 191* 197*                     Brief Urine Lab Results  (Last result in the past 365 days)        Color   Clarity   Blood   Leuk Est   Nitrite   Protein   CREAT   Urine HCG        04/21/25 1742 Yellow   Clear   Negative   Negative   Negative   Negative                   Microbiology Results Abnormal       None            Peripheral Block  Result Date: 6/10/2025  Ramses Rodriguez, BETITO     6/10/2025  3:32 PM Peripheral Block Patient reassessed immediately prior to procedure Patient location during procedure: pre-op Start time: 6/10/2025 3:17 PM Stop time: 6/10/2025 3:18 PM Reason for block: at surgeon's request and post-op pain management Performed by CRNA/CAA: Ramses Rodriguez CRNA Assisted by: Margy Samaniego RN Preanesthetic Checklist Completed: patient identified, IV checked, site marked, risks and benefits discussed, surgical consent, monitors and equipment checked, pre-op evaluation and timeout performed Prep: Sterile barriers:cap, gloves, mask and washed/disinfected hands Prep: ChloraPrep Patient monitoring: blood pressure monitoring, continuous pulse oximetry and EKG Procedure Sedation: yes Performed under: local infiltration Guidance:ultrasound guided ULTRASOUND INTERPRETATION.  Using ultrasound guidance a 20 G gauge needle was placed in close proximity to the nerve, at which point, under ultrasound guidance anesthetic was injected in the area of the nerve and spread of the anesthesia was seen on ultrasound in close proximity thereto.  There were no abnormalities seen on ultrasound; a digital image was taken; and the patient tolerated the procedure with no complications. Images:still images obtained, printed/placed on chart Laterality:left Block Type:popliteal Injection Technique:single-shot Needle  "Type:echogenic and short-bevel Needle Gauge:20 G Resistance on Injection: none Medications Used: dexamethasone sodium phosphate injection - Injection  2 mg - 6/10/2025 3:18:00 PM bupivacaine PF (MARCAINE) 0.25 % injection - Injection  30 mL - 6/10/2025 3:18:00 PM Medications Preservative Free Saline:5ml Post Assessment Injection Assessment: negative aspiration for heme, no paresthesia on injection and incremental injection Patient Tolerance:comfortable throughout block Complications:no Additional Notes SINGLE shot  A high-frequency linear transducer, with sterile cover, was placed in the popliteal fossa to identify the popliteal artery and vein, Tibial nerve (TN) and Common Peroneal nerve (CP). The transducer was then moved in a cephalad fashion to observe the TN and CP nerve bifurcation to form the Sciatic Nerve. The insertion site was prepped and draped in sterile fashion. Skin and cutaneous tissue was infiltrated with 2-5 ml of 1% Lidocaine. Using ultrasound-guidance, a 20-gauge B-Saxena 4\" Ultraplex 360 non-stimulating echogenic needle was then inserted and advanced in plane from lateral to medial. Preservative-free normal saline was utilized for hydro-dissection of tissue, advancement of Touhy, and to confirm final needle placement posterior to the nerves. Local anesthetic injection spread, in incremental 3-5 ml injections, to surround both nerve structures. Aspiration every 5 ml to prevent intravascular injection. Injection was completed with negative aspiration of blood and negative intravascular injection. Injection pressures were normal with minimal resistance Performed by: Ramses Rodriguez CRNA     Peripheral Block  Result Date: 6/10/2025  Ramses Rodriguez CRNA     6/10/2025  3:32 PM Peripheral Block Patient reassessed immediately prior to procedure Start time: 6/10/2025 3:14 PM Stop time: 6/10/2025 3:16 PM Reason for block: at surgeon's request and post-op pain management Performed by CRNA/CAA: Michael" Ramses NGUYEN CRNA Assisted by: Margy Samaniego RN Preanesthetic Checklist Completed: patient identified, IV checked, site marked, risks and benefits discussed, surgical consent, monitors and equipment checked, pre-op evaluation and timeout performed Prep: Pt Position: supine Sterile barriers:cap, gloves, mask, sterile barriers and washed/disinfected hands Prep: ChloraPrep Patient monitoring: blood pressure monitoring, continuous pulse oximetry and EKG Procedure Performed under: spinal Guidance:ultrasound guided ULTRASOUND INTERPRETATION.  Using ultrasound guidance a 20 G gauge needle was placed in close proximity to the nerve, at which point, under ultrasound guidance anesthetic was injected in the area of the nerve and spread of the anesthesia was seen on ultrasound in close proximity thereto.  There were no abnormalities seen on ultrasound; a digital image was taken; and the patient tolerated the procedure with no complications. Images:still images obtained, printed/placed on chart Laterality:left Block Type:adductor canal block Injection Technique:single-shot Needle Type:echogenic and short-bevel Needle Gauge:20 G Resistance on Injection: none Medications Used: dexamethasone sodium phosphate injection - Injection  2 mg - 6/10/2025 3:16:00 PM bupivacaine PF (MARCAINE) 0.25 % injection - Injection  30 mL - 6/10/2025 3:16:00 PM Medications Preservative Free Saline:5ml Post Assessment Injection Assessment: negative aspiration for heme, incremental injection and no paresthesia on injection Patient Tolerance:comfortable throughout block Complications:no Additional Notes SINGLE shot A high-frequency linear transducer, with sterile cover, was placed on the anterior mid-thigh (between the anterior superior iliac spine and patella). The transducer was then moved medially to identify the Sartorius muscle (Arina), Vastus Medialis muscle (VMM), Superficial Femoral Artery (SFA) and Vein. The transducer was then moved cephalad or  "caudad to position the SFA in the middle of the Arina. The insertion site was prepped and draped in sterile fashion. Skin and cutaneous tissue was infiltrated with 2-5 ml of 1% Lidocaine. Using ultrasound-guidance, a 20-gauge B-Saxena 4\" Ultraplex 360 non-stimulating echogenic needle was advanced in plane from lateral to medial. Preservative-free normal saline was utilized for hydro-dissection of tissue, advancement of needle, and to confirm needle placement below the fascial plane of the Arina where the Nerve to the VMM is located. Local anesthetic (LA) 5 ml deposited here. The needle continues its path lateral to the SFA at the level of the Saphenous Nerve. The remainder of the LA was deposited at the 10-11 o'clock position of the SFA. This injection created a space between the Arina and the SFA. Aspiration every 5 ml to prevent intravascular injection. Injection was completed with negative aspiration of blood and negative intravascular injection. Injection pressures were normal with minimal resistance. Performed by: Ramses Rodriguez CRNA     MRI Foot Left Without Contrast  Result Date: 6/10/2025  MRI FOOT LEFT WO CONTRAST Date of Exam: 6/10/2025 1:40 PM EDT Indication: eval osteomyelitis.  Comparison: Foot radiograph 6/9/2025 Technique:  Routine multiplanar/multisequence sequence images of the left foot were obtained without contrast administration. Findings: Bones and joints: Marrow edema within the first distal phalanx. There is loss of normal T1 cortical and marrow signal of the tuft of the first distal phalanx consistent with osteomyelitis. No central joint effusion. Mild degenerative changes most advanced at the first metatarsophalangeal joint. No suspicious osseous lesions. No evidence of fracture. Partially visualized hardware at the ankle. Soft tissues: Fatty and edematous atrophy of the intrinsic foot muscles. Visualized flexor extensor tendons appear intact. Visualized plantar plates appear intact. No " intermetatarsal bursitis or neuroma.     Impression: Impression: Osteomyelitis of the tuft of the first distal phalanx. Electronically Signed: Preston Goodson MD  6/10/2025 2:31 PM EDT  Workstation ID: [id number]    Duplex Lower Extremity Art / Grafts - Bilateral CAR  Result Date: 6/10/2025    Right lower extremity arteries are patent and without significant stenosis. Left lower extremity arteries are patent and without significant stenosis.   Minimal atherosclerotic plaque noted.   Normal bilateral ANUSHA at 1.1           Current medications:  Scheduled Meds:allopurinol, 100 mg, Oral, Daily  carvedilol, 6.25 mg, Oral, BID With Meals  cefepime, 2,000 mg, Intravenous, Q8H  cetirizine, 10 mg, Oral, Daily  [Held by provider] colchicine, 0.6 mg, Oral, Daily  DAPTOmycin, 6 mg/kg (Adjusted), Intravenous, Q24H  gabapentin, 200 mg, Oral, Nightly  insulin glargine, 25 Units, Subcutaneous, Nightly  insulin lispro, 2-7 Units, Subcutaneous, 4x Daily AC & at Bedtime  Insulin Lispro, 5 Units, Subcutaneous, TID With Meals  lactulose, 20 g, Oral, TID  [Held by provider] metFORMIN, 500 mg, Oral, Daily With Breakfast  metroNIDAZOLE, 500 mg, Oral, Q8H  pantoprazole, 40 mg, Oral, Q AM  riFAXIMin, 550 mg, Oral, BID  sodium chloride, 10 mL, Intravenous, Q12H  tamsulosin, 0.4 mg, Oral, Nightly      Continuous Infusions:   PRN Meds:.  senna-docusate sodium **AND** polyethylene glycol **AND** bisacodyl **AND** bisacodyl    Calcium Replacement - Follow Nurse / BPA Driven Protocol    dextrose    dextrose    glucagon (human recombinant)    Magnesium Standard Dose Replacement - Follow Nurse / BPA Driven Protocol    nitroglycerin    ondansetron ODT **OR** ondansetron    oxyCODONE    Phosphorus Replacement - Follow Nurse / BPA Driven Protocol    Potassium Replacement - Follow Nurse / BPA Driven Protocol    sodium chloride    sodium chloride    Assessment & Plan   Assessment & Plan     Active Hospital Problems    Diagnosis  POA    **Osteomyelitis  [M86.9]  Yes    Cirrhosis of liver without ascites [K74.60]  Unknown    Peripheral polyneuropathy [G62.9]  Unknown    Osteomyelitis of great toe of left foot [M86.9]  Unknown    Type 2 diabetes mellitus with diabetic neuropathy [E11.40]  Yes    Primary hypertension [I10]  Yes      Resolved Hospital Problems   No resolved problems to display.        Brief Hospital Course to date:  Jimmy Gabehart is a 70 y.o. male with PMH of HTN, T2DM complicated by peripheral neuropathy, and cirrhosis who presented with osteomyelitis of his L great toe. S/P amputation 6/10/25. ID managing antibiotics, wound care.    L great toe osteomyelitis/cellulitis due to diabetes  -ID following in consultation, appreciate assistance. Wound Cx negative at 2 days.  -s/p amputation 6/10, orthopedics. Post op wound care as outtlined in note.  -post op pt/ot w/ heel weight bearing.     Acute Renal insufficiency w/ likely underlying CKD IIIa  -- appears that he has been 1.1- 1.47 over the last few months, no data available prior to that  -- Cr 1.49 on admission. Improved to 1.2-1.3 which is likely baseline.     DM w/ hyperglycemia  -Basal/bolus insulin increased     Cirrhosis  - Refusing lactulose at this time due to immobility. Continue Rifaximin     Peripheral neuropathy  -Continue Neurontin     HTN  -Well controlled on home meds, Coreg 6.25mg BID     Thrombocytopenia  -unclear etiology, history of BM biopsy, data deficient, possibly due to cirrhosis.   -Stable today      BPH  -on flomax     GERD  -PPI        Expected Discharge Location and Transportation: Home with HH, family to transport.  Expected Discharge  6/13/2025     VTE Prophylaxis:  Mechanical VTE prophylaxis orders are present.         AM-PAC 6 Clicks Score (PT): 18 (06/11/25 2666)    CODE STATUS:   Code Status and Medical Interventions: CPR (Attempt to Resuscitate); Full Support   Ordered at: 06/09/25 1156     Code Status (Patient has no pulse and is not breathing):    CPR (Attempt to  "Resuscitate)     Medical Interventions (Patient has pulse or is breathing):    Full Support       Aleyda LINDA Jimbo, SHARON  06/12/25    Brief Attending Progress Note     I have seen and examined the patient, performing an independent face-to-face diagnostic evaluation with plan of care reviewed and developed with the advanced practice clinician (APC).       Brief Summary Statement/HPI:   I d/w Winnie SKELTON. Patient doing well and ambulated well with PT yesterday. Awaiting final ID recs but hopefully home tomorrow.    Attending Physical Exam:  Constitutional: No acute distress, awake, alert  HENT: NCAT, mucous membranes moist  Respiratory: Normal effort  Cardiovascular: RRR on tele  Gastrointestinal: Positive bowel sounds, soft, nontender, nondistended  Musculoskeletal: Foot dressed  Psychiatric: Appropriate affect, cooperative  Neurologic: Oriented x 3, strength symmetric in all extremities, Cranial Nerves grossly intact to confrontation, speech clear  Skin: No rashes  (consider inserting and editing \".LEXEXAMPROG\" for qualifying detailed exam for Level 3 billing)      Brief Assessment/Plan:  For additional information see above per APC which I have reviewed and/or edited.    Georgiana Perera II, DO  06/12/25          "

## 2025-06-12 NOTE — PROGRESS NOTES
"Jimmy Gabehart       LOS: 2 days   Patient Care Team:  Miriam Mckeon DO as PCP - General (Family Medicine)  Liz Kaur APRN as Nurse Practitioner (Nurse Practitioner)  Avery Scanlon MD as Consulting Physician (Cardiology)    Chief Complaint:  Left great toe osteomyelitis    Subjective     Interval History:     Resting comfortably in bed this morning.  Pain tolerable, no acute events overnight.    Review of Systems:      Gen- No fevers, chills  CV- No chest pain, palpitations  Resp- No cough, dyspnea  GI- No N/V/D, abd pain    Objective     Vital Signs  Vital Signs (last 24 hours)         06/10 0700  06/11 0659 06/11 0700  06/11 0905   Most Recent      Temp (°F) 97.6 -  98.7      97.7     97.7 (36.5) 06/11 0714    Heart Rate 64 -  79    63 -  73     73 06/11 0847    Resp 16 -  18      18     18 06/11 0714    /67 -  152/77    132/72 -  142/66     132/72 06/11 0847    SpO2 (%) 95 -  100      97     97 06/11 0714    Flow (L/min) (Oxygen Therapy) 2 -  4       2 06/11 0400    Oxygen Concentration (%)   28       28 06/11 0243              Physical Exam:     No acute distress.  Nonlabored respirations.  Regular rate and rhythm.  Abdomen nondistended.  Left lower extremity: Operative dressing in place is clean, dry, intact.  Incision inspected, appears clean, healing well.  Skin is well-approximated, sutures in place.  No erythema, drainage, purulence, warmth.     Results Review:     I reviewed the patient's new clinical results.    Medication Review:   Hospital Medications (active)         Dose Frequency Start End    allopurinol (ZYLOPRIM) tablet 100 mg 100 mg Daily 6/10/2025 --    Admin Instructions: ([facility]) Take with food if GI upset occurs.    Route: Oral    bisacodyl (DULCOLAX) EC tablet 5 mg 5 mg Daily PRN 6/9/2025 --    Admin Instructions: Use if no bowel movement after 12 hours.  Swallow whole. Do not crush, split, or chew tablet.    Route: Oral    Linked Group 1: Placed in \"And\" Linked Group " "       bisacodyl (DULCOLAX) suppository 10 mg 10 mg Daily PRN 6/9/2025 --    Admin Instructions: Use if no bowel movement after 12 hours.  Hold for diarrhea    Route: Rectal    Linked Group 1: Placed in \"And\" Linked Group        Calcium Replacement - Follow Nurse / BPA Driven Protocol  As Needed 6/9/2025 --    Admin Instructions: Open Order & Select \"[facility] Electrolyte Replacement Protocol Algorithm\" to View Details    Route: Not Applicable    carvedilol (COREG) tablet 6.25 mg 6.25 mg 2 Times Daily With Meals 6/9/2025 --    Admin Instructions: Hold for SBP less than 100, DBP less than 60, or heart rate less than 50. If a dose is held, please contact the provider.  Give with food.    Route: Oral    cefepime 2000 mg IVPB in 100 mL NS (MBP) 2,000 mg Every 8 Hours 6/10/2025 6/24/2025    Route: Intravenous    cetirizine (zyrTEC) tablet 10 mg 10 mg Daily 6/10/2025 --    Route: Oral    colchicine tablet 0.6 mg ([Held by provider] since 6/10/2025 11:44 AM) 0.6 mg Daily 6/10/2025 --    Admin Instructions: Group 2 (Pink) Hazardous Drug - Reproductive Risk Only - See Handling Guide    Route: Oral    DAPTOmycin (CUBICIN) 550 mg in sodium chloride 0.9 % 50 mL IVPB 6 mg/kg × 92.1 kg (Adjusted) Every 24 Hours 6/9/2025 6/14/2025    Admin Instructions: Caution: Look alike/sound alike drug alert.  Refrigerate. Do not shake.    Route: Intravenous    dextrose (D50W) (25 g/50 mL) IV injection 25 g 25 g Every 15 Minutes PRN 6/9/2025 --    Admin Instructions: Blood sugar less than 70; patient has IV access - Unresponsive, NPO or Unable To Safely Swallow    Route: Intravenous    dextrose (GLUTOSE) oral gel 15 g 15 g Every 15 Minutes PRN 6/9/2025 --    Admin Instructions: BS<70, Patient Alert, Is not NPO, Can safely swallow.    Route: Oral    gabapentin (NEURONTIN) capsule 200 mg 200 mg Nightly 6/9/2025 --    Admin Instructions: (MARK)    Route: Oral    glucagon (GLUCAGEN) injection 1 mg 1 mg Every 15 Minutes PRN 6/9/2025 --    Admin " Instructions: Blood Glucose Less Than 70 - Patient Without IV Access - Unresponsive, NPO or Unable To Safely Swallow  Reconstitute powder for injection by adding 1 mL of -supplied sterile diluent or sterile water for injection to a vial containing 1 mg of the drug, to provide solutions containing 1 mg/mL. Shake vial gently to dissolve.    Route: Intramuscular    insulin glargine (LANTUS, SEMGLEE) injection 20 Units 20 Units Nightly 6/9/2025 --    Admin Instructions: Do not hold basal insulin without an order. Consider requesting a dose edit, if needed.  ([facility])    Route: Subcutaneous    Insulin Lispro (humaLOG) injection 2-7 Units 2-7 Units 4 Times Daily Before Meals & Nightly 6/9/2025 --    Admin Instructions: Correction Insulin - Low Dose - Total Insulin Dose Less Than 40 units/day (Lean, Elderly or Renal Patients)    Blood Glucose 150-199 mg/dL - 2 units  Blood Glucose 200-249 mg/dL - 3 units  Blood Glucose 250-299 mg/dL - 4 units  Blood Glucose 300-349 mg/dL - 5 units  Blood Glucose 350-400 mg/dL - 6 units  Blood Glucose Greater Than 400 mg/dL - 7 units & Call Provider  ([facility]) Caution: Look alike/sound alike drug alert([facility])    Route: Subcutaneous    Insulin Lispro (humaLOG) injection 5 Units 5 Units 3 Times Daily With Meals 6/9/2025 --    Admin Instructions:  Solution should be clear. If cloudy, contact pharmacy for a new vial. Scheduled mealtime doses of this medication should be held if patient NPO.  ([facility]) Caution: Look alike/sound alike drug alert([facility])    Route: Subcutaneous    lactated ringers infusion 9 mL/hr Continuous 6/11/2025 6/11/2025    Admin Instructions: May switch to NS IV at KVO if renal / if indicated    Route: Intravenous    lactulose (CHRONULAC) 10 GM/15ML solution 20 g 20 g 3 Times Daily 6/9/2025 --    Admin Instructions: May be mixed with fruit juice, water, or milk.    Route: Oral    Magnesium Standard Dose Replacement - Follow Nurse / BPA Driven Protocol  As Needed 6/9/2025 --     "Admin Instructions: Open Order & Select \"[facility] Electrolyte Replacement Protocol Algorithm\" to View Details    Route: Not Applicable    metFORMIN (GLUCOPHAGE) tablet 500 mg ([Held by provider] since 6/10/2025 11:44 AM) 500 mg Daily With Breakfast 6/10/2025 --    Admin Instructions: Caution: Look alike/sound alike drug alert    Route: Oral    metroNIDAZOLE (FLAGYL) tablet 500 mg 500 mg Every 8 Hours Scheduled 6/10/2025 6/24/2025    Admin Instructions: Caution: Look alike/sound alike drug alert    Route: Oral    nitroglycerin (NITROSTAT) SL tablet 0.4 mg 0.4 mg Every 5 Minutes PRN 6/9/2025 --    Admin Instructions: If Pain Unrelieved After 3 Doses Notify MD  May administer up to 3 doses per episode. Hold if SBP less than 100.    Route: Sublingual    ondansetron (ZOFRAN) injection 4 mg 4 mg Every 6 Hours PRN 6/9/2025 --    Admin Instructions: If BOTH ondansetron (ZOFRAN) and promethazine (PHENERGAN) are ordered use ondansetron first and THEN promethazine IF ondansetron is ineffective.    Route: Intravenous    Linked Group 2: Placed in \"Or\" Linked Group        ondansetron ODT (ZOFRAN-ODT) disintegrating tablet 4 mg 4 mg Every 6 Hours PRN 6/9/2025 --    Admin Instructions: If BOTH ondansetron (ZOFRAN) and promethazine (PHENERGAN) are ordered use ondansetron first and THEN promethazine IF ondansetron is ineffective.  Place on tongue and allow to dissolve.    Route: Oral    Linked Group 2: Placed in \"Or\" Linked Group        oxyCODONE (ROXICODONE) immediate release tablet 10 mg 10 mg Every 6 Hours PRN 6/9/2025 --    Admin Instructions: If given for pain, use the following pain scale:  Mild Pain = Pain Score of 1-3, CPOT 1-2  Moderate Pain = Pain Score of 4-6, CPOT 3-4  Severe Pain = Pain Score of 7-10, CPOT 5-8    Route: Oral    pantoprazole (PROTONIX) EC tablet 40 mg 40 mg Every Early Morning 6/10/2025 --    Admin Instructions: Do not crush or chew the capsules or tablets. The drug may not work as designed if the capsule or " "tablet is crushed or chewed. Swallow whole.  Swallow whole; do not crush, split, or chew.    Route: Oral    Phosphorus Replacement - Follow Nurse / BPA Driven Protocol  As Needed 6/9/2025 --    Admin Instructions: Open Order & Select \"[facility]HS Electrolyte Replacement Protocol Algorithm\" to View Details    Route: Not Applicable    polyethylene glycol (MIRALAX) packet 17 g 17 g Daily PRN 6/9/2025 --    Admin Instructions: Use if no bowel movement after 12 hours. Mix in 6-8 ounces of water.  Use 4-8 ounces of water, tea, or juice for each 17 gram dose.    Route: Oral    Linked Group 1: Placed in \"And\" Linked Group        Potassium Replacement - Follow Nurse / BPA Driven Protocol  As Needed 6/9/2025 --    Admin Instructions: Open Order & Select \"[facility]HS Electrolyte Replacement Protocol Algorithm\" to View Details    Route: Not Applicable    riFAXIMin (XIFAXAN) tablet 550 mg 550 mg 2 Times Daily 6/9/2025 --    Admin Instructions: Caution: Look alike/sound alike drug alert    Route: Oral    sennosides-docusate (PERICOLACE) 8.6-50 MG per tablet 2 tablet 2 tablet 2 Times Daily PRN 6/9/2025 --    Admin Instructions: Start bowel management regimen if patient has not had a bowel movement after 12 hours.    Route: Oral    Linked Group 1: Placed in \"And\" Linked Group        sodium chloride 0.9 % flush 10 mL 10 mL Every 12 Hours Scheduled 6/9/2025 --    Route: Intravenous    sodium chloride 0.9 % flush 10 mL 10 mL As Needed 6/9/2025 --    Route: Intravenous    sodium chloride 0.9 % infusion 40 mL 40 mL As Needed 6/9/2025 --    Admin Instructions: Following administration of an IV intermittent medication, flush line with 40mL NS at 100mL/hr.    Route: Intravenous    tamsulosin (FLOMAX) 24 hr capsule 0.4 mg 0.4 mg Nightly 6/9/2025 --    Admin Instructions: Do not crush or chew the capsules or tablets. The drug may not work as designed if the capsule or tablet is crushed or chewed. Swallow whole. If patient unable to swallow whole, contact " pharmacy for alternative.    Route: Oral              Assessment & Plan     70-year-old male with left great toe osteomyelitis now status post left hallux amputation at the level of the metatarsophalangeal joint 6/10/2025      Osteomyelitis    Primary hypertension    Type 2 diabetes mellitus with diabetic neuropathy    Cirrhosis of liver without ascites    Peripheral polyneuropathy    Osteomyelitis of great toe of left foot    Heel weightbearing as tolerated left lower extremity in postoperative shoe  Follow cultures; antibiotic management per infectious disease, appreciate their recommendations    To begin postoperative day 3, daily dressing changes per nursing staff: Xeroform, 4 x 4, Kerlix, Ace    Follow-up in 2 weeks for incision check, radiographs    [facility]  [address], [address]  Formerly [facility] [place], [address]  Please schedule at [phone]      NAFISA Stout  06/12/25  08:35 EDT

## 2025-06-12 NOTE — PROGRESS NOTES
INFECTIOUS DISEASE PROGRESS NOTE    Jimmy Gabehart  1955  [id number]    Date of consult: 6/10/25    Admit date: 6/9/2025    Requesting Provider: Dr. Patel  Evaluating physician: Allan Draek MD  Reason for Consultation: L great toe osteo   Chief Complaint: toe wound      Subjective   History of present illness:  Jimmy Gabehart is a  70 y.o.  Yr old male with a pmh of HTN, recently diagnosed cirrhosis, recent left elbow bursitis, thrombocytopenia, anemia of chronic disease, and poorly controlled DM who presented on 6/9 with left great toe wound. The patient had recently been discharged from [facility] after an admission for hepatic encephalopathy and left elbow bursitis and went to inpatient rehab for a few days. He had a left great toe wound and had a scab that opened on his toe and subsequently went to podiatry after discharged from inpatient rehab. His wound was debrided by podiatry and he had nail trimming. He subsequently experienced redness, swelling, and pain over his left great toe and he presented to [facility] for cellulitis and was also suspected to have osteomyelitis at that time. He was discharged home with PICC line and IV antibiotics (data deficient) and advised to follow up with surgery and ID. His toe started to have worsening swelling and drainage. Cultures at [facility] grew 3 different organisms but currently data deficient regarding cultures. He was experiencing some chills but no fevers. He presented to the ER yesterday due to worsening left great toe swelling.     Since arrival, the patient has remained afebrile. He has pancytopenia with wbc 3.28, hgb 9.4, and plt count of 85. Cr was 1.5 on arrival but has trended down to 1.28. CRP was 2.13 and ESR was 95. Left foot xray showed soft tissue gas in the first toe and erosive changes consistent with osteomyelitis. Lower extremity arterial duplex has been ordered. Orthopedic surgery has been consulted and planning on  left foot MRI. Tentative plan is for left hallux amputation at the level of the metatarsophalangeal joint this afternoon. The patient has been started on IV daptomycin and ceftriaxone by the primary team. ID has been consulted for abx recs.     6/11/25: the patient is doing ok today. Pain under control. S/p left great toe amputation yesterday with surgical cx in progress. No fevers. No n/v/d.     6/12/25: the patient has no new complaints today. Pain is under control. No fevers. He has some bleeding from surgical site during dressing change today but reports no redness at the site. No n/v. No rashes. PICC line site is doing ok.     Past Medical History:   Diagnosis Date    Diabetes     Hypertension        Past Surgical History:   Procedure Laterality Date    AMPUTATION FOOT Left 6/10/2025    Procedure: HALLUX AMPUTATION AT THE LEVEL OF THE METATARSOPHALANGEAL JOINT LEFT;  Surgeon: Allan Clark Jr., MD;  Location: [facility];  Service: Orthopedics;  Laterality: Left;    BACK SURGERY      CARPAL TUNNEL RELEASE      COLONOSCOPY      EYE SURGERY      FOOT SURGERY Left 1986    Ankle fusion    HERNIA REPAIR      KNEE SURGERY Right 12/14/2022    TKA right knee manipulation 01-25-23    TONSILLECTOMY AND ADENOIDECTOMY      TRIGGER FINGER RELEASE      WISDOM TOOTH EXTRACTION         Pediatric History   Patient Parents    Not on file     Other Topics Concern    Not on file   Social History Narrative    Not on file       family history includes Arthritis in his mother; Heart attack in his father and mother; High cholesterol in his mother; Hypertension in his mother; Kidney disease in his mother.    Allergies   Allergen Reactions    Demerol [Meperidine]     Penicillins Provider Review Needed     Has tolerated ceftriaxone, has had RX for cephalexin       Immunization History   Administered Date(s) Administered    COVID-19 (PFIZER) BIVALENT 12+YRS 11/14/2022    COVID-19 (PFIZER) Purple Cap Monovalent 08/30/2021, 09/20/2021        Medication:    Current Facility-Administered Medications:     allopurinol (ZYLOPRIM) tablet 100 mg, 100 mg, Oral, Daily, Allan Clark Jr., MD, 100 mg at 06/12/25 0811    sennosides-docusate (PERICOLACE) 8.6-50 MG per tablet 2 tablet, 2 tablet, Oral, BID PRN **AND** polyethylene glycol (MIRALAX) packet 17 g, 17 g, Oral, Daily PRN **AND** bisacodyl (DULCOLAX) EC tablet 5 mg, 5 mg, Oral, Daily PRN **AND** bisacodyl (DULCOLAX) suppository 10 mg, 10 mg, Rectal, Daily PRN, Allan Clark Jr., MD    Calcium Replacement - Follow Nurse / BPA Driven Protocol, , Not Applicable, PRN, Allan Clark Jr., MD    carvedilol (COREG) tablet 6.25 mg, 6.25 mg, Oral, BID With Meals, Allan Clark Jr., MD, 6.25 mg at 06/12/25 1708    cefepime 2000 mg IVPB in 100 mL NS (MBP), 2,000 mg, Intravenous, Q8H, Allan Clark Jr., MD, 2,000 mg at 06/12/25 1346    cetirizine (zyrTEC) tablet 10 mg, 10 mg, Oral, Daily, Allan Clark Jr., MD, 10 mg at 06/12/25 0811    [Held by provider] colchicine tablet 0.6 mg, 0.6 mg, Oral, Daily, Allan Clark Jr., MD    DAPTOmycin (CUBICIN) 550 mg in sodium chloride 0.9 % 50 mL IVPB, 6 mg/kg (Adjusted), Intravenous, Q24H, Allan Clark Jr., MD, Last Rate: 100 mL/hr at 06/11/25 2115, 550 mg at 06/11/25 2115    dextrose (D50W) (25 g/50 mL) IV injection 25 g, 25 g, Intravenous, Q15 Min PRN, Allan Clark Jr., MD    dextrose (GLUTOSE) oral gel 15 g, 15 g, Oral, Q15 Min PRN, Allan Clark Jr., MD    gabapentin (NEURONTIN) capsule 200 mg, 200 mg, Oral, Nightly, Allan Clark Jr., MD, 200 mg at 06/11/25 2115    glucagon (GLUCAGEN) injection 1 mg, 1 mg, Intramuscular, Q15 Min PRN, Allan Clark Jr., MD    insulin glargine (LANTUS, SEMGLEE) injection 25 Units, 25 Units, Subcutaneous, Nightly, Georgiana Perera II, DO, 25 Units at 06/11/25 2116    Insulin Lispro (humaLOG) injection 2-7 Units, 2-7 Units, Subcutaneous, 4x Daily AC & at Bedtime, Allan Clark Jr., MD, 2  Units at 06/12/25 1711    Insulin Lispro (humaLOG) injection 5 Units, 5 Units, Subcutaneous, TID With Meals, Allan Clark Jr., MD, 5 Units at 06/12/25 1712    lactulose (CHRONULAC) 10 GM/15ML solution 20 g, 20 g, Oral, TID, Allan Clark Jr., MD, 20 g at 06/12/25 0811    Magnesium Standard Dose Replacement - Follow Nurse / BPA Driven Protocol, , Not Applicable, PRN, Allan Clark Jr., MD    [Held by provider] metFORMIN (GLUCOPHAGE) tablet 500 mg, 500 mg, Oral, Daily With Breakfast, Allan Clark Jr., MD    metroNIDAZOLE (FLAGYL) tablet 500 mg, 500 mg, Oral, Q8H, Allan Clark Jr., MD, 500 mg at 06/12/25 1347    nitroglycerin (NITROSTAT) SL tablet 0.4 mg, 0.4 mg, Sublingual, Q5 Min PRN, Allan Clark Jr., MD    ondansetron ODT (ZOFRAN-ODT) disintegrating tablet 4 mg, 4 mg, Oral, Q6H PRN **OR** ondansetron (ZOFRAN) injection 4 mg, 4 mg, Intravenous, Q6H PRN, Allan Clark Jr., MD, 4 mg at 06/10/25 1555    oxyCODONE (ROXICODONE) immediate release tablet 10 mg, 10 mg, Oral, Q4H PRN, Georgiana Perera II, DO, 10 mg at 06/12/25 1707    pantoprazole (PROTONIX) EC tablet 40 mg, 40 mg, Oral, Q AM, Allan Clark Jr., MD, 40 mg at 06/12/25 0643    Phosphorus Replacement - Follow Nurse / BPA Driven Protocol, , Not Applicable, PRN, Allan Clark Jr., MD    Potassium Replacement - Follow Nurse / BPA Driven Protocol, , Not Applicable, PRN, Allan Clark Jr., MD    riFAXIMin (XIFAXAN) tablet 550 mg, 550 mg, Oral, BID, Allan Clark Jr., MD, 550 mg at 06/12/25 0811    sodium chloride 0.9 % flush 10 mL, 10 mL, Intravenous, Q12H, Allan Clark Jr., MD, 10 mL at 06/11/25 2118    sodium chloride 0.9 % flush 10 mL, 10 mL, Intravenous, PRN, Allan Clark Jr., MD    sodium chloride 0.9 % infusion 40 mL, 40 mL, Intravenous, PRN, Allan Clark Jr., MD    tamsulosin (FLOMAX) 24 hr capsule 0.4 mg, 0.4 mg, Oral, Nightly, Allan Clark Jr., MD, 0.4 mg at 06/11/25 2115    Please refer to  "the medical record for a full medication list    Review of Systems:  As above    Physical Exam:   Vital Signs   Temp:  [97.4 °F (36.3 °C)-98.3 °F (36.8 °C)] 98 °F (36.7 °C)  Heart Rate:  [56-68] 57  Resp:  [16-18] 16  BP: (117-152)/(49-79) 117/49    Temp  Min: 97.4 °F (36.3 °C)  Max: 98.3 °F (36.8 °C)  BP  Min: 117/49  Max: 152/76  Pulse  Min: 56  Max: 68  Resp  Min: 16  Max: 18  SpO2  Min: 97 %  Max: 99 %    Blood pressure 117/49, pulse 57, temperature 98 °F (36.7 °C), temperature source Oral, resp. rate 16, height 188 cm (74\"), weight 107 kg (235 lb 8 oz), SpO2 97%.  GENERAL: Awake and alert, in no acute distress. Resting in bed  HEENT:  Normocephalic, atraumatic. No external oral lesions noted  HEART: RRR, no murmur  LUNGS: CTAB. No respiratory distress  ABDOMEN: Soft, nontender, nondistended. No appreciable HSM.   GENITAL: no Castorena catheter  SKIN: no generalized rashes.    PSYCHIATRIC: cooperative.  Appropriate mood and affect  EXT:  left foot with surgical dressings in place s/p left great toe amputation. No drainage noted through dressings. No erythema noted over exposed left leg  NEURO: awake alert and oriented ×4.  Normal speech and cognition    Left upper extremity PICC line in place, no erythema or drainage at site       File Link    Scan on 6/9/2025 1445 by Jazmine Seay RN        [facility] Information    Document ID File Type Document Type Description   [id number] Image WOUND IMAGE      Import Information    Attached At Date Time User Dept   Encounter Level 6/9/2025  2:45 PM Jazmine Seay RN  [facility]     Encounter    Hospital Encounter on 6/9/25       Results Review:   I reviewed the patient's new clinical results.  I reviewed the patient's new imaging results and agree with the interpretation.  I reviewed the patient's other test results and agree with the interpretation    Results from last 7 days   Lab Units 06/11/25  0502 06/10/25  0440 06/09/25  1022   WBC 10*3/mm3 2.80* 3.28* 3.56 " "  HEMOGLOBIN g/dL 9.9* 9.4* 9.5*   HEMATOCRIT % 30.4* 28.7* 29.8*   PLATELETS 10*3/mm3 92* 85* 89*     Results from last 7 days   Lab Units 06/11/25  0502   SODIUM mmol/L 136   POTASSIUM mmol/L 4.7   CHLORIDE mmol/L 103   CO2 mmol/L 20.0*   BUN mg/dL 24.7*   CREATININE mg/dL 1.20   GLUCOSE mg/dL 219*   CALCIUM mg/dL 8.9     Results from last 7 days   Lab Units 06/11/25  0502   ALK PHOS U/L 204*   BILIRUBIN mg/dL 0.5   ALT (SGPT) U/L 44*   AST (SGOT) U/L 64*     Results from last 7 days   Lab Units 06/09/25  1022   SED RATE mm/hr 95*     Results from last 7 days   Lab Units 06/09/25  1022   CRP mg/dL 2.13*         Results from last 7 days   Lab Units 06/09/25  1022   LACTATE mmol/L 1.7     Estimated Creatinine Clearance: 74.6 mL/min (by C-G formula based on SCr of 1.2 mg/dL).  CPK          6/9/2025    10:22   Common Labs   Creatine Kinase 33       Procalitonin Results:       Brief Urine Lab Results  (Last result in the past 365 days)        Color   Clarity   Blood   Leuk Est   Nitrite   Protein   CREAT   Urine HCG        04/21/25 1742 Yellow   Clear   Negative   Negative   Negative   Negative                  No results found for: \"SITE\", \"ALLENTEST\", \"PHART\", \"ROG5QEB\", \"PO2ART\", \"KZO8WHO\", \"BASEEXCESS\", \"D3RMGRBF\", \"HGBBG\", \"HCTABG\", \"OXYHEMOGLOBI\", \"METHHGBN\", \"CARBOXYHGB\", \"CO2CT\", \"BAROMETRIC\", \"MODALITY\", \"FIO2\"     Microbiology:    Micro from [facility]:    Left great toe wound cx: Pseudomonas (s-cefepime, cipro, levaquin, zosyn) , enterococcus faecalis, staph hemolyticus      Radiology:  Imaging Results (Last 72 Hours)       Procedure Component Value Units Date/Time    MRI Foot Left Without Contrast [[id number]] Collected: 06/10/25 1422     Updated: 06/10/25 1434    Narrative:        MRI FOOT LEFT WO CONTRAST    Date of Exam: 6/10/2025 1:40 PM EDT    Indication: eval osteomyelitis.     Comparison: Foot radiograph 6/9/2025    Technique:  Routine multiplanar/multisequence sequence images of the left foot " were obtained without contrast administration.      Findings:  Bones and joints: Marrow edema within the first distal phalanx. There is loss of normal T1 cortical and marrow signal of the tuft of the first distal phalanx consistent with osteomyelitis. No central joint effusion. Mild degenerative changes most   advanced at the first metatarsophalangeal joint. No suspicious osseous lesions. No evidence of fracture. Partially visualized hardware at the ankle.    Soft tissues: Fatty and edematous atrophy of the intrinsic foot muscles. Visualized flexor extensor tendons appear intact. Visualized plantar plates appear intact. No intermetatarsal bursitis or neuroma.      Impression:      Impression:  Osteomyelitis of the tuft of the first distal phalanx.        Electronically Signed: Preston Goodson MD    6/10/2025 2:31 PM EDT    Workstation ID: [id number]            IMPRESSION:     Problems:  Left great toe wound with cellulitis and osteomyelitis of the distal phalanx- left great toe wound culture from OSH grew pseudomonas, enterococcus faecalis, and staph hemolyticus. Now s/p left great toe amputation at level of MTP joint on 6/10/25.   Poorly controlled DM  Cirrhosis of the liver  Chronic pancytopenia- likely due to cirrhosis  Renal dysfunction- unclear baseline Cr. His Cr was elevated to 1.49 on arrival but has improved to 1.28  PCN allergy- has tolerated cephalosporins ok thus far    RECOMMENDATIONS:  -follow cbc and cmp. Baseline CPK level ok at 33  -MRI left foot- showed osteomyelitis of the tuft of the distal phalanx of left great toe  -orthopedic surgery took the patient to the OR for left great toe amputation at the level of the MTP joint on 6/10/25. Surgical cultures in progress, NGTD  - continue daptomycin for now  - continue cefepime 2g IV q8h  - continue flagyl 500 mg PO TID    Plan to continue antibiotics until tomorrow and if he doing well and surgical site looks good then I would be ok with his discharge  off of antibiotics.     This visit included the following complex service elements:  Complex medical decision-making associated with antimicrobial prescribing.     Allan Drake MD  6/12/2025

## 2025-06-12 NOTE — PLAN OF CARE
Problem: Adult Inpatient Plan of Care  Goal: Plan of Care Review  Outcome: Progressing  Goal: Patient-Specific Goal (Individualized)  Outcome: Progressing  Goal: Absence of Hospital-Acquired Illness or Injury  Outcome: Progressing  Intervention: Identify and Manage Fall Risk  Recent Flowsheet Documentation  Taken 6/12/2025 0600 by Milli Jacome RN  Safety Promotion/Fall Prevention:   activity supervised   assistive device/personal items within reach   clutter free environment maintained   nonskid shoes/slippers when out of bed   safety round/check completed   room organization consistent  Taken 6/12/2025 0400 by Milli Jacome RN  Safety Promotion/Fall Prevention:   activity supervised   assistive device/personal items within reach   clutter free environment maintained   nonskid shoes/slippers when out of bed   room organization consistent   safety round/check completed  Taken 6/12/2025 0200 by Milli Jacome RN  Safety Promotion/Fall Prevention:   activity supervised   assistive device/personal items within reach   clutter free environment maintained   nonskid shoes/slippers when out of bed   room organization consistent   safety round/check completed  Taken 6/12/2025 0000 by Milli Jacome RN  Safety Promotion/Fall Prevention:   activity supervised   assistive device/personal items within reach   clutter free environment maintained   nonskid shoes/slippers when out of bed   room organization consistent   safety round/check completed  Taken 6/11/2025 2200 by Milli Jacome RN  Safety Promotion/Fall Prevention:   activity supervised   assistive device/personal items within reach   clutter free environment maintained   nonskid shoes/slippers when out of bed   room organization consistent   safety round/check completed  Taken 6/11/2025 2117 by Milli Jacome RN  Safety Promotion/Fall Prevention:   activity supervised   assistive device/personal items within reach   clutter free environment maintained    nonskid shoes/slippers when out of bed   safety round/check completed   room organization consistent  Taken 6/11/2025 2000 by Milli Jacome RN  Safety Promotion/Fall Prevention:   activity supervised   assistive device/personal items within reach   clutter free environment maintained   nonskid shoes/slippers when out of bed   room organization consistent   safety round/check completed  Intervention: Prevent Skin Injury  Recent Flowsheet Documentation  Taken 6/12/2025 0600 by Milli Jacome RN  Body Position: position changed independently  Skin Protection:   transparent dressing maintained   incontinence pads utilized   protective footwear used  Taken 6/12/2025 0400 by Milli Jacome RN  Body Position: position changed independently  Skin Protection:   transparent dressing maintained   incontinence pads utilized   protective footwear used   silicone foam dressing in place  Taken 6/12/2025 0200 by Milli Jacome RN  Body Position: position changed independently  Skin Protection:   transparent dressing maintained   incontinence pads utilized   protective footwear used  Taken 6/12/2025 0000 by Milli Jacome RN  Body Position: position changed independently  Skin Protection:   transparent dressing maintained   incontinence pads utilized   protective footwear used  Taken 6/11/2025 2200 by Milli Jacome RN  Body Position: position changed independently  Skin Protection:   transparent dressing maintained   incontinence pads utilized   protective footwear used  Taken 6/11/2025 2117 by Milli Jacome RN  Body Position: position changed independently  Skin Protection:   transparent dressing maintained   incontinence pads utilized   protective footwear used  Taken 6/11/2025 2000 by Milli Jacome RN  Body Position: position changed independently  Skin Protection:   transparent dressing maintained   incontinence pads utilized   protective footwear used  Goal: Optimal Comfort and Wellbeing  Outcome:  Progressing  Intervention: Provide Person-Centered Care  Recent Flowsheet Documentation  Taken 6/11/2025 2117 by Milli Jacome RN  Trust Relationship/Rapport:   care explained   choices provided   questions answered   questions encouraged   thoughts/feelings acknowledged  Goal: Readiness for Transition of Care  Outcome: Progressing     Problem: Comorbidity Management  Goal: Blood Glucose Level Within Target Range  Outcome: Progressing  Intervention: Monitor and Manage Glycemia  Recent Flowsheet Documentation  Taken 6/12/2025 0600 by Milli Jacome RN  Medication Review/Management: medications reviewed  Taken 6/12/2025 0400 by Milli Jacome RN  Medication Review/Management: medications reviewed  Taken 6/12/2025 0200 by Milli Jacome RN  Medication Review/Management: medications reviewed  Taken 6/12/2025 0000 by Milli Jacome RN  Medication Review/Management: medications reviewed  Taken 6/11/2025 2200 by Milli Jacome RN  Medication Review/Management: medications reviewed  Taken 6/11/2025 2117 by Milli Jacome RN  Medication Review/Management: medications reviewed  Taken 6/11/2025 2000 by Milli Jacome RN  Medication Review/Management: medications reviewed  Goal: Maintenance of Osteoarthritis Symptom Control  Outcome: Progressing  Intervention: Maintain Osteoarthritis Symptom Control  Recent Flowsheet Documentation  Taken 6/12/2025 0600 by Milli Jacome RN  Activity Management: activity minimized  Medication Review/Management: medications reviewed  Taken 6/12/2025 0400 by Milli Jacome RN  Activity Management: activity minimized  Medication Review/Management: medications reviewed  Taken 6/12/2025 0200 by Milli Jacome RN  Activity Management: activity minimized  Medication Review/Management: medications reviewed  Taken 6/12/2025 0000 by Milli Jacome RN  Activity Management: activity minimized  Medication Review/Management: medications reviewed  Taken 6/11/2025 2200 by Milli Jacome  RN  Activity Management: activity minimized  Medication Review/Management: medications reviewed  Taken 6/11/2025 2117 by Milli Jacome RN  Activity Management: back to bed  Medication Review/Management: medications reviewed  Taken 6/11/2025 2000 by Milli Jacome RN  Activity Management:   activity encouraged   up in chair  Medication Review/Management: medications reviewed     Problem: Sepsis/Septic Shock  Goal: Optimal Coping  Outcome: Progressing  Goal: Absence of Bleeding  Outcome: Progressing  Goal: Blood Glucose Level Within Target Range  Outcome: Progressing  Goal: Absence of Infection Signs and Symptoms  Outcome: Progressing  Intervention: Promote Recovery  Recent Flowsheet Documentation  Taken 6/12/2025 0600 by Milli Jacome RN  Activity Management: activity minimized  Taken 6/12/2025 0400 by Milli Jacome RN  Activity Management: activity minimized  Taken 6/12/2025 0200 by Milli Jacome RN  Activity Management: activity minimized  Taken 6/12/2025 0000 by Milli Jacome RN  Activity Management: activity minimized  Taken 6/11/2025 2200 by Milli Jacome RN  Activity Management: activity minimized  Taken 6/11/2025 2117 by Milli Jacome RN  Activity Management: back to bed  Taken 6/11/2025 2000 by Milli Jacome RN  Activity Management:   activity encouraged   up in chair  Goal: Optimal Nutrition Delivery  Outcome: Progressing     Problem: Fall Injury Risk  Goal: Absence of Fall and Fall-Related Injury  Outcome: Progressing  Intervention: Identify and Manage Contributors  Recent Flowsheet Documentation  Taken 6/12/2025 0600 by Milli Jacome RN  Medication Review/Management: medications reviewed  Taken 6/12/2025 0400 by Milli Jacome RN  Medication Review/Management: medications reviewed  Taken 6/12/2025 0200 by Milli Jacome RN  Medication Review/Management: medications reviewed  Taken 6/12/2025 0000 by Milli Jacome RN  Medication Review/Management: medications reviewed  Taken 6/11/2025  2200 by Milli Jacome RN  Medication Review/Management: medications reviewed  Taken 6/11/2025 2117 by Milli Jacome RN  Medication Review/Management: medications reviewed  Taken 6/11/2025 2000 by Milli Jacome RN  Medication Review/Management: medications reviewed  Intervention: Promote Injury-Free Environment  Recent Flowsheet Documentation  Taken 6/12/2025 0600 by Milli Jacome RN  Safety Promotion/Fall Prevention:   activity supervised   assistive device/personal items within reach   clutter free environment maintained   nonskid shoes/slippers when out of bed   safety round/check completed   room organization consistent  Taken 6/12/2025 0400 by Milli Jacome RN  Safety Promotion/Fall Prevention:   activity supervised   assistive device/personal items within reach   clutter free environment maintained   nonskid shoes/slippers when out of bed   room organization consistent   safety round/check completed  Taken 6/12/2025 0200 by Milli Jacome RN  Safety Promotion/Fall Prevention:   activity supervised   assistive device/personal items within reach   clutter free environment maintained   nonskid shoes/slippers when out of bed   room organization consistent   safety round/check completed  Taken 6/12/2025 0000 by Milli Jacome RN  Safety Promotion/Fall Prevention:   activity supervised   assistive device/personal items within reach   clutter free environment maintained   nonskid shoes/slippers when out of bed   room organization consistent   safety round/check completed  Taken 6/11/2025 2200 by Milli Jacome RN  Safety Promotion/Fall Prevention:   activity supervised   assistive device/personal items within reach   clutter free environment maintained   nonskid shoes/slippers when out of bed   room organization consistent   safety round/check completed  Taken 6/11/2025 2117 by Milli Jacome RN  Safety Promotion/Fall Prevention:   activity supervised   assistive device/personal items within reach    clutter free environment maintained   nonskid shoes/slippers when out of bed   safety round/check completed   room organization consistent  Taken 6/11/2025 2000 by Milli Jacome RN  Safety Promotion/Fall Prevention:   activity supervised   assistive device/personal items within reach   clutter free environment maintained   nonskid shoes/slippers when out of bed   room organization consistent   safety round/check completed     Problem: Skin Injury Risk Increased  Goal: Skin Health and Integrity  Outcome: Progressing  Intervention: Optimize Skin Protection  Recent Flowsheet Documentation  Taken 6/12/2025 0600 by Milli Jacome RN  Activity Management: activity minimized  Pressure Reduction Techniques: frequent weight shift encouraged  Head of Bed (HOB) Positioning: [facility] elevated  Pressure Reduction Devices: pressure-redistributing mattress utilized  Skin Protection:   transparent dressing maintained   incontinence pads utilized   protective footwear used  Taken 6/12/2025 0400 by Milli Jacome RN  Activity Management: activity minimized  Pressure Reduction Techniques: frequent weight shift encouraged  Head of Bed (HOB) Positioning: [facility] elevated  Pressure Reduction Devices: pressure-redistributing mattress utilized  Skin Protection:   transparent dressing maintained   incontinence pads utilized   protective footwear used   silicone foam dressing in place  Taken 6/12/2025 0200 by Milli Jacome RN  Activity Management: activity minimized  Pressure Reduction Techniques: frequent weight shift encouraged  Head of Bed (HOB) Positioning: [facility] elevated  Pressure Reduction Devices: pressure-redistributing mattress utilized  Skin Protection:   transparent dressing maintained   incontinence pads utilized   protective footwear used  Taken 6/12/2025 0000 by Milli Jacome RN  Activity Management: activity minimized  Pressure Reduction Techniques: frequent weight shift encouraged  Head of Bed (HOB) Positioning: [facility]  elevated  Pressure Reduction Devices: pressure-redistributing mattress utilized  Skin Protection:   transparent dressing maintained   incontinence pads utilized   protective footwear used  Taken 6/11/2025 2200 by Milli Jacome RN  Activity Management: activity minimized  Pressure Reduction Techniques: frequent weight shift encouraged  Head of Bed (HOB) Positioning: [facility] elevated  Pressure Reduction Devices: pressure-redistributing mattress utilized  Skin Protection:   transparent dressing maintained   incontinence pads utilized   protective footwear used  Taken 6/11/2025 2117 by Milli Jacome RN  Activity Management: back to bed  Pressure Reduction Techniques: frequent weight shift encouraged  Head of Bed (HOB) Positioning: [facility] elevated  Pressure Reduction Devices: pressure-redistributing mattress utilized  Skin Protection:   transparent dressing maintained   incontinence pads utilized   protective footwear used  Taken 6/11/2025 2000 by Milli Jacome RN  Activity Management:   activity encouraged   up in chair  Pressure Reduction Techniques: frequent weight shift encouraged  Head of Bed (HOB) Positioning: [facility] elevated  Pressure Reduction Devices: pressure-redistributing mattress utilized  Skin Protection:   transparent dressing maintained   incontinence pads utilized   protective footwear used     Problem: Noninvasive Ventilation Acute  Goal: Effective Unassisted Ventilation and Oxygenation  Outcome: Progressing   Goal Outcome Evaluation:

## 2025-06-12 NOTE — PLAN OF CARE
Problem: Adult Inpatient Plan of Care  Goal: Plan of Care Review  Outcome: Progressing  Goal: Patient-Specific Goal (Individualized)  Outcome: Progressing  Goal: Absence of Hospital-Acquired Illness or Injury  Outcome: Progressing  Intervention: Identify and Manage Fall Risk  Recent Flowsheet Documentation  Taken 6/12/2025 1600 by Nanci Rowley RN  Safety Promotion/Fall Prevention:   activity supervised   assistive device/personal items within reach   clutter free environment maintained   fall prevention program maintained   nonskid shoes/slippers when out of bed   safety round/check completed  Taken 6/12/2025 1400 by Nanci Rowley RN  Safety Promotion/Fall Prevention:   activity supervised   assistive device/personal items within reach   clutter free environment maintained   fall prevention program maintained   nonskid shoes/slippers when out of bed   safety round/check completed  Taken 6/12/2025 1200 by Nanci Rowley RN  Safety Promotion/Fall Prevention:   activity supervised   assistive device/personal items within reach   clutter free environment maintained   fall prevention program maintained   nonskid shoes/slippers when out of bed   safety round/check completed  Taken 6/12/2025 1000 by Nanci Rowley RN  Safety Promotion/Fall Prevention:   activity supervised   assistive device/personal items within reach   clutter free environment maintained   fall prevention program maintained   nonskid shoes/slippers when out of bed   safety round/check completed  Taken 6/12/2025 0800 by Nanci Rowley RN  Safety Promotion/Fall Prevention:   activity supervised   assistive device/personal items within reach   clutter free environment maintained   fall prevention program maintained   nonskid shoes/slippers when out of bed   safety round/check completed  Intervention: Prevent Skin Injury  Recent Flowsheet Documentation  Taken 6/12/2025 1600 by Nanci Rowley RN  Body Position: position changed independently  Skin  Protection:   incontinence pads utilized   protective footwear used  Taken 6/12/2025 1400 by Nanci Rowley RN  Body Position: position changed independently  Skin Protection:   incontinence pads utilized   protective footwear used  Taken 6/12/2025 1200 by Nanci Rowley RN  Body Position: position changed independently  Skin Protection:   incontinence pads utilized   protective footwear used   transparent dressing maintained  Taken 6/12/2025 1000 by Nanci Rowley RN  Body Position: position changed independently  Skin Protection:   incontinence pads utilized   protective footwear used  Taken 6/12/2025 0800 by Nanci Rowley RN  Body Position: position changed independently  Skin Protection:   incontinence pads utilized   protective footwear used   transparent dressing maintained  Intervention: Prevent Infection  Recent Flowsheet Documentation  Taken 6/12/2025 1600 by Nanci Rowley RN  Infection Prevention:   environmental surveillance performed   equipment surfaces disinfected   hand hygiene promoted   personal protective equipment utilized   rest/sleep promoted   single patient room provided   visitors restricted/screened  Taken 6/12/2025 1400 by Nanci Rowley RN  Infection Prevention:   environmental surveillance performed   equipment surfaces disinfected   hand hygiene promoted   personal protective equipment utilized   rest/sleep promoted   single patient room provided   visitors restricted/screened  Taken 6/12/2025 1200 by Nanci Rowley RN  Infection Prevention:   environmental surveillance performed   equipment surfaces disinfected   hand hygiene promoted   personal protective equipment utilized   rest/sleep promoted   single patient room provided   visitors restricted/screened  Taken 6/12/2025 1000 by Nanci Rowley RN  Infection Prevention:   single patient room provided   visitors restricted/screened   rest/sleep promoted   personal protective equipment utilized   equipment surfaces disinfected   hand  hygiene promoted   environmental surveillance performed  Taken 6/12/2025 0800 by Nanci Rowley RN  Infection Prevention:   single patient room provided   rest/sleep promoted   personal protective equipment utilized   hand hygiene promoted   equipment surfaces disinfected   environmental surveillance performed   visitors restricted/screened  Goal: Optimal Comfort and Wellbeing  Outcome: Progressing  Intervention: Provide Person-Centered Care  Recent Flowsheet Documentation  Taken 6/12/2025 0800 by Nanci Rowley RN  Trust Relationship/Rapport:   care explained   choices provided   questions answered   questions encouraged   reassurance provided  Goal: Readiness for Transition of Care  Outcome: Progressing     Problem: Comorbidity Management  Goal: Blood Glucose Level Within Target Range  Outcome: Progressing  Intervention: Monitor and Manage Glycemia  Recent Flowsheet Documentation  Taken 6/12/2025 1200 by Nanci Rowley RN  Medication Review/Management: medications reviewed  Taken 6/12/2025 1000 by Nanci Rowley RN  Medication Review/Management: medications reviewed  Taken 6/12/2025 0800 by Nanci Rowley RN  Medication Review/Management: medications reviewed  Goal: Maintenance of Osteoarthritis Symptom Control  Outcome: Progressing  Intervention: Maintain Osteoarthritis Symptom Control  Recent Flowsheet Documentation  Taken 6/12/2025 1600 by Nanci Rowley RN  Activity Management: back to bed  Taken 6/12/2025 1400 by Nanci Rowley RN  Activity Management: back to bed  Taken 6/12/2025 1200 by Nanci Rowley RN  Activity Management:   back to bed   up to bedside commode  Assistive Device Utilized: walker  Medication Review/Management: medications reviewed  Taken 6/12/2025 1000 by Nanci Rowley RN  Activity Management: back to bed  Assistive Device Utilized: walker  Medication Review/Management: medications reviewed  Taken 6/12/2025 0800 by Nanci Rowley RN  Activity Management: up in chair  Assistive Device  Utilized: walker  Medication Review/Management: medications reviewed     Problem: Sepsis/Septic Shock  Goal: Optimal Coping  Outcome: Progressing  Goal: Absence of Bleeding  Outcome: Progressing  Goal: Blood Glucose Level Within Target Range  Outcome: Progressing  Goal: Absence of Infection Signs and Symptoms  Outcome: Progressing  Intervention: Initiate Sepsis Management  Recent Flowsheet Documentation  Taken 6/12/2025 1600 by Nanci Rowley RN  Infection Prevention:   environmental surveillance performed   equipment surfaces disinfected   hand hygiene promoted   personal protective equipment utilized   rest/sleep promoted   single patient room provided   visitors restricted/screened  Taken 6/12/2025 1400 by Nanci Rowley RN  Infection Prevention:   environmental surveillance performed   equipment surfaces disinfected   hand hygiene promoted   personal protective equipment utilized   rest/sleep promoted   single patient room provided   visitors restricted/screened  Taken 6/12/2025 1200 by Nanci Rowley RN  Infection Prevention:   environmental surveillance performed   equipment surfaces disinfected   hand hygiene promoted   personal protective equipment utilized   rest/sleep promoted   single patient room provided   visitors restricted/screened  Taken 6/12/2025 1000 by Nanci Rowley RN  Infection Prevention:   single patient room provided   visitors restricted/screened   rest/sleep promoted   personal protective equipment utilized   equipment surfaces disinfected   hand hygiene promoted   environmental surveillance performed  Taken 6/12/2025 0800 by Nanci Rowley RN  Infection Prevention:   single patient room provided   rest/sleep promoted   personal protective equipment utilized   hand hygiene promoted   equipment surfaces disinfected   environmental surveillance performed   visitors restricted/screened  Intervention: Promote Recovery  Recent Flowsheet Documentation  Taken 6/12/2025 1600 by Nanci Rowley  RN  Activity Management: back to bed  Taken 6/12/2025 1400 by Nanci Rowley RN  Activity Management: back to bed  Taken 6/12/2025 1200 by Nanci Rowley RN  Activity Management:   back to bed   up to bedside commode  Taken 6/12/2025 1000 by Nanci Rowley RN  Activity Management: back to bed  Taken 6/12/2025 0800 by Nanci Rowley RN  Activity Management: up in chair  Goal: Optimal Nutrition Delivery  Outcome: Progressing     Problem: Fall Injury Risk  Goal: Absence of Fall and Fall-Related Injury  Outcome: Progressing  Intervention: Identify and Manage Contributors  Recent Flowsheet Documentation  Taken 6/12/2025 1200 by Nanci Rowley RN  Medication Review/Management: medications reviewed  Taken 6/12/2025 1000 by Nanci Rowley RN  Medication Review/Management: medications reviewed  Taken 6/12/2025 0800 by Nanci Rowley RN  Medication Review/Management: medications reviewed  Intervention: Promote Injury-Free Environment  Recent Flowsheet Documentation  Taken 6/12/2025 1600 by Nanci Rowley RN  Safety Promotion/Fall Prevention:   activity supervised   assistive device/personal items within reach   clutter free environment maintained   fall prevention program maintained   nonskid shoes/slippers when out of bed   safety round/check completed  Taken 6/12/2025 1400 by Nanci Rowley RN  Safety Promotion/Fall Prevention:   activity supervised   assistive device/personal items within reach   clutter free environment maintained   fall prevention program maintained   nonskid shoes/slippers when out of bed   safety round/check completed  Taken 6/12/2025 1200 by Nanci Rowley RN  Safety Promotion/Fall Prevention:   activity supervised   assistive device/personal items within reach   clutter free environment maintained   fall prevention program maintained   nonskid shoes/slippers when out of bed   safety round/check completed  Taken 6/12/2025 1000 by Nanci Rowley RN  Safety Promotion/Fall Prevention:   activity  supervised   assistive device/personal items within reach   clutter free environment maintained   fall prevention program maintained   nonskid shoes/slippers when out of bed   safety round/check completed  Taken 6/12/2025 0800 by Nanci Rowley RN  Safety Promotion/Fall Prevention:   activity supervised   assistive device/personal items within reach   clutter free environment maintained   fall prevention program maintained   nonskid shoes/slippers when out of bed   safety round/check completed     Problem: Skin Injury Risk Increased  Goal: Skin Health and Integrity  Outcome: Progressing  Intervention: Optimize Skin Protection  Recent Flowsheet Documentation  Taken 6/12/2025 1600 by Nanci Rowley RN  Activity Management: back to bed  Pressure Reduction Techniques:   frequent weight shift encouraged   weight shift assistance provided  Head of Bed (HOB) Positioning: \[facility]\"" elevated  Pressure Reduction Devices: pressure-redistributing mattress utilized  Skin Protection:   incontinence pads utilized   protective footwear used  Taken 6/12/2025 1400 by Nanci Rowley RN  Activity Management: back to bed  Pressure Reduction Techniques:   frequent weight shift encouraged   weight shift assistance provided  Head of Bed (HOB) Positioning: \[facility]\"" elevated  Pressure Reduction Devices: pressure-redistributing mattress utilized  Skin Protection:   incontinence pads utilized   protective footwear used  Taken 6/12/2025 1200 by Nanci Rowley RN  Activity Management:   back to bed   up to bedside commode  Pressure Reduction Techniques:   frequent weight shift encouraged   weight shift assistance provided  Head of Bed (\[facility]\"") Positioning: \[facility]\"" elevated  Pressure Reduction Devices: pressure-redistributing mattress utilized  Skin Protection:   incontinence pads utilized   protective footwear used   transparent dressing maintained  Taken 6/12/2025 1000 by Nanci Rowley RN  Activity Management: back to bed  Pressure Reduction Techniques:    frequent weight shift encouraged   weight shift assistance provided  Head of Bed (HOB) Positioning: [facility] elevated  Pressure Reduction Devices: pressure-redistributing mattress utilized  Skin Protection:   incontinence pads utilized   protective footwear used  Taken 6/12/2025 0800 by Nanci Rowley, RN  Activity Management: up in chair  Pressure Reduction Techniques:   frequent weight shift encouraged   weight shift assistance provided  Head of Bed (HOB) Positioning: [facility] elevated  Pressure Reduction Devices: pressure-redistributing mattress utilized  Skin Protection:   incontinence pads utilized   protective footwear used   transparent dressing maintained     Problem: Noninvasive Ventilation Acute  Goal: Effective Unassisted Ventilation and Oxygenation  Outcome: Progressing   Goal Outcome Evaluation:

## 2025-06-13 ENCOUNTER — READMISSION MANAGEMENT (OUTPATIENT)
Dept: CALL CENTER | Facility: HOSPITAL | Age: 70
End: 2025-06-13
Payer: MEDICARE

## 2025-06-13 VITALS
HEART RATE: 68 BPM | WEIGHT: 235.5 LBS | TEMPERATURE: 97.5 F | OXYGEN SATURATION: 96 % | SYSTOLIC BLOOD PRESSURE: 132 MMHG | RESPIRATION RATE: 18 BRPM | HEIGHT: 74 IN | BODY MASS INDEX: 30.22 KG/M2 | DIASTOLIC BLOOD PRESSURE: 66 MMHG

## 2025-06-13 LAB
BACTERIA SPEC AEROBE CULT: NORMAL
CYTO UR: NORMAL
GLUCOSE BLDC GLUCOMTR-MCNC: 185 MG/DL (ref 70–130)
GLUCOSE BLDC GLUCOMTR-MCNC: 93 MG/DL (ref 70–130)
GRAM STN SPEC: NORMAL
LAB AP CASE REPORT: NORMAL
LAB AP CLINICAL INFORMATION: NORMAL
PATH REPORT.FINAL DX SPEC: NORMAL
PATH REPORT.GROSS SPEC: NORMAL

## 2025-06-13 PROCEDURE — 94799 UNLISTED PULMONARY SVC/PX: CPT

## 2025-06-13 PROCEDURE — 99239 HOSP IP/OBS DSCHRG MGMT >30: CPT | Performed by: PHYSICIAN ASSISTANT

## 2025-06-13 PROCEDURE — 82948 REAGENT STRIP/BLOOD GLUCOSE: CPT

## 2025-06-13 PROCEDURE — 25010000002 CEFEPIME PER 500 MG: Performed by: ORTHOPAEDIC SURGERY

## 2025-06-13 RX ORDER — LACTULOSE 10 G/15ML
20 SOLUTION ORAL 3 TIMES DAILY
Start: 2025-06-13

## 2025-06-13 RX ADMIN — ALLOPURINOL 100 MG: 100 TABLET ORAL at 10:29

## 2025-06-13 RX ADMIN — PHENOL 1 SPRAY: 1.5 LIQUID ORAL at 10:46

## 2025-06-13 RX ADMIN — PANTOPRAZOLE SODIUM 40 MG: 40 TABLET, DELAYED RELEASE ORAL at 06:14

## 2025-06-13 RX ADMIN — METRONIDAZOLE 500 MG: 500 TABLET ORAL at 06:14

## 2025-06-13 RX ADMIN — CEFEPIME 2000 MG: 2 INJECTION, POWDER, FOR SOLUTION INTRAVENOUS at 06:15

## 2025-06-13 RX ADMIN — CARVEDILOL 6.25 MG: 6.25 TABLET, FILM COATED ORAL at 10:29

## 2025-06-13 RX ADMIN — RIFAXIMIN 550 MG: 550 TABLET ORAL at 10:29

## 2025-06-13 RX ADMIN — CETIRIZINE HYDROCHLORIDE 10 MG: 10 TABLET, FILM COATED ORAL at 10:29

## 2025-06-13 RX ADMIN — OXYCODONE HYDROCHLORIDE 10 MG: 10 TABLET ORAL at 10:44

## 2025-06-13 NOTE — CASE MANAGEMENT/SOCIAL WORK
Case Management Discharge Note      Final Note:  PACC RN has notified Margy with [facility] of the pts DC and resume HH orders placed in EPIC. No AB needed at DC.         Selected Continued Care - Admitted Since 6/9/2025       Destination    No services have been selected for the patient.                Durable Medical Equipment    No services have been selected for the patient.                Dialysis/Infusion       Service Provider Services Address Phone Fax Patient Preferred    [facility] [address], [facility] [address] [phone] --              Home Medical Care Coordination complete.      Service Provider Services Address Phone Fax Patient Preferred    [facility] [address], [facility] [address] [phone] --              Therapy    No services have been selected for the patient.                Community Resources    No services have been selected for the patient.                Community & DME    No services have been selected for the patient.                    Selected Continued Care - Prior Encounters Includes continued care and service providers with selected services from prior encounters from 3/11/2025 to 6/13/2025      Discharged on 5/2/2025 Admission date: 4/19/2025 - Discharge disposition: Skilled Nursing Facility (DC - External)      Destination       Service Provider Services Address Phone Fax Patient Preferred    [facility] [address] [facility] [address] [phone] --                               Final Discharge Disposition Code: 06 - home with home health care

## 2025-06-13 NOTE — PLAN OF CARE
Problem: Adult Inpatient Plan of Care  Goal: Plan of Care Review  Outcome: Progressing  Goal: Patient-Specific Goal (Individualized)  Outcome: Progressing  Goal: Absence of Hospital-Acquired Illness or Injury  Outcome: Progressing  Intervention: Identify and Manage Fall Risk  Recent Flowsheet Documentation  Taken 6/13/2025 0400 by Milli Jacome RN  Safety Promotion/Fall Prevention:   activity supervised   assistive device/personal items within reach   clutter free environment maintained   nonskid shoes/slippers when out of bed   room organization consistent   safety round/check completed  Taken 6/13/2025 0200 by Milli Jacome RN  Safety Promotion/Fall Prevention:   activity supervised   assistive device/personal items within reach   clutter free environment maintained   nonskid shoes/slippers when out of bed   room organization consistent   safety round/check completed  Taken 6/13/2025 0000 by Milli Jacome RN  Safety Promotion/Fall Prevention:   activity supervised   assistive device/personal items within reach   clutter free environment maintained   nonskid shoes/slippers when out of bed   room organization consistent   safety round/check completed  Taken 6/12/2025 2120 by Milli Jacome RN  Safety Promotion/Fall Prevention:   activity supervised   assistive device/personal items within reach   clutter free environment maintained   nonskid shoes/slippers when out of bed   safety round/check completed   room organization consistent  Taken 6/12/2025 2000 by Milli Jacome RN  Safety Promotion/Fall Prevention:   activity supervised   assistive device/personal items within reach   clutter free environment maintained   nonskid shoes/slippers when out of bed   room organization consistent   safety round/check completed  Intervention: Prevent Skin Injury  Recent Flowsheet Documentation  Taken 6/13/2025 0400 by Milli Jacome RN  Body Position: position changed independently  Skin Protection:   transparent  dressing maintained   incontinence pads utilized   protective footwear used  Taken 6/13/2025 0200 by Milli Jacome RN  Body Position: position changed independently  Skin Protection:   transparent dressing maintained   incontinence pads utilized   protective footwear used  Taken 6/13/2025 0000 by Milli Jacome RN  Body Position: position changed independently  Skin Protection:   transparent dressing maintained   incontinence pads utilized   protective footwear used  Taken 6/12/2025 2120 by Milli Jacome RN  Body Position: position changed independently  Skin Protection:   transparent dressing maintained   incontinence pads utilized   protective footwear used  Taken 6/12/2025 2000 by Milli Jacome RN  Body Position: position changed independently  Skin Protection:   transparent dressing maintained   incontinence pads utilized   protective footwear used  Goal: Optimal Comfort and Wellbeing  Outcome: Progressing  Goal: Readiness for Transition of Care  Outcome: Progressing     Problem: Comorbidity Management  Goal: Blood Glucose Level Within Target Range  Outcome: Progressing  Intervention: Monitor and Manage Glycemia  Recent Flowsheet Documentation  Taken 6/13/2025 0400 by Milli Jacome RN  Medication Review/Management: medications reviewed  Taken 6/13/2025 0200 by Milli Jacome RN  Medication Review/Management: medications reviewed  Taken 6/13/2025 0000 by Milli Jacome RN  Medication Review/Management: medications reviewed  Taken 6/12/2025 2120 by Milli Jacome RN  Medication Review/Management: medications reviewed  Taken 6/12/2025 2000 by Milli Jacome RN  Medication Review/Management: medications reviewed  Goal: Maintenance of Osteoarthritis Symptom Control  Outcome: Progressing  Intervention: Maintain Osteoarthritis Symptom Control  Recent Flowsheet Documentation  Taken 6/13/2025 0400 by Milli Jacome RN  Activity Management: activity minimized  Medication Review/Management: medications  reviewed  Taken 6/13/2025 0200 by Milli Jacome RN  Activity Management: activity minimized  Medication Review/Management: medications reviewed  Taken 6/13/2025 0000 by Milli Jacome RN  Activity Management: activity minimized  Medication Review/Management: medications reviewed  Taken 6/12/2025 2120 by Milli Jacome RN  Activity Management: activity encouraged  Medication Review/Management: medications reviewed  Taken 6/12/2025 2000 by Milli Jacome RN  Activity Management: activity encouraged  Medication Review/Management: medications reviewed     Problem: Sepsis/Septic Shock  Goal: Optimal Coping  Outcome: Progressing  Goal: Absence of Bleeding  Outcome: Progressing  Goal: Blood Glucose Level Within Target Range  Outcome: Progressing  Goal: Absence of Infection Signs and Symptoms  Outcome: Progressing  Intervention: Promote Recovery  Recent Flowsheet Documentation  Taken 6/13/2025 0400 by Milli Jacome RN  Activity Management: activity minimized  Taken 6/13/2025 0200 by Milli Jacome RN  Activity Management: activity minimized  Taken 6/13/2025 0000 by Milli Jacome RN  Activity Management: activity minimized  Taken 6/12/2025 2120 by Milli Jacome RN  Activity Management: activity encouraged  Taken 6/12/2025 2000 by Milli Jacome RN  Activity Management: activity encouraged  Goal: Optimal Nutrition Delivery  Outcome: Progressing     Problem: Fall Injury Risk  Goal: Absence of Fall and Fall-Related Injury  Outcome: Progressing  Intervention: Identify and Manage Contributors  Recent Flowsheet Documentation  Taken 6/13/2025 0400 by Milli Jacome RN  Medication Review/Management: medications reviewed  Taken 6/13/2025 0200 by Milli Jacome RN  Medication Review/Management: medications reviewed  Taken 6/13/2025 0000 by Milli Jacome RN  Medication Review/Management: medications reviewed  Taken 6/12/2025 2120 by Milli Jacome RN  Medication Review/Management: medications reviewed  Taken 6/12/2025  2000 by Milli Jacome RN  Medication Review/Management: medications reviewed  Intervention: Promote Injury-Free Environment  Recent Flowsheet Documentation  Taken 6/13/2025 0400 by Milli Jacome RN  Safety Promotion/Fall Prevention:   activity supervised   assistive device/personal items within reach   clutter free environment maintained   nonskid shoes/slippers when out of bed   room organization consistent   safety round/check completed  Taken 6/13/2025 0200 by Milli Jacome RN  Safety Promotion/Fall Prevention:   activity supervised   assistive device/personal items within reach   clutter free environment maintained   nonskid shoes/slippers when out of bed   room organization consistent   safety round/check completed  Taken 6/13/2025 0000 by Milli Jacome RN  Safety Promotion/Fall Prevention:   activity supervised   assistive device/personal items within reach   clutter free environment maintained   nonskid shoes/slippers when out of bed   room organization consistent   safety round/check completed  Taken 6/12/2025 2120 by Milli Jacome RN  Safety Promotion/Fall Prevention:   activity supervised   assistive device/personal items within reach   clutter free environment maintained   nonskid shoes/slippers when out of bed   safety round/check completed   room organization consistent  Taken 6/12/2025 2000 by Milli Jacome RN  Safety Promotion/Fall Prevention:   activity supervised   assistive device/personal items within reach   clutter free environment maintained   nonskid shoes/slippers when out of bed   room organization consistent   safety round/check completed     Problem: Skin Injury Risk Increased  Goal: Skin Health and Integrity  Outcome: Progressing  Intervention: Optimize Skin Protection  Recent Flowsheet Documentation  Taken 6/13/2025 0400 by Milli Jacome RN  Activity Management: activity minimized  Pressure Reduction Techniques: frequent weight shift encouraged  Head of Bed (HOB)  Positioning: [facility] elevated  Pressure Reduction Devices: pressure-redistributing mattress utilized  Skin Protection:   transparent dressing maintained   incontinence pads utilized   protective footwear used  Taken 6/13/2025 0200 by Milli Jacome RN  Activity Management: activity minimized  Pressure Reduction Techniques: frequent weight shift encouraged  Head of Bed (HOB) Positioning: [facility] elevated  Pressure Reduction Devices: pressure-redistributing mattress utilized  Skin Protection:   transparent dressing maintained   incontinence pads utilized   protective footwear used  Taken 6/13/2025 0000 by Milli Jacome RN  Activity Management: activity minimized  Pressure Reduction Techniques: frequent weight shift encouraged  Head of Bed (HOB) Positioning: [facility] elevated  Pressure Reduction Devices: pressure-redistributing mattress utilized  Skin Protection:   transparent dressing maintained   incontinence pads utilized   protective footwear used  Taken 6/12/2025 2120 by Milli Jacome RN  Activity Management: activity encouraged  Pressure Reduction Techniques: frequent weight shift encouraged  Head of Bed (HOB) Positioning: [facility] elevated  Pressure Reduction Devices: pressure-redistributing mattress utilized  Skin Protection:   transparent dressing maintained   incontinence pads utilized   protective footwear used  Taken 6/12/2025 2000 by Milli Jacome RN  Activity Management: activity encouraged  Pressure Reduction Techniques: frequent weight shift encouraged  Head of Bed (HOB) Positioning: [facility] elevated  Pressure Reduction Devices: pressure-redistributing mattress utilized  Skin Protection:   transparent dressing maintained   incontinence pads utilized   protective footwear used     Problem: Noninvasive Ventilation Acute  Goal: Effective Unassisted Ventilation and Oxygenation  Outcome: Progressing   Goal Outcome Evaluation:

## 2025-06-13 NOTE — DISCHARGE SUMMARY
"    [facility] Medicine Services  DISCHARGE SUMMARY    Patient Name: Jimmy Gabehart  : 1955  MRN: [id number]    Date of Admission: 2025  8:18 AM  Date of Discharge:  2025  Primary Care Physician: Miriam Mckeon DO    Consults       Date and Time Order Name Status Description    2025  2:45 PM Inpatient Orthopedic Surgery Consult Completed     2025  2:45 PM Inpatient Infectious Diseases Consult Completed             Hospital Course     Presenting Problem: Osteomyelitis left great toe/foot.    Active Hospital Problems    Diagnosis  POA    **Osteomyelitis [M86.9]  Yes    Cirrhosis of liver without ascites [K74.60]  Unknown    Peripheral polyneuropathy [G62.9]  Unknown    Osteomyelitis of great toe of left foot [M86.9]  Unknown    Type 2 diabetes mellitus with diabetic neuropathy [E11.40]  Yes    Primary hypertension [I10]  Yes      Resolved Hospital Problems   No resolved problems to display.          Hospital Course:  Jimmy Gabehart is a 70 y.o. male PMHx of HTN, T2DM complicated by peripheral neuropathy, and cirrhosis who presented with osteomyelitis of his L great toe. S/P amputation 6/10/25. ID managing antibiotics, ortho managing wound care. Completed course of antibiotics on  and ID felt that patient could DC home off antibiotics with removal of PICC line.      Discharge Follow Up Recommendations for outpatient labs/diagnostics:   Follow-up in 2 weeks for incision check, radiographs with Dr. Clark/orthopedics.       Patient already has follow-up with [facility] gastroenterology scheduled  at 10:30 AM and should keep this appointment.    Day of Discharge     HPI:   Resting in bed, awakens easily.  States he has difficulty with pain control late at night but had no trouble sleeping.  Wife currently at bedside.  They have been in contact with Home Health.  Patient also reports a \"dry, scratchy\" throat.  No fever or chills.        Review of " Systems   Constitutional:  Negative for chills and fever.   HENT:  Positive for sore throat.    Respiratory:  Negative for shortness of breath.    Skin:  Negative for rash.   Neurological:  Negative for headaches.   All other systems reviewed and are negative.        Vital Signs:   Temp:  [97.5 °F (36.4 °C)-98.7 °F (37.1 °C)] 97.5 °F (36.4 °C)  Heart Rate:  [58-63] 58  Resp:  [16-20] 18  BP: (116-133)/(49-69) 132/66      Physical Exam:  Constitutional: No acute distress, awake, alert  HENT: NCAT, mucous membranes moist. + glasses.  Respiratory: Clear to auscultation bilaterally, respiratory effort normal   Cardiovascular: RRR, no murmurs, rubs, or gallops  Gastrointestinal: Positive bowel sounds, soft, nontender, nondistended  Musculoskeletal: No bilateral ankle edema  Psychiatric: Appropriate affect, cooperative  Neurologic: Oriented x 3, strength symmetric in all extremities, Cranial Nerves grossly intact to confrontation, speech clear  Skin: No rashes. Dressing intact Right foot    Pertinent  and/or Most Recent Results     LAB RESULTS:      Lab 06/11/25  0502 06/10/25  0440 06/09/25  1022   WBC 2.80* 3.28* 3.56   HEMOGLOBIN 9.9* 9.4* 9.5*   HEMATOCRIT 30.4* 28.7* 29.8*   PLATELETS 92* 85* 89*   NEUTROS ABS 2.14 1.31* 1.73   IMMATURE GRANS (ABS) 0.04 0.02 0.04   LYMPHS ABS 0.51* 1.44 1.23   MONOS ABS 0.10 0.46 0.51   EOS ABS 0.00 0.04 0.04   .7* 107.9* 109.2*   SED RATE  --   --  95*   CRP  --   --  2.13*   LACTATE  --   --  1.7         Lab 06/11/25  0502 06/10/25  0843 06/09/25  1022   SODIUM 136 136 137   POTASSIUM 4.7 4.1 4.2   CHLORIDE 103 103 103   CO2 20.0* 23.0 24.0   ANION GAP 13.0 10.0 10.0   BUN 24.7* 21.8 23.5*   CREATININE 1.20 1.28* 1.49*   EGFR 65.1 60.2 50.2*   GLUCOSE 219* 113* 167*   CALCIUM 8.9 9.1 8.9         Lab 06/11/25  0502 06/10/25  0843 06/09/25  1022   TOTAL PROTEIN 7.7 7.5 7.6   ALBUMIN 3.5 3.4* 3.3*   GLOBULIN 4.2 4.1 4.3   ALT (SGPT) 44* 39 42*   AST (SGOT) 64* 59* 58*    BILIRUBIN 0.5 0.5 0.4   ALK PHOS 204* 191* 197*                     Brief Urine Lab Results  (Last result in the past 365 days)        Color   Clarity   Blood   Leuk Est   Nitrite   Protein   CREAT   Urine HCG        04/21/25 1742 Yellow   Clear   Negative   Negative   Negative   Negative                 Microbiology Results (last 10 days)       Procedure Component Value - Date/Time    Anaerobic Culture - Surgical Site, Toe, Left [[id number]]  (Normal) Collected: 06/10/25 1552    Lab Status: Preliminary result Specimen: Surgical Site from Toe, Left Updated: 06/13/25 0755     Anaerobic Culture No anaerobes isolated at 3 days    AFB Culture - Surgical Site, Toe, Left [[id number]] Collected: 06/10/25 1552    Lab Status: Preliminary result Specimen: Surgical Site from Toe, Left Updated: 06/12/25 0630     AFB Stain No acid fast bacilli seen on concentrated smear    Wound Culture - Surgical Site, Toe, Left [[id number]] Collected: 06/10/25 1552    Lab Status: Preliminary result Specimen: Surgical Site from Toe, Left Updated: 06/12/25 0721     Wound Culture No growth at 2 days     Gram Stain No WBCs or organisms seen            Peripheral Block  Result Date: 6/10/2025  Ramses Rodriguez CRNA     6/10/2025  3:32 PM Peripheral Block Patient reassessed immediately prior to procedure Patient location during procedure: pre-op Start time: 6/10/2025 3:17 PM Stop time: 6/10/2025 3:18 PM Reason for block: at surgeon's request and post-op pain management Performed by CRNA/CAA: Ramses Rodriguez CRNA Assisted by: Margy Samaniego RN Preanesthetic Checklist Completed: patient identified, IV checked, site marked, risks and benefits discussed, surgical consent, monitors and equipment checked, pre-op evaluation and timeout performed Prep: Sterile barriers:cap, gloves, mask and washed/disinfected hands Prep: ChloraPrep Patient monitoring: blood pressure monitoring, continuous pulse oximetry and EKG Procedure Sedation: yes Performed under:  "local infiltration Guidance:ultrasound guided ULTRASOUND INTERPRETATION.  Using ultrasound guidance a 20 G gauge needle was placed in close proximity to the nerve, at which point, under ultrasound guidance anesthetic was injected in the area of the nerve and spread of the anesthesia was seen on ultrasound in close proximity thereto.  There were no abnormalities seen on ultrasound; a digital image was taken; and the patient tolerated the procedure with no complications. Images:still images obtained, printed/placed on chart Laterality:left Block Type:popliteal Injection Technique:single-shot Needle Type:echogenic and short-bevel Needle Gauge:20 G Resistance on Injection: none Medications Used: dexamethasone sodium phosphate injection - Injection  2 mg - 6/10/2025 3:18:00 PM bupivacaine PF (MARCAINE) 0.25 % injection - Injection  30 mL - 6/10/2025 3:18:00 PM Medications Preservative Free Saline:5ml Post Assessment Injection Assessment: negative aspiration for heme, no paresthesia on injection and incremental injection Patient Tolerance:comfortable throughout block Complications:no Additional Notes SINGLE shot  A high-frequency linear transducer, with sterile cover, was placed in the popliteal fossa to identify the popliteal artery and vein, Tibial nerve (TN) and Common Peroneal nerve (CP). The transducer was then moved in a cephalad fashion to observe the TN and CP nerve bifurcation to form the Sciatic Nerve. The insertion site was prepped and draped in sterile fashion. Skin and cutaneous tissue was infiltrated with 2-5 ml of 1% Lidocaine. Using ultrasound-guidance, a 20-gauge B-Saxena 4\" Ultraplex 360 non-stimulating echogenic needle was then inserted and advanced in plane from lateral to medial. Preservative-free normal saline was utilized for hydro-dissection of tissue, advancement of Touhy, and to confirm final needle placement posterior to the nerves. Local anesthetic injection spread, in incremental 3-5 ml " injections, to surround both nerve structures. Aspiration every 5 ml to prevent intravascular injection. Injection was completed with negative aspiration of blood and negative intravascular injection. Injection pressures were normal with minimal resistance Performed by: Ramses Rodriguez CRNA     Peripheral Block  Result Date: 6/10/2025  Ramses Rodriguez CRNA     6/10/2025  3:32 PM Peripheral Block Patient reassessed immediately prior to procedure Start time: 6/10/2025 3:14 PM Stop time: 6/10/2025 3:16 PM Reason for block: at surgeon's request and post-op pain management Performed by CRNA/CAA: Ramses Rodriguez CRNA Assisted by: Margy Samaniego RN Preanesthetic Checklist Completed: patient identified, IV checked, site marked, risks and benefits discussed, surgical consent, monitors and equipment checked, pre-op evaluation and timeout performed Prep: Pt Position: supine Sterile barriers:cap, gloves, mask, sterile barriers and washed/disinfected hands Prep: ChloraPrep Patient monitoring: blood pressure monitoring, continuous pulse oximetry and EKG Procedure Performed under: spinal Guidance:ultrasound guided ULTRASOUND INTERPRETATION.  Using ultrasound guidance a 20 G gauge needle was placed in close proximity to the nerve, at which point, under ultrasound guidance anesthetic was injected in the area of the nerve and spread of the anesthesia was seen on ultrasound in close proximity thereto.  There were no abnormalities seen on ultrasound; a digital image was taken; and the patient tolerated the procedure with no complications. Images:still images obtained, printed/placed on chart Laterality:left Block Type:adductor canal block Injection Technique:single-shot Needle Type:echogenic and short-bevel Needle Gauge:20 G Resistance on Injection: none Medications Used: dexamethasone sodium phosphate injection - Injection  2 mg - 6/10/2025 3:16:00 PM bupivacaine PF (MARCAINE) 0.25 % injection - Injection  30 mL - 6/10/2025  "3:16:00 PM Medications Preservative Free Saline:5ml Post Assessment Injection Assessment: negative aspiration for heme, incremental injection and no paresthesia on injection Patient Tolerance:comfortable throughout block Complications:no Additional Notes SINGLE shot A high-frequency linear transducer, with sterile cover, was placed on the anterior mid-thigh (between the anterior superior iliac spine and patella). The transducer was then moved medially to identify the Sartorius muscle (Arina), Vastus Medialis muscle (VMM), Superficial Femoral Artery (SFA) and Vein. The transducer was then moved cephalad or caudad to position the SFA in the middle of the Arina. The insertion site was prepped and draped in sterile fashion. Skin and cutaneous tissue was infiltrated with 2-5 ml of 1% Lidocaine. Using ultrasound-guidance, a 20-gauge B-Saxena 4\" Ultraplex 360 non-stimulating echogenic needle was advanced in plane from lateral to medial. Preservative-free normal saline was utilized for hydro-dissection of tissue, advancement of needle, and to confirm needle placement below the fascial plane of the Arina where the Nerve to the VMM is located. Local anesthetic (LA) 5 ml deposited here. The needle continues its path lateral to the SFA at the level of the Saphenous Nerve. The remainder of the LA was deposited at the 10-11 o'clock position of the SFA. This injection created a space between the Arina and the SFA. Aspiration every 5 ml to prevent intravascular injection. Injection was completed with negative aspiration of blood and negative intravascular injection. Injection pressures were normal with minimal resistance. Performed by: Ramses Rodriguez CRNA     MRI Foot Left Without Contrast  Result Date: 6/10/2025  MRI FOOT LEFT WO CONTRAST Date of Exam: 6/10/2025 1:40 PM EDT Indication: eval osteomyelitis.  Comparison: Foot radiograph 6/9/2025 Technique:  Routine multiplanar/multisequence sequence images of the left foot were obtained " without contrast administration. Findings: Bones and joints: Marrow edema within the first distal phalanx. There is loss of normal T1 cortical and marrow signal of the tuft of the first distal phalanx consistent with osteomyelitis. No central joint effusion. Mild degenerative changes most advanced at the first metatarsophalangeal joint. No suspicious osseous lesions. No evidence of fracture. Partially visualized hardware at the ankle. Soft tissues: Fatty and edematous atrophy of the intrinsic foot muscles. Visualized flexor extensor tendons appear intact. Visualized plantar plates appear intact. No intermetatarsal bursitis or neuroma.     Impression: Osteomyelitis of the tuft of the first distal phalanx. Electronically Signed: Preston Goodson MD  6/10/2025 2:31 PM EDT  Workstation ID: [id number]    Duplex Lower Extremity Art / Grafts - Bilateral CAR  Result Date: 6/10/2025    Right lower extremity arteries are patent and without significant stenosis. Left lower extremity arteries are patent and without significant stenosis.   Minimal atherosclerotic plaque noted.   Normal bilateral ANUSHA at 1.1     XR Toe 2+ View Left  Result Date: 6/9/2025  XR TOE 2+ VW LEFT Date of Exam: 6/9/2025 8:46 AM EDT Indication: osteo on gt toe ? Comparison: Foot radiographs 2/8/2023 Findings: Normal soft tissue gas within the first toe. No traumatic malalignment on this nonweightbearing exam. No evidence of fracture. Mild scattered degenerative changes. There appears to be age-indeterminate erosive change/remodeling along the medial tuft of the first distal phalanx.     Impression: 1.Soft tissue gas in the first toe which could be seen with infection or wound. 2.Age-indeterminate erosive change/remodeling along the medial tuft of the first distal phalanx. This could be seen with osteomyelitis. MRI could be performed for further evaluation as clinically indicated. Electronically Signed: Preston Goodson MD  6/9/2025 9:22 AM EDT  Workstation  ID: [id number]      Results for orders placed during the hospital encounter of 06/09/25    Duplex Lower Extremity Art / Grafts - Bilateral CAR    Interpretation Summary    Right lower extremity arteries are patent and without significant stenosis. Left lower extremity arteries are patent and without significant stenosis.    Minimal atherosclerotic plaque noted.    Normal bilateral ANUSHA at 1.1      Results for orders placed during the hospital encounter of 06/09/25    Duplex Lower Extremity Art / Grafts - Bilateral CAR    Interpretation Summary    Right lower extremity arteries are patent and without significant stenosis. Left lower extremity arteries are patent and without significant stenosis.    Minimal atherosclerotic plaque noted.    Normal bilateral ANUSHA at 1.1          Plan for Follow-up of Pending Labs/Results: Ortho/Dr. Allan Clark's office to call with results of fungus culture which is the only micro study currently pending.  Pending Labs       Order Current Status    Fungus Culture - Surgical Site, Toe, Left In process    Tissue Pathology Exam In process    AFB Culture - Surgical Site, Toe, Left Preliminary result    Anaerobic Culture - Surgical Site, Toe, Left Preliminary result    Wound Culture - Surgical Site, Toe, Left Preliminary result          Discharge Details        Discharge Medications        New Medications        Instructions Start Date   naloxone 4 MG/0.1ML nasal spray  Commonly known as: NARCAN   Call 911. Don't prime. Spray in 1 nostril as needed for overdose. Repeat in 2-3 minutes in other nostril if no or minimal breathing/responsiveness.             ASK your doctor about these medications        Instructions Start Date   allopurinol 100 MG tablet  Commonly known as: ZYLOPRIM   100 mg, Daily      Azelastine HCl 137 MCG/SPRAY solution   2 sprays, 2 Times Daily PRN      carvedilol 6.25 MG tablet  Commonly known as: COREG   6.25 mg, 2 Times Daily With Meals      colchicine 0.6 MG tablet    0.6 mg, Daily      fexofenadine 180 MG tablet  Commonly known as: ALLEGRA   180 mg, Daily      fish oil 1000 MG capsule capsule   Take 1 capsule by mouth Daily With Breakfast.      gabapentin 100 MG tablet  Commonly known as: NEURONTIN   200 mg, Oral, Nightly      HumaLOG KwikPen 100 UNIT/ML solution pen-injector  Generic drug: Insulin Lispro (1 Unit Dial)  Ask about: Which instructions should I use?   10 Units, 3 Times Daily With Meals      Januvia 50 MG tablet  Generic drug: SITagliptin   50 mg, Daily      lactulose 10 GM/15ML solution  Commonly known as: CHRONULAC   20 g, Oral, 2 Times Daily      losartan 100 MG tablet  Commonly known as: COZAAR   50 mg, Daily      magnesium chloride ER 64 MG DR tablet   Daily      Magnesium Chloride 64 MG tablet   64 mg, Daily      metFORMIN 500 MG tablet  Commonly known as: GLUCOPHAGE   500 mg, Daily With Breakfast      moxifloxacin 0.5 % ophthalmic solution  Commonly known as: VIGAMOX   1 drop, 2 Times Daily      oxyCODONE 10 MG tablet  Commonly known as: ROXICODONE  Ask about: Which instructions should I use?   10 mg, Oral, Every 6 Hours PRN      oxyCODONE 5 MG immediate release tablet  Commonly known as: ROXICODONE  Ask about: Which instructions should I use?   5 mg, Oral, Every 4 Hours PRN      pantoprazole 40 MG EC tablet  Commonly known as: PROTONIX   40 mg, Daily      riFAXIMin 550 MG tablet  Commonly known as: XIFAXAN   550 mg, Oral, 2 Times Daily      tamsulosin 0.4 MG capsule 24 hr capsule  Commonly known as: FLOMAX   1 capsule, Daily      ZINC PO   50 mg, Daily               Allergies   Allergen Reactions    Demerol [Meperidine]     Penicillins Provider Review Needed     Has tolerated ceftriaxone, has had RX for cephalexin         Discharge Disposition:  Discharge home today in stable condition.  Wound Care has been arranged through Home Health      Diet:    Diet Order   Procedures    Diet: Cardiac; Healthy Heart (2-3 Na+); Fluid Consistency: Thin (IDDSI 0)             Activity:  Heel weightbearing as tolerated left lower extremity in postoperative shoe (Ortho recommendations).      Restrictions or Other Recommendations:  Wound Care Instructions (per Ortho) - To begin Post Op day 3 (6/13/25):daily dressing changes per nursing staff: Xeroform, 4 x 4, Kerlix, ACE          CODE STATUS:    Code Status and Medical Interventions: CPR (Attempt to Resuscitate); Full Support   Ordered at: 06/09/25 1156     Code Status (Patient has no pulse and is not breathing):    CPR (Attempt to Resuscitate)     Medical Interventions (Patient has pulse or is breathing):    Full Support       Future Appointments   Date Time Provider Department Center   9/9/2025 10:30 AM Liz Kaur [place]N [facility] [place] [facility] [place]  06/13/25      Time Spent on Discharge:  I spent  40  minutes on this discharge activity which included: face-to-face encounter with the patient, reviewing the data in the system, coordination of the care with the nursing staff as well as consultants, documentation, and entering orders.

## 2025-06-13 NOTE — PROGRESS NOTES
INFECTIOUS DISEASE PROGRESS NOTE    Jimmy Gabehart  1955  [id number]    Date of consult: 6/10/25    Admit date: 6/9/2025    Requesting Provider: Dr. Patel  Evaluating physician: Allan Drake MD  Reason for Consultation: L great toe osteo   Chief Complaint: toe wound      Subjective   History of present illness:  Jimmy Gabehart is a  70 y.o.  Yr old male with a pmh of HTN, recently diagnosed cirrhosis, recent left elbow bursitis, thrombocytopenia, anemia of chronic disease, and poorly controlled DM who presented on 6/9 with left great toe wound. The patient had recently been discharged from [facility] after an admission for hepatic encephalopathy and left elbow bursitis and went to inpatient rehab for a few days. He had a left great toe wound and had a scab that opened on his toe and subsequently went to podiatry after discharged from inpatient rehab. His wound was debrided by podiatry and he had nail trimming. He subsequently experienced redness, swelling, and pain over his left great toe and he presented to [facility] for cellulitis and was also suspected to have osteomyelitis at that time. He was discharged home with PICC line and IV antibiotics (data deficient) and advised to follow up with surgery and ID. His toe started to have worsening swelling and drainage. Cultures at [facility] grew 3 different organisms but currently data deficient regarding cultures. He was experiencing some chills but no fevers. He presented to the ER yesterday due to worsening left great toe swelling.     Since arrival, the patient has remained afebrile. He has pancytopenia with wbc 3.28, hgb 9.4, and plt count of 85. Cr was 1.5 on arrival but has trended down to 1.28. CRP was 2.13 and ESR was 95. Left foot xray showed soft tissue gas in the first toe and erosive changes consistent with osteomyelitis. Lower extremity arterial duplex has been ordered. Orthopedic surgery has been consulted and planning on  left foot MRI. Tentative plan is for left hallux amputation at the level of the metatarsophalangeal joint this afternoon. The patient has been started on IV daptomycin and ceftriaxone by the primary team. ID has been consulted for abx recs.     6/11/25: the patient is doing ok today. Pain under control. S/p left great toe amputation yesterday with surgical cx in progress. No fevers. No n/v/d.     6/12/25: the patient has no new complaints today. Pain is under control. No fevers. He has some bleeding from surgical site during dressing change today but reports no redness at the site. No n/v. No rashes. PICC line site is doing ok.     6/13/25: the patient is doing well today. Pain is under control. He reports that his surgical site was not bleeding much today and no redness or purulent drainage. No fevers. No n/v.     Past Medical History:   Diagnosis Date    Diabetes     Hypertension        Past Surgical History:   Procedure Laterality Date    AMPUTATION FOOT Left 6/10/2025    Procedure: HALLUX AMPUTATION AT THE LEVEL OF THE METATARSOPHALANGEAL JOINT LEFT;  Surgeon: Allan Clark Jr., MD;  Location: [facility];  Service: Orthopedics;  Laterality: Left;    BACK SURGERY      CARPAL TUNNEL RELEASE      COLONOSCOPY      EYE SURGERY      FOOT SURGERY Left 1986    Ankle fusion    HERNIA REPAIR      KNEE SURGERY Right 12/14/2022    TKA right knee manipulation 01-25-23    TONSILLECTOMY AND ADENOIDECTOMY      TRIGGER FINGER RELEASE      WISDOM TOOTH EXTRACTION         Pediatric History   Patient Parents    Not on file     Other Topics Concern    Not on file   Social History Narrative    Not on file       family history includes Arthritis in his mother; Heart attack in his father and mother; High cholesterol in his mother; Hypertension in his mother; Kidney disease in his mother.    Allergies   Allergen Reactions    Demerol [Meperidine]     Penicillins Provider Review Needed     Has tolerated ceftriaxone, has had RX for  cephalexin       Immunization History   Administered Date(s) Administered    COVID-19 (PFIZER) BIVALENT 12+YRS 11/14/2022    COVID-19 (PFIZER) Purple Cap Monovalent 08/30/2021, 09/20/2021       Medication:  No current facility-administered medications for this encounter.    Current Outpatient Medications:     allopurinol (ZYLOPRIM) 100 MG tablet, Take 1 tablet by mouth Daily., Disp: , Rfl:     Azelastine HCl 137 MCG/SPRAY solution, Administer 2 sprays into the nostril(s) as directed by provider 2 (Two) Times a Day As Needed., Disp: , Rfl:     carvedilol (COREG) 6.25 MG tablet, Take 1 tablet by mouth 2 (Two) Times a Day With Meals., Disp: , Rfl:     colchicine 0.6 MG tablet, Take 1 tablet by mouth Daily., Disp: , Rfl:     fexofenadine (ALLEGRA) 180 MG tablet, Take 1 tablet by mouth Daily., Disp: , Rfl:     gabapentin (NEURONTIN) 100 MG tablet, Take 2 tablets by mouth Every Night., Disp: 10 tablet, Rfl: 0    Insulin Lispro, 1 Unit Dial, (HumaLOG KwikPen) 100 UNIT/ML solution pen-injector, Inject 10 Units under the skin into the appropriate area as directed 3 (Three) Times a Day With Meals., Disp: , Rfl:     Januvia 50 MG tablet, Take 1 tablet by mouth Daily., Disp: , Rfl:     lactulose (CHRONULAC) 10 GM/15ML solution, Take 30 mL by mouth 3 (Three) Times a Day., Disp: , Rfl:     losartan (COZAAR) 100 MG tablet, Take 0.5 tablets by mouth Daily., Disp: , Rfl:     Magnesium Chloride 64 MG tablet, Take 64 mg by mouth Daily. (Slowmag), Disp: , Rfl:     magnesium chloride ER 64 MG DR tablet, Take  by mouth Daily., Disp: , Rfl:     metFORMIN (GLUCOPHAGE) 500 MG tablet, Take 1 tablet by mouth Daily With Breakfast., Disp: , Rfl:     moxifloxacin (VIGAMOX) 0.5 % ophthalmic solution, Administer 0.05 mL into the left eye 2 (Two) Times a Day., Disp: , Rfl:     Multiple Vitamins-Minerals (ZINC PO), Take 50 mg by mouth Daily., Disp: , Rfl:     Omega-3 Fatty Acids (fish oil) 1000 MG capsule capsule, Take 1 capsule by mouth Daily With  "Breakfast., Disp: , Rfl:     pantoprazole (PROTONIX) 40 MG EC tablet, Take 1 tablet by mouth Daily., Disp: , Rfl:     riFAXIMin (XIFAXAN) 550 MG tablet, Take 1 tablet by mouth 2 (Two) Times a Day. Indications: Impaired Brain Function due to Liver Disease, Disp: , Rfl:     tamsulosin (FLOMAX) 0.4 MG capsule 24 hr capsule, Take 1 capsule by mouth Daily., Disp: , Rfl:     naloxone (NARCAN) 4 MG/0.1ML nasal spray, Call 911. Don't prime. Spray in 1 nostril as needed for overdose. Repeat in 2-3 minutes in other nostril if no or minimal breathing/responsiveness., Disp: 2 each, Rfl: 0    oxyCODONE (ROXICODONE) 5 MG immediate release tablet, Take 1 tablet by mouth Every 4 (Four) Hours As Needed for Moderate Pain., Disp: 42 tablet, Rfl: 0    Please refer to the medical record for a full medication list    Review of Systems:  As above    Physical Exam:   Vital Signs   Temp:  [97.5 °F (36.4 °C)-98.7 °F (37.1 °C)] 97.5 °F (36.4 °C)  Heart Rate:  [58-63] 58  Resp:  [16-20] 18  BP: (116-133)/(49-69) 132/66    Temp  Min: 97.5 °F (36.4 °C)  Max: 98.7 °F (37.1 °C)  BP  Min: 116/64  Max: 133/69  Pulse  Min: 58  Max: 63  Resp  Min: 16  Max: 20  SpO2  Min: 93 %  Max: 97 %    Blood pressure 132/66, pulse 58, temperature 97.5 °F (36.4 °C), temperature source Oral, resp. rate 18, height 188 cm (74\"), weight 107 kg (235 lb 8 oz), SpO2 97%.  GENERAL: Awake and alert, in no acute distress. Resting in bed  HEENT:  Normocephalic, atraumatic. No external oral lesions noted  HEART: RRR, no murmur  LUNGS: CTAB. No respiratory distress  ABDOMEN: Soft, nontender, nondistended. No appreciable HSM.   GENITAL: no Castorena catheter  SKIN: no generalized rashes.    PSYCHIATRIC: cooperative.  Appropriate mood and affect  EXT:  left foot with surgical dressings in place s/p left great toe amputation. I reviewed a photo from dressing change today and surgical site is intact without erythema or drainage. No signs of active infection at this time  NEURO: awake " "alert and oriented ×4.  Normal speech and cognition    LUE PICC line removed          I reviewed the patient's new clinical results.  I reviewed the patient's new imaging results and agree with the interpretation.  I reviewed the patient's other test results and agree with the interpretation    Results from last 7 days   Lab Units 06/11/25  0502 06/10/25  0440 06/09/25  1022   WBC 10*3/mm3 2.80* 3.28* 3.56   HEMOGLOBIN g/dL 9.9* 9.4* 9.5*   HEMATOCRIT % 30.4* 28.7* 29.8*   PLATELETS 10*3/mm3 92* 85* 89*     Results from last 7 days   Lab Units 06/11/25  0502   SODIUM mmol/L 136   POTASSIUM mmol/L 4.7   CHLORIDE mmol/L 103   CO2 mmol/L 20.0*   BUN mg/dL 24.7*   CREATININE mg/dL 1.20   GLUCOSE mg/dL 219*   CALCIUM mg/dL 8.9     Results from last 7 days   Lab Units 06/11/25  0502   ALK PHOS U/L 204*   BILIRUBIN mg/dL 0.5   ALT (SGPT) U/L 44*   AST (SGOT) U/L 64*     Results from last 7 days   Lab Units 06/09/25  1022   SED RATE mm/hr 95*     Results from last 7 days   Lab Units 06/09/25  1022   CRP mg/dL 2.13*         Results from last 7 days   Lab Units 06/09/25  1022   LACTATE mmol/L 1.7     Estimated Creatinine Clearance: 74.6 mL/min (by C-G formula based on SCr of 1.2 mg/dL).  CPK          6/9/2025    10:22   Common Labs   Creatine Kinase 33       Procalitonin Results:       Brief Urine Lab Results  (Last result in the past 365 days)        Color   Clarity   Blood   Leuk Est   Nitrite   Protein   CREAT   Urine HCG        04/21/25 1742 Yellow   Clear   Negative   Negative   Negative   Negative                  No results found for: \"SITE\", \"ALLENTEST\", \"PHART\", \"AAY2QFD\", \"PO2ART\", \"LEC6BPI\", \"BASEEXCESS\", \"T4MUDVPD\", \"HGBBG\", \"HCTABG\", \"OXYHEMOGLOBI\", \"METHHGBN\", \"CARBOXYHGB\", \"CO2CT\", \"BAROMETRIC\", \"MODALITY\", \"FIO2\"     Microbiology:    Micro from [facility]:    Left great toe wound cx: Pseudomonas (s-cefepime, cipro, levaquin, zosyn) , enterococcus faecalis, staph hemolyticus      Radiology:  Imaging " Results (Last 72 Hours)       Procedure Component Value Units Date/Time    MRI Foot Left Without Contrast [[id number]] Collected: 06/10/25 1422     Updated: 06/10/25 1434    Narrative:        MRI FOOT LEFT WO CONTRAST    Date of Exam: 6/10/2025 1:40 PM EDT    Indication: eval osteomyelitis.     Comparison: Foot radiograph 6/9/2025    Technique:  Routine multiplanar/multisequence sequence images of the left foot were obtained without contrast administration.      Findings:  Bones and joints: Marrow edema within the first distal phalanx. There is loss of normal T1 cortical and marrow signal of the tuft of the first distal phalanx consistent with osteomyelitis. No central joint effusion. Mild degenerative changes most   advanced at the first metatarsophalangeal joint. No suspicious osseous lesions. No evidence of fracture. Partially visualized hardware at the ankle.    Soft tissues: Fatty and edematous atrophy of the intrinsic foot muscles. Visualized flexor extensor tendons appear intact. Visualized plantar plates appear intact. No intermetatarsal bursitis or neuroma.      Impression:      Impression:  Osteomyelitis of the tuft of the first distal phalanx.        Electronically Signed: Preston Goodson MD    6/10/2025 2:31 PM EDT    Workstation ID: [id number]            IMPRESSION:     Problems:  Left great toe wound with cellulitis and osteomyelitis of the distal phalanx- left great toe wound culture from OSH grew pseudomonas, enterococcus faecalis, and staph hemolyticus. Now s/p left great toe amputation at level of MTP joint on 6/10/25.   Poorly controlled DM  Cirrhosis of the liver  Chronic pancytopenia- likely due to cirrhosis  Renal dysfunction- unclear baseline Cr. His Cr was elevated to 1.49 on arrival but has improved to 1.28  PCN allergy- has tolerated cephalosporins ok thus far    RECOMMENDATIONS:  -follow cbc and cmp. Baseline CPK level ok at 33  -orthopedic surgery took the patient to the OR for left great  toe amputation at the level of the MTP joint on 6/10/25. Surgical cultures in progress, NGTD  - stop daptomycin, cefepime, and flagyl    Ok for discharge off antibiotics today. Ok to remove PICC line. I provided the patient and his wife with our office number and asked them to call if he has any worsening pain, redness, drainage, swelling at his surgical site. Surgical site currently looks good without any signs of ongoing infection.     This visit included the following complex service elements:  Complex medical decision-making associated with antimicrobial prescribing.     Allan Drake MD  6/13/2025

## 2025-06-14 NOTE — OUTREACH NOTE
Prep Survey      Flowsheet Row Responses   [facility] facility patient discharged from? [place]   Is LACE score < 7 ? No   Eligibility Readm Mgmt   Discharge diagnosis L great toe osteomyelitis/cellulitis, with gas in soft tissue per radiography  -consult ID   Does the patient have one of the following disease processes/diagnoses(primary or secondary)? Sepsis   Does the patient have Home health ordered? Yes   What is the Home health agency?  [facility]   Is there a DME ordered? No   Prep survey completed? Yes            TAYLER TRAN - Registered Nurse

## 2025-06-15 LAB
BACTERIA SPEC ANAEROBE CULT: NORMAL
FUNGUS WND CULT: NORMAL
MYCOBACTERIUM SPEC CULT: NORMAL
NIGHT BLUE STAIN TISS: NORMAL

## 2025-06-17 LAB
FUNGUS WND CULT: NORMAL
MYCOBACTERIUM SPEC CULT: NORMAL
NIGHT BLUE STAIN TISS: NORMAL

## 2025-06-19 ENCOUNTER — READMISSION MANAGEMENT (OUTPATIENT)
Dept: CALL CENTER | Facility: HOSPITAL | Age: 70
End: 2025-06-19
Payer: MEDICARE

## 2025-06-19 NOTE — OUTREACH NOTE
Sepsis Week 1 Survey      Flowsheet Row Responses   [facility] patient discharged from? [place]   Does the patient have one of the following disease processes/diagnoses(primary or secondary)? Sepsis   Week 1 attempt successful? Yes   Call start time 1432   Call end time 1436   Discharge diagnosis L great toe osteomyelitis/cellulitis, with gas in soft tissue per radiography  -consult ID   Meds reviewed with patient/caregiver? Yes   Is the patient having any side effects they believe may be caused by any medication additions or changes? No   Does the patient have all medications related to this admission filled (includes all antibiotics, inhalers, nebulizers,steroids,etc.) Yes   Is the patient taking all medications as directed (includes completed medication regime)? Yes   Comments regarding appointments post op appt on 6/26/25   Comments regarding PCP 6/24/25   Does the patient have an appointment with their PCP within 7 days of discharge? Greater than 7 days   Nursing Interventions Verified appointment date/time/provider   Has the patient kept scheduled appointments due by today? N/A   What is the Home health agency?  [facility]   Has home health visited the patient within 72 hours of discharge? Yes   DME comments pt uses a walker for balance   Psychosocial issues? No   Comments BS have been ranging around 112-128   Did the patient receive a copy of their discharge instructions? Yes   Nursing interventions Reviewed instructions with patient   What is the patient's perception of their health status since discharge? Same  [pt has lack of energy, tires easily, and feels weak]   Nursing interventions Nurse provided patient education   Is the patient/caregiver able to teach back TIME? T emperature - higher or lower than normal, I nfection - may have signs and symptoms of an infection, M ental Decline - confused, sleepy, difficult to arouse, E xtremely Ill -  severe pain, discomfort, shortness of breath   Nursing interventions Nurse provided patient education   Is patient/caregiver able to teach back steps to recovery at home? Set small, achievable goals for return to baseline health   Is the patient/caregiver able to teach back signs and symptoms of worsening condition: Fever, Hyperthermia, Altered mental status(confusion/coma), Shortness of breath/rapid respiratory rate, Edema   Is the patient/caregiver able to teach back the hierarchy of who to call/visit for symptoms/problems? PCP, Specialist, Home health nurse, Urgent Care, ED, 911 Yes   Week 1 call completed? Yes   Is the patient interested in additional calls from an ambulatory ? No   Would this patient benefit from a Referral to [facility] Social Work? No   Call end time [id number]            Dona BARNES - Registered Nurse

## 2025-06-24 LAB
FUNGUS WND CULT: NORMAL
MYCOBACTERIUM SPEC CULT: NORMAL
NIGHT BLUE STAIN TISS: NORMAL

## 2025-07-01 LAB
FUNGUS WND CULT: NORMAL
MYCOBACTERIUM SPEC CULT: NORMAL
NIGHT BLUE STAIN TISS: NORMAL

## 2025-07-02 ENCOUNTER — READMISSION MANAGEMENT (OUTPATIENT)
Dept: CALL CENTER | Facility: HOSPITAL | Age: 70
End: 2025-07-02
Payer: MEDICARE

## 2025-07-02 NOTE — OUTREACH NOTE
Sepsis Week 3 Survey      Flowsheet Row Responses   [facility] patient discharged from? [place]   Does the patient have one of the following disease processes/diagnoses(primary or secondary)? Sepsis   Week 3 attempt successful? No   Unsuccessful attempts Attempt 1            Rosa Maria NOGUERA - Registered Nurse

## 2025-07-08 LAB
FUNGUS WND CULT: NORMAL
MYCOBACTERIUM SPEC CULT: NORMAL
NIGHT BLUE STAIN TISS: NORMAL

## 2025-07-15 LAB
FUNGUS WND CULT: NORMAL
MYCOBACTERIUM SPEC CULT: NORMAL
NIGHT BLUE STAIN TISS: NORMAL

## 2025-07-22 LAB
FUNGUS WND CULT: NORMAL
MYCOBACTERIUM SPEC CULT: NORMAL
NIGHT BLUE STAIN TISS: NORMAL

## 2025-07-24 ENCOUNTER — TELEPHONE (OUTPATIENT)
Dept: GASTROENTEROLOGY | Facility: CLINIC | Age: 70
End: 2025-07-24
Payer: MEDICARE

## 2025-07-24 NOTE — TELEPHONE ENCOUNTER
Hub staff attempted to follow warm transfer process and was unsuccessful     Caller: Gabehart,Sherry    Relationship to patient: Emergency Contact    Best call back number: [phone]     Patient is needing: PT IS HAVING CIRRHOSIS FLARE UP, PT WIFE WOULD LIKE TO SPEAK WITH SOMEONE TO SEE IF JOINT PAIN IS A SYMPTOM OF THIS FLARE UP. PT HAS SEEN PCP AND KNEE SPECIALIST AND BOTH FOUND NOTHING WRONG WITH IT. PLEASE CALL AND ADVISE. PCP IS THINKING DOING A BONE SCAN BUT WANTED TO RULE OUT GASTRO FIRST

## 2025-07-24 NOTE — TELEPHONE ENCOUNTER
"I'm not sure what she means by \"cirrhosis flare-up\" however joint pain is not associated with cirrhosis.  "

## 2025-08-05 ENCOUNTER — HOSPITAL ENCOUNTER (EMERGENCY)
Facility: HOSPITAL | Age: 70
Discharge: HOME | End: 2025-08-05
Attending: EMERGENCY MEDICINE | Admitting: EMERGENCY MEDICINE
Payer: MEDICARE

## 2025-08-05 ENCOUNTER — APPOINTMENT (OUTPATIENT)
Dept: GENERAL RADIOLOGY | Facility: HOSPITAL | Age: 70
End: 2025-08-05
Payer: MEDICARE

## 2025-08-05 VITALS
TEMPERATURE: 97.6 F | BODY MASS INDEX: 30.16 KG/M2 | SYSTOLIC BLOOD PRESSURE: 145 MMHG | RESPIRATION RATE: 18 BRPM | DIASTOLIC BLOOD PRESSURE: 94 MMHG | WEIGHT: 235 LBS | OXYGEN SATURATION: 98 % | HEART RATE: 52 BPM | HEIGHT: 74 IN

## 2025-08-05 DIAGNOSIS — R31.9 HEMATURIA, UNSPECIFIED TYPE: ICD-10-CM

## 2025-08-05 DIAGNOSIS — Z96.651 HISTORY OF RIGHT KNEE JOINT REPLACEMENT: ICD-10-CM

## 2025-08-05 DIAGNOSIS — M25.561 ACUTE PAIN OF RIGHT KNEE: Primary | ICD-10-CM

## 2025-08-05 DIAGNOSIS — Z87.19 HISTORY OF CIRRHOSIS: ICD-10-CM

## 2025-08-05 LAB
ALBUMIN SERPL-MCNC: 3.6 G/DL (ref 3.5–5.2)
ALBUMIN/GLOB SERPL: 0.9 G/DL
ALP SERPL-CCNC: 164 U/L (ref 39–117)
ALT SERPL W P-5'-P-CCNC: 49 U/L (ref 1–41)
AMMONIA BLD-SCNC: 30 UMOL/L (ref 16–60)
ANION GAP SERPL CALCULATED.3IONS-SCNC: 11.6 MMOL/L (ref 5–15)
AST SERPL-CCNC: 75 U/L (ref 1–40)
BASOPHILS # BLD AUTO: 0 10*3/MM3 (ref 0–0.2)
BASOPHILS NFR BLD AUTO: 0 % (ref 0–1.5)
BILIRUB SERPL-MCNC: 0.5 MG/DL (ref 0–1.2)
BILIRUB UR QL STRIP: NEGATIVE
BUN SERPL-MCNC: 17.6 MG/DL (ref 8–23)
BUN/CREAT SERPL: 17.6 (ref 7–25)
CALCIUM SPEC-SCNC: 8.9 MG/DL (ref 8.6–10.5)
CHLORIDE SERPL-SCNC: 105 MMOL/L (ref 98–107)
CLARITY UR: CLEAR
CO2 SERPL-SCNC: 20.4 MMOL/L (ref 22–29)
COLOR UR: YELLOW
CREAT SERPL-MCNC: 1 MG/DL (ref 0.76–1.27)
DEPRECATED RDW RBC AUTO: 61.4 FL (ref 37–54)
EGFRCR SERPLBLD CKD-EPI 2021: 81 ML/MIN/1.73
EOSINOPHIL # BLD AUTO: 0.07 10*3/MM3 (ref 0–0.4)
EOSINOPHIL NFR BLD AUTO: 2 % (ref 0.3–6.2)
ERYTHROCYTE [DISTWIDTH] IN BLOOD BY AUTOMATED COUNT: 15.3 % (ref 12.3–15.4)
GEN 5 1HR TROPONIN T REFLEX: 13 NG/L
GLOBULIN UR ELPH-MCNC: 3.9 GM/DL
GLUCOSE SERPL-MCNC: 188 MG/DL (ref 65–99)
GLUCOSE UR STRIP-MCNC: NEGATIVE MG/DL
HCT VFR BLD AUTO: 32.8 % (ref 37.5–51)
HGB BLD-MCNC: 11 G/DL (ref 13–17.7)
HGB UR QL STRIP.AUTO: NEGATIVE
HOLD SPECIMEN: NORMAL
IMM GRANULOCYTES # BLD AUTO: 0.01 10*3/MM3 (ref 0–0.05)
IMM GRANULOCYTES NFR BLD AUTO: 0.3 % (ref 0–0.5)
KETONES UR QL STRIP: NEGATIVE
LEUKOCYTE ESTERASE UR QL STRIP.AUTO: NEGATIVE
LYMPHOCYTES # BLD AUTO: 1.52 10*3/MM3 (ref 0.7–3.1)
LYMPHOCYTES NFR BLD AUTO: 42.6 % (ref 19.6–45.3)
MACROCYTES BLD QL SMEAR: NORMAL
MAGNESIUM SERPL-MCNC: 2.2 MG/DL (ref 1.6–2.4)
MCH RBC QN AUTO: 36.4 PG (ref 26.6–33)
MCHC RBC AUTO-ENTMCNC: 33.5 G/DL (ref 31.5–35.7)
MCV RBC AUTO: 108.6 FL (ref 79–97)
MONOCYTES # BLD AUTO: 0.42 10*3/MM3 (ref 0.1–0.9)
MONOCYTES NFR BLD AUTO: 11.8 % (ref 5–12)
NEUTROPHILS NFR BLD AUTO: 1.55 10*3/MM3 (ref 1.7–7)
NEUTROPHILS NFR BLD AUTO: 43.3 % (ref 42.7–76)
NITRITE UR QL STRIP: NEGATIVE
NRBC BLD AUTO-RTO: 0 /100 WBC (ref 0–0.2)
PH UR STRIP.AUTO: 6 [PH] (ref 5–8)
PLATELET # BLD AUTO: 96 10*3/MM3 (ref 140–450)
PMV BLD AUTO: 11.6 FL (ref 6–12)
POTASSIUM SERPL-SCNC: 4 MMOL/L (ref 3.5–5.2)
PROT SERPL-MCNC: 7.5 G/DL (ref 6–8.5)
PROT UR QL STRIP: ABNORMAL
QT INTERVAL: 432 MS
QTC INTERVAL: 427 MS
RBC # BLD AUTO: 3.02 10*6/MM3 (ref 4.14–5.8)
SMALL PLATELETS BLD QL SMEAR: NORMAL
SODIUM SERPL-SCNC: 137 MMOL/L (ref 136–145)
SP GR UR STRIP: 1.02 (ref 1–1.03)
TROPONIN T NUMERIC DELTA: 1 NG/L
TROPONIN T SERPL HS-MCNC: 12 NG/L
UROBILINOGEN UR QL STRIP: ABNORMAL
WBC MORPH BLD: NORMAL
WBC NRBC COR # BLD AUTO: 3.57 10*3/MM3 (ref 3.4–10.8)
WHOLE BLOOD HOLD COAG: NORMAL
WHOLE BLOOD HOLD SPECIMEN: NORMAL

## 2025-08-05 PROCEDURE — 80053 COMPREHEN METABOLIC PANEL: CPT | Performed by: EMERGENCY MEDICINE

## 2025-08-05 PROCEDURE — 85025 COMPLETE CBC W/AUTO DIFF WBC: CPT | Performed by: EMERGENCY MEDICINE

## 2025-08-05 PROCEDURE — 82140 ASSAY OF AMMONIA: CPT | Performed by: EMERGENCY MEDICINE

## 2025-08-05 PROCEDURE — 73560 X-RAY EXAM OF KNEE 1 OR 2: CPT

## 2025-08-05 PROCEDURE — 83735 ASSAY OF MAGNESIUM: CPT | Performed by: EMERGENCY MEDICINE

## 2025-08-05 PROCEDURE — 85007 BL SMEAR W/DIFF WBC COUNT: CPT | Performed by: EMERGENCY MEDICINE

## 2025-08-05 PROCEDURE — 71045 X-RAY EXAM CHEST 1 VIEW: CPT

## 2025-08-05 PROCEDURE — 36415 COLL VENOUS BLD VENIPUNCTURE: CPT

## 2025-08-05 PROCEDURE — 93005 ELECTROCARDIOGRAM TRACING: CPT | Performed by: EMERGENCY MEDICINE

## 2025-08-05 PROCEDURE — 81003 URINALYSIS AUTO W/O SCOPE: CPT | Performed by: EMERGENCY MEDICINE

## 2025-08-05 PROCEDURE — 84484 ASSAY OF TROPONIN QUANT: CPT | Performed by: EMERGENCY MEDICINE

## 2025-08-05 PROCEDURE — 99284 EMERGENCY DEPT VISIT MOD MDM: CPT

## 2025-08-05 RX ORDER — SODIUM CHLORIDE 0.9 % (FLUSH) 0.9 %
10 SYRINGE (ML) INJECTION AS NEEDED
Status: DISCONTINUED | OUTPATIENT
Start: 2025-08-05 | End: 2025-08-05 | Stop reason: HOSPADM

## 2025-08-26 LAB
QT INTERVAL: 432 MS
QTC INTERVAL: 427 MS

## (undated) DEVICE — CONTAINER,SPECIMEN,OR STERILE,4OZ: Brand: MEDLINE

## (undated) DEVICE — THIN OFFSET (9.0 X 0.38 X 25.0MM)

## (undated) DEVICE — STOCKINETTE,IMPERVIOUS,12X48,STERILE: Brand: MEDLINE

## (undated) DEVICE — BNDG,ELSTC,MATRIX,STRL,4"X5YD,LF,HOOK&LP: Brand: MEDLINE

## (undated) DEVICE — BNDG ELAS CO-FLEX SLF ADHR 4IN5YD LF STRL

## (undated) DEVICE — SUT PDS 2/0 CT2 27IN VIL PDP333H

## (undated) DEVICE — DRAPE,TOP,102X53,STERILE: Brand: MEDLINE

## (undated) DEVICE — X-LARGE COTTON GLOVE: Brand: DEROYAL

## (undated) DEVICE — TRAP FLD MINIVAC MEGADYNE 100ML

## (undated) DEVICE — BANDAGE,GAUZE,BULKEE II,4.5"X4.1YD,STRL: Brand: MEDLINE

## (undated) DEVICE — CULT ANAERB

## (undated) DEVICE — BLANKT WARM UPPR/BDY ARM/OUT 57X196CM

## (undated) DEVICE — CULT AERO SGL

## (undated) DEVICE — DRSNG PAD ABD 8X10IN STRL

## (undated) DEVICE — DRSNG GZ CURAD XEROFORM NONADHR OVERWRAP 5X9IN

## (undated) DEVICE — SUT ETHLN 2/0 PS 18IN 585H

## (undated) DEVICE — Device: Brand: XPERIENCE

## (undated) DEVICE — COVER,MAYO STAND,XL,STERILE: Brand: MEDLINE

## (undated) DEVICE — PENCL SMOKE/EVAC MEGADYNE TELESCP 10FT

## (undated) DEVICE — DRP SURG U/DRP INVISISHIELD PA 48X52IN

## (undated) DEVICE — 450 ML BOTTLE OF 0.05% CHLORHEXIDINE GLUCONATE IN 99.95% STERILE WATER FOR IRRIGATION, USP AND APPLICATOR.: Brand: IRRISEPT ANTIMICROBIAL WOUND LAVAGE

## (undated) DEVICE — DRAPE,T,LIMB,BILATERAL,STERILE: Brand: MEDLINE

## (undated) DEVICE — BNDG ELAS CO-FLEX SLF ADHR 6IN 5YD LF STRL

## (undated) DEVICE — GOWN,SIRUS,POLYRNF,XLN/3XL,18/CS: Brand: MEDLINE

## (undated) DEVICE — PATIENT RETURN ELECTRODE, SINGLE-USE, CONTACT QUALITY MONITORING, ADULT, WITH 9FT CORD, FOR PATIENTS WEIGING OVER 33LBS. (15KG): Brand: MEGADYNE

## (undated) DEVICE — PK EXTREM LOWR 10

## (undated) DEVICE — GLV SURG SENSICARE PI MIC PF SZ9 LF STRL